# Patient Record
Sex: FEMALE | Race: WHITE | NOT HISPANIC OR LATINO | Employment: FULL TIME | URBAN - METROPOLITAN AREA
[De-identification: names, ages, dates, MRNs, and addresses within clinical notes are randomized per-mention and may not be internally consistent; named-entity substitution may affect disease eponyms.]

---

## 2017-06-29 ENCOUNTER — OFFICE VISIT (OUTPATIENT)
Dept: OBSTETRICS AND GYNECOLOGY | Facility: CLINIC | Age: 52
End: 2017-06-29

## 2017-06-29 VITALS
BODY MASS INDEX: 24.79 KG/M2 | WEIGHT: 134.7 LBS | DIASTOLIC BLOOD PRESSURE: 66 MMHG | SYSTOLIC BLOOD PRESSURE: 98 MMHG | HEIGHT: 62 IN

## 2017-06-29 DIAGNOSIS — D25.9 UTERINE LEIOMYOMA, UNSPECIFIED LOCATION: ICD-10-CM

## 2017-06-29 DIAGNOSIS — Z13.9 SCREENING: Primary | ICD-10-CM

## 2017-06-29 DIAGNOSIS — B00.9 HSV-2 (HERPES SIMPLEX VIRUS 2) INFECTION: ICD-10-CM

## 2017-06-29 DIAGNOSIS — F17.200 SMOKER: ICD-10-CM

## 2017-06-29 LAB
BILIRUB BLD-MCNC: NEGATIVE MG/DL
CLARITY, POC: CLEAR
COLOR UR: YELLOW
GLUCOSE UR STRIP-MCNC: NEGATIVE MG/DL
KETONES UR QL: NEGATIVE
LEUKOCYTE EST, POC: NEGATIVE
NITRITE UR-MCNC: NEGATIVE MG/ML
PH UR: 5 [PH] (ref 5–8)
PROT UR STRIP-MCNC: NEGATIVE MG/DL
RBC # UR STRIP: NEGATIVE /UL
SP GR UR: 1 (ref 1–1.03)
UROBILINOGEN UR QL: NORMAL

## 2017-06-29 PROCEDURE — 99396 PREV VISIT EST AGE 40-64: CPT | Performed by: OBSTETRICS & GYNECOLOGY

## 2017-06-29 PROCEDURE — 99406 BEHAV CHNG SMOKING 3-10 MIN: CPT | Performed by: OBSTETRICS & GYNECOLOGY

## 2017-06-29 PROCEDURE — 81002 URINALYSIS NONAUTO W/O SCOPE: CPT | Performed by: OBSTETRICS & GYNECOLOGY

## 2017-06-29 RX ORDER — SITAGLIPTIN 50 MG/1
TABLET, FILM COATED ORAL
COMMUNITY
Start: 2017-06-22 | End: 2020-01-04 | Stop reason: HOSPADM

## 2017-07-02 PROBLEM — R51.9 HEADACHE: Status: ACTIVE | Noted: 2017-07-02

## 2017-07-02 PROBLEM — D21.9 FIBROIDS: Status: ACTIVE | Noted: 2017-07-02

## 2017-07-02 PROBLEM — B00.9 HSV-2 (HERPES SIMPLEX VIRUS 2) INFECTION: Status: ACTIVE | Noted: 2017-07-02

## 2017-07-02 PROBLEM — F17.200 SMOKER: Status: ACTIVE | Noted: 2017-07-02

## 2017-07-02 PROBLEM — E11.9 DIABETES MELLITUS (HCC): Status: ACTIVE | Noted: 2017-07-02

## 2017-07-02 RX ORDER — VALACYCLOVIR HYDROCHLORIDE 500 MG/1
500 TABLET, FILM COATED ORAL DAILY
Qty: 90 TABLET | Refills: 3 | Status: SHIPPED | OUTPATIENT
Start: 2017-07-02 | End: 2018-09-02 | Stop reason: SDUPTHER

## 2017-08-21 ENCOUNTER — HOSPITAL ENCOUNTER (OUTPATIENT)
Dept: MAMMOGRAPHY | Facility: HOSPITAL | Age: 52
Discharge: HOME | End: 2017-08-21
Attending: OBSTETRICS & GYNECOLOGY | Admitting: OBSTETRICS & GYNECOLOGY

## 2017-08-21 DIAGNOSIS — Z13.9 SCREENING: ICD-10-CM

## 2017-08-21 PROCEDURE — G0202 SCR MAMMO BI INCL CAD: HCPCS

## 2017-09-16 ENCOUNTER — APPOINTMENT (OUTPATIENT)
Dept: GENERAL RADIOLOGY | Facility: HOSPITAL | Age: 52
End: 2017-09-16

## 2017-09-16 ENCOUNTER — HOSPITAL ENCOUNTER (EMERGENCY)
Facility: HOSPITAL | Age: 52
Discharge: HOME | End: 2017-09-16
Attending: EMERGENCY MEDICINE | Admitting: EMERGENCY MEDICINE

## 2017-09-16 VITALS
TEMPERATURE: 98.4 F | HEART RATE: 96 BPM | DIASTOLIC BLOOD PRESSURE: 77 MMHG | SYSTOLIC BLOOD PRESSURE: 117 MMHG | RESPIRATION RATE: 16 BRPM | HEIGHT: 62 IN | BODY MASS INDEX: 22.45 KG/M2 | WEIGHT: 122 LBS | OXYGEN SATURATION: 98 %

## 2017-09-16 DIAGNOSIS — S62.324A CLOSED DISPLACED FRACTURE OF SHAFT OF FOURTH METACARPAL BONE OF RIGHT HAND, INITIAL ENCOUNTER: Primary | ICD-10-CM

## 2017-09-16 PROCEDURE — 26600 TREAT METACARPAL FRACTURE: CPT | Performed by: EMERGENCY MEDICINE

## 2017-09-16 PROCEDURE — 29125 APPL SHORT ARM SPLINT STATIC: CPT | Performed by: EMERGENCY MEDICINE

## 2017-09-16 PROCEDURE — 73130 X-RAY EXAM OF HAND: CPT

## 2017-09-16 PROCEDURE — 99283 EMERGENCY DEPT VISIT LOW MDM: CPT

## 2017-09-16 RX ORDER — HYDROCODONE BITARTRATE AND ACETAMINOPHEN 5; 325 MG/1; MG/1
1 TABLET ORAL EVERY 8 HOURS PRN
Qty: 15 TABLET | Refills: 0 | Status: SHIPPED | OUTPATIENT
Start: 2017-09-16 | End: 2017-09-18 | Stop reason: SDUPTHER

## 2017-09-16 RX ORDER — HYDROCODONE BITARTRATE AND ACETAMINOPHEN 5; 325 MG/1; MG/1
1 TABLET ORAL ONCE
Status: COMPLETED | OUTPATIENT
Start: 2017-09-16 | End: 2017-09-16

## 2017-09-16 RX ADMIN — HYDROCODONE BITARTRATE AND ACETAMINOPHEN 1 TABLET: 5; 325 TABLET ORAL at 19:07

## 2017-09-16 NOTE — DISCHARGE INSTRUCTIONS
Keep your hand in the splint that we provided until you are seen by the orthopedics doctor this week.  Please return to the emergency room for any worsening pain, swelling, weakness, numbness or any other concerns.

## 2017-09-16 NOTE — ED PROVIDER NOTES
Subjective   History of Present Illness  History of Present Illness    Chief complaint: Hand injury    Location: Right hand    Quality/Severity:  Moderate pain    Timing/Duration: Occurred earlier this morning    Modifying Factors: Worse with movement of the hand    Narrative: This patient presents for evaluation of a right hand injury that she sustained this morning.  She went outside to take a trash bag out the trash can.  When she turned around to go upstairs, she lost her footing and fell forward.  She smacked her right hand against the railing along the back side of the hand bones.  This caused some immediate pain and subsequently some swelling.  She tried to take some ibuprofen at home but that did not help the pain at all.  The tenderness seemed to increase at the afternoon and it was hurting more and more if she tried to move her hand.  She is right-hand dominant.  She denies any other areas of injury or concern.    Associated Symptoms: none      Review of Systems   Constitutional: Negative for activity change and diaphoresis.   HENT: Negative.    Respiratory: Negative for cough and shortness of breath.    Cardiovascular: Negative for chest pain.   Gastrointestinal: Negative for abdominal pain.   Musculoskeletal: Positive for arthralgias and joint swelling. Negative for gait problem.   Skin: Negative for color change.   Neurological: Negative for syncope and headaches.   All other systems reviewed and are negative.      Past Medical History:   Diagnosis Date   • Abnormal Pap smear of cervix     1 abnormal pap with normal f/u   • Diabetes mellitus    • Fibroid    • HSV-2 (herpes simplex virus 2) infection 2017   • Menstrual disorder    • Migraines    • Seasonal allergies        No Known Allergies    Past Surgical History:   Procedure Laterality Date   • CARPAL TUNNEL RELEASE WITH CUBITAL TUNNEL RELEASE Right    •  SECTION      X2   • COLONOSCOPY N/A 2016    Procedure: COLONOSCOPY with  polypectomy;  Surgeon: Maryan Kent MD;  Location: [facility];  Service:    • ENDOMETRIAL ABLATION      thermachoice   • HYSTEROSCOPY      AND ENDOMETRIAL  ABLATION   • NASAL SEPTUM SURGERY     • TUBAL ABDOMINAL LIGATION         Family History   Problem Relation Age of Onset   • Hypertension Mother    • Hyperlipidemia Mother    • Atrial fibrillation Mother    • Stroke Mother    • Heart attack Father    • Hypertension Father    • Diabetes Father    • Alcohol abuse Father    • Dementia Father    • Stroke Sister    • Crohn's disease Sister    • Heart attack Brother    • Hypertension Brother    • Stroke Brother    • Alzheimer's disease Paternal Grandmother    • Diabetes Paternal Aunt    • Rheum arthritis Other    • Colon cancer Maternal Uncle    • Breast cancer Neg Hx    • Ovarian cancer Neg Hx        Social History     Social History   • Marital status:      Spouse name: N/A   • Number of children: N/A   • Years of education: N/A     Social History Main Topics   • Smoking status: Former Smoker   • Smokeless tobacco: Never Used   • Alcohol use Yes      Comment: OCCASIONALLY   • Drug use: No   • Sexual activity: Yes     Partners: Male     Birth control/ protection: Surgical     Other Topics Concern   • None     Social History Narrative   • None     ED Triage Vitals   Temp Heart Rate Resp BP SpO2   09/16/17 1747 09/16/17 1747 09/16/17 1747 09/16/17 1747 09/16/17 1747   98.4 °F (36.9 °C) 96 16 117/77 98 %      Temp src Heart Rate Source Patient Position BP Location FiO2 (%)   09/16/17 1747 -- -- -- --   Oral               Objective   Physical Exam   Constitutional: She is oriented to person, place, and time. She appears well-developed and well-nourished. No distress.   HENT:   Head: Normocephalic and atraumatic.   Eyes: EOM are normal. Pupils are equal, round, and reactive to light. Right eye exhibits no discharge. Left eye exhibits no discharge.   Neck: Normal range of motion. Neck supple.   Cardiovascular:  Normal rate and intact distal pulses.    Pulmonary/Chest: Effort normal. No respiratory distress.   Musculoskeletal: She exhibits tenderness. She exhibits no edema or deformity.   Moderate tenderness noted throughout the dorsal aspect of the right third, fourth, fifth metacarpal bones.  There is some moderate swelling and ecchymosis noted in these areas also.  Patient has decreased range of motion with flexion and extension of the fingers due to pain.  Distal sensation is intact to the fingers appropriately.  The wrist appears to be nontender and nonswollen.  She is able to flex and extend the wrist appropriately.  Radial pulses easily palpated.   Neurological: She is alert and oriented to person, place, and time.   Skin: Skin is warm and dry. No rash noted. She is not diaphoretic. No erythema.   Psychiatric: She has a normal mood and affect. Her behavior is normal. Judgment and thought content normal.   Nursing note and vitals reviewed.    RADIOLOGY        Study: X-ray right hand    Findings: Fourth metacarpal fracture    Interpreted contemporaneously with treatment by myself, independently viewed by me        Orthopedic Injury Treatment  Date/Time: 9/16/2017 7:40 PM  Performed by: MARIBETH STAFFORD  Authorized by: MARIBETH STAFFORD   Consent: Verbal consent obtained.  Risks and benefits: risks, benefits and alternatives were discussed  Consent given by: patient  Patient understanding: patient states understanding of the procedure being performed  Patient consent: the patient's understanding of the procedure matches consent given  Procedure consent: procedure consent matches procedure scheduled  Imaging studies: imaging studies available  Patient identity confirmed: verbally with patient  Injury location: hand  Location details: right hand  Injury type: fracture  Fracture type: fourth metacarpal  Pre-procedure neurovascular assessment: neurovascularly intact  Pre-procedure distal perfusion: normal  Pre-procedure  neurological function: normal  Pre-procedure range of motion: reduced    Anesthesia:  Local anesthesia used: no    Sedation:  Patient sedated: no  Manipulation performed: no  Immobilization: splint  Splint type: ulnar gutter  Supplies used: Ortho-Glass  Post-procedure neurovascular assessment: post-procedure neurovascularly intact  Post-procedure distal perfusion: normal  Post-procedure neurological function: normal  Post-procedure range of motion: unchanged  Patient tolerance: Patient tolerated the procedure well with no immediate complications               ED Course  ED Course   Comment By Time   I reviewed the x-ray which shows fourth metacarpal fracture as suspected clinically.  I placed her in an ulnar gutter splint using Ortho-Glass material.  We'll give her a prescription for Norco for the next few days.  Advised follow-up with orthopedics this week for repeat evaluation. Asael Barbour MD 09/16 1942                  MDM  Number of Diagnoses or Management Options  Closed displaced fracture of shaft of fourth metacarpal bone of right hand, initial encounter:      Amount and/or Complexity of Data Reviewed  Tests in the radiology section of CPT®: reviewed and ordered  Independent visualization of images, tracings, or specimens: yes    Risk of Complications, Morbidity, and/or Mortality  Presenting problems: moderate  Diagnostic procedures: moderate  Management options: moderate        Final diagnoses:   Closed displaced fracture of shaft of fourth metacarpal bone of right hand, initial encounter            Asael Barbour MD  09/16/17 1944

## 2017-09-18 ENCOUNTER — OFFICE VISIT (OUTPATIENT)
Dept: ORTHOPEDIC SURGERY | Facility: CLINIC | Age: 52
End: 2017-09-18

## 2017-09-18 VITALS
BODY MASS INDEX: 22.45 KG/M2 | DIASTOLIC BLOOD PRESSURE: 69 MMHG | HEIGHT: 62 IN | HEART RATE: 93 BPM | SYSTOLIC BLOOD PRESSURE: 101 MMHG | WEIGHT: 122 LBS

## 2017-09-18 DIAGNOSIS — S62.334A CLOSED DISPLACED FRACTURE OF NECK OF FOURTH METACARPAL BONE OF RIGHT HAND, INITIAL ENCOUNTER: Primary | ICD-10-CM

## 2017-09-18 PROCEDURE — 26600 TREAT METACARPAL FRACTURE: CPT | Performed by: ORTHOPAEDIC SURGERY

## 2017-09-18 PROCEDURE — 99203 OFFICE O/P NEW LOW 30 MIN: CPT | Performed by: ORTHOPAEDIC SURGERY

## 2017-09-18 RX ORDER — HYDROCODONE BITARTRATE AND ACETAMINOPHEN 5; 325 MG/1; MG/1
1 TABLET ORAL EVERY 8 HOURS PRN
Qty: 24 TABLET | Refills: 0 | Status: SHIPPED | OUTPATIENT
Start: 2017-09-18 | End: 2017-09-23

## 2017-09-18 NOTE — PROGRESS NOTES
Subjective: Right hand pain     Patient ID: Lona Dupree is a 52 y.o. female.    Chief Complaint:    History of Present Illness 52-year-old female is seen for her right dominant hand she injured over the weekend when she fell resulting in a right fourth metacarpal shaft fracture.  Was seen in the emergency room where she was x-rayed and splinted now presents here.  He is complaining of moderate pain.       Social History     Occupational History   • Not on file.     Social History Main Topics   • Smoking status: Former Smoker   • Smokeless tobacco: Never Used   • Alcohol use Yes      Comment: OCCASIONALLY   • Drug use: No   • Sexual activity: Yes     Partners: Male     Birth control/ protection: Surgical      Review of Systems   Constitutional: Negative for chills, diaphoresis, fever and unexpected weight change.   HENT: Negative for hearing loss, nosebleeds, sore throat and tinnitus.    Eyes: Negative for pain and visual disturbance.   Respiratory: Negative for cough, shortness of breath and wheezing.    Cardiovascular: Negative for chest pain and palpitations.   Gastrointestinal: Negative for abdominal pain, diarrhea, nausea and vomiting.   Endocrine: Negative for cold intolerance, heat intolerance and polydipsia.   Genitourinary: Negative for difficulty urinating, dysuria and hematuria.   Musculoskeletal: Positive for arthralgias, joint swelling and myalgias.   Skin: Negative for rash and wound.   Allergic/Immunologic: Negative for environmental allergies.   Neurological: Negative for dizziness, syncope and numbness.   Hematological: Does not bruise/bleed easily.   Psychiatric/Behavioral: Negative for dysphoric mood and sleep disturbance. The patient is not nervous/anxious.          Past Medical History:   Diagnosis Date   • Abnormal Pap smear of cervix     1 abnormal pap with normal f/u   • Diabetes mellitus    • Fibroid    • HSV-2 (herpes simplex virus 2) infection 7/2/2017   • Menstrual disorder    •  Migraines    • Seasonal allergies      Past Surgical History:   Procedure Laterality Date   • CARPAL TUNNEL RELEASE WITH CUBITAL TUNNEL RELEASE Right    •  SECTION      X2   • COLONOSCOPY N/A 2016    Procedure: COLONOSCOPY with polypectomy;  Surgeon: Maryan Kent MD;  Location: [facility];  Service:    • ENDOMETRIAL ABLATION      thermachoice   • HYSTEROSCOPY      AND ENDOMETRIAL  ABLATION   • NASAL SEPTUM SURGERY     • TUBAL ABDOMINAL LIGATION       Family History   Problem Relation Age of Onset   • Hypertension Mother    • Hyperlipidemia Mother    • Atrial fibrillation Mother    • Stroke Mother    • Heart attack Father    • Hypertension Father    • Diabetes Father    • Alcohol abuse Father    • Dementia Father    • Stroke Sister    • Crohn's disease Sister    • Heart attack Brother    • Hypertension Brother    • Stroke Brother    • Alzheimer's disease Paternal Grandmother    • Diabetes Paternal Aunt    • Rheum arthritis Other    • Colon cancer Maternal Uncle    • Breast cancer Neg Hx    • Ovarian cancer Neg Hx          Objective:  Vitals:    17 1355   BP: 101/69   Pulse: 93     Last 3 weights    17  1355   Weight: 122 lb (55.3 kg)     Body mass index is 22.31 kg/(m^2).       Ortho Exam  reviewed the x-rays AP lateral oblique done at the Hospital shows fracture of the fourth metacarpal shaft.  The mid metacarpal fracture oblique in nature with some slight shortening.  Does not appear to be any significant malrotation.  She is alert and oriented ×3.  She is normocephalic.  Pupils equal round reactive to light sclerae clear.  The right hand shows moderate swelling to the metacarpal area with minimal ecchymosis.  His no motor deficit good distal pulses.  Moderate to severe pain over the fourth metacarpal as expected.  Distal motor function is intact as far as radial ulnar median nerve.  No sensory loss.  No swelling to the forearm.  Skin is cool to touch.    Assessment:       1. Closed  displaced fracture of neck of fourth metacarpal bone of right hand, initial encounter          Plan:      reviewed the x-rays first with the patient.  Also reviewed her physical findings with her.  She does not have any apparent malrotation of the digit is when she goes to clench a fist there is no malrotation of that digit.  Discussed with her the nature the fracture that is slight shortening may cause an extensor lag but is no more than his right nonfunctional any functional disability.  After lengthy discussion we'll put her in a new ulnar gutter splint return next week for repeat x-rays to evaluate any further shortening of that fracture.  If there is any significant shortening she may require reduction internal fixation otherwise stays as it is been to treat this nonoperatively.  Discussed this treatment plan with her and she was in agreement.  Return next week with an x-ray AP lateral oblique of the hand in the splint.      Work Status:    ABE query complete.    Orders:  No orders of the defined types were placed in this encounter.      Medications:  New Medications Ordered This Visit   Medications   • HYDROcodone-acetaminophen (NORCO) 5-325 MG per tablet     Sig: Take 1 tablet by mouth Every 8 (Eight) Hours As Needed for Severe Pain  for up to 5 days.     Dispense:  24 tablet     Refill:  0       Followup:  Return in about 1 week (around 9/25/2017).          Dragon transcription disclaimer     Much of this encounter note is an electronic transcription/translation of spoken language to printed text. The electronic translation of spoken language may permit erroneous, or at times, nonsensical words or phrases to be inadvertently transcribed. Although I have reviewed the note for such errors, some may still exist.

## 2017-09-25 ENCOUNTER — OFFICE VISIT (OUTPATIENT)
Dept: ORTHOPEDIC SURGERY | Facility: CLINIC | Age: 52
End: 2017-09-25

## 2017-09-25 VITALS — WEIGHT: 125 LBS | BODY MASS INDEX: 23 KG/M2 | HEIGHT: 62 IN

## 2017-09-25 DIAGNOSIS — R52 PAIN: Primary | ICD-10-CM

## 2017-09-25 DIAGNOSIS — S62.334A CLOSED DISPLACED FRACTURE OF NECK OF FOURTH METACARPAL BONE OF RIGHT HAND, INITIAL ENCOUNTER: ICD-10-CM

## 2017-09-25 PROCEDURE — 99024 POSTOP FOLLOW-UP VISIT: CPT | Performed by: ORTHOPAEDIC SURGERY

## 2017-09-25 PROCEDURE — 73130 X-RAY EXAM OF HAND: CPT | Performed by: ORTHOPAEDIC SURGERY

## 2017-09-25 NOTE — PROGRESS NOTES
Subjective: Right fourth metacarpal fracture     Patient ID: Lona Dupree is a 52 y.o. female.    Chief Complaint:    History of Present Illness patient is a week out from the injury and is doing well with minimal pain and discomfort.  She has been in an ulnar gutter splint since her last visit       Social History     Occupational History   • Not on file.     Social History Main Topics   • Smoking status: Former Smoker   • Smokeless tobacco: Never Used   • Alcohol use Yes      Comment: OCCASIONALLY   • Drug use: No   • Sexual activity: Yes     Partners: Male     Birth control/ protection: Surgical      Review of Systems      Past Medical History:   Diagnosis Date   • Abnormal Pap smear of cervix     1 abnormal pap with normal f/u   • Diabetes mellitus    • Fibroid    • HSV-2 (herpes simplex virus 2) infection 2017   • Menstrual disorder    • Migraines    • Seasonal allergies      Past Surgical History:   Procedure Laterality Date   • CARPAL TUNNEL RELEASE WITH CUBITAL TUNNEL RELEASE Right    •  SECTION      X2   • COLONOSCOPY N/A 2016    Procedure: COLONOSCOPY with polypectomy;  Surgeon: Maryan Kent MD;  Location: [facility];  Service:    • ENDOMETRIAL ABLATION      thermachoice   • HYSTEROSCOPY      AND ENDOMETRIAL  ABLATION   • NASAL SEPTUM SURGERY     • TUBAL ABDOMINAL LIGATION       Family History   Problem Relation Age of Onset   • Hypertension Mother    • Hyperlipidemia Mother    • Atrial fibrillation Mother    • Stroke Mother    • Heart attack Father    • Hypertension Father    • Diabetes Father    • Alcohol abuse Father    • Dementia Father    • Stroke Sister    • Crohn's disease Sister    • Heart attack Brother    • Hypertension Brother    • Stroke Brother    • Alzheimer's disease Paternal Grandmother    • Diabetes Paternal Aunt    • Rheum arthritis Other    • Colon cancer Maternal Uncle    • Breast cancer Neg Hx    • Ovarian cancer Neg Hx          Objective:  There were no vitals  filed for this visit.  Last 3 weights    09/25/17  1411   Weight: 125 lb (56.7 kg)     Body mass index is 22.86 kg/(m^2).       Ortho Exam  AP lateral oblique view of that hand shows the oblique fracture with no significant change in the position compared to the x-rays done previously.  Alignment is satisfactory.  She is doing well with minimal pain    Assessment:       1. Pain    2. Closed displaced fracture of neck of fourth metacarpal bone of right hand, initial encounter          Plan:  Fracture remains unchanged think we can continue to treat this nonoperatively.  Return to office in 10 days with a repeat x-ray of the hand.  Off work until seen.          Work Status:    Squla query complete.    Orders:  Orders Placed This Encounter   Procedures   • XR Hand 3+ View Right       Medications:  No orders of the defined types were placed in this encounter.      Followup:  Return in about 10 days (around 10/5/2017).          Dragon transcription disclaimer     Much of this encounter note is an electronic transcription/translation of spoken language to printed text. The electronic translation of spoken language may permit erroneous, or at times, nonsensical words or phrases to be inadvertently transcribed. Although I have reviewed the note for such errors, some may still exist.

## 2017-10-05 ENCOUNTER — OFFICE VISIT (OUTPATIENT)
Dept: ORTHOPEDIC SURGERY | Facility: CLINIC | Age: 52
End: 2017-10-05

## 2017-10-05 DIAGNOSIS — R52 PAIN: Primary | ICD-10-CM

## 2017-10-05 DIAGNOSIS — S62.334A CLOSED DISPLACED FRACTURE OF NECK OF FOURTH METACARPAL BONE OF RIGHT HAND, INITIAL ENCOUNTER: ICD-10-CM

## 2017-10-05 PROCEDURE — 73130 X-RAY EXAM OF HAND: CPT | Performed by: ORTHOPAEDIC SURGERY

## 2017-10-05 PROCEDURE — 99024 POSTOP FOLLOW-UP VISIT: CPT | Performed by: ORTHOPAEDIC SURGERY

## 2017-10-05 RX ORDER — HYDROCODONE BITARTRATE AND ACETAMINOPHEN 5; 325 MG/1; MG/1
1 TABLET ORAL EVERY 6 HOURS PRN
Qty: 40 TABLET | Refills: 0 | Status: SHIPPED | OUTPATIENT
Start: 2017-10-05 | End: 2017-10-31 | Stop reason: SDUPTHER

## 2017-10-05 NOTE — PROGRESS NOTES
Subjective: Right hand fracture     Patient ID: Lona Dupree is a 52 y.o. female.    Chief Complaint:    History of Present Illness patient is approximately 3 weeks out from the right fourth metacarpal shaft fracture.  She is doing well with minimal pain.  Has been in the ulnar gutter splint.       Social History     Occupational History   • Not on file.     Social History Main Topics   • Smoking status: Former Smoker   • Smokeless tobacco: Never Used   • Alcohol use Yes      Comment: OCCASIONALLY   • Drug use: No   • Sexual activity: Yes     Partners: Male     Birth control/ protection: Surgical      Review of Systems   Constitutional: Negative for chills, diaphoresis, fever and unexpected weight change.   HENT: Negative for hearing loss, nosebleeds, sore throat and tinnitus.    Eyes: Negative for pain and visual disturbance.   Respiratory: Negative for cough, shortness of breath and wheezing.    Cardiovascular: Negative for chest pain and palpitations.   Gastrointestinal: Negative for abdominal pain, diarrhea, nausea and vomiting.   Endocrine: Negative for cold intolerance, heat intolerance and polydipsia.   Genitourinary: Negative for difficulty urinating, dysuria and hematuria.   Musculoskeletal: Positive for arthralgias. Negative for joint swelling and myalgias.   Skin: Negative for rash and wound.   Allergic/Immunologic: Negative for environmental allergies.   Neurological: Negative for dizziness, syncope and numbness.   Hematological: Does not bruise/bleed easily.   Psychiatric/Behavioral: Negative for dysphoric mood and sleep disturbance. The patient is not nervous/anxious.    All other systems reviewed and are negative.        Past Medical History:   Diagnosis Date   • Abnormal Pap smear of cervix     1 abnormal pap with normal f/u   • Diabetes mellitus    • Fibroid    • HSV-2 (herpes simplex virus 2) infection 7/2/2017   • Menstrual disorder    • Migraines    • Seasonal allergies      Past Surgical  History:   Procedure Laterality Date   • CARPAL TUNNEL RELEASE WITH CUBITAL TUNNEL RELEASE Right    •  SECTION      X2   • COLONOSCOPY N/A 2016    Procedure: COLONOSCOPY with polypectomy;  Surgeon: Maryan Kent MD;  Location: [facility];  Service:    • ENDOMETRIAL ABLATION      thermachoice   • HYSTEROSCOPY      AND ENDOMETRIAL  ABLATION   • NASAL SEPTUM SURGERY     • TUBAL ABDOMINAL LIGATION       Family History   Problem Relation Age of Onset   • Hypertension Mother    • Hyperlipidemia Mother    • Atrial fibrillation Mother    • Stroke Mother    • Heart attack Father    • Hypertension Father    • Diabetes Father    • Alcohol abuse Father    • Dementia Father    • Stroke Sister    • Crohn's disease Sister    • Heart attack Brother    • Hypertension Brother    • Stroke Brother    • Alzheimer's disease Paternal Grandmother    • Diabetes Paternal Aunt    • Rheum arthritis Other    • Colon cancer Maternal Uncle    • Breast cancer Neg Hx    • Ovarian cancer Neg Hx          Objective:  There were no vitals filed for this visit.  There were no vitals filed for this visit.  There is no height or weight on file to calculate BMI.       Ortho Exam  AP lateral oblique view of her wrist to evaluate fracture healing shows callus formation at the fractures I with no further displacement compared to previous x-rays.  She is alert and oriented ×3.  No motor deficit good distal pulses good capillary refill    Assessment:       1. Pain    2. Closed displaced fracture of neck of fourth metacarpal bone of right hand, initial encounter          Plan:        Ulnar gutter splint applied.  Return in 3 weeks with x-rays out of the splint.      Work Status:    ABE query complete.    Orders:  Orders Placed This Encounter   Procedures   • XR Hand 3+ View Right       Medications:  No orders of the defined types were placed in this encounter.      Followup:  Return in about 3 weeks (around 10/26/2017).          Dragon  transcription disclaimer     Much of this encounter note is an electronic transcription/translation of spoken language to printed text. The electronic translation of spoken language may permit erroneous, or at times, nonsensical words or phrases to be inadvertently transcribed. Although I have reviewed the note for such errors, some may still exist.

## 2017-10-26 ENCOUNTER — OFFICE VISIT (OUTPATIENT)
Dept: ORTHOPEDIC SURGERY | Facility: CLINIC | Age: 52
End: 2017-10-26

## 2017-10-26 DIAGNOSIS — S62.334A CLOSED DISPLACED FRACTURE OF NECK OF FOURTH METACARPAL BONE OF RIGHT HAND, INITIAL ENCOUNTER: ICD-10-CM

## 2017-10-26 DIAGNOSIS — R52 PAIN: Primary | ICD-10-CM

## 2017-10-26 PROCEDURE — 99024 POSTOP FOLLOW-UP VISIT: CPT | Performed by: ORTHOPAEDIC SURGERY

## 2017-10-26 PROCEDURE — 73130 X-RAY EXAM OF HAND: CPT | Performed by: ORTHOPAEDIC SURGERY

## 2017-10-26 NOTE — PROGRESS NOTES
Subjective: Right fourth metacarpal fracture     Patient ID: Lona Dupree is a 52 y.o. female.    Chief Complaint:    History of Present Illness 52-year-old female right-hand-dominant is 6 weeks out from her injury.  She is doing well pain well-controlled oral medication       Social History     Occupational History   • Not on file.     Social History Main Topics   • Smoking status: Former Smoker   • Smokeless tobacco: Never Used   • Alcohol use Yes      Comment: OCCASIONALLY   • Drug use: No   • Sexual activity: Yes     Partners: Male     Birth control/ protection: Surgical      Review of Systems   Constitutional: Negative for chills, diaphoresis, fever and unexpected weight change.   HENT: Negative for hearing loss, nosebleeds, sore throat and tinnitus.    Eyes: Negative for pain and visual disturbance.   Respiratory: Negative for cough, shortness of breath and wheezing.    Cardiovascular: Negative for chest pain and palpitations.   Gastrointestinal: Negative for abdominal pain, diarrhea, nausea and vomiting.   Endocrine: Negative for cold intolerance, heat intolerance and polydipsia.   Genitourinary: Negative for difficulty urinating, dysuria and hematuria.   Musculoskeletal: Positive for arthralgias. Negative for joint swelling and myalgias.   Skin: Negative for rash and wound.   Allergic/Immunologic: Negative for environmental allergies.   Neurological: Negative for dizziness, syncope and numbness.   Hematological: Does not bruise/bleed easily.   Psychiatric/Behavioral: Negative for dysphoric mood and sleep disturbance. The patient is not nervous/anxious.    All other systems reviewed and are negative.        Past Medical History:   Diagnosis Date   • Abnormal Pap smear of cervix     1 abnormal pap with normal f/u   • Diabetes mellitus    • Fibroid    • HSV-2 (herpes simplex virus 2) infection 7/2/2017   • Menstrual disorder    • Migraines    • Seasonal allergies      Past Surgical History:   Procedure  Laterality Date   • CARPAL TUNNEL RELEASE WITH CUBITAL TUNNEL RELEASE Right    •  SECTION      X2   • COLONOSCOPY N/A 2016    Procedure: COLONOSCOPY with polypectomy;  Surgeon: Maryan Kent MD;  Location: [facility] OR;  Service:    • ENDOMETRIAL ABLATION      thermachoice   • HYSTEROSCOPY      AND ENDOMETRIAL  ABLATION   • NASAL SEPTUM SURGERY     • TUBAL ABDOMINAL LIGATION       Family History   Problem Relation Age of Onset   • Hypertension Mother    • Hyperlipidemia Mother    • Atrial fibrillation Mother    • Stroke Mother    • Heart attack Father    • Hypertension Father    • Diabetes Father    • Alcohol abuse Father    • Dementia Father    • Stroke Sister    • Crohn's disease Sister    • Heart attack Brother    • Hypertension Brother    • Stroke Brother    • Alzheimer's disease Paternal Grandmother    • Diabetes Paternal Aunt    • Rheum arthritis Other    • Colon cancer Maternal Uncle    • Breast cancer Neg Hx    • Ovarian cancer Neg Hx          Objective:  There were no vitals filed for this visit.  There were no vitals filed for this visit.  There is no height or weight on file to calculate BMI.       Ortho Exam  AP lateral oblique view of the hand out of the splint shows no change in position of the fracture.  Compared to previous x-rays there is some increasing callus but is not completely healed.  She is alert and oriented ×3.  His no swelling.  No motor loss no erythema and no sensory loss.  There is still some tenderness over the fracture site itself.    Assessment:       1. Pain    2. Closed displaced fracture of neck of fourth metacarpal bone of right hand, initial encounter          Plan:      reviewed the x-rays and physical findings with the patient.  We'll place her in a wrist immobilizer to protect the fourth metacarpal fracture she can begin using the hand as pain permits.  Off work until seen back in 3 weeks with a repeat x-ray.      Work Status:    ABE query  complete.    Orders:  Orders Placed This Encounter   Procedures   • XR Hand 3+ View Right       Medications:  No orders of the defined types were placed in this encounter.      Followup:  Return in about 3 weeks (around 11/16/2017).          Dragon transcription disclaimer     Much of this encounter note is an electronic transcription/translation of spoken language to printed text. The electronic translation of spoken language may permit erroneous, or at times, nonsensical words or phrases to be inadvertently transcribed. Although I have reviewed the note for such errors, some may still exist.

## 2017-10-31 ENCOUNTER — TELEPHONE (OUTPATIENT)
Dept: ORTHOPEDIC SURGERY | Facility: CLINIC | Age: 52
End: 2017-10-31

## 2017-10-31 RX ORDER — HYDROCODONE BITARTRATE AND ACETAMINOPHEN 5; 325 MG/1; MG/1
1 TABLET ORAL EVERY 6 HOURS PRN
Qty: 40 TABLET | Refills: 0 | Status: SHIPPED | OUTPATIENT
Start: 2017-10-31 | End: 2020-01-02 | Stop reason: SDUPTHER

## 2017-11-20 ENCOUNTER — OFFICE VISIT (OUTPATIENT)
Dept: ORTHOPEDIC SURGERY | Facility: CLINIC | Age: 52
End: 2017-11-20

## 2017-11-20 VITALS — WEIGHT: 120 LBS | HEIGHT: 62 IN | BODY MASS INDEX: 22.08 KG/M2

## 2017-11-20 DIAGNOSIS — R52 PAIN: Primary | ICD-10-CM

## 2017-11-20 DIAGNOSIS — S62.334A CLOSED DISPLACED FRACTURE OF NECK OF FOURTH METACARPAL BONE OF RIGHT HAND, INITIAL ENCOUNTER: ICD-10-CM

## 2017-11-20 PROCEDURE — 73130 X-RAY EXAM OF HAND: CPT | Performed by: ORTHOPAEDIC SURGERY

## 2017-11-20 PROCEDURE — 99024 POSTOP FOLLOW-UP VISIT: CPT | Performed by: ORTHOPAEDIC SURGERY

## 2017-11-20 RX ORDER — MELOXICAM 7.5 MG/1
7.5 TABLET ORAL DAILY
Qty: 30 TABLET | Refills: 5 | Status: SHIPPED | OUTPATIENT
Start: 2017-11-20 | End: 2017-12-18

## 2017-11-20 NOTE — PROGRESS NOTES
Subjective: Right fourth metacarpal fracture     Patient ID: Lona Dupree is a 52 y.o. female.    Chief Complaint:    History of Present Illness patient is 2 months out from her injury for a nondisplaced fracture treated with splinting almost doing well still having some soreness in the hand.  Pain involves not only the fourth metacarpal fracture that she's having pain in the PIPJ of the long finger and some stiffness in the fifth digit.       Social History     Occupational History   • Not on file.     Social History Main Topics   • Smoking status: Former Smoker   • Smokeless tobacco: Never Used   • Alcohol use Yes      Comment: OCCASIONALLY   • Drug use: No   • Sexual activity: Yes     Partners: Male     Birth control/ protection: Surgical      Review of Systems   Constitutional: Negative for chills, diaphoresis, fever and unexpected weight change.   HENT: Negative for hearing loss, nosebleeds, sore throat and tinnitus.    Eyes: Negative for pain and visual disturbance.   Respiratory: Negative for cough, shortness of breath and wheezing.    Cardiovascular: Negative for chest pain and palpitations.   Gastrointestinal: Negative for abdominal pain, diarrhea, nausea and vomiting.   Endocrine: Negative for cold intolerance, heat intolerance and polydipsia.   Genitourinary: Negative for difficulty urinating, dysuria and hematuria.   Musculoskeletal: Positive for arthralgias and myalgias. Negative for joint swelling.   Skin: Negative for rash and wound.   Allergic/Immunologic: Negative for environmental allergies.   Neurological: Negative for dizziness, syncope and numbness.   Hematological: Does not bruise/bleed easily.   Psychiatric/Behavioral: Negative for dysphoric mood and sleep disturbance. The patient is not nervous/anxious.          Past Medical History:   Diagnosis Date   • Abnormal Pap smear of cervix     1 abnormal pap with normal f/u   • Diabetes mellitus    • Fibroid    • HSV-2 (herpes simplex virus 2)  infection 2017   • Menstrual disorder    • Migraines    • Seasonal allergies      Past Surgical History:   Procedure Laterality Date   • CARPAL TUNNEL RELEASE WITH CUBITAL TUNNEL RELEASE Right    •  SECTION      X2   • COLONOSCOPY N/A 2016    Procedure: COLONOSCOPY with polypectomy;  Surgeon: Maryan Kent MD;  Location: [facility];  Service:    • ENDOMETRIAL ABLATION      thermachoice   • HYSTEROSCOPY      AND ENDOMETRIAL  ABLATION   • NASAL SEPTUM SURGERY     • TUBAL ABDOMINAL LIGATION       Family History   Problem Relation Age of Onset   • Hypertension Mother    • Hyperlipidemia Mother    • Atrial fibrillation Mother    • Stroke Mother    • Heart attack Father    • Hypertension Father    • Diabetes Father    • Alcohol abuse Father    • Dementia Father    • Stroke Sister    • Crohn's disease Sister    • Heart attack Brother    • Hypertension Brother    • Stroke Brother    • Alzheimer's disease Paternal Grandmother    • Diabetes Paternal Aunt    • Rheum arthritis Other    • Colon cancer Maternal Uncle    • Breast cancer Neg Hx    • Ovarian cancer Neg Hx          Objective:  There were no vitals filed for this visit.  Last 3 weights    17  0854   Weight: 120 lb (54.4 kg)     Body mass index is 21.95 kg/(m^2).       Ortho Exam  AP lateral oblique view of her hand to evaluate fracture healing does show increased callus formation compared to previous x-rays no change in the alignment with the fracture is not completely healed.  She is alert and oriented ×3.  She has full range of motion of the hand and the digits or soreness in the long and little finger with full flexion.  Still some point tenderness over the fracture site.  Swelling to the PIPJ of the long finger but no swelling over the fracture site.  There is no motor deficit good distal pulses no sensory loss.    Assessment:       1. Pain    2. Closed displaced fracture of neck of fourth metacarpal bone of right hand, initial encounter           Plan:      we'll begin meloxicam to try to get rid of some of the soreness in the digits.  Return in 4 to the repeat x-ray to does not complete consolidation we'll get a CT scan.  Discussed treatment plan with the patient and she is in agreement.      Work Status:    ABE query complete.    Orders:  Orders Placed This Encounter   Procedures   • XR hand 3+ vw right       Medications:  New Medications Ordered This Visit   Medications   • meloxicam (MOBIC) 7.5 MG tablet     Sig: Take 1 tablet by mouth Daily.     Dispense:  30 tablet     Refill:  5       Followup:  Return in about 4 weeks (around 12/18/2017).          Dragon transcription disclaimer     Much of this encounter note is an electronic transcription/translation of spoken language to printed text. The electronic translation of spoken language may permit erroneous, or at times, nonsensical words or phrases to be inadvertently transcribed. Although I have reviewed the note for such errors, some may still exist.

## 2017-12-18 ENCOUNTER — OFFICE VISIT (OUTPATIENT)
Dept: ORTHOPEDIC SURGERY | Facility: CLINIC | Age: 52
End: 2017-12-18

## 2017-12-18 DIAGNOSIS — R52 PAIN: Primary | ICD-10-CM

## 2017-12-18 DIAGNOSIS — S62.334A CLOSED DISPLACED FRACTURE OF NECK OF FOURTH METACARPAL BONE OF RIGHT HAND, INITIAL ENCOUNTER: ICD-10-CM

## 2017-12-18 PROCEDURE — 73130 X-RAY EXAM OF HAND: CPT | Performed by: ORTHOPAEDIC SURGERY

## 2017-12-18 PROCEDURE — 99213 OFFICE O/P EST LOW 20 MIN: CPT | Performed by: ORTHOPAEDIC SURGERY

## 2017-12-18 NOTE — PROGRESS NOTES
Subjective: Fracture right fourth metacarpal shaft     Patient ID: Lona Dupree is a 52 y.o. female.    Chief Complaint:    History of Present Illness patient is a little more than 3 months out is doing better with less pain and discomfort in the hand.  Is able to perform all activities of daily living with minimal to no limitations       Social History     Occupational History   • Not on file.     Social History Main Topics   • Smoking status: Former Smoker   • Smokeless tobacco: Never Used   • Alcohol use Yes      Comment: OCCASIONALLY   • Drug use: No   • Sexual activity: Yes     Partners: Male     Birth control/ protection: Surgical      Review of Systems   Constitutional: Negative for chills, diaphoresis, fever and unexpected weight change.   HENT: Negative for hearing loss, nosebleeds, sore throat and tinnitus.    Eyes: Negative for pain and visual disturbance.   Respiratory: Negative for cough, shortness of breath and wheezing.    Cardiovascular: Negative for chest pain and palpitations.   Gastrointestinal: Negative for abdominal pain, diarrhea, nausea and vomiting.   Endocrine: Negative for cold intolerance, heat intolerance and polydipsia.   Genitourinary: Negative for difficulty urinating, dysuria and hematuria.   Musculoskeletal: Positive for arthralgias. Negative for joint swelling and myalgias.   Skin: Negative for rash and wound.   Allergic/Immunologic: Negative for environmental allergies.   Neurological: Negative for dizziness, syncope and numbness.   Hematological: Does not bruise/bleed easily.   Psychiatric/Behavioral: Negative for dysphoric mood and sleep disturbance. The patient is not nervous/anxious.    All other systems reviewed and are negative.        Past Medical History:   Diagnosis Date   • Abnormal Pap smear of cervix     1 abnormal pap with normal f/u   • Diabetes mellitus    • Fibroid    • HSV-2 (herpes simplex virus 2) infection 7/2/2017   • Menstrual disorder    • Migraines    •  Seasonal allergies      Past Surgical History:   Procedure Laterality Date   • CARPAL TUNNEL RELEASE WITH CUBITAL TUNNEL RELEASE Right    •  SECTION      X2   • COLONOSCOPY N/A 2016    Procedure: COLONOSCOPY with polypectomy;  Surgeon: Maryan Kent MD;  Location: [facility];  Service:    • ENDOMETRIAL ABLATION      thermachoice   • HYSTEROSCOPY      AND ENDOMETRIAL  ABLATION   • NASAL SEPTUM SURGERY     • TUBAL ABDOMINAL LIGATION       Family History   Problem Relation Age of Onset   • Hypertension Mother    • Hyperlipidemia Mother    • Atrial fibrillation Mother    • Stroke Mother    • Heart attack Father    • Hypertension Father    • Diabetes Father    • Alcohol abuse Father    • Dementia Father    • Stroke Sister    • Crohn's disease Sister    • Heart attack Brother    • Hypertension Brother    • Stroke Brother    • Alzheimer's disease Paternal Grandmother    • Diabetes Paternal Aunt    • Rheum arthritis Other    • Colon cancer Maternal Uncle    • Breast cancer Neg Hx    • Ovarian cancer Neg Hx          Objective:  There were no vitals filed for this visit.  There were no vitals filed for this visit.  There is no height or weight on file to calculate BMI.       Ortho Exam   AP lateral oblique view of the hand shows increasing consolidation at the fracture site compared to the previous x-rays.  On exam there is no swelling or erythema.  She has full range of motion of the hand to make a tight clench fist.  No motor deficit no sensory loss.  The skin is cool to touch.  Minimal tenderness over the fracture site but no crepitus or instability is noted.  Skin is cool to touch.  Unable take meloxicam on a daily basis and she states it causes blurring of her vision but can't take ibuprofen with no side effects.  Has not been taking any anti-inflammatory to regular basis.      Assessment:       1. Pain    2. Closed displaced fracture of neck of fourth metacarpal bone of right hand, initial encounter           Plan:       I recommend she take the ibuprofen on a daily basis at least 800 twice a day.  Return to see me in 6 weeks only symptomatic otherwise when necessary.  She is doing better supportive a delay any further testing specifically CT scan that she has not met her deductible      Work Status:    ABE query complete.    Orders:  Orders Placed This Encounter   Procedures   • XR Hand 3+ View Right       Medications:  No orders of the defined types were placed in this encounter.      Followup:  Return in about 6 weeks (around 1/29/2018).          Dragon transcription disclaimer     Much of this encounter note is an electronic transcription/translation of spoken language to printed text. The electronic translation of spoken language may permit erroneous, or at times, nonsensical words or phrases to be inadvertently transcribed. Although I have reviewed the note for such errors, some may still exist.

## 2018-01-26 ENCOUNTER — TELEPHONE (OUTPATIENT)
Dept: ORTHOPEDIC SURGERY | Facility: CLINIC | Age: 53
End: 2018-01-26

## 2018-02-01 ENCOUNTER — OFFICE VISIT (OUTPATIENT)
Dept: ORTHOPEDIC SURGERY | Facility: CLINIC | Age: 53
End: 2018-02-01

## 2018-02-01 DIAGNOSIS — R52 PAIN: Primary | ICD-10-CM

## 2018-02-01 DIAGNOSIS — S62.334A CLOSED DISPLACED FRACTURE OF NECK OF FOURTH METACARPAL BONE OF RIGHT HAND, INITIAL ENCOUNTER: ICD-10-CM

## 2018-02-01 PROCEDURE — 73130 X-RAY EXAM OF HAND: CPT | Performed by: ORTHOPAEDIC SURGERY

## 2018-02-01 PROCEDURE — 99213 OFFICE O/P EST LOW 20 MIN: CPT | Performed by: ORTHOPAEDIC SURGERY

## 2018-02-01 NOTE — PROGRESS NOTES
Subjective: Right fourth metacarpal fracture     Patient ID: Lona Dupree is a 52 y.o. female.    Chief Complaint:    History of Present Illness 52-year-old female right-hand-dominant is now 4-1/2 months out from injury.  She returns today BECAUSE she is having still some soreness in the pain is not any real pain.  She did taken an anti-inflammatory but only intermittently.       Social History     Occupational History   • Not on file.     Social History Main Topics   • Smoking status: Former Smoker   • Smokeless tobacco: Never Used   • Alcohol use Yes      Comment: OCCASIONALLY   • Drug use: No   • Sexual activity: Yes     Partners: Male     Birth control/ protection: Surgical      Review of Systems   Constitutional: Negative for chills, diaphoresis, fever and unexpected weight change.   HENT: Negative for hearing loss, nosebleeds, sore throat and tinnitus.    Eyes: Negative for pain and visual disturbance.   Respiratory: Negative for cough, shortness of breath and wheezing.    Cardiovascular: Negative for chest pain and palpitations.   Gastrointestinal: Negative for abdominal pain, diarrhea, nausea and vomiting.   Endocrine: Negative for cold intolerance, heat intolerance and polydipsia.   Genitourinary: Negative for difficulty urinating, dysuria and hematuria.   Musculoskeletal: Positive for arthralgias. Negative for joint swelling and myalgias.   Skin: Negative for rash and wound.   Allergic/Immunologic: Negative for environmental allergies.   Neurological: Negative for dizziness, syncope and numbness.   Hematological: Does not bruise/bleed easily.   Psychiatric/Behavioral: Negative for dysphoric mood and sleep disturbance. The patient is not nervous/anxious.    All other systems reviewed and are negative.          Objective:     Ortho Exam  in AP lateral oblique view of the hand to evaluate healing of the fourth metacarpal fracture shows complete consolidation at the fracture site with near anatomical  alignment.  She is alert and oriented ×3.  She has no motor deficit as far as radial ulnar median nerve function is no sensory loss either.  His no appreciable swelling.  There is no erythema.  She has full range of motion of the hand and all the digits of the hand.  She complains of more discomfort along the ulnar border of the hand along the fifth metacarpal and over the fracture site which shows no fluctuance and minimal discomfort.  She has full dorsi and volar flexion of the wrist.  She has good clench fist strength minimal tenderness.  Tolerating the anti-inflammatory taking it with minimal discomfort.  She has full range of motion of the elbow.  There is no appreciable swelling to the upper or lower arm indicating any signs of vascular compromise.  She has good capillary refill.    Assessment:       1. Pain    2. Closed displaced fracture of neck of fourth metacarpal bone of right hand, initial encounter          Plan:      over 10 minutes was spent reviewing the x-rays and physical findings with the patient.  I assured her the fracture is healed and the discomfort she is feeling now is soft tissue related.  I advised continue taking the anti-inflammatory and a therapeutic level on a regular basis for the next 2 months.  She still symptomatic in 2 months she is to call we'll make a referral to hand surgeon but as far as her activity can be mentioned that he has pain permits with no limitations.

## 2018-07-24 ENCOUNTER — TRANSCRIBE ORDERS (OUTPATIENT)
Dept: ADMINISTRATIVE | Facility: HOSPITAL | Age: 53
End: 2018-07-24

## 2018-07-24 DIAGNOSIS — Z12.39 SCREENING BREAST EXAMINATION: Primary | ICD-10-CM

## 2018-07-30 ENCOUNTER — OFFICE VISIT (OUTPATIENT)
Dept: OBSTETRICS AND GYNECOLOGY | Facility: CLINIC | Age: 53
End: 2018-07-30

## 2018-07-30 VITALS — DIASTOLIC BLOOD PRESSURE: 76 MMHG | SYSTOLIC BLOOD PRESSURE: 120 MMHG | WEIGHT: 116 LBS | BODY MASS INDEX: 21.22 KG/M2

## 2018-07-30 DIAGNOSIS — Z11.51 SPECIAL SCREENING EXAMINATION FOR HUMAN PAPILLOMAVIRUS (HPV): ICD-10-CM

## 2018-07-30 DIAGNOSIS — Z01.419 WELL FEMALE EXAM WITH ROUTINE GYNECOLOGICAL EXAM: ICD-10-CM

## 2018-07-30 DIAGNOSIS — Z01.419 WELL WOMAN EXAM WITH ROUTINE GYNECOLOGICAL EXAM: Primary | ICD-10-CM

## 2018-07-30 PROCEDURE — 99396 PREV VISIT EST AGE 40-64: CPT | Performed by: NURSE PRACTITIONER

## 2018-08-01 LAB
CYTOLOGIST CVX/VAG CYTO: NORMAL
CYTOLOGY CVX/VAG DOC THIN PREP: NORMAL
DX ICD CODE: NORMAL
HIV 1 & 2 AB SER-IMP: NORMAL
HPV I/H RISK 1 DNA CVX QL PROBE+SIG AMP: NEGATIVE
OTHER STN SPEC: NORMAL
PATH REPORT.FINAL DX SPEC: NORMAL
STAT OF ADQ CVX/VAG CYTO-IMP: NORMAL

## 2018-08-04 PROBLEM — Z01.419 WELL WOMAN EXAM WITH ROUTINE GYNECOLOGICAL EXAM: Status: ACTIVE | Noted: 2018-08-04

## 2018-08-27 ENCOUNTER — HOSPITAL ENCOUNTER (OUTPATIENT)
Dept: MAMMOGRAPHY | Facility: HOSPITAL | Age: 53
Discharge: HOME | End: 2018-08-27
Attending: OBSTETRICS & GYNECOLOGY | Admitting: OBSTETRICS & GYNECOLOGY

## 2018-08-27 DIAGNOSIS — Z12.39 SCREENING BREAST EXAMINATION: ICD-10-CM

## 2018-08-27 PROCEDURE — 77067 SCR MAMMO BI INCL CAD: CPT

## 2018-09-04 RX ORDER — VALACYCLOVIR HYDROCHLORIDE 500 MG/1
TABLET, FILM COATED ORAL
Qty: 90 TABLET | Refills: 2 | Status: SHIPPED | OUTPATIENT
Start: 2018-09-04 | End: 2019-05-25 | Stop reason: SDUPTHER

## 2019-03-18 ENCOUNTER — HOSPITAL ENCOUNTER (EMERGENCY)
Facility: HOSPITAL | Age: 54
Discharge: HOME | End: 2019-03-18
Attending: EMERGENCY MEDICINE | Admitting: EMERGENCY MEDICINE

## 2019-03-18 VITALS
WEIGHT: 103 LBS | HEIGHT: 62 IN | HEART RATE: 83 BPM | DIASTOLIC BLOOD PRESSURE: 56 MMHG | TEMPERATURE: 97.5 F | RESPIRATION RATE: 18 BRPM | BODY MASS INDEX: 18.95 KG/M2 | OXYGEN SATURATION: 99 % | SYSTOLIC BLOOD PRESSURE: 108 MMHG

## 2019-03-18 DIAGNOSIS — E11.65 TYPE 2 DIABETES MELLITUS WITH HYPERGLYCEMIA, WITHOUT LONG-TERM CURRENT USE OF INSULIN (HCC): Primary | ICD-10-CM

## 2019-03-18 LAB
ANION GAP SERPL CALCULATED.3IONS-SCNC: 11.6 MMOL/L
B-HCG UR QL: NEGATIVE
BILIRUB UR QL STRIP: NEGATIVE
BUN BLD-MCNC: 15 MG/DL (ref 6–20)
BUN/CREAT SERPL: 19 (ref 7–25)
CALCIUM SPEC-SCNC: 9 MG/DL (ref 8.6–10.5)
CHLORIDE SERPL-SCNC: 97 MMOL/L (ref 98–107)
CLARITY UR: CLEAR
CO2 SERPL-SCNC: 26.4 MMOL/L (ref 22–29)
COLOR UR: YELLOW
CREAT BLD-MCNC: 0.79 MG/DL (ref 0.57–1)
GFR SERPL CREATININE-BSD FRML MDRD: 76 ML/MIN/1.73
GLUCOSE BLD-MCNC: 406 MG/DL (ref 65–99)
GLUCOSE BLDC GLUCOMTR-MCNC: 314 MG/DL (ref 70–130)
GLUCOSE BLDC GLUCOMTR-MCNC: 422 MG/DL (ref 70–130)
GLUCOSE UR STRIP-MCNC: ABNORMAL MG/DL
HGB UR QL STRIP.AUTO: NEGATIVE
KETONES UR QL STRIP: ABNORMAL
LEUKOCYTE ESTERASE UR QL STRIP.AUTO: NEGATIVE
NITRITE UR QL STRIP: NEGATIVE
PH UR STRIP.AUTO: 7 [PH] (ref 4.5–8)
POTASSIUM BLD-SCNC: 4.1 MMOL/L (ref 3.5–5.2)
PROT UR QL STRIP: NEGATIVE
SODIUM BLD-SCNC: 135 MMOL/L (ref 136–145)
SP GR UR STRIP: 1.01 (ref 1–1.03)
UROBILINOGEN UR QL STRIP: ABNORMAL

## 2019-03-18 PROCEDURE — 99284 EMERGENCY DEPT VISIT MOD MDM: CPT | Performed by: EMERGENCY MEDICINE

## 2019-03-18 PROCEDURE — 99283 EMERGENCY DEPT VISIT LOW MDM: CPT

## 2019-03-18 PROCEDURE — 82962 GLUCOSE BLOOD TEST: CPT

## 2019-03-18 PROCEDURE — 63710000001 INSULIN REGULAR HUMAN PER 5 UNITS: Performed by: EMERGENCY MEDICINE

## 2019-03-18 PROCEDURE — 80048 BASIC METABOLIC PNL TOTAL CA: CPT | Performed by: EMERGENCY MEDICINE

## 2019-03-18 PROCEDURE — 96360 HYDRATION IV INFUSION INIT: CPT

## 2019-03-18 PROCEDURE — 81025 URINE PREGNANCY TEST: CPT | Performed by: EMERGENCY MEDICINE

## 2019-03-18 PROCEDURE — 81003 URINALYSIS AUTO W/O SCOPE: CPT | Performed by: EMERGENCY MEDICINE

## 2019-03-18 RX ORDER — ASPIRIN 81 MG/1
81 TABLET, CHEWABLE ORAL DAILY
COMMUNITY
End: 2021-04-22 | Stop reason: HOSPADM

## 2019-03-18 RX ORDER — SODIUM CHLORIDE 0.9 % (FLUSH) 0.9 %
10 SYRINGE (ML) INJECTION AS NEEDED
Status: DISCONTINUED | OUTPATIENT
Start: 2019-03-18 | End: 2019-03-18 | Stop reason: HOSPADM

## 2019-03-18 RX ORDER — FLUOXETINE 10 MG/1
10 CAPSULE ORAL DAILY
COMMUNITY
End: 2021-02-22

## 2019-03-18 RX ADMIN — HUMAN INSULIN 10 UNITS: 100 INJECTION, SOLUTION SUBCUTANEOUS at 17:53

## 2019-03-18 RX ADMIN — SODIUM CHLORIDE 1000 ML: 9 INJECTION, SOLUTION INTRAVENOUS at 16:14

## 2019-03-18 NOTE — ED PROVIDER NOTES
" EMERGENCY DEPARTMENT ENCOUNTER      Room Number: 3/03      HPI:    Chief complaint: Hyperglycemia    Location: No focal pain complaint    Quality/Severity: Her meter would not read other than \"high\"    Timing/Duration: Today    Modifying Factors: Has not yet taken her Janumet this morning    Associated Symptoms: Intermittent blurry vision, fatigue, xerostomia.  No chest pain or shortness of breath.  No vomiting or diarrhea.  No dysuria, hematuria or urinary frequency reported    Narrative: Pt is a 54 y.o. female who presents complaining of uncontrolled diabetes.  Patient has been off of all diabetes medication for nearly 1 year until she was seen by her primary care provider last week who found her hemoglobin A1c had returned to 12.1.  She was started on Janumet last week.  She had not taken her morning dose.      PMD: Jaiden Villagomez MD    REVIEW OF SYSTEMS  Review of Systems   Intermittent blurry vision, fatigue, xerostomia.  No chest pain or shortness of breath.  No vomiting or diarrhea.  No dysuria, hematuria or urinary frequency reported    PAST MEDICAL HISTORY  Active Ambulatory Problems     Diagnosis Date Noted   • History of bloody stools 2016   • HSV-2 (herpes simplex virus 2) infection 2017   • Fibroids 2017   • Smoker 2017   • Headache 2017   • Diabetes mellitus (CMS/HCC) 2017   • Well woman exam with routine gynecological exam 2018     Resolved Ambulatory Problems     Diagnosis Date Noted   • No Resolved Ambulatory Problems     Past Medical History:   Diagnosis Date   • Abnormal Pap smear of cervix    • Diabetes mellitus (CMS/[facility])    • Fibroid    • HSV-2 (herpes simplex virus 2) infection 2017   • Menstrual disorder    • Migraines    • Seasonal allergies        PAST SURGICAL HISTORY  Past Surgical History:   Procedure Laterality Date   • CARPAL TUNNEL RELEASE WITH CUBITAL TUNNEL RELEASE Right    •  SECTION      X2   • COLONOSCOPY N/A 2016    " "Procedure: COLONOSCOPY with polypectomy;  Surgeon: Maryan Kent MD;  Location: [facility];  Service:    • ENDOMETRIAL ABLATION      thermachoice   • HYSTEROSCOPY      AND ENDOMETRIAL  ABLATION   • NASAL SEPTUM SURGERY     • TUBAL ABDOMINAL LIGATION         FAMILY HISTORY  Family History   Problem Relation Age of Onset   • Hypertension Mother    • Hyperlipidemia Mother    • Atrial fibrillation Mother    • Stroke Mother    • Heart attack Father    • Hypertension Father    • Diabetes Father    • Alcohol abuse Father    • Dementia Father    • Stroke Sister    • Crohn's disease Sister    • Heart attack Brother    • Hypertension Brother    • Stroke Brother    • Alzheimer's disease Paternal Grandmother    • Diabetes Paternal Aunt    • Rheum arthritis Other    • Colon cancer Maternal Uncle    • Breast cancer Neg Hx    • Ovarian cancer Neg Hx        SOCIAL HISTORY  Social History     Socioeconomic History   • Marital status:      Spouse name: Not on file   • Number of children: Not on file   • Years of education: Not on file   • Highest education level: Not on file   Social Needs   • Financial resource strain: Not on file   • Food insecurity - worry: Not on file   • Food insecurity - inability: Not on file   • Transportation needs - medical: Not on file   • Transportation needs - non-medical: Not on file   Occupational History   • Not on file   Tobacco Use   • Smoking status: Former Smoker   • Smokeless tobacco: Never Used   • Tobacco comment: Quit \" a long time ago\"    Substance and Sexual Activity   • Alcohol use: Yes     Comment: OCCASIONALLY   • Drug use: No   • Sexual activity: Yes     Partners: Male     Birth control/protection: Surgical   Other Topics Concern   • Not on file   Social History Narrative   • Not on file       ALLERGIES  Mobic [meloxicam]    PHYSICAL EXAM  ED Triage Vitals [03/18/19 1501]   Temp Heart Rate Resp BP SpO2   97.5 °F (36.4 °C) 80 16 111/64 99 %      Temp src Heart Rate Source " Patient Position BP Location FiO2 (%)   Oral Monitor Sitting Right arm --       Physical Exam   Constitutional: She is oriented to person, place, and time and well-developed, well-nourished, and in no distress. No distress.   HENT:   Head: Normocephalic.   Mucous membranes moist   Eyes: No scleral icterus.   Neck:   Painless range of motion   Cardiovascular: Normal rate and regular rhythm.   Pulmonary/Chest: Effort normal and breath sounds normal. No respiratory distress.   Abdominal: Soft. There is no tenderness.   Musculoskeletal:   Moves all extremities equally   Neurological: She is alert and oriented to person, place, and time. Gait normal. GCS score is 15.   Skin: Skin is warm and dry.   Psychiatric: Mood and affect normal.   Nursing note and vitals reviewed.      LAB RESULTS  Results for orders placed or performed during the hospital encounter of 03/18/19   Basic Metabolic Panel   Result Value Ref Range    Glucose 406 (C) 65 - 99 mg/dL    BUN 15 6 - 20 mg/dL    Creatinine 0.79 0.57 - 1.00 mg/dL    Sodium 135 (L) 136 - 145 mmol/L    Potassium 4.1 3.5 - 5.2 mmol/L    Chloride 97 (L) 98 - 107 mmol/L    CO2 26.4 22.0 - 29.0 mmol/L    Calcium 9.0 8.6 - 10.5 mg/dL    eGFR Non African Amer 76 >60 mL/min/1.73    BUN/Creatinine Ratio 19.0 7.0 - 25.0    Anion Gap 11.6 mmol/L   Urinalysis With Microscopic If Indicated (No Culture) - Urine, Clean Catch   Result Value Ref Range    Color, UA Yellow Yellow, Straw    Appearance, UA Clear Clear    pH, UA 7.0 4.5 - 8.0    Specific Gravity, UA 1.015 1.003 - 1.030    Glucose,  mg/dL (2+) (A) Negative    Ketones, UA 40 mg/dL (2+) (A) Negative    Bilirubin, UA Negative Negative    Blood, UA Negative Negative    Protein, UA Negative Negative    Leuk Esterase, UA Negative Negative    Nitrite, UA Negative Negative    Urobilinogen, UA 0.2 E.U./dL 0.2 - 1.0 E.U./dL   Pregnancy, Urine - Urine, Clean Catch   Result Value Ref Range    HCG, Urine QL Negative Negative   POC Glucose  Once   Result Value Ref Range    Glucose 422 (H) 70 - 130 mg/dL         I ordered the above labs and reviewed the results    RADIOLOGY  No results found.    I ordered the above radiologic testing and reviewed the results    PROCEDURES  Procedures      PROGRESS AND CONSULTS  ED Course as of Mar 18 1739   Mon Mar 18, 2019   1737 No serious electrolyte abnormalities related to the hyperglycemia.  Normal renal function noted.  10 units insulin given IV at this time with plan for patient to check her glucose at home in the next hour.  Outpatient follow-up with primary care in the next 24 hours is recommended. CO2: 26.4 [RS]      ED Course User Index  [RS] Johnie Menendez MD           MEDICAL DECISION MAKING  Results were reviewed/discussed with the patient and they were also made aware of online access. Pt also made aware that some labs, such as cultures, will not be resulted during ER visit and follow up with PMD is necessary.     MDM       DIAGNOSIS  Final diagnoses:   Type 2 diabetes mellitus with hyperglycemia, without long-term current use of insulin (CMS/[facility])       Latest Documented Vital Signs:  As of 5:39 PM  BP- 111/64 HR- 80 Temp- 97.5 °F (36.4 °C) (Oral) O2 sat- 99%    DISPOSITION  Discharge-stable       Medication List      No changes were made to your prescriptions during this visit.       Follow-up Information     Jaiden Villagomez MD. Go in 1 day.    Specialty:  Family Medicine  Contact information:  [address] [place] [address] [phone]                        Johnie Menendez MD  03/18/19 1739

## 2019-05-28 RX ORDER — VALACYCLOVIR HYDROCHLORIDE 500 MG/1
TABLET, FILM COATED ORAL
Qty: 90 TABLET | Refills: 1 | Status: SHIPPED | OUTPATIENT
Start: 2019-05-28 | End: 2019-12-07 | Stop reason: SDUPTHER

## 2019-12-09 RX ORDER — VALACYCLOVIR HYDROCHLORIDE 500 MG/1
TABLET, FILM COATED ORAL
Qty: 90 TABLET | Refills: 0 | Status: SHIPPED | OUTPATIENT
Start: 2019-12-09 | End: 2020-06-08 | Stop reason: SDUPTHER

## 2020-01-02 ENCOUNTER — APPOINTMENT (OUTPATIENT)
Dept: GENERAL RADIOLOGY | Facility: HOSPITAL | Age: 55
End: 2020-01-02

## 2020-01-02 ENCOUNTER — HOSPITAL ENCOUNTER (OUTPATIENT)
Facility: HOSPITAL | Age: 55
Setting detail: OBSERVATION
Discharge: HOME | End: 2020-01-04
Attending: EMERGENCY MEDICINE | Admitting: INTERNAL MEDICINE

## 2020-01-02 DIAGNOSIS — R11.2 NON-INTRACTABLE VOMITING WITH NAUSEA, UNSPECIFIED VOMITING TYPE: ICD-10-CM

## 2020-01-02 DIAGNOSIS — E11.10 DIABETIC KETOACIDOSIS WITHOUT COMA ASSOCIATED WITH TYPE 2 DIABETES MELLITUS (HCC): Primary | ICD-10-CM

## 2020-01-02 DIAGNOSIS — E86.0 DEHYDRATION: ICD-10-CM

## 2020-01-02 PROBLEM — E13.10 DIABETES MELLITUS WITH KETOSIS: Status: ACTIVE | Noted: 2020-01-02

## 2020-01-02 PROBLEM — E87.29 HIGH ANION GAP METABOLIC ACIDOSIS: Status: ACTIVE | Noted: 2020-01-02

## 2020-01-02 PROBLEM — F15.10 METHAMPHETAMINE USE: Status: ACTIVE | Noted: 2020-01-02

## 2020-01-02 PROBLEM — F14.90 COCAINE USE: Status: ACTIVE | Noted: 2020-01-02

## 2020-01-02 LAB
ACETONE BLD QL: ABNORMAL
ALBUMIN SERPL-MCNC: 4.6 G/DL (ref 3.5–5.2)
ALBUMIN/GLOB SERPL: 1.6 G/DL
ALP SERPL-CCNC: 77 U/L (ref 39–117)
ALT SERPL W P-5'-P-CCNC: 13 U/L (ref 1–33)
AMPHET+METHAMPHET UR QL: NEGATIVE
AMPHETAMINES UR QL: POSITIVE
ANION GAP SERPL CALCULATED.3IONS-SCNC: 16.4 MMOL/L (ref 5–15)
ANION GAP SERPL CALCULATED.3IONS-SCNC: 19.1 MMOL/L (ref 5–15)
ANION GAP SERPL CALCULATED.3IONS-SCNC: 22 MMOL/L (ref 5–15)
ARTERIAL PATENCY WRIST A: ABNORMAL
AST SERPL-CCNC: 12 U/L (ref 1–32)
ATMOSPHERIC PRESS: 732 MMHG
B-HCG UR QL: NEGATIVE
BARBITURATES UR QL SCN: NEGATIVE
BASE EXCESS BLDA CALC-SCNC: -9 MMOL/L (ref 0–2)
BASOPHILS # BLD AUTO: 0.03 10*3/MM3 (ref 0–0.2)
BASOPHILS NFR BLD AUTO: 0.6 % (ref 0–1.5)
BDY SITE: ABNORMAL
BENZODIAZ UR QL SCN: NEGATIVE
BILIRUB SERPL-MCNC: 0.4 MG/DL (ref 0.2–1.2)
BILIRUB UR QL STRIP: NEGATIVE
BODY TEMPERATURE: 37 C
BUN BLD-MCNC: 15 MG/DL (ref 6–20)
BUN BLD-MCNC: 16 MG/DL (ref 6–20)
BUN BLD-MCNC: 18 MG/DL (ref 6–20)
BUN/CREAT SERPL: 26.1 (ref 7–25)
BUN/CREAT SERPL: 26.7 (ref 7–25)
BUN/CREAT SERPL: 26.8 (ref 7–25)
BUPRENORPHINE SERPL-MCNC: NEGATIVE NG/ML
CALCIUM SPEC-SCNC: 8.1 MG/DL (ref 8.6–10.5)
CALCIUM SPEC-SCNC: 8.7 MG/DL (ref 8.6–10.5)
CALCIUM SPEC-SCNC: 9.3 MG/DL (ref 8.6–10.5)
CANNABINOIDS SERPL QL: POSITIVE
CHLORIDE SERPL-SCNC: 105 MMOL/L (ref 98–107)
CHLORIDE SERPL-SCNC: 108 MMOL/L (ref 98–107)
CHLORIDE SERPL-SCNC: 97 MMOL/L (ref 98–107)
CK SERPL-CCNC: 40 U/L (ref 20–180)
CLARITY UR: CLEAR
CO2 SERPL-SCNC: 14.6 MMOL/L (ref 22–29)
CO2 SERPL-SCNC: 14.9 MMOL/L (ref 22–29)
CO2 SERPL-SCNC: 16 MMOL/L (ref 22–29)
COCAINE UR QL: POSITIVE
COLOR UR: ABNORMAL
CREAT BLD-MCNC: 0.56 MG/DL (ref 0.57–1)
CREAT BLD-MCNC: 0.6 MG/DL (ref 0.57–1)
CREAT BLD-MCNC: 0.69 MG/DL (ref 0.57–1)
D-LACTATE SERPL-SCNC: 0.8 MMOL/L (ref 0.5–2)
DEPRECATED RDW RBC AUTO: 48.7 FL (ref 37–54)
EOSINOPHIL # BLD AUTO: 0.06 10*3/MM3 (ref 0–0.4)
EOSINOPHIL NFR BLD AUTO: 1.2 % (ref 0.3–6.2)
ERYTHROCYTE [DISTWIDTH] IN BLOOD BY AUTOMATED COUNT: 13.5 % (ref 12.3–15.4)
ETHANOL BLD-MCNC: <10 MG/DL (ref 0–10)
ETHANOL UR QL: <0.01 %
GFR SERPL CREATININE-BSD FRML MDRD: 105 ML/MIN/1.73
GFR SERPL CREATININE-BSD FRML MDRD: 113 ML/MIN/1.73
GFR SERPL CREATININE-BSD FRML MDRD: 89 ML/MIN/1.73
GLOBULIN UR ELPH-MCNC: 2.9 GM/DL
GLUCOSE BLD-MCNC: 109 MG/DL (ref 65–99)
GLUCOSE BLD-MCNC: 137 MG/DL (ref 65–99)
GLUCOSE BLD-MCNC: 250 MG/DL (ref 65–99)
GLUCOSE BLDC GLUCOMTR-MCNC: 114 MG/DL (ref 70–130)
GLUCOSE BLDC GLUCOMTR-MCNC: 128 MG/DL (ref 70–130)
GLUCOSE BLDC GLUCOMTR-MCNC: 227 MG/DL (ref 70–130)
GLUCOSE UR STRIP-MCNC: ABNORMAL MG/DL
HBA1C MFR BLD: 11.2 % (ref 4.8–5.6)
HCO3 BLDA-SCNC: 16.5 MMOL/L (ref 20–26)
HCT VFR BLD AUTO: 39.5 % (ref 34–46.6)
HGB BLD-MCNC: 13.7 G/DL (ref 12–15.9)
HGB BLDA-MCNC: 12.4 G/DL (ref 13.5–17.5)
HGB UR QL STRIP.AUTO: NEGATIVE
IMM GRANULOCYTES # BLD AUTO: 0.02 10*3/MM3 (ref 0–0.05)
IMM GRANULOCYTES NFR BLD AUTO: 0.4 % (ref 0–0.5)
KETONES UR QL STRIP: ABNORMAL
LEUKOCYTE ESTERASE UR QL STRIP.AUTO: NEGATIVE
LIPASE SERPL-CCNC: 35 U/L (ref 13–60)
LYMPHOCYTES # BLD AUTO: 1.39 10*3/MM3 (ref 0.7–3.1)
LYMPHOCYTES NFR BLD AUTO: 27.9 % (ref 19.6–45.3)
MAGNESIUM SERPL-MCNC: 1.8 MG/DL (ref 1.6–2.6)
MAGNESIUM SERPL-MCNC: 1.9 MG/DL (ref 1.6–2.6)
MCH RBC QN AUTO: 34.2 PG (ref 26.6–33)
MCHC RBC AUTO-ENTMCNC: 34.7 G/DL (ref 31.5–35.7)
MCV RBC AUTO: 98.5 FL (ref 79–97)
METHADONE UR QL SCN: NEGATIVE
MODALITY: ABNORMAL
MONOCYTES # BLD AUTO: 0.44 10*3/MM3 (ref 0.1–0.9)
MONOCYTES NFR BLD AUTO: 8.8 % (ref 5–12)
NEUTROPHILS # BLD AUTO: 3.04 10*3/MM3 (ref 1.7–7)
NEUTROPHILS NFR BLD AUTO: 61.1 % (ref 42.7–76)
NITRITE UR QL STRIP: NEGATIVE
NRBC BLD AUTO-RTO: 0 /100 WBC (ref 0–0.2)
OPIATES UR QL: NEGATIVE
OXYCODONE UR QL SCN: NEGATIVE
PCO2 BLDA: 33.7 MM HG (ref 35–45)
PCO2 TEMP ADJ BLD: 33.7 MM HG (ref 35–45)
PCP UR QL SCN: NEGATIVE
PH BLDA: 7.3 PH UNITS (ref 7.35–7.45)
PH UR STRIP.AUTO: 5.5 [PH] (ref 4.5–8)
PH, TEMP CORRECTED: 7.3 PH UNITS (ref 7.35–7.45)
PHOSPHATE SERPL-MCNC: 3.5 MG/DL (ref 2.5–4.5)
PHOSPHATE SERPL-MCNC: 4.4 MG/DL (ref 2.5–4.5)
PLATELET # BLD AUTO: 201 10*3/MM3 (ref 140–450)
PMV BLD AUTO: 11 FL (ref 6–12)
PO2 BLDA: 88.8 MM HG (ref 83–108)
PO2 TEMP ADJ BLD: 88.8 MM HG (ref 83–108)
POTASSIUM BLD-SCNC: 3.7 MMOL/L (ref 3.5–5.2)
POTASSIUM BLD-SCNC: 3.9 MMOL/L (ref 3.5–5.2)
POTASSIUM BLD-SCNC: 3.9 MMOL/L (ref 3.5–5.2)
PROCALCITONIN SERPL-MCNC: 0.03 NG/ML (ref 0.1–0.25)
PROPOXYPH UR QL: NEGATIVE
PROT SERPL-MCNC: 7.5 G/DL (ref 6–8.5)
PROT UR QL STRIP: NEGATIVE
RBC # BLD AUTO: 4.01 10*6/MM3 (ref 3.77–5.28)
SAO2 % BLDCOA: 95 % (ref 94–99)
SODIUM BLD-SCNC: 135 MMOL/L (ref 136–145)
SODIUM BLD-SCNC: 139 MMOL/L (ref 136–145)
SODIUM BLD-SCNC: 139 MMOL/L (ref 136–145)
SP GR UR STRIP: 1.02 (ref 1–1.03)
T4 FREE SERPL-MCNC: 1.13 NG/DL (ref 0.93–1.7)
TRICYCLICS UR QL SCN: NEGATIVE
TROPONIN T SERPL-MCNC: <0.01 NG/ML (ref 0–0.03)
TROPONIN T SERPL-MCNC: <0.01 NG/ML (ref 0–0.03)
TSH SERPL DL<=0.05 MIU/L-ACNC: 1.59 UIU/ML (ref 0.27–4.2)
UROBILINOGEN UR QL STRIP: ABNORMAL
VENTILATOR MODE: ABNORMAL
WBC NRBC COR # BLD: 4.98 10*3/MM3 (ref 3.4–10.8)

## 2020-01-02 PROCEDURE — 84484 ASSAY OF TROPONIN QUANT: CPT | Performed by: INTERNAL MEDICINE

## 2020-01-02 PROCEDURE — 96361 HYDRATE IV INFUSION ADD-ON: CPT

## 2020-01-02 PROCEDURE — 83690 ASSAY OF LIPASE: CPT | Performed by: EMERGENCY MEDICINE

## 2020-01-02 PROCEDURE — G0378 HOSPITAL OBSERVATION PER HR: HCPCS

## 2020-01-02 PROCEDURE — 80048 BASIC METABOLIC PNL TOTAL CA: CPT | Performed by: INTERNAL MEDICINE

## 2020-01-02 PROCEDURE — 80184 ASSAY OF PHENOBARBITAL: CPT | Performed by: INTERNAL MEDICINE

## 2020-01-02 PROCEDURE — 83930 ASSAY OF BLOOD OSMOLALITY: CPT | Performed by: INTERNAL MEDICINE

## 2020-01-02 PROCEDURE — 36600 WITHDRAWAL OF ARTERIAL BLOOD: CPT

## 2020-01-02 PROCEDURE — G0480 DRUG TEST DEF 1-7 CLASSES: HCPCS | Performed by: INTERNAL MEDICINE

## 2020-01-02 PROCEDURE — 82962 GLUCOSE BLOOD TEST: CPT

## 2020-01-02 PROCEDURE — 84443 ASSAY THYROID STIM HORMONE: CPT | Performed by: INTERNAL MEDICINE

## 2020-01-02 PROCEDURE — 84145 PROCALCITONIN (PCT): CPT | Performed by: INTERNAL MEDICINE

## 2020-01-02 PROCEDURE — 87040 BLOOD CULTURE FOR BACTERIA: CPT | Performed by: INTERNAL MEDICINE

## 2020-01-02 PROCEDURE — 99283 EMERGENCY DEPT VISIT LOW MDM: CPT

## 2020-01-02 PROCEDURE — 25010000002 ONDANSETRON PER 1 MG: Performed by: EMERGENCY MEDICINE

## 2020-01-02 PROCEDURE — 82803 BLOOD GASES ANY COMBINATION: CPT

## 2020-01-02 PROCEDURE — 99220 PR INITIAL OBSERVATION CARE/DAY 70 MINUTES: CPT | Performed by: INTERNAL MEDICINE

## 2020-01-02 PROCEDURE — 80053 COMPREHEN METABOLIC PANEL: CPT | Performed by: EMERGENCY MEDICINE

## 2020-01-02 PROCEDURE — 96374 THER/PROPH/DIAG INJ IV PUSH: CPT

## 2020-01-02 PROCEDURE — 99284 EMERGENCY DEPT VISIT MOD MDM: CPT | Performed by: EMERGENCY MEDICINE

## 2020-01-02 PROCEDURE — 82009 KETONE BODYS QUAL: CPT | Performed by: EMERGENCY MEDICINE

## 2020-01-02 PROCEDURE — 85025 COMPLETE CBC W/AUTO DIFF WBC: CPT | Performed by: EMERGENCY MEDICINE

## 2020-01-02 PROCEDURE — 81003 URINALYSIS AUTO W/O SCOPE: CPT | Performed by: EMERGENCY MEDICINE

## 2020-01-02 PROCEDURE — 83735 ASSAY OF MAGNESIUM: CPT | Performed by: INTERNAL MEDICINE

## 2020-01-02 PROCEDURE — 83605 ASSAY OF LACTIC ACID: CPT | Performed by: INTERNAL MEDICINE

## 2020-01-02 PROCEDURE — 83036 HEMOGLOBIN GLYCOSYLATED A1C: CPT | Performed by: INTERNAL MEDICINE

## 2020-01-02 PROCEDURE — 82550 ASSAY OF CK (CPK): CPT | Performed by: INTERNAL MEDICINE

## 2020-01-02 PROCEDURE — 81025 URINE PREGNANCY TEST: CPT | Performed by: INTERNAL MEDICINE

## 2020-01-02 PROCEDURE — 84439 ASSAY OF FREE THYROXINE: CPT | Performed by: INTERNAL MEDICINE

## 2020-01-02 PROCEDURE — 80299 QUANTITATIVE ASSAY DRUG: CPT | Performed by: INTERNAL MEDICINE

## 2020-01-02 PROCEDURE — 84100 ASSAY OF PHOSPHORUS: CPT | Performed by: INTERNAL MEDICINE

## 2020-01-02 PROCEDURE — 0100U HC BIOFIRE FILMARRAY RESP PANEL 2: CPT | Performed by: INTERNAL MEDICINE

## 2020-01-02 PROCEDURE — 71046 X-RAY EXAM CHEST 2 VIEWS: CPT

## 2020-01-02 PROCEDURE — 80307 DRUG TEST PRSMV CHEM ANLYZR: CPT | Performed by: INTERNAL MEDICINE

## 2020-01-02 RX ORDER — ASPIRIN 81 MG/1
81 TABLET, CHEWABLE ORAL DAILY
Status: DISCONTINUED | OUTPATIENT
Start: 2020-01-03 | End: 2020-01-04 | Stop reason: HOSPADM

## 2020-01-02 RX ORDER — DEXTROSE MONOHYDRATE 25 G/50ML
12.5 INJECTION, SOLUTION INTRAVENOUS AS NEEDED
Status: DISCONTINUED | OUTPATIENT
Start: 2020-01-02 | End: 2020-01-02

## 2020-01-02 RX ORDER — SODIUM CHLORIDE 9 MG/ML
40 INJECTION, SOLUTION INTRAVENOUS AS NEEDED
Status: DISCONTINUED | OUTPATIENT
Start: 2020-01-02 | End: 2020-01-04 | Stop reason: HOSPADM

## 2020-01-02 RX ORDER — ONDANSETRON 2 MG/ML
4 INJECTION INTRAMUSCULAR; INTRAVENOUS EVERY 6 HOURS PRN
Status: DISCONTINUED | OUTPATIENT
Start: 2020-01-02 | End: 2020-01-04 | Stop reason: HOSPADM

## 2020-01-02 RX ORDER — DEXTROSE, SODIUM CHLORIDE, AND POTASSIUM CHLORIDE 5; .45; .15 G/100ML; G/100ML; G/100ML
250 INJECTION INTRAVENOUS CONTINUOUS PRN
Status: DISCONTINUED | OUTPATIENT
Start: 2020-01-02 | End: 2020-01-02

## 2020-01-02 RX ORDER — SODIUM CHLORIDE 9 MG/ML
250 INJECTION, SOLUTION INTRAVENOUS CONTINUOUS PRN
Status: DISCONTINUED | OUTPATIENT
Start: 2020-01-02 | End: 2020-01-02

## 2020-01-02 RX ORDER — SODIUM CHLORIDE 0.9 % (FLUSH) 0.9 %
10 SYRINGE (ML) INJECTION EVERY 12 HOURS SCHEDULED
Status: DISCONTINUED | OUTPATIENT
Start: 2020-01-02 | End: 2020-01-04 | Stop reason: HOSPADM

## 2020-01-02 RX ORDER — SODIUM CHLORIDE 9 MG/ML
10 INJECTION, SOLUTION INTRAVENOUS CONTINUOUS PRN
Status: DISCONTINUED | OUTPATIENT
Start: 2020-01-02 | End: 2020-01-02

## 2020-01-02 RX ORDER — POTASSIUM CHLORIDE 20 MEQ/1
40 TABLET, EXTENDED RELEASE ORAL ONCE
Status: COMPLETED | OUTPATIENT
Start: 2020-01-02 | End: 2020-01-02

## 2020-01-02 RX ORDER — SODIUM CHLORIDE AND POTASSIUM CHLORIDE 150; 450 MG/100ML; MG/100ML
250 INJECTION, SOLUTION INTRAVENOUS CONTINUOUS PRN
Status: DISCONTINUED | OUTPATIENT
Start: 2020-01-02 | End: 2020-01-02

## 2020-01-02 RX ORDER — ONDANSETRON 4 MG/1
4 TABLET, FILM COATED ORAL EVERY 6 HOURS PRN
Status: DISCONTINUED | OUTPATIENT
Start: 2020-01-02 | End: 2020-01-04 | Stop reason: HOSPADM

## 2020-01-02 RX ORDER — SODIUM CHLORIDE 0.9 % (FLUSH) 0.9 %
10 SYRINGE (ML) INJECTION ONCE AS NEEDED
Status: DISCONTINUED | OUTPATIENT
Start: 2020-01-02 | End: 2020-01-02

## 2020-01-02 RX ORDER — SODIUM CHLORIDE, SODIUM LACTATE, POTASSIUM CHLORIDE, CALCIUM CHLORIDE 600; 310; 30; 20 MG/100ML; MG/100ML; MG/100ML; MG/100ML
150 INJECTION, SOLUTION INTRAVENOUS CONTINUOUS
Status: DISCONTINUED | OUTPATIENT
Start: 2020-01-02 | End: 2020-01-03

## 2020-01-02 RX ORDER — SENNA AND DOCUSATE SODIUM 50; 8.6 MG/1; MG/1
2 TABLET, FILM COATED ORAL 2 TIMES DAILY
Status: DISCONTINUED | OUTPATIENT
Start: 2020-01-02 | End: 2020-01-04 | Stop reason: HOSPADM

## 2020-01-02 RX ORDER — FLUOXETINE 10 MG/1
10 CAPSULE ORAL DAILY
Status: DISCONTINUED | OUTPATIENT
Start: 2020-01-03 | End: 2020-01-04 | Stop reason: HOSPADM

## 2020-01-02 RX ORDER — VALACYCLOVIR HYDROCHLORIDE 500 MG/1
500 TABLET, FILM COATED ORAL DAILY
Status: DISCONTINUED | OUTPATIENT
Start: 2020-01-03 | End: 2020-01-04 | Stop reason: HOSPADM

## 2020-01-02 RX ORDER — DEXTROSE MONOHYDRATE 25 G/50ML
25 INJECTION, SOLUTION INTRAVENOUS
Status: DISCONTINUED | OUTPATIENT
Start: 2020-01-02 | End: 2020-01-04 | Stop reason: HOSPADM

## 2020-01-02 RX ORDER — DEXTROSE, SODIUM CHLORIDE, AND POTASSIUM CHLORIDE 5; .45; .3 G/100ML; G/100ML; G/100ML
250 INJECTION INTRAVENOUS CONTINUOUS PRN
Status: DISCONTINUED | OUTPATIENT
Start: 2020-01-02 | End: 2020-01-02

## 2020-01-02 RX ORDER — CALCIUM CARBONATE 200(500)MG
1 TABLET,CHEWABLE ORAL 2 TIMES DAILY PRN
Status: DISCONTINUED | OUTPATIENT
Start: 2020-01-02 | End: 2020-01-04 | Stop reason: HOSPADM

## 2020-01-02 RX ORDER — PROMETHAZINE HYDROCHLORIDE 12.5 MG/1
25 TABLET ORAL EVERY 6 HOURS PRN
Status: DISCONTINUED | OUTPATIENT
Start: 2020-01-02 | End: 2020-01-04 | Stop reason: HOSPADM

## 2020-01-02 RX ORDER — SODIUM CHLORIDE 450 MG/100ML
250 INJECTION, SOLUTION INTRAVENOUS CONTINUOUS PRN
Status: DISCONTINUED | OUTPATIENT
Start: 2020-01-02 | End: 2020-01-02

## 2020-01-02 RX ORDER — ONDANSETRON 2 MG/ML
4 INJECTION INTRAMUSCULAR; INTRAVENOUS ONCE
Status: COMPLETED | OUTPATIENT
Start: 2020-01-02 | End: 2020-01-02

## 2020-01-02 RX ORDER — SODIUM CHLORIDE 0.9 % (FLUSH) 0.9 %
10 SYRINGE (ML) INJECTION AS NEEDED
Status: DISCONTINUED | OUTPATIENT
Start: 2020-01-02 | End: 2020-01-04 | Stop reason: HOSPADM

## 2020-01-02 RX ORDER — SODIUM CHLORIDE AND POTASSIUM CHLORIDE 300; 900 MG/100ML; MG/100ML
250 INJECTION, SOLUTION INTRAVENOUS CONTINUOUS PRN
Status: DISCONTINUED | OUTPATIENT
Start: 2020-01-02 | End: 2020-01-02

## 2020-01-02 RX ORDER — SODIUM CHLORIDE AND POTASSIUM CHLORIDE 150; 900 MG/100ML; MG/100ML
250 INJECTION, SOLUTION INTRAVENOUS CONTINUOUS PRN
Status: DISCONTINUED | OUTPATIENT
Start: 2020-01-02 | End: 2020-01-02

## 2020-01-02 RX ORDER — DEXTROSE AND SODIUM CHLORIDE 5; .45 G/100ML; G/100ML
250 INJECTION, SOLUTION INTRAVENOUS CONTINUOUS PRN
Status: DISCONTINUED | OUTPATIENT
Start: 2020-01-02 | End: 2020-01-02

## 2020-01-02 RX ORDER — TRAZODONE HYDROCHLORIDE 50 MG/1
50 TABLET ORAL NIGHTLY
Status: DISCONTINUED | OUTPATIENT
Start: 2020-01-02 | End: 2020-01-04 | Stop reason: HOSPADM

## 2020-01-02 RX ORDER — ACETAMINOPHEN 650 MG/1
650 SUPPOSITORY RECTAL EVERY 4 HOURS PRN
Status: DISCONTINUED | OUTPATIENT
Start: 2020-01-02 | End: 2020-01-04 | Stop reason: HOSPADM

## 2020-01-02 RX ORDER — ACETAMINOPHEN 325 MG/1
650 TABLET ORAL EVERY 4 HOURS PRN
Status: DISCONTINUED | OUTPATIENT
Start: 2020-01-02 | End: 2020-01-04 | Stop reason: HOSPADM

## 2020-01-02 RX ORDER — ACETAMINOPHEN 160 MG/5ML
650 SOLUTION ORAL EVERY 4 HOURS PRN
Status: DISCONTINUED | OUTPATIENT
Start: 2020-01-02 | End: 2020-01-04 | Stop reason: HOSPADM

## 2020-01-02 RX ORDER — CHOLECALCIFEROL (VITAMIN D3) 125 MCG
5 CAPSULE ORAL NIGHTLY PRN
Status: DISCONTINUED | OUTPATIENT
Start: 2020-01-02 | End: 2020-01-04 | Stop reason: HOSPADM

## 2020-01-02 RX ORDER — NICOTINE POLACRILEX 4 MG
15 LOZENGE BUCCAL
Status: DISCONTINUED | OUTPATIENT
Start: 2020-01-02 | End: 2020-01-04 | Stop reason: HOSPADM

## 2020-01-02 RX ADMIN — SODIUM CHLORIDE 1000 ML: 9 INJECTION, SOLUTION INTRAVENOUS at 20:31

## 2020-01-02 RX ADMIN — POTASSIUM CHLORIDE 40 MEQ: 1500 TABLET, EXTENDED RELEASE ORAL at 22:34

## 2020-01-02 RX ADMIN — TRAZODONE HYDROCHLORIDE 50 MG: 50 TABLET ORAL at 22:34

## 2020-01-02 RX ADMIN — SODIUM CHLORIDE 1000 ML: 9 INJECTION, SOLUTION INTRAVENOUS at 18:01

## 2020-01-02 RX ADMIN — ONDANSETRON 4 MG: 2 INJECTION, SOLUTION INTRAMUSCULAR; INTRAVENOUS at 18:01

## 2020-01-02 RX ADMIN — SODIUM CHLORIDE, PRESERVATIVE FREE 10 ML: 5 INJECTION INTRAVENOUS at 22:41

## 2020-01-02 RX ADMIN — SODIUM CHLORIDE, POTASSIUM CHLORIDE, SODIUM LACTATE AND CALCIUM CHLORIDE 150 ML/HR: 600; 310; 30; 20 INJECTION, SOLUTION INTRAVENOUS at 21:41

## 2020-01-02 NOTE — ED PROVIDER NOTES
Subjective   History of Present Illness  History of Present Illness    Chief complaint: Vomiting and weakness    Location: Gastrointestinal    Quality/Severity: Moderate nausea and vomiting    Timing/Duration: Persistent for the past 5 days    Modifying Factors: None    Narrative: This patient presents for evaluation of persistent nausea with vomiting and weakness.  She has been feeling ill for the past 5 days approximately.  She started off with nausea and vomiting earlier this week and now has some increasing weakness and lightheadedness with activities.  She does have a history of diabetes.  Ordinarily she takes oral medications for her diabetes but has been unable to do so lately because of her vomiting.  She has not had much diarrhea.  In fact she has had some constipation.  There have been no known sick contacts.  She denies any dysuria.  She has had 2 C-sections in the past but no other past abdominal surgeries.  She was seen earlier today at urgent care center but they referred her here for further evaluation and IV fluids because of concerns for dehydration.    Associated Symptoms: Crampy abdominal pains    Review of Systems   Constitutional: Positive for activity change, appetite change and fatigue. Negative for chills and fever.   HENT: Negative.    Respiratory: Negative for shortness of breath.    Cardiovascular: Negative for chest pain.   Gastrointestinal: Positive for abdominal pain, constipation, nausea and vomiting. Negative for diarrhea.   Genitourinary: Negative for dysuria, flank pain and frequency.   Skin: Negative for color change and rash.   Neurological: Positive for dizziness, weakness and light-headedness. Negative for syncope and headaches.   All other systems reviewed and are negative.      Past Medical History:   Diagnosis Date   • Abnormal Pap smear of cervix     1 abnormal pap with normal f/u   • Diabetes mellitus (CMS/HCC)    • Fibroid    • HSV-2 (herpes simplex virus 2) infection  "2017   • Menstrual disorder    • Migraines    • Seasonal allergies        Allergies   Allergen Reactions   • Mobic [Meloxicam]      Blurred vision       Past Surgical History:   Procedure Laterality Date   • CARPAL TUNNEL RELEASE WITH CUBITAL TUNNEL RELEASE Right    •  SECTION      X2   • COLONOSCOPY N/A 2016    Procedure: COLONOSCOPY with polypectomy;  Surgeon: Maryan Kent MD;  Location: [facility];  Service:    • ENDOMETRIAL ABLATION      thermachoice   • HYSTEROSCOPY      AND ENDOMETRIAL  ABLATION   • NASAL SEPTUM SURGERY     • TUBAL ABDOMINAL LIGATION         Family History   Problem Relation Age of Onset   • Hypertension Mother    • Hyperlipidemia Mother    • Atrial fibrillation Mother    • Stroke Mother    • Heart attack Father    • Hypertension Father    • Diabetes Father    • Alcohol abuse Father    • Dementia Father    • Stroke Sister    • Crohn's disease Sister    • Heart attack Brother    • Hypertension Brother    • Stroke Brother    • Alzheimer's disease Paternal Grandmother    • Diabetes Paternal Aunt    • Rheum arthritis Other    • Colon cancer Maternal Uncle    • Breast cancer Neg Hx    • Ovarian cancer Neg Hx        Social History     Socioeconomic History   • Marital status:      Spouse name: Not on file   • Number of children: Not on file   • Years of education: Not on file   • Highest education level: Not on file   Tobacco Use   • Smoking status: Former Smoker   • Smokeless tobacco: Never Used   • Tobacco comment: Quit \" a long time ago\"    Substance and Sexual Activity   • Alcohol use: Yes     Comment: OCCASIONALLY   • Drug use: No   • Sexual activity: Yes     Partners: Male     Birth control/protection: Surgical     ED Triage Vitals [20 1713]   Temp Heart Rate Resp BP SpO2   97.5 °F (36.4 °C) 90 18 101/63 98 %      Temp src Heart Rate Source Patient Position BP Location FiO2 (%)   Oral Monitor Lying Right arm --           Objective   Physical Exam "   Constitutional: She is oriented to person, place, and time. She appears well-developed and well-nourished. No distress.   HENT:   Head: Normocephalic and atraumatic.   Eyes: Pupils are equal, round, and reactive to light. EOM are normal. Right eye exhibits no discharge. Left eye exhibits no discharge.   Neck: Normal range of motion. Neck supple. No tracheal deviation present.   Cardiovascular: Normal rate, regular rhythm, normal heart sounds and intact distal pulses. Exam reveals no friction rub.   No murmur heard.  Pulmonary/Chest: Effort normal and breath sounds normal. No stridor. No respiratory distress. She has no wheezes.   Abdominal: Soft. She exhibits no distension and no mass. There is no tenderness. There is no rebound and no guarding.   No focal areas of tenderness to deep palpation.  No peritoneal signs anywhere.   Musculoskeletal: Normal range of motion. She exhibits no edema, tenderness or deformity.   Neurological: She is alert and oriented to person, place, and time.   Skin: Skin is warm and dry. No rash noted. She is not diaphoretic. No erythema.   Psychiatric: She has a normal mood and affect. Her behavior is normal. Judgment and thought content normal.   Nursing note and vitals reviewed.    Results for orders placed or performed during the hospital encounter of 01/02/20   Comprehensive Metabolic Panel   Result Value Ref Range    Glucose 250 (H) 65 - 99 mg/dL    BUN 18 6 - 20 mg/dL    Creatinine 0.69 0.57 - 1.00 mg/dL    Sodium 135 (L) 136 - 145 mmol/L    Potassium 3.9 3.5 - 5.2 mmol/L    Chloride 97 (L) 98 - 107 mmol/L    CO2 16.0 (L) 22.0 - 29.0 mmol/L    Calcium 9.3 8.6 - 10.5 mg/dL    Total Protein 7.5 6.0 - 8.5 g/dL    Albumin 4.60 3.50 - 5.20 g/dL    ALT (SGPT) 13 1 - 33 U/L    AST (SGOT) 12 1 - 32 U/L    Alkaline Phosphatase 77 39 - 117 U/L    Total Bilirubin 0.4 0.2 - 1.2 mg/dL    eGFR Non African Amer 89 >60 mL/min/1.73    Globulin 2.9 gm/dL    A/G Ratio 1.6 g/dL    BUN/Creatinine Ratio  26.1 (H) 7.0 - 25.0    Anion Gap 22.0 (H) 5.0 - 15.0 mmol/L   Lipase   Result Value Ref Range    Lipase 35 13 - 60 U/L   Urinalysis With Microscopic If Indicated (No Culture) - Urine, Clean Catch   Result Value Ref Range    Color, UA Straw Yellow, Straw    Appearance, UA Clear Clear    pH, UA 5.5 4.5 - 8.0    Specific Gravity, UA 1.025 1.003 - 1.030    Glucose,  mg/dL (2+) (A) Negative    Ketones, UA >=160 mg/dL (4+) (A) Negative    Bilirubin, UA Negative Negative    Blood, UA Negative Negative    Protein, UA Negative Negative    Leuk Esterase, UA Negative Negative    Nitrite, UA Negative Negative    Urobilinogen, UA 0.2 E.U./dL 0.2 - 1.0 E.U./dL   CBC Auto Differential   Result Value Ref Range    WBC 4.98 3.40 - 10.80 10*3/mm3    RBC 4.01 3.77 - 5.28 10*6/mm3    Hemoglobin 13.7 12.0 - 15.9 g/dL    Hematocrit 39.5 34.0 - 46.6 %    MCV 98.5 (H) 79.0 - 97.0 fL    MCH 34.2 (H) 26.6 - 33.0 pg    MCHC 34.7 31.5 - 35.7 g/dL    RDW 13.5 12.3 - 15.4 %    RDW-SD 48.7 37.0 - 54.0 fl    MPV 11.0 6.0 - 12.0 fL    Platelets 201 140 - 450 10*3/mm3    Neutrophil % 61.1 42.7 - 76.0 %    Lymphocyte % 27.9 19.6 - 45.3 %    Monocyte % 8.8 5.0 - 12.0 %    Eosinophil % 1.2 0.3 - 6.2 %    Basophil % 0.6 0.0 - 1.5 %    Immature Grans % 0.4 0.0 - 0.5 %    Neutrophils, Absolute 3.04 1.70 - 7.00 10*3/mm3    Lymphocytes, Absolute 1.39 0.70 - 3.10 10*3/mm3    Monocytes, Absolute 0.44 0.10 - 0.90 10*3/mm3    Eosinophils, Absolute 0.06 0.00 - 0.40 10*3/mm3    Basophils, Absolute 0.03 0.00 - 0.20 10*3/mm3    Immature Grans, Absolute 0.02 0.00 - 0.05 10*3/mm3    nRBC 0.0 0.0 - 0.2 /100 WBC   Acetone   Result Value Ref Range    Acetone Moderate (A) Negative   Blood Gas, Arterial   Result Value Ref Range    Site Right Brachial     Alfred's Test N/A     pH, Arterial 7.299 (L) 7.350 - 7.450 pH units    pCO2, Arterial 33.7 (L) 35.0 - 45.0 mm Hg    pO2, Arterial 88.8 83.0 - 108.0 mm Hg    HCO3, Arterial 16.5 (L) 20.0 - 26.0 mmol/L    Base Excess,  Arterial -9.0 (L) 0.0 - 2.0 mmol/L    O2 Saturation, Arterial 95.0 94.0 - 99.0 %    Hemoglobin, Blood Gas 12.4 (L) 13.5 - 17.5 g/dL    Temperature 37.0 C    Barometric Pressure for Blood Gas 732 mmHg    Modality Room Air     Ventilator Mode NA     pCO2, Temperature Corrected 33.7 (L) 35 - 45 mm Hg    pH, Temp Corrected 7.299 (L) 7.350 - 7.450 pH Units    pO2, Temperature Corrected 88.8 83 - 108 mm Hg   POC Glucose Once   Result Value Ref Range    Glucose 227 (H) 70 - 130 mg/dL         Procedures           ED Course  ED Course as of Jan 02 1902   Thu Jan 02, 2020 1900 I reviewed the labs from today's visit.  Patient does technically have DKA with positive ketones in the blood and an anion gap.  She does not seem to be very far into the DKA spectrum, however.  Her heart rate is within normal limits.  She is not tachypneic.  I spoke with Dr. Hernandez from the hospitalist service.  She is agreeable to accept this patient tonight for further therapies in the ICU.  Patient is agreeable to stay as recommended.    [RODNEY]      ED Course User Index  [RODNEY] Asael Barbour MD                                               MDM  Number of Diagnoses or Management Options     Amount and/or Complexity of Data Reviewed  Clinical lab tests: reviewed and ordered  Decide to obtain previous medical records or to obtain history from someone other than the patient: yes  Review and summarize past medical records: yes    Risk of Complications, Morbidity, and/or Mortality  Presenting problems: moderate  Diagnostic procedures: moderate  Management options: moderate        Final diagnoses:   Diabetic ketoacidosis without coma associated with type 2 diabetes mellitus (CMS/HCC)   Non-intractable vomiting with nausea, unspecified vomiting type   Dehydration            Asael Barbour MD  01/02/20 1902

## 2020-01-03 ENCOUNTER — APPOINTMENT (OUTPATIENT)
Dept: CT IMAGING | Facility: HOSPITAL | Age: 55
End: 2020-01-03

## 2020-01-03 LAB
ADV 40+41 DNA STL QL NAA+NON-PROBE: NOT DETECTED
ANION GAP SERPL CALCULATED.3IONS-SCNC: 17.6 MMOL/L (ref 5–15)
ANION GAP SERPL CALCULATED.3IONS-SCNC: 19 MMOL/L (ref 5–15)
ANION GAP SERPL CALCULATED.3IONS-SCNC: 19.1 MMOL/L (ref 5–15)
ASTRO TYP 1-8 RNA STL QL NAA+NON-PROBE: NOT DETECTED
B PARAPERT DNA SPEC QL NAA+PROBE: NOT DETECTED
B PERT DNA SPEC QL NAA+PROBE: NOT DETECTED
BASOPHILS # BLD AUTO: 0.04 10*3/MM3 (ref 0–0.2)
BASOPHILS NFR BLD AUTO: 0.9 % (ref 0–1.5)
BUN BLD-MCNC: 11 MG/DL (ref 6–20)
BUN BLD-MCNC: 14 MG/DL (ref 6–20)
BUN BLD-MCNC: 9 MG/DL (ref 6–20)
BUN/CREAT SERPL: 13.2 (ref 7–25)
BUN/CREAT SERPL: 16.7 (ref 7–25)
BUN/CREAT SERPL: 23.3 (ref 7–25)
C CAYETANENSIS DNA STL QL NAA+NON-PROBE: NOT DETECTED
C PNEUM DNA NPH QL NAA+NON-PROBE: NOT DETECTED
CALCIUM SPEC-SCNC: 8.4 MG/DL (ref 8.6–10.5)
CALCIUM SPEC-SCNC: 8.8 MG/DL (ref 8.6–10.5)
CALCIUM SPEC-SCNC: 8.8 MG/DL (ref 8.6–10.5)
CAMPY SP DNA.DIARRHEA STL QL NAA+PROBE: NOT DETECTED
CHLORIDE SERPL-SCNC: 101 MMOL/L (ref 98–107)
CHLORIDE SERPL-SCNC: 104 MMOL/L (ref 98–107)
CHLORIDE SERPL-SCNC: 108 MMOL/L (ref 98–107)
CK SERPL-CCNC: 36 U/L (ref 20–180)
CO2 SERPL-SCNC: 11.9 MMOL/L (ref 22–29)
CO2 SERPL-SCNC: 14 MMOL/L (ref 22–29)
CO2 SERPL-SCNC: 15.4 MMOL/L (ref 22–29)
CREAT BLD-MCNC: 0.6 MG/DL (ref 0.57–1)
CREAT BLD-MCNC: 0.66 MG/DL (ref 0.57–1)
CREAT BLD-MCNC: 0.68 MG/DL (ref 0.57–1)
CRYPTOSP STL CULT: NOT DETECTED
D-LACTATE SERPL-SCNC: 0.7 MMOL/L (ref 0.5–2)
DEPRECATED RDW RBC AUTO: 50.6 FL (ref 37–54)
E COLI DNA SPEC QL NAA+PROBE: NOT DETECTED
E HISTOLYT AG STL-ACNC: NOT DETECTED
EAEC PAA PLAS AGGR+AATA ST NAA+NON-PRB: NOT DETECTED
EC STX1 + STX2 GENES STL NAA+PROBE: NOT DETECTED
EOSINOPHIL # BLD AUTO: 0.15 10*3/MM3 (ref 0–0.4)
EOSINOPHIL NFR BLD AUTO: 3.3 % (ref 0.3–6.2)
EPEC EAE GENE STL QL NAA+NON-PROBE: NOT DETECTED
ERYTHROCYTE [DISTWIDTH] IN BLOOD BY AUTOMATED COUNT: 13.7 % (ref 12.3–15.4)
ETEC LTA+ST1A+ST1B TOX ST NAA+NON-PROBE: NOT DETECTED
FLUAV H1 2009 PAND RNA NPH QL NAA+PROBE: NOT DETECTED
FLUAV H1 HA GENE NPH QL NAA+PROBE: NOT DETECTED
FLUAV H3 RNA NPH QL NAA+PROBE: NOT DETECTED
FLUAV SUBTYP SPEC NAA+PROBE: NOT DETECTED
FLUBV RNA ISLT QL NAA+PROBE: NOT DETECTED
G LAMBLIA DNA SPEC QL NAA+PROBE: NOT DETECTED
GFR SERPL CREATININE-BSD FRML MDRD: 105 ML/MIN/1.73
GFR SERPL CREATININE-BSD FRML MDRD: 91 ML/MIN/1.73
GFR SERPL CREATININE-BSD FRML MDRD: 94 ML/MIN/1.73
GLUCOSE BLD-MCNC: 103 MG/DL (ref 65–99)
GLUCOSE BLD-MCNC: 123 MG/DL (ref 65–99)
GLUCOSE BLD-MCNC: 165 MG/DL (ref 65–99)
GLUCOSE BLDC GLUCOMTR-MCNC: 117 MG/DL (ref 70–130)
GLUCOSE BLDC GLUCOMTR-MCNC: 205 MG/DL (ref 70–130)
GLUCOSE BLDC GLUCOMTR-MCNC: 341 MG/DL (ref 70–130)
HADV DNA SPEC NAA+PROBE: NOT DETECTED
HCOV 229E RNA SPEC QL NAA+PROBE: NOT DETECTED
HCOV HKU1 RNA SPEC QL NAA+PROBE: NOT DETECTED
HCOV NL63 RNA SPEC QL NAA+PROBE: NOT DETECTED
HCOV OC43 RNA SPEC QL NAA+PROBE: NOT DETECTED
HCT VFR BLD AUTO: 36.3 % (ref 34–46.6)
HGB BLD-MCNC: 12.4 G/DL (ref 12–15.9)
HMPV RNA NPH QL NAA+NON-PROBE: NOT DETECTED
HPIV1 RNA SPEC QL NAA+PROBE: NOT DETECTED
HPIV2 RNA SPEC QL NAA+PROBE: NOT DETECTED
HPIV3 RNA NPH QL NAA+PROBE: NOT DETECTED
HPIV4 P GENE NPH QL NAA+PROBE: NOT DETECTED
IMM GRANULOCYTES # BLD AUTO: 0.01 10*3/MM3 (ref 0–0.05)
IMM GRANULOCYTES NFR BLD AUTO: 0.2 % (ref 0–0.5)
LYMPHOCYTES # BLD AUTO: 1.62 10*3/MM3 (ref 0.7–3.1)
LYMPHOCYTES NFR BLD AUTO: 35.8 % (ref 19.6–45.3)
M PNEUMO IGG SER IA-ACNC: NOT DETECTED
MAGNESIUM SERPL-MCNC: 1.7 MG/DL (ref 1.6–2.6)
MCH RBC QN AUTO: 34.3 PG (ref 26.6–33)
MCHC RBC AUTO-ENTMCNC: 34.2 G/DL (ref 31.5–35.7)
MCV RBC AUTO: 100.3 FL (ref 79–97)
MONOCYTES # BLD AUTO: 0.51 10*3/MM3 (ref 0.1–0.9)
MONOCYTES NFR BLD AUTO: 11.3 % (ref 5–12)
NEUTROPHILS # BLD AUTO: 2.19 10*3/MM3 (ref 1.7–7)
NEUTROPHILS NFR BLD AUTO: 48.5 % (ref 42.7–76)
NOROVIRUS GI+II RNA STL QL NAA+NON-PROBE: NOT DETECTED
NRBC BLD AUTO-RTO: 0 /100 WBC (ref 0–0.2)
OSMOLALITY SERPL: 569 MOSM/KG (ref 275–300)
P SHIGELLOIDES DNA STL QL NAA+PROBE: NOT DETECTED
PHOSPHATE SERPL-MCNC: 2.9 MG/DL (ref 2.5–4.5)
PLATELET # BLD AUTO: 180 10*3/MM3 (ref 140–450)
PMV BLD AUTO: 10.9 FL (ref 6–12)
POTASSIUM BLD-SCNC: 3.9 MMOL/L (ref 3.5–5.2)
POTASSIUM BLD-SCNC: 4 MMOL/L (ref 3.5–5.2)
POTASSIUM BLD-SCNC: 4.4 MMOL/L (ref 3.5–5.2)
RBC # BLD AUTO: 3.62 10*6/MM3 (ref 3.77–5.28)
RHINOVIRUS RNA SPEC NAA+PROBE: NOT DETECTED
RSV RNA NPH QL NAA+NON-PROBE: NOT DETECTED
RV RNA STL NAA+PROBE: NOT DETECTED
SALMONELLA DNA SPEC QL NAA+PROBE: NOT DETECTED
SAPO I+II+IV+V RNA STL QL NAA+NON-PROBE: NOT DETECTED
SHIGELLA SP+EIEC IPAH STL QL NAA+PROBE: NOT DETECTED
SODIUM BLD-SCNC: 134 MMOL/L (ref 136–145)
SODIUM BLD-SCNC: 137 MMOL/L (ref 136–145)
SODIUM BLD-SCNC: 139 MMOL/L (ref 136–145)
TROPONIN T SERPL-MCNC: <0.01 NG/ML (ref 0–0.03)
V CHOLERAE DNA SPEC QL NAA+PROBE: NOT DETECTED
VIBRIO DNA SPEC NAA+PROBE: NOT DETECTED
WBC NRBC COR # BLD: 4.52 10*3/MM3 (ref 3.4–10.8)
YERSINIA STL CULT: NOT DETECTED

## 2020-01-03 PROCEDURE — 83605 ASSAY OF LACTIC ACID: CPT | Performed by: INTERNAL MEDICINE

## 2020-01-03 PROCEDURE — 63710000001 INSULIN ASPART PER 5 UNITS: Performed by: INTERNAL MEDICINE

## 2020-01-03 PROCEDURE — 83735 ASSAY OF MAGNESIUM: CPT | Performed by: INTERNAL MEDICINE

## 2020-01-03 PROCEDURE — 96361 HYDRATE IV INFUSION ADD-ON: CPT

## 2020-01-03 PROCEDURE — 99225 PR SBSQ OBSERVATION CARE/DAY 25 MINUTES: CPT | Performed by: HOSPITALIST

## 2020-01-03 PROCEDURE — 84100 ASSAY OF PHOSPHORUS: CPT | Performed by: INTERNAL MEDICINE

## 2020-01-03 PROCEDURE — 85025 COMPLETE CBC W/AUTO DIFF WBC: CPT | Performed by: INTERNAL MEDICINE

## 2020-01-03 PROCEDURE — 0097U HC BIOFIRE FILMARRAY GI PANEL: CPT | Performed by: INTERNAL MEDICINE

## 2020-01-03 PROCEDURE — 25010000002 IOPAMIDOL 61 % SOLUTION: Performed by: INTERNAL MEDICINE

## 2020-01-03 PROCEDURE — 63710000001 PROMETHAZINE PER 12.5 MG: Performed by: INTERNAL MEDICINE

## 2020-01-03 PROCEDURE — 82550 ASSAY OF CK (CPK): CPT | Performed by: INTERNAL MEDICINE

## 2020-01-03 PROCEDURE — G0378 HOSPITAL OBSERVATION PER HR: HCPCS

## 2020-01-03 PROCEDURE — 84484 ASSAY OF TROPONIN QUANT: CPT | Performed by: INTERNAL MEDICINE

## 2020-01-03 PROCEDURE — 74177 CT ABD & PELVIS W/CONTRAST: CPT

## 2020-01-03 PROCEDURE — 0 DIATRIZOATE MEGLUMINE & SODIUM PER 1 ML: Performed by: INTERNAL MEDICINE

## 2020-01-03 PROCEDURE — 80048 BASIC METABOLIC PNL TOTAL CA: CPT | Performed by: HOSPITALIST

## 2020-01-03 PROCEDURE — 80048 BASIC METABOLIC PNL TOTAL CA: CPT | Performed by: INTERNAL MEDICINE

## 2020-01-03 PROCEDURE — 82962 GLUCOSE BLOOD TEST: CPT

## 2020-01-03 RX ORDER — DEXTROSE AND SODIUM CHLORIDE 5; .45 G/100ML; G/100ML
INJECTION, SOLUTION INTRAVENOUS
Status: COMPLETED
Start: 2020-01-03 | End: 2020-01-03

## 2020-01-03 RX ORDER — SUMATRIPTAN 25 MG/1
100 TABLET, FILM COATED ORAL ONCE
Status: COMPLETED | OUTPATIENT
Start: 2020-01-03 | End: 2020-01-03

## 2020-01-03 RX ORDER — DEXTROSE AND SODIUM CHLORIDE 5; .9 G/100ML; G/100ML
INJECTION, SOLUTION INTRAVENOUS
Status: COMPLETED
Start: 2020-01-03 | End: 2020-01-03

## 2020-01-03 RX ORDER — DEXTROSE AND SODIUM CHLORIDE 5; .9 G/100ML; G/100ML
100 INJECTION, SOLUTION INTRAVENOUS CONTINUOUS
Status: DISCONTINUED | OUTPATIENT
Start: 2020-01-03 | End: 2020-01-04

## 2020-01-03 RX ORDER — DEXTROSE AND SODIUM CHLORIDE 5; .45 G/100ML; G/100ML
100 INJECTION, SOLUTION INTRAVENOUS CONTINUOUS
Status: DISCONTINUED | OUTPATIENT
Start: 2020-01-03 | End: 2020-01-03

## 2020-01-03 RX ADMIN — SENNOSIDES AND DOCUSATE SODIUM 2 TABLET: 8.6; 5 TABLET ORAL at 20:32

## 2020-01-03 RX ADMIN — PROMETHAZINE HYDROCHLORIDE 25 MG: 12.5 TABLET ORAL at 11:33

## 2020-01-03 RX ADMIN — INSULIN ASPART 3 UNITS: 100 INJECTION, SOLUTION INTRAVENOUS; SUBCUTANEOUS at 11:55

## 2020-01-03 RX ADMIN — SUMATRIPTAN SUCCINATE 100 MG: 25 TABLET, FILM COATED ORAL at 14:10

## 2020-01-03 RX ADMIN — SODIUM CHLORIDE, PRESERVATIVE FREE 10 ML: 5 INJECTION INTRAVENOUS at 20:32

## 2020-01-03 RX ADMIN — DEXTROSE AND SODIUM CHLORIDE 100 ML/HR: 5; 450 INJECTION, SOLUTION INTRAVENOUS at 08:29

## 2020-01-03 RX ADMIN — DIATRIZOATE MEGLUMINE AND DIATRIZOATE SODIUM 30 ML: 600; 100 SOLUTION ORAL; RECTAL at 11:30

## 2020-01-03 RX ADMIN — ACETAMINOPHEN 650 MG: 325 TABLET, FILM COATED ORAL at 11:33

## 2020-01-03 RX ADMIN — VALACYCLOVIR HYDROCHLORIDE 500 MG: 500 TABLET, FILM COATED ORAL at 09:51

## 2020-01-03 RX ADMIN — TRAZODONE HYDROCHLORIDE 50 MG: 50 TABLET ORAL at 20:32

## 2020-01-03 RX ADMIN — FLUOXETINE 10 MG: 10 CAPSULE ORAL at 09:51

## 2020-01-03 RX ADMIN — SODIUM CHLORIDE, PRESERVATIVE FREE 10 ML: 5 INJECTION INTRAVENOUS at 09:51

## 2020-01-03 RX ADMIN — ASPIRIN 81 MG 81 MG: 81 TABLET ORAL at 09:51

## 2020-01-03 RX ADMIN — SENNOSIDES AND DOCUSATE SODIUM 2 TABLET: 8.6; 5 TABLET ORAL at 09:51

## 2020-01-03 RX ADMIN — IOPAMIDOL 100 ML: 612 INJECTION, SOLUTION INTRAVENOUS at 14:00

## 2020-01-03 RX ADMIN — ONDANSETRON HYDROCHLORIDE 4 MG: 4 TABLET, FILM COATED ORAL at 08:45

## 2020-01-03 RX ADMIN — INSULIN ASPART 5 UNITS: 100 INJECTION, SOLUTION INTRAVENOUS; SUBCUTANEOUS at 20:31

## 2020-01-03 RX ADMIN — DEXTROSE AND SODIUM CHLORIDE 100 ML: 5; 900 INJECTION, SOLUTION INTRAVENOUS at 17:02

## 2020-01-03 RX ADMIN — DEXTROSE AND SODIUM CHLORIDE 100 ML/HR: 5; .45 INJECTION, SOLUTION INTRAVENOUS at 08:29

## 2020-01-03 RX ADMIN — SODIUM CHLORIDE, POTASSIUM CHLORIDE, SODIUM LACTATE AND CALCIUM CHLORIDE 150 ML/HR: 600; 310; 30; 20 INJECTION, SOLUTION INTRAVENOUS at 04:48

## 2020-01-03 NOTE — PROGRESS NOTES
Malnutrition Severity Assessment    Patient Name:  Lona Dupree  YOB: 1965  MRN: [id number]  Admit Date:  1/2/2020    Patient meets criteria for : Severe Malnutrition    Comments:  Recommend when begin po to advance as tolerated to Consistent Carbohydrate as tolerated.  May also benefit from GI Soft.    Malnutrition Severity Assessment  Malnutrition Type: Chronic Disease - Related Malnutrition     Malnutrition Type (last 8 hours)      Malnutrition Severity Assessment     Row Name 01/03/20 1541       Malnutrition Severity Assessment    Malnutrition Type  Chronic Disease - Related Malnutrition    Row Name 01/03/20 1541       Insufficient Energy Intake     Insufficient Energy Intake   <75% of est. energy requirement for >7d)    Row Name 01/03/20 1541       Muscle Loss    [place]stone Region  Severe - deep hollowing/scooping, lack of muscle to touch, facial bones well defined    Acromion Bone Region  Severe - squared shoulders, bones, and acromion process protrusion prominent    Patellar Region  Moderate - patella more prominent, less muscle definition around patella    Row Name 01/03/20 1541       Fat Loss    Upper Arm Region  Moderate - some fat tissue, not ample    Row Name 01/03/20 1541       Criteria Met (Must meet criteria for severity in at least 2 of these categories: M Wasting, Fat Loss, Fluid, Secondary Signs, Wt. Status, Intake)    Patient meets criteria for   Severe Malnutrition          Electronically signed by:  Analy Overton RD  01/03/20 4:21 PM

## 2020-01-03 NOTE — H&P
[facility] MEDICAL GROUP HOSPITALIST     Jaiden Villagomez MD    CHIEF COMPLAINT: Vomiting and Weakness    HISTORY OF PRESENT ILLNESS:    53 yo WF w/ PMH DM (on oral medications including Metformin), and Migraines who p/w feeling ill including n/v, lethargy, increased issues from her hemorrhoids, increased constipation, orthostasis  for ~5 days.     Was around young children at [place], one of which has come down with an unspecified viral illness.    She has continued to take her medications, and was concerned because her BG's were elevated. States that when she was diagnosed w/ type 2 DM about 3 years ago, she stopped eating sugars and carbs. Because she did not want to be on Insulin.     States that since he was seen in the ER here last march, she went back on her janumet and Dr. Villagomez added Jardiance. In November she was switched to a different SGLT2 inihibitor since jardiance was no longer covered by her insurance. For the last few days her blood sugars have been running 200's-300's, and that this is unusual for her.     She has also been losing weight. She has been trying to put weight on, and feels she has lost another 20 pounds in the last few months. Documented weights in the system show she was 158 in 2016, and then had a stated weight of 103 in March of this year. The only measured weights in the system are from today, and from 2016.      Pt denies substance use, but UDS positive for Amphetamine and cocaine.     ROS Positive for mild epigastric pain, BRBPR (hemorriods), mild cough, seasonal allergies, anorexia  Negative for fever, chills, night sweats      Past Medical History:   Diagnosis Date   • Abnormal Pap smear of cervix     1 abnormal pap with normal f/u   • Diabetes mellitus (CMS/HCC)    • Fibroid    • HSV-2 (herpes simplex virus 2) infection 7/2/2017   • Menstrual disorder    • Migraines    • Seasonal allergies      Past Surgical History:   Procedure Laterality Date   • CARPAL TUNNEL  "RELEASE WITH CUBITAL TUNNEL RELEASE Right    •  SECTION      X2   • COLONOSCOPY N/A 2016    Procedure: COLONOSCOPY with polypectomy;  Surgeon: Maryan Kent MD;  Location: [facility] OR;  Service:    • ENDOMETRIAL ABLATION      thermachoice   • HYSTEROSCOPY      AND ENDOMETRIAL  ABLATION   • NASAL SEPTUM SURGERY     • TUBAL ABDOMINAL LIGATION       Family History   Problem Relation Age of Onset   • Hypertension Mother    • Hyperlipidemia Mother    • Atrial fibrillation Mother    • Stroke Mother    • Heart attack Father    • Hypertension Father    • Diabetes Father    • Alcohol abuse Father    • Dementia Father    • Stroke Sister    • Crohn's disease Sister    • Heart attack Brother    • Hypertension Brother    • Stroke Brother    • Alzheimer's disease Paternal Grandmother    • Diabetes Paternal Aunt    • Rheum arthritis Other    • Colon cancer Maternal Uncle    • Breast cancer Neg Hx    • Ovarian cancer Neg Hx      Social History     Tobacco Use   • Smoking status: Former Smoker   • Smokeless tobacco: Never Used   • Tobacco comment: Quit \" a long time ago\"    Substance Use Topics   • Alcohol use: Yes     Comment: OCCASIONALLY   • Drug use: No     Medications Prior to Admission   Medication Sig Dispense Refill Last Dose   • aspirin 81 MG chewable tablet Chew 81 mg Daily.   2020 at Unknown time   • cetirizine (ZyrTEC) 10 MG tablet Take 10 mg by mouth daily   2020 at Unknown time   • Empagliflozin (JARDIANCE) 25 MG tablet Jardiance 25 mg tablet   Take 1 tablet every day by oral route for 30 days.   2020 at Unknown time   • FLUoxetine (PROzac) 10 MG capsule Take 10 mg by mouth Daily.   2020 at Unknown time   • ibuprofen (IBU) 800 MG tablet 800 mg Every 8 (Eight) Hours As Needed for Mild Pain  or Moderate Pain .   Past Month at Unknown time   • promethazine (PHENERGAN) 25 MG tablet Take 25 mg by mouth every 6 (six) hours as needed for nausea or vomiting.   2020 at Unknown time   • " "pseudoephedrine (SUDAFED) 120 MG 12 hr tablet Take 120 mg by mouth Every 12 (Twelve) Hours As Needed for Congestion.   Past Week at Unknown time   • SITagliptin-metFORMIN HCl ER (JANUMET XR)  MG tablet Take 2 tablets by mouth Daily.   1/2/2020 at Unknown time   • SITagliptin-metFORMIN HCl ER (JANUMET XR)  MG tablet Take 1 tablet by mouth Daily.      • SUMAtriptan (IMITREX) 100 MG tablet sumatriptan 100 mg tablet   take 1 po at onset of symptoms. can repeat x 1 in 2 hours if headache persists   Past Week at Unknown time   • traZODone (DESYREL) 50 MG tablet Take 50 mg by mouth every night.   1/1/2020 at Unknown time   • valACYclovir (VALTREX) 500 MG tablet TAKE ONE TABLET BY MOUTH DAILY 90 tablet 0 1/2/2020 at Unknown time   • JANUVIA 50 MG tablet    Taking   • metFORMIN (GLUCOPHAGE) 500 MG tablet    Taking     Allergies:  Mobic [meloxicam]  Debilities: As per HPI and Physical Exam.     REVIEW OF SYSTEMS:  Please see the above history of present illness for pertinent positives and negatives.  The remainder of the patient's systems have been reviewed and are negative.    Vital Signs  Temp:  [97.3 °F (36.3 °C)-98.2 °F (36.8 °C)] 98.2 °F (36.8 °C)  Heart Rate:  [85-95] 85  Resp:  [18-20] 20  BP: ()/(63-70) 109/70    Flowsheet Rows      First Filed Value   Admission Height  157.5 cm (62\") Documented at 01/02/2020 1713   Admission Weight  42.2 kg (93 lb) Documented at 01/02/2020 1713           Physical Exam:  Physical Exam   Constitutional: She appears well-developed. No distress.   Thin, appears younger than biologic age, tired appearing lady   HENT:   Head: Normocephalic and atraumatic.   Right Ear: External ear normal.   Left Ear: External ear normal.   Nose: Nose normal.   Eyes: Pupils are equal, round, and reactive to light. Conjunctivae and EOM are normal.   Neck: No JVD present. No tracheal deviation present.   Cardiovascular: Normal rate, regular rhythm, normal heart sounds and intact distal " pulses. Exam reveals no friction rub.   No murmur heard.  Pulmonary/Chest: Effort normal and breath sounds normal. No stridor. No respiratory distress. She has no wheezes. She has no rales.   Abdominal: Soft. Bowel sounds are normal. She exhibits no distension. There is no tenderness. There is no guarding.   Musculoskeletal: Normal range of motion. She exhibits no edema.   Lymphadenopathy:     She has no cervical adenopathy.   Neurological: No cranial nerve deficit. She exhibits normal muscle tone. Coordination normal.   Skin: Skin is warm and dry. She is not diaphoretic. No erythema. No pallor.   Tanned skin   Psychiatric:   Deferred mood, full affect   Nursing note and vitals reviewed.       Results Review:    I reviewed the patient's new clinical results.  Lab Results (most recent)     Procedure Component Value Units Date/Time    POC Glucose Once [[id number]]  (Normal) Collected:  01/02/20 2103    Specimen:  Blood Updated:  01/02/20 2112     Glucose 114 mg/dL     Basic Metabolic Panel [[id number]]  (Abnormal) Collected:  01/02/20 2021    Specimen:  Blood Updated:  01/02/20 2050     Glucose 137 mg/dL      BUN 16 mg/dL      Creatinine 0.60 mg/dL      Sodium 139 mmol/L      Potassium 3.9 mmol/L      Chloride 105 mmol/L      CO2 14.9 mmol/L      Calcium 8.7 mg/dL      eGFR Non African Amer 105 mL/min/1.73      BUN/Creatinine Ratio 26.7     Anion Gap 19.1 mmol/L     Narrative:       GFR Normal >60  Chronic Kidney Disease <60  Kidney Failure <15      Magnesium [[id number]]  (Normal) Collected:  01/02/20 2021    Specimen:  Blood Updated:  01/02/20 2050     Magnesium 1.8 mg/dL     Phosphorus [[id number]]  (Normal) Collected:  01/02/20 2021    Specimen:  Blood Updated:  01/02/20 2050     Phosphorus 3.5 mg/dL     Lactic Acid, Plasma [[id number]]  (Normal) Collected:  01/02/20 2022    Specimen:  Blood Updated:  01/02/20 2047     Lactate 0.8 mmol/L     Urine Drug Screen - Urine, Clean Catch [[id number]]  (Abnormal) Collected:   01/02/20 1727    Specimen:  Urine, Clean Catch Updated:  01/02/20 2044     THC, Screen, Urine Positive     Phencyclidine (PCP), Urine Negative     Cocaine Screen, Urine Positive     Methamphetamine, Ur Positive     Opiate Screen Negative     Amphetamine Screen, Urine Negative     Benzodiazepine Screen, Urine Negative     Tricyclic Antidepressants Screen Negative     Methadone Screen, Urine Negative     Barbiturates Screen, Urine Negative     Oxycodone Screen, Urine Negative     Propoxyphene Screen Negative     Buprenorphine, Screen, Urine Negative    Narrative:       Urine drug screen results are to be used for medical purposes only.  They are not to be used for legal purposes such as employment testing.  Negative results do not necessarily mean the complete absence of a subtance, but rather that the result is less than the cutoff for that substance.  Positive results are unconfirmed and considered Preliminary Positive.  [facility] does not automatically confirm Postitive Unconfirmed results.  The physician may request (order) an Unconfirmed Positive result to be sent out for confirmation.      Negative Thresholds for Drugs Screened:    THC screen, urine                          50 ng/ml  Phenycyclidine (PCP), urine                25 ng/ml  Cocaine screen, urine                     150 ng/ml  Methamphetamine, urine                    500 ng/ml  Opiate screen, urine                      100 ng/ml  Amphetamine screen, urine                 500 ng/ml  Benzodiazepine screen, urine              150 ng/ml  Tricyclic Antidepressants screen, urine   300 ng/ml  Methadone screen, urine                   200 ng/ml  Barbiturates screen, urine                200 ng/ml  Oxycodone screen, urine                   100 ng/ml  Propoxyphene screen, urine                300 ng/ml  Buprenorphine screen, urine                10 ng/ml    Blood Culture - Blood, Arm, Right [[id number]] Collected:  01/02/20 2035    Specimen:   Blood from Arm, Right Updated:  01/02/20 2035    Blood Culture - Blood, Arm, Right [[id number]] Collected:  01/02/20 2021    Specimen:  Blood from Arm, Right Updated:  01/02/20 2025    Osmolality, Serum [[id number]] Collected:  01/02/20 2021    Specimen:  Blood Updated:  01/02/20 2025    Toxicology Screen, Serum [[id number]] Collected:  01/02/20 2021    Specimen:  Blood Updated:  01/02/20 2025    POC Glucose Once [[id number]]  (Normal) Collected:  01/02/20 2007    Specimen:  Blood Updated:  01/02/20 2020     Glucose 128 mg/dL     Procalcitonin [[id number]]  (Abnormal) Collected:  01/02/20 1725    Specimen:  Blood Updated:  01/02/20 1936     Procalcitonin 0.03 ng/mL     Troponin [[id number]]  (Normal) Collected:  01/02/20 1725    Specimen:  Blood Updated:  01/02/20 1936     Troponin T <0.010 ng/mL     Narrative:       Troponin T Reference Range:  <= 0.03 ng/mL-   Negative for AMI  >0.03 ng/mL-     Abnormal for myocardial necrosis.  Clinicians would have to utilize clinical acumen, EKG, Troponin and serial changes to determine if it is an Acute Myocardial Infarction or myocardial injury due to an underlying chronic condition.     Hemoglobin A1c [[id number]]  (Abnormal) Collected:  01/02/20 1725    Specimen:  Blood Updated:  01/02/20 1931     Hemoglobin A1C 11.20 %     Narrative:       Hemoglobin A1C Ranges:    Increased Risk for Diabetes  5.7% to 6.4%  Diabetes                     >= 6.5%  Diabetic Goal                < 7.0%    Phosphorus [[id number]]  (Normal) Collected:  01/02/20 1725    Specimen:  Blood Updated:  01/02/20 1930     Phosphorus 4.4 mg/dL     Ethanol [[id number]] Collected:  01/02/20 1725    Specimen:  Blood Updated:  01/02/20 1930     Ethanol <10 mg/dL      Ethanol % <0.010 %     Magnesium [[id number]]  (Normal) Collected:  01/02/20 1725    Specimen:  Blood Updated:  01/02/20 1930     Magnesium 1.9 mg/dL     Pregnancy, Urine - Urine, Clean Catch [[id number]]  (Normal) Collected:  01/02/20 1727    Specimen:   Urine, Clean Catch Updated:  01/02/20 1920     HCG, Urine QL Negative    Blood Gas, Arterial [[id number]]  (Abnormal) Collected:  01/02/20 1819    Specimen:  Arterial Blood Updated:  01/02/20 1825     Site Right Brachial     Alfred's Test N/A     pH, Arterial 7.299 pH units      Comment: 84 Value below reference range        pCO2, Arterial 33.7 mm Hg      Comment: 84 Value below reference range        pO2, Arterial 88.8 mm Hg      HCO3, Arterial 16.5 mmol/L      Comment: 84 Value below reference range        Base Excess, Arterial -9.0 mmol/L      Comment: 84 Value below reference range        O2 Saturation, Arterial 95.0 %      Hemoglobin, Blood Gas 12.4 g/dL      Temperature 37.0 C      Barometric Pressure for Blood Gas 732 mmHg      Modality Room Air     Ventilator Mode NA     Comment: Meter: [id number]     :  [id number]        pCO2, Temperature Corrected 33.7 mm Hg      pH, Temp Corrected 7.299 pH Units      pO2, Temperature Corrected 88.8 mm Hg     Comprehensive Metabolic Panel [[id number]]  (Abnormal) Collected:  01/02/20 1725    Specimen:  Blood Updated:  01/02/20 1757     Glucose 250 mg/dL      BUN 18 mg/dL      Creatinine 0.69 mg/dL      Sodium 135 mmol/L      Potassium 3.9 mmol/L      Chloride 97 mmol/L      CO2 16.0 mmol/L      Calcium 9.3 mg/dL      Total Protein 7.5 g/dL      Albumin 4.60 g/dL      ALT (SGPT) 13 U/L      AST (SGOT) 12 U/L      Alkaline Phosphatase 77 U/L      Total Bilirubin 0.4 mg/dL      eGFR Non African Amer 89 mL/min/1.73      Globulin 2.9 gm/dL      A/G Ratio 1.6 g/dL      BUN/Creatinine Ratio 26.1     Anion Gap 22.0 mmol/L     Narrative:       GFR Normal >60  Chronic Kidney Disease <60  Kidney Failure <15      Lipase [[id number]]  (Normal) Collected:  01/02/20 1725    Specimen:  Blood Updated:  01/02/20 1757     Lipase 35 U/L     Ketone Bodies, Serum (Not performed at [place]) [[id number]] Collected:  01/02/20 1725    Specimen:  Blood Updated:  01/02/20 1749    Narrative:        The following orders were created for panel order Ketone Bodies, Serum (Not performed at [place]).  Procedure                               Abnormality         Status                     ---------                               -----------         ------                     Acetone[[id number]]                      Abnormal            Final result                 Please view results for these tests on the individual orders.    Acetone [[id number]]  (Abnormal) Collected:  01/02/20 1725    Specimen:  Blood Updated:  01/02/20 1749     Acetone Moderate    Urinalysis With Microscopic If Indicated (No Culture) - Urine, Clean Catch [[id number]]  (Abnormal) Collected:  01/02/20 1727    Specimen:  Urine, Clean Catch Updated:  01/02/20 1739     Color, UA Straw     Appearance, UA Clear     pH, UA 5.5     Specific Gravity, UA 1.025     Glucose,  mg/dL (2+)     Ketones, UA >=160 mg/dL (4+)     Bilirubin, UA Negative     Blood, UA Negative     Protein, UA Negative     Leuk Esterase, UA Negative     Nitrite, UA Negative     Urobilinogen, UA 0.2 E.U./dL    Narrative:       Urine microscopic not indicated.    CBC & Differential [[id number]] Collected:  01/02/20 1725    Specimen:  Blood Updated:  01/02/20 1738    Narrative:       The following orders were created for panel order CBC & Differential.  Procedure                               Abnormality         Status                     ---------                               -----------         ------                     CBC Auto Differential[[id number]]        Abnormal            Final result                 Please view results for these tests on the individual orders.    CBC Auto Differential [[id number]]  (Abnormal) Collected:  01/02/20 1725    Specimen:  Blood Updated:  01/02/20 1738     WBC 4.98 10*3/mm3      RBC 4.01 10*6/mm3      Hemoglobin 13.7 g/dL      Hematocrit 39.5 %      MCV 98.5 fL      MCH 34.2 pg      MCHC 34.7 g/dL      RDW 13.5 %      RDW-SD 48.7 fl      MPV 11.0  fL      Platelets 201 10*3/mm3      Neutrophil % 61.1 %      Lymphocyte % 27.9 %      Monocyte % 8.8 %      Eosinophil % 1.2 %      Basophil % 0.6 %      Immature Grans % 0.4 %      Neutrophils, Absolute 3.04 10*3/mm3      Lymphocytes, Absolute 1.39 10*3/mm3      Monocytes, Absolute 0.44 10*3/mm3      Eosinophils, Absolute 0.06 10*3/mm3      Basophils, Absolute 0.03 10*3/mm3      Immature Grans, Absolute 0.02 10*3/mm3      nRBC 0.0 /100 WBC           Imaging Results (Most Recent)     Procedure Component Value Units Date/Time    XR Chest 2 View [[id number]] Collected:  01/02/20 1950     Updated:  01/02/20 1952    Narrative:       CR Chest 2 Vws    INDICATION:    Diabetic ketoacidosis. Nausea and vomiting.    COMPARISON:    None.    FINDINGS:   PA and lateral views of the chest.  Heart and mediastinal contours are normal. The lungs are clear. No pneumothorax or pleural effusion.        Impression:       No acute cardiopulmonary findings.    Signer Name: Prashanth Elizabeth MD   Signed: 1/2/2020 7:50 PM   Workstation Name: [id number]    [facility]        ECG/EMG Results (most recent)     None          Assessment/Plan     HAGMA  - DDX prior to her UDS resulting was GARIMA w/ DKA, Metformin lactic acidosis, Severe dehydration / sepsis from gastroenteritis  - Now favor dehydration, with possible viral component (lactate neg, improving with just fluids)  - Insulin drip ordered in ER for BG > 250, pH 3.2, bicarb 16, ketones in urine/acetone in serum  -  did not start insulin drip, because BG < 200 with fluid bolus, drip dc'd just after getting to unit  - Will continue to give fluids  - Will trend and replace electrolytes  - Given Cocaine use, will check trop and CK    N/V  - Will give her clear liquid diet  - Will get Resp panel (does have mild cough, sick contact) and GI panel to help r/o viral enteritis     DM2  - Pt states complaint with meds, but A1c 11, and was 12 back in March  - BG currently < 180,  will start on SSI and accuchecks ACHS    Polysubstance abuse  - UDS positive for Methamphetamine, cocaine, and THC  - [place] eval prior to discharge  - Have not provided counseling, results available after interview w/ pt, felt at risk of leaving ama if confronted overnight, and due to degree of acidosis, best for pt to be worked up and given fluids       I discussed the patients findings and my recommendations with patient    Amaury Hernnadez MD  01/02/20  9:15 PM

## 2020-01-03 NOTE — ED NOTES
Patient transported via RN on monitor, CXR obtained in radiology in route.  PT arrived in ICU, placed on monitor and left with two ICU RN in room.         Anibal Umanzor, RN  01/02/20 1954

## 2020-01-03 NOTE — PLAN OF CARE
Problem: Patient Care Overview  Goal: Plan of Care Review  Outcome: Ongoing (interventions implemented as appropriate)  Flowsheets (Taken 1/3/2020 8560)  Progress: improving  Plan of Care Reviewed With: patient  Outcome Summary: CO2 remains low and anion gap remains elevated. fluids changed to D5 NS. VSS. afebrile. ongoing nausea no episodes of vomiting. c/o of headache resolved with medication; see MAR. CT with contrast of abd and pelvis; tolerated well. Will continue to monitor in the ICU setting.

## 2020-01-03 NOTE — PROGRESS NOTES
Adult Nutrition  Assessment/PES    Patient Name:  Lona Dupree  YOB: 1965  MRN: [id number]  Admit Date:  1/2/2020    Assessment Date:  1/3/2020        Reason for Assessment     Row Name 01/03/20 1819        Reason for Assessment    Reason For Assessment  other (see comments)     Diagnosis  -- DKA      Identified At Risk by Screening Criteria  need for education             Problem      Row Name 01/03/20 1820        Nutrition Diagnoses Problem 2      Knowledge Deficit     Etiology (related to)  MNT for Treatment/Condition     Signs/Symptoms (evidenced by)  Potential Information Deficit             Intervention Goal     Row Name 01/03/20 1820        Intervention Goal    General  Provide information regarding MNT for treatment/condition        Education/Evaluation     Row Name 01/03/20 1821        Education    Education  Provided education regarding     Provided education regarding  -- Consistent Carbohydrate diet with no more than 180 grams carbohydrate/day and no more than 60 grams per meal.        Monitor/Evaluation    Monitor  -- Pt. demonstrated good understanding by ability to read a food label for carbohydrate and state examples of carbohydrate containing foods, expected compliance is good based upon response to education     Education Follow-up  -- Provided with handouts:  Quick Starts to Diabetes, Consistent Carbohydrate Diet, Food Labeling and RD contact info.           Electronically signed by:  Analy Overton RD  01/03/20 6:25 PM

## 2020-01-03 NOTE — NURSING NOTE
Discharge Planning Assessment   Aditi Do     Patient Name: Lona Dupree  MRN: [id number]  Today's Date: 1/3/2020    Admit Date: 1/2/2020    Discharge Needs Assessment     Row Name 01/03/20 1516       Living Environment    Lives With  child(yfn), adult    Name(s) of Who Lives With Patient  Norberto Dupree son    Current Living Arrangements  home/apartment/condo    Primary Care Provided by  self    Provides Primary Care For  no one    Family Caregiver if Needed  child(yfn), adult    Family Caregiver Names  Kerrie and Norberto - adult children    Quality of Family Relationships  supportive    Able to Return to Prior Arrangements  yes       Resource/Environmental Concerns    Resource/Environmental Concerns  none    Transportation Concerns  car, none       Transition Planning    Patient/Family Anticipates Transition to  home with family    Patient/Family Anticipated Services at Transition  none    Transportation Anticipated  family or friend will provide       Discharge Needs Assessment    Readmission Within the Last 30 Days  no previous admission in last 30 days    Concerns to be Addressed  no discharge needs identified    Equipment Currently Used at Home  glucometer    Anticipated Changes Related to Illness  none    Equipment Needed After Discharge  none    Current Discharge Risk  substance use/abuse        Discharge Plan     Row Name 01/03/20 1519       Plan    Plan  d/c home with family    Patient/Family in Agreement with Plan  yes    Plan Comments  Into room to visit with pt.  Facesheet verified.  Pt lives in a home with her adult son Norberto.  She is typically independent with ADL;s and uses no DME except for a glucometer which is in good working order.  Pt states she has no problems obtaining or paying for medications at pharmacy.  Pt has no history with HH/rehab services.  Pt plans on d/c home with family upon d/c.  Denies any needs or concerns r/t d/c.  CM name and number placed on whiteboard.  CM will  continue to follow.          Destination      Coordination has not been started for this encounter.      Durable Medical Equipment      Coordination has not been started for this encounter.      Dialysis/Infusion      Coordination has not been started for this encounter.      Home Medical Care      Coordination has not been started for this encounter.      Therapy      Coordination has not been started for this encounter.      Community Resources      Coordination has not been started for this encounter.          Demographic Summary     Row Name 01/03/20 1514       General Information    Admission Type  observation    Arrived From  home    Referral Source  admission list    Reason for Consult  discharge planning    Preferred Language  English     Used During This Interaction  no        Functional Status     Row Name 01/03/20 1516       Functional Status    Usual Activity Tolerance  excellent    Current Activity Tolerance  excellent       Functional Status, IADL    Medications  independent    Meal Preparation  independent    Housekeeping  independent    Laundry  independent    Shopping  independent       Mental Status    General Appearance WDL  WDL       Mental Status Summary    Recent Changes in Mental Status/Cognitive Functioning  no changes        Psychosocial    No documentation.       Abuse/Neglect    No documentation.       Legal    No documentation.       Substance Abuse    No documentation.       Patient Forms    No documentation.           Pedro Lujan RN

## 2020-01-03 NOTE — PLAN OF CARE
Discharge Planning Assessment   [facility] Do     Patient Name: Lona Dupree  MRN: [id number]  Today's Date: 1/3/2020    Admit Date: 1/2/2020    Discharge Needs Assessment       Row Name 01/03/20 1516       Living Environment    Lives With  child(yfn), adult    Name(s) of Who Lives With Patient  Norberto mario    Current Living Arrangements  home/apartment/condo    Primary Care Provided by  self    Provides Primary Care For  no one    Family Caregiver if Needed  child(yfn), adult    Family Caregiver Names  Kerrie and Norberto - adult children    Quality of Family Relationships  supportive    Able to Return to Prior Arrangements  yes       Resource/Environmental Concerns    Resource/Environmental Concerns  none    Transportation Concerns  car, none       Transition Planning    Patient/Family Anticipates Transition to  home with family    Patient/Family Anticipated Services at Transition  none    Transportation Anticipated  family or friend will provide       Discharge Needs Assessment    Readmission Within the Last 30 Days  no previous admission in last 30 days    Concerns to be Addressed  no discharge needs identified    Equipment Currently Used at Home  glucometer    Anticipated Changes Related to Illness  none    Equipment Needed After Discharge  none    Current Discharge Risk  substance use/abuse            Discharge Plan       Row Name 01/03/20 1519       Plan    Plan  d/c home with family    Patient/Family in Agreement with Plan  yes    Plan Comments  Into room to visit with pt.  Facesheet verified.  Pt lives in a home with her adult son Norberto.  She is typically independent with ADL;s and uses no DME except for a glucometer which is in good working order.  Pt states she has no problems obtaining or paying for medications at pharmacy.  Pt has no history with HH/rehab services.  Pt plans on d/c home with family upon d/c.  Denies any needs or concerns r/t d/c.  CM name and number placed on whiteboard.  CM  will continue to follow.              Destination        Coordination has not been started for this encounter.          Durable Medical Equipment        Coordination has not been started for this encounter.          Dialysis/Infusion        Coordination has not been started for this encounter.          Home Medical Care        Coordination has not been started for this encounter.          Therapy        Coordination has not been started for this encounter.          Community Resources        Coordination has not been started for this encounter.              Demographic Summary       Row Name 01/03/20 1514       General Information    Admission Type  observation    Arrived From  home    Referral Source  admission list    Reason for Consult  discharge planning    Preferred Language  English     Used During This Interaction  no            Functional Status       Row Name 01/03/20 1516       Functional Status    Usual Activity Tolerance  excellent    Current Activity Tolerance  excellent       Functional Status, IADL    Medications  independent    Meal Preparation  independent    Housekeeping  independent    Laundry  independent    Shopping  independent       Mental Status    General Appearance WDL  WDL       Mental Status Summary    Recent Changes in Mental Status/Cognitive Functioning  no changes            Psychosocial    No documentation.         Abuse/Neglect    No documentation.         Legal    No documentation.         Substance Abuse    No documentation.         Patient Forms    No documentation.             Pedro Lujan RN

## 2020-01-03 NOTE — PROGRESS NOTES
"Adult Nutrition  Assessment/PES    Patient Name:  Lona Dupree  YOB: 1965  MRN: [id number]  Admit Date:  1/2/2020    Assessment Date:  1/3/2020    Comments:  Patient meets criteria for severe malnutrition due to physical signs/symptoms of muscle/subcutaneous fat loss.  Recommend when begin po to advance as tolerated to Consistent Carbohydrate as tolerated.  May also benefit from GI Soft.      Reason for Assessment     Row Name 01/03/20 1530          Reason for Assessment    Reason For Assessment  nurse/nurse practitioner consult     Diagnosis  -- DKA, severe dehydration/sepsis from gastroenteritis, polysubstance abuse     Identified At Risk by Screening Criteria  MST SCORE 2+         Nutrition/Diet History     Row Name 01/03/20 1531          Nutrition/Diet History    Typical Food/Fluid Intake  avoids mostly added sugars in diet, diagnosis of diabetes 2016, wt was 153, changed diet/exercised, doing well until 3/19 and glucose increased     Factors Affecting Nutritional Intake  -- reports good appetite/intake until about a week ago when started having nausea/vomiting/intake drastically reduced         Anthropometrics     Row Name 01/03/20 1534          Anthropometrics    Height  157.5 cm (62.01\")        Admit Weight    Admit Weight  43.1 kg (95 lb)        Ideal Body Weight (IBW)    Ideal Body Weight (IBW) (kg)  50.45        Usual Body Weight (UBW)    Weight Loss  -- wt 158# 8-8-16 (39.8% decrease in weight)        Body Mass Index (BMI)    BMI Assessment  BMI 17-18.4: protein-energy malnutrition grade I 17         Labs/Tests/Procedures/Meds     Row Name 01/03/20 1536          Labs/Procedures/Meds    Lab Results Reviewed  reviewed     Lab Results Comments  [id number]=84.2        Medications    Pertinent Medications Reviewed  reviewed     Pertinent Medications Comments  IV fluids at 100 ml/hour           Estimated/Assessed Needs     Row Name 01/03/20 1538 01/03/20 1534       Calculation Measurements    " "Height  --  157.5 cm (62.01\")       Estimated/Assessed Needs    Additional Documentation  Fluid Requirements (Group);Calorie Requirements (Group);Protein Requirements (Group);Grafton-St. Jeor Equation (Group)  --       Calorie Requirements    Estimated Calorie Need Method  [facility] activity factor  --    Estimated Calorie Requirement Comment  1,485 estimated carbohydrate needs=167 grams per day  --       Protein Requirements    Est Protein Requirement Amount (gms/kg)  1.5 gm protein 65 grams per day  --       Fluid Requirements    Estimated Fluid Requirement Method  RDA Method 1,485  --        Nutrition Prescription Ordered     Row Name 01/03/20 1540          Nutrition Prescription PO    Current PO Diet  NPO         Evaluation of Received Nutrient/Fluid Intake     Row Name 01/03/20 1540          Fluid Intake Evaluation    IV Fluid (mL)  -- IV fluids meets estimated fluid needs        PO Evaluation    % PO Intake  -- pt. NPO           Malnutrition Severity Assessment     Row Name 01/03/20 1541          Malnutrition Severity Assessment    Malnutrition Type  Chronic Disease - Related Malnutrition        Insufficient Energy Intake     Insufficient Energy Intake   <75% of est. energy requirement for >7d)        Muscle Loss    [place]  Severe - deep hollowing/scooping, lack of muscle to touch, facial bones well defined     Clavicle Bone Region  Moderate - some protrusion in females, visible in males     Acromion Bone Region  Severe - squared shoulders, bones, and acromion process protrusion prominent     Patellar Region  Moderate - patella more prominent, less muscle definition around patella        Fat Loss    Upper Arm Region  Moderate - some fat tissue, not ample        Criteria Met (Must meet criteria for severity in at least 2 of these categories: M Wasting, Fat Loss, Fluid, Secondary Signs, Wt. Status, Intake)    Patient meets criteria for   Severe Malnutrition     "       Problem/Interventions:  Problem 1     Row Name 01/03/20 1544          Nutrition Diagnoses Problem 1    Problem 1  Malnutrition     Etiology (related to)  Factors Affecting Nutrition     Signs/Symptoms (evidenced by)  Report/Observation malnutrition severity assessment         Problem 2     Row Name 01/03/20 1545          Nutrition Diagnoses Problem 2    Problem 2  Knowledge Deficit     Etiology (related to)  MNT for Treatment/Condition     Signs/Symptoms (evidenced by)  Potential Information Deficit             Intervention Goal     Row Name 01/03/20 1545          Intervention Goal    General  Provide information regarding MNT for treatment/condition     PO  Initiate feeding     Weight  No significant weight loss         Nutrition Intervention     Row Name 01/03/20 1545          Nutrition Intervention    RD/Tech Action  Await begin PO;Encourage intake;Follow Tx progress Secure chat message sent to MD regarding recommendations above.          Education/Evaluation     Row Name 01/03/20 1619          Education    Education  -- pt. interested in education for diabetes.  Will follow up with this at a later time        Monitor/Evaluation    Monitor  I&O;Pertinent labs;Weight;Skin status           Electronically signed by:  Analy Overton RD  01/03/20 4:28 PM

## 2020-01-03 NOTE — PROGRESS NOTES
"Hospitalist Team      Patient Care Team:  Jaiden Villagomez MD as PCP - General  Jaiden Villagomez MD as PCP - Family Medicine        Chief Complaint: Follow-up high anion gap metabolic acidosis.    Subjective    Interval History and ROS:     The patient states she feels sick at her stomach still today.  She notes pain diffusely in her abdomen.  She admits to using marijuana for nausea and headaches at home.  She also admits to doing cocaine on New Year's Marya.  She states this is the only time she has ever used cocaine.  She denies ever using methamphetamine, she does states she uses Sudafed at home on a regular basis for her allergies.  Denies any chest pain or shortness of breath.  She denies any heart palpitations.  Her heart rate and blood pressure are within normal limits and she is afebrile.      Objective    Vital Signs  Temp:  [97.5 °F (36.4 °C)-98.2 °F (36.8 °C)] 97.5 °F (36.4 °C)  Heart Rate:  [70-96] 80  Resp:  [14-20] 14  BP: ()/(55-77) 99/59  Oxygen Therapy  SpO2: 98 %  Pulse Oximetry Type: Continuous  Device (Oxygen Therapy): room air     Flowsheet Rows      First Filed Value   Admission Height  157.5 cm (62\") Documented at 01/02/2020 1713   Admission Weight  42.2 kg (93 lb) Documented at 01/02/2020 1713            Physical Exam:    Physical Exam   Constitutional: She is oriented to person, place, and time. She appears well-developed. No distress.   Thin female who appears older than her stated age.   HENT:   Head: Normocephalic and atraumatic.   Mouth/Throat: Oropharynx is clear and moist.   Eyes: Conjunctivae and EOM are normal. No scleral icterus.   Neck: Neck supple. No JVD present.   Cardiovascular: Normal rate and regular rhythm. Exam reveals no gallop and no friction rub.   No murmur heard.  Pulmonary/Chest: Effort normal and breath sounds normal. No respiratory distress. She has no wheezes. She has no rales.   Abdominal: Soft. Bowel sounds are normal. She exhibits no distension. There " is tenderness.   Tenderness to palpation most notable in the right lower quadrant.   Musculoskeletal: She exhibits no edema.   Lymphadenopathy:     She has no cervical adenopathy.   Neurological: She is alert and oriented to person, place, and time.   Skin: Skin is warm and dry. No rash noted.   Psychiatric: She has a normal mood and affect. Her behavior is normal.   Vitals reviewed.      Results Review:     I reviewed the patient's new clinical results.    Lab Results (last 24 hours)     Procedure Component Value Units Date/Time    Basic Metabolic Panel [[id number]] Collected:  01/03/20 1712    Specimen:  Blood Updated:  01/03/20 1714    Basic Metabolic Panel [[id number]]  (Abnormal) Collected:  01/03/20 1227    Specimen:  Blood Updated:  01/03/20 1305     Glucose 165 mg/dL      BUN 11 mg/dL      Creatinine 0.66 mg/dL      Sodium 134 mmol/L      Potassium 4.0 mmol/L      Chloride 101 mmol/L      CO2 14.0 mmol/L      Calcium 8.4 mg/dL      eGFR Non African Amer 94 mL/min/1.73      BUN/Creatinine Ratio 16.7     Anion Gap 19.0 mmol/L     Narrative:       GFR Normal >60  Chronic Kidney Disease <60  Kidney Failure <15      POC Glucose Once [[id number]]  (Abnormal) Collected:  01/03/20 1142    Specimen:  Blood Updated:  01/03/20 1159     Glucose 205 mg/dL     Respiratory Panel, PCR - Swab, Nasopharynx [[id number]]  (Normal) Collected:  01/02/20 2200    Specimen:  Swab from Nasopharynx Updated:  01/03/20 1118     ADENOVIRUS, PCR Not Detected     Coronavirus 229E Not Detected     Coronavirus HKU1 Not Detected     Coronavirus NL63 Not Detected     Coronavirus OC43 Not Detected     Human Metapneumovirus Not Detected     Human Rhinovirus/Enterovirus Not Detected     Influenza B PCR Not Detected     Parainfluenza Virus 1 Not Detected     Parainfluenza Virus 2 Not Detected     Parainfluenza Virus 3 Not Detected     Parainfluenza Virus 4 Not Detected     Bordetella pertussis pcr Not Detected     Influenza A H1 2009 PCR Not Detected       Chlamydophila pneumoniae PCR Not Detected     Mycoplasma pneumo by PCR Not Detected     Influenza A PCR Not Detected     Influenza A H3 Not Detected     Influenza A H1 Not Detected     RSV, PCR Not Detected     Bordetella parapertussis PCR Not Detected    Osmolality, Serum [[id number]]  (Abnormal) Collected:  01/02/20 2021    Specimen:  Blood Updated:  01/03/20 1043     Osmolality 569 mOsm/kg     Gastrointestinal Panel, PCR - Stool, Per Rectum [[id number]] Collected:  01/03/20 0949    Specimen:  Stool from Per Rectum Updated:  01/03/20 0955    POC Glucose Once [[id number]]  (Normal) Collected:  01/03/20 0758    Specimen:  Blood Updated:  01/03/20 0812     Glucose 117 mg/dL     Troponin [[id number]]  (Normal) Collected:  01/03/20 0500    Specimen:  Blood Updated:  01/03/20 0542     Troponin T <0.010 ng/mL     Narrative:       Troponin T Reference Range:  <= 0.03 ng/mL-   Negative for AMI  >0.03 ng/mL-     Abnormal for myocardial necrosis.  Clinicians would have to utilize clinical acumen, EKG, Troponin and serial changes to determine if it is an Acute Myocardial Infarction or myocardial injury due to an underlying chronic condition.     Basic Metabolic Panel [[id number]]  (Abnormal) Collected:  01/03/20 0500    Specimen:  Blood Updated:  01/03/20 0537     Glucose 123 mg/dL      BUN 14 mg/dL      Creatinine 0.60 mg/dL      Sodium 139 mmol/L      Potassium 4.4 mmol/L      Chloride 108 mmol/L      CO2 11.9 mmol/L      Calcium 8.8 mg/dL      eGFR Non African Amer 105 mL/min/1.73      BUN/Creatinine Ratio 23.3     Anion Gap 19.1 mmol/L     Narrative:       GFR Normal >60  Chronic Kidney Disease <60  Kidney Failure <15      Magnesium [[id number]]  (Normal) Collected:  01/03/20 0500    Specimen:  Blood Updated:  01/03/20 0537     Magnesium 1.7 mg/dL     Phosphorus [[id number]]  (Normal) Collected:  01/03/20 0500    Specimen:  Blood Updated:  01/03/20 0537     Phosphorus 2.9 mg/dL     CK [[id number]]  (Normal) Collected:   01/03/20 0500    Specimen:  Blood Updated:  01/03/20 0537     Creatine Kinase 36 U/L     Lactic Acid, Plasma [[id number]]  (Normal) Collected:  01/03/20 0500    Specimen:  Blood Updated:  01/03/20 0530     Lactate 0.7 mmol/L     CBC Auto Differential [[id number]]  (Abnormal) Collected:  01/03/20 0500    Specimen:  Blood Updated:  01/03/20 0525     WBC 4.52 10*3/mm3      RBC 3.62 10*6/mm3      Hemoglobin 12.4 g/dL      Hematocrit 36.3 %      .3 fL      MCH 34.3 pg      MCHC 34.2 g/dL      RDW 13.7 %      RDW-SD 50.6 fl      MPV 10.9 fL      Platelets 180 10*3/mm3      Neutrophil % 48.5 %      Lymphocyte % 35.8 %      Monocyte % 11.3 %      Eosinophil % 3.3 %      Basophil % 0.9 %      Immature Grans % 0.2 %      Neutrophils, Absolute 2.19 10*3/mm3      Lymphocytes, Absolute 1.62 10*3/mm3      Monocytes, Absolute 0.51 10*3/mm3      Eosinophils, Absolute 0.15 10*3/mm3      Basophils, Absolute 0.04 10*3/mm3      Immature Grans, Absolute 0.01 10*3/mm3      nRBC 0.0 /100 WBC     T4, Free [[id number]]  (Normal) Collected:  01/02/20 2114    Specimen:  Blood from Arm, Left Updated:  01/02/20 2348     Free T4 1.13 ng/dL     Troponin [[id number]]  (Normal) Collected:  01/02/20 2114    Specimen:  Blood from Arm, Left Updated:  01/02/20 2348     Troponin T <0.010 ng/mL     Narrative:       Troponin T Reference Range:  <= 0.03 ng/mL-   Negative for AMI  >0.03 ng/mL-     Abnormal for myocardial necrosis.  Clinicians would have to utilize clinical acumen, EKG, Troponin and serial changes to determine if it is an Acute Myocardial Infarction or myocardial injury due to an underlying chronic condition.     TSH [[id number]]  (Normal) Collected:  01/02/20 2114    Specimen:  Blood from Arm, Left Updated:  01/02/20 2348     TSH 1.590 uIU/mL     CK [[id number]]  (Normal) Collected:  01/02/20 2114    Specimen:  Blood from Arm, Left Updated:  01/02/20 2337     Creatine Kinase 40 U/L     Vitamin B12 & Folate [[id number]] Collected:  01/02/20  2021    Specimen:  Blood Updated:  01/02/20 2312    Basic Metabolic Panel [[id number]]  (Abnormal) Collected:  01/02/20 2114    Specimen:  Blood from Arm, Left Updated:  01/02/20 2149     Glucose 109 mg/dL      BUN 15 mg/dL      Creatinine 0.56 mg/dL      Sodium 139 mmol/L      Potassium 3.7 mmol/L      Chloride 108 mmol/L      CO2 14.6 mmol/L      Calcium 8.1 mg/dL      eGFR Non African Amer 113 mL/min/1.73      BUN/Creatinine Ratio 26.8     Anion Gap 16.4 mmol/L     Narrative:       GFR Normal >60  Chronic Kidney Disease <60  Kidney Failure <15      POC Glucose Once [[id number]]  (Normal) Collected:  01/02/20 2103    Specimen:  Blood Updated:  01/02/20 2112     Glucose 114 mg/dL     Basic Metabolic Panel [[id number]]  (Abnormal) Collected:  01/02/20 2021    Specimen:  Blood Updated:  01/02/20 2050     Glucose 137 mg/dL      BUN 16 mg/dL      Creatinine 0.60 mg/dL      Sodium 139 mmol/L      Potassium 3.9 mmol/L      Chloride 105 mmol/L      CO2 14.9 mmol/L      Calcium 8.7 mg/dL      eGFR Non African Amer 105 mL/min/1.73      BUN/Creatinine Ratio 26.7     Anion Gap 19.1 mmol/L     Narrative:       GFR Normal >60  Chronic Kidney Disease <60  Kidney Failure <15      Magnesium [[id number]]  (Normal) Collected:  01/02/20 2021    Specimen:  Blood Updated:  01/02/20 2050     Magnesium 1.8 mg/dL     Phosphorus [[id number]]  (Normal) Collected:  01/02/20 2021    Specimen:  Blood Updated:  01/02/20 2050     Phosphorus 3.5 mg/dL     Lactic Acid, Plasma [[id number]]  (Normal) Collected:  01/02/20 2022    Specimen:  Blood Updated:  01/02/20 2047     Lactate 0.8 mmol/L     Urine Drug Screen - Urine, Clean Catch [[id number]]  (Abnormal) Collected:  01/02/20 1727    Specimen:  Urine, Clean Catch Updated:  01/02/20 2044     THC, Screen, Urine Positive     Phencyclidine (PCP), Urine Negative     Cocaine Screen, Urine Positive     Methamphetamine, Ur Positive     Opiate Screen Negative     Amphetamine Screen, Urine Negative      Benzodiazepine Screen, Urine Negative     Tricyclic Antidepressants Screen Negative     Methadone Screen, Urine Negative     Barbiturates Screen, Urine Negative     Oxycodone Screen, Urine Negative     Propoxyphene Screen Negative     Buprenorphine, Screen, Urine Negative    Narrative:       Urine drug screen results are to be used for medical purposes only.  They are not to be used for legal purposes such as employment testing.  Negative results do not necessarily mean the complete absence of a subtance, but rather that the result is less than the cutoff for that substance.  Positive results are unconfirmed and considered Preliminary Positive.  [facility] does not automatically confirm Postitive Unconfirmed results.  The physician may request (order) an Unconfirmed Positive result to be sent out for confirmation.      Negative Thresholds for Drugs Screened:    THC screen, urine                          50 ng/ml  Phenycyclidine (PCP), urine                25 ng/ml  Cocaine screen, urine                     150 ng/ml  Methamphetamine, urine                    500 ng/ml  Opiate screen, urine                      100 ng/ml  Amphetamine screen, urine                 500 ng/ml  Benzodiazepine screen, urine              150 ng/ml  Tricyclic Antidepressants screen, urine   300 ng/ml  Methadone screen, urine                   200 ng/ml  Barbiturates screen, urine                200 ng/ml  Oxycodone screen, urine                   100 ng/ml  Propoxyphene screen, urine                300 ng/ml  Buprenorphine screen, urine                10 ng/ml    Blood Culture - Blood, Arm, Right [[id number]] Collected:  01/02/20 2035    Specimen:  Blood from Arm, Right Updated:  01/02/20 2035    Blood Culture - Blood, Arm, Right [[id number]] Collected:  01/02/20 2021    Specimen:  Blood from Arm, Right Updated:  01/02/20 2025    Toxicology Screen, Serum [[id number]] Collected:  01/02/20 2021    Specimen:  Blood Updated:   01/02/20 2025    POC Glucose Once [[id number]]  (Normal) Collected:  01/02/20 2007    Specimen:  Blood Updated:  01/02/20 2020     Glucose 128 mg/dL     Procalcitonin [[id number]]  (Abnormal) Collected:  01/02/20 1725    Specimen:  Blood Updated:  01/02/20 1936     Procalcitonin 0.03 ng/mL     Troponin [[id number]]  (Normal) Collected:  01/02/20 1725    Specimen:  Blood Updated:  01/02/20 1936     Troponin T <0.010 ng/mL     Narrative:       Troponin T Reference Range:  <= 0.03 ng/mL-   Negative for AMI  >0.03 ng/mL-     Abnormal for myocardial necrosis.  Clinicians would have to utilize clinical acumen, EKG, Troponin and serial changes to determine if it is an Acute Myocardial Infarction or myocardial injury due to an underlying chronic condition.     Hemoglobin A1c [[id number]]  (Abnormal) Collected:  01/02/20 1725    Specimen:  Blood Updated:  01/02/20 1931     Hemoglobin A1C 11.20 %     Narrative:       Hemoglobin A1C Ranges:    Increased Risk for Diabetes  5.7% to 6.4%  Diabetes                     >= 6.5%  Diabetic Goal                < 7.0%    Phosphorus [[id number]]  (Normal) Collected:  01/02/20 1725    Specimen:  Blood Updated:  01/02/20 1930     Phosphorus 4.4 mg/dL     Ethanol [[id number]] Collected:  01/02/20 1725    Specimen:  Blood Updated:  01/02/20 1930     Ethanol <10 mg/dL      Ethanol % <0.010 %     Magnesium [[id number]]  (Normal) Collected:  01/02/20 1725    Specimen:  Blood Updated:  01/02/20 1930     Magnesium 1.9 mg/dL     Pregnancy, Urine - Urine, Clean Catch [[id number]]  (Normal) Collected:  01/02/20 1727    Specimen:  Urine, Clean Catch Updated:  01/02/20 1920     HCG, Urine QL Negative    Blood Gas, Arterial [[id number]]  (Abnormal) Collected:  01/02/20 1819    Specimen:  Arterial Blood Updated:  01/02/20 1825     Site Right Brachial     Alfred's Test N/A     pH, Arterial 7.299 pH units      Comment: 84 Value below reference range        pCO2, Arterial 33.7 mm Hg      Comment: 84 Value below  reference range        pO2, Arterial 88.8 mm Hg      HCO3, Arterial 16.5 mmol/L      Comment: 84 Value below reference range        Base Excess, Arterial -9.0 mmol/L      Comment: 84 Value below reference range        O2 Saturation, Arterial 95.0 %      Hemoglobin, Blood Gas 12.4 g/dL      Temperature 37.0 C      Barometric Pressure for Blood Gas 732 mmHg      Modality Room Air     Ventilator Mode NA     Comment: Meter: [id number]     :  [id number]        pCO2, Temperature Corrected 33.7 mm Hg      pH, Temp Corrected 7.299 pH Units      pO2, Temperature Corrected 88.8 mm Hg     Comprehensive Metabolic Panel [[id number]]  (Abnormal) Collected:  01/02/20 1725    Specimen:  Blood Updated:  01/02/20 1757     Glucose 250 mg/dL      BUN 18 mg/dL      Creatinine 0.69 mg/dL      Sodium 135 mmol/L      Potassium 3.9 mmol/L      Chloride 97 mmol/L      CO2 16.0 mmol/L      Calcium 9.3 mg/dL      Total Protein 7.5 g/dL      Albumin 4.60 g/dL      ALT (SGPT) 13 U/L      AST (SGOT) 12 U/L      Alkaline Phosphatase 77 U/L      Total Bilirubin 0.4 mg/dL      eGFR Non African Amer 89 mL/min/1.73      Globulin 2.9 gm/dL      A/G Ratio 1.6 g/dL      BUN/Creatinine Ratio 26.1     Anion Gap 22.0 mmol/L     Narrative:       GFR Normal >60  Chronic Kidney Disease <60  Kidney Failure <15      Lipase [[id number]]  (Normal) Collected:  01/02/20 1725    Specimen:  Blood Updated:  01/02/20 1757     Lipase 35 U/L     Ketone Bodies, Serum (Not performed at [place]) [[id number]] Collected:  01/02/20 1725    Specimen:  Blood Updated:  01/02/20 1749    Narrative:       The following orders were created for panel order Ketone Bodies, Serum (Not performed at [place]).  Procedure                               Abnormality         Status                     ---------                               -----------         ------                     Acetone[[id number]]                      Abnormal            Final result                 Please view  results for these tests on the individual orders.    Acetone [[id number]]  (Abnormal) Collected:  01/02/20 1725    Specimen:  Blood Updated:  01/02/20 1749     Acetone Moderate    Urinalysis With Microscopic If Indicated (No Culture) - Urine, Clean Catch [[id number]]  (Abnormal) Collected:  01/02/20 1727    Specimen:  Urine, Clean Catch Updated:  01/02/20 1739     Color, UA Straw     Appearance, UA Clear     pH, UA 5.5     Specific Gravity, UA 1.025     Glucose,  mg/dL (2+)     Ketones, UA >=160 mg/dL (4+)     Bilirubin, UA Negative     Blood, UA Negative     Protein, UA Negative     Leuk Esterase, UA Negative     Nitrite, UA Negative     Urobilinogen, UA 0.2 E.U./dL    Narrative:       Urine microscopic not indicated.    CBC & Differential [[id number]] Collected:  01/02/20 1725    Specimen:  Blood Updated:  01/02/20 1738    Narrative:       The following orders were created for panel order CBC & Differential.  Procedure                               Abnormality         Status                     ---------                               -----------         ------                     CBC Auto Differential[[id number]]        Abnormal            Final result                 Please view results for these tests on the individual orders.    CBC Auto Differential [[id number]]  (Abnormal) Collected:  01/02/20 1725    Specimen:  Blood Updated:  01/02/20 1738     WBC 4.98 10*3/mm3      RBC 4.01 10*6/mm3      Hemoglobin 13.7 g/dL      Hematocrit 39.5 %      MCV 98.5 fL      MCH 34.2 pg      MCHC 34.7 g/dL      RDW 13.5 %      RDW-SD 48.7 fl      MPV 11.0 fL      Platelets 201 10*3/mm3      Neutrophil % 61.1 %      Lymphocyte % 27.9 %      Monocyte % 8.8 %      Eosinophil % 1.2 %      Basophil % 0.6 %      Immature Grans % 0.4 %      Neutrophils, Absolute 3.04 10*3/mm3      Lymphocytes, Absolute 1.39 10*3/mm3      Monocytes, Absolute 0.44 10*3/mm3      Eosinophils, Absolute 0.06 10*3/mm3      Basophils, Absolute 0.03 10*3/mm3        Immature Grans, Absolute 0.02 10*3/mm3      nRBC 0.0 /100 WBC           Imaging Results (Last 24 Hours)     Procedure Component Value Units Date/Time    CT Abdomen Pelvis With Contrast [[id number]] Collected:  01/03/20 1500     Updated:  01/03/20 1509    Narrative:       CT ABDOMEN PELVIS WITH CONTRAST 01/03/2020     HISTORY:  54-year-old female with right lower quadrant abdominal pain and nausea  and vomiting for a few days.     TECHNIQUE:   Routine contrast-enhanced CT abdomen and pelvis. Radiation dose  reduction techniques included automated exposure control or exposure  modulation based on body size. Radiation audit for CT and nuclear  cardiology exams in the last 12 months: 0.     ABDOMEN FINDINGS:   Visualized lung bases are unremarkable.     The liver, spleen, pancreas, gallbladder, both adrenal glands, and both  kidneys appear within normal limits. Abdominal aorta normal in course  and caliber without dissection. Small bowel is unremarkable without  obstruction. Normal appendix. Moderate stool burden throughout the  colon. The colon is otherwise unremarkable. No free fluid or free air.     PELVIS FINDINGS:   The urinary bladder is unremarkable. The uterus is retroverted.  Questionable uterine fibroids. Consider follow-up with pelvic  ultrasound. Adnexa are unremarkable. No significant free pelvic fluid.     No acute osseous abnormality.       Impression:       1. No acute abdominal or pelvic findings.  2. Normal appendix.  3. Moderate stool burden.  4. Questionable uterine fibroids. This can be further evaluated with a  nonemergent pelvic ultrasound if clinically indicated.     This report was finalized on 1/3/2020 3:07 PM by Dr. Cayden Valdez MD.       XR Chest 2 View [[id number]] Collected:  01/02/20 1950     Updated:  01/02/20 1952    Narrative:       CR Chest 2 Vws    INDICATION:    Diabetic ketoacidosis. Nausea and vomiting.    COMPARISON:    None.    FINDINGS:   PA and lateral views of the chest.   Heart and mediastinal contours are normal. The lungs are clear. No pneumothorax or pleural effusion.        Impression:       No acute cardiopulmonary findings.    Signer Name: Prashanth Elizabeth MD   Signed: 1/2/2020 7:50 PM   Workstation Name: [id number]    [facility]          ECG/EMG Results (most recent)     None          Medication Review:   I have reviewed the patient's current medication list    Current Facility-Administered Medications:   •  0.9% sodium chloride for irrigation-mineral oil-glycerin (SMOG) enema 300 mL, 300 mL, Rectal, Once, Loreto Morrow MD  •  acetaminophen (TYLENOL) tablet 650 mg, 650 mg, Oral, Q4H PRN, 650 mg at 01/03/20 1133 **OR** acetaminophen (TYLENOL) 160 MG/5ML solution 650 mg, 650 mg, Oral, Q4H PRN **OR** acetaminophen (TYLENOL) suppository 650 mg, 650 mg, Rectal, Q4H PRN, Amaury Hernandez MD  •  aspirin chewable tablet 81 mg, 81 mg, Oral, Daily, Amaury Hernandez MD, 81 mg at 01/03/20 0951  •  calcium carbonate (TUMS) chewable tablet 500 mg (200 mg elemental), 1 tablet, Oral, BID PRN, Amaury Hernandez MD  •  dextrose (D50W) 25 g/ 50mL Intravenous Solution 25 g, 25 g, Intravenous, Q15 Min PRN, Amaury Hernandez MD  •  dextrose (GLUTOSE) oral gel 15 g, 15 g, Oral, Q15 Min PRN, Amaury Hernandez MD  •  dextrose 5 % and sodium chloride 0.9 % infusion, 100 mL/hr, Intravenous, Continuous, Loreto Morrow MD, Last Rate: 100 mL/hr at 01/03/20 1702, 100 mL at 01/03/20 1702  •  FLUoxetine (PROzac) capsule 10 mg, 10 mg, Oral, Daily, Amaury Hernandez MD, 10 mg at 01/03/20 0951  •  glucagon (GLUCAGEN) injection 1 mg, 1 mg, Subcutaneous, Q15 Min PRN, Amaury Hernandez MD  •  insulin aspart (novoLOG) injection 0-7 Units, 0-7 Units, Subcutaneous, 4x Daily AC & at Bedtime, Amaury Hernandez MD, 3 Units at 01/03/20 1155  •  melatonin tablet 5 mg, 5 mg, Oral, Nightly PRN, Amaury Hernandez MD  •  ondansetron  (ZOFRAN) tablet 4 mg, 4 mg, Oral, Q6H PRN, 4 mg at 01/03/20 0845 **OR** ondansetron (ZOFRAN) injection 4 mg, 4 mg, Intravenous, Q6H PRN, Amaury Hernandez MD  •  polyethylene glycol 3350 powder (packet), 17 g, Oral, Daily PRN, Amaury Hernandez MD  •  promethazine (PHENERGAN) tablet 25 mg, 25 mg, Oral, Q6H PRN, Amaury Hernandez MD, 25 mg at 01/03/20 1133  •  senna-docusate sodium (SENOKOT-S) 8.6-50 MG tablet 2 tablet, 2 tablet, Oral, BID, Amaury Hernandez MD, 2 tablet at 01/03/20 0951  •  [COMPLETED] Insert peripheral IV, , , Once **AND** sodium chloride 0.9 % flush 10 mL, 10 mL, Intravenous, PRN, Asael Barbour MD  •  sodium chloride 0.9 % flush 10 mL, 10 mL, Intravenous, PRN, Amaury Hernandez MD  •  sodium chloride 0.9 % flush 10 mL, 10 mL, Intravenous, Q12H, Amaury Hernandez MD, 10 mL at 01/03/20 0951  •  sodium chloride 0.9 % infusion 40 mL, 40 mL, Intravenous, PRN, Amaury Hernandez MD  •  traZODone (DESYREL) tablet 50 mg, 50 mg, Oral, Nightly, Amaury Hernandez MD, 50 mg at 01/02/20 2234  •  valACYclovir (VALTREX) tablet 500 mg, 500 mg, Oral, Daily, Amaury Hernandez MD, 500 mg at 01/03/20 0951      Assessment/Plan     -High anion gap metabolic acidosis:  Unclear etiology, possible dehydration from viral gastroenteritis, questionable borderline DKA but bicarbonate was not quite low enough initially, then blood glucose level dropped well below 250.  Lactic acid level was normal.  CK also normal.  In ER for BG > 250, pH 3.2, bicarb 16, ketones in urine/acetone in serum  Patient was placed in the intensive care unit but DKA protocol was not initiated as by that point her blood sugar was down to 128.  Despite improvement in her blood glucose levels and IV fluids her acidosis has persisted.  This morning I added dextrose to her IV fluids to help metabolize some of the keto acids and her blood as her acidosis had worsened slightly.  Her bicarbonate level is now trending back  up.  I initially had her on D5 half-normal saline at 100, I will change this to D5 normal saline at 100 as her sodium has dropped slightly.  I will recheck labs again later this evening and if her bicarbonate level continues to trend up we will continue current treatment.  If her bicarbonate level again falls I feel that there will be no choice but to the patient on DKA protocol and see if this helps resolve her acidosis.  I will continue to monitor closely in the intensive care unit.     -Nausea and vomiting:  Viral panel here is negative and GI panel is pending.  The patient has some tenderness to palpation in her right lower quadrant on examination today so I will check a CT scan with oral and IV contrast to further evaluate.  The patient notes she has a known history of chronic constipation but her stools now are very soft.  She continues to have some persistent nausea.  Will continue Zofran and Phenergan as needed.  Continue IV fluids.  I will continue to monitor.     -Uncontrolled DM type 2  Hemoglobin A1c is 11.2.  Right now the patient's home oral medications are on hold as her oral intake is decreased due to her nausea and vomiting.  I will continue Accu-Cheks q. before meals and at bedtime with low-dose sliding scale insulin.  Continue dextrose and fluids for now as noted above.  I would like her blood sugar levels tonight to be in the mid 100s.     -Polysubstance abuse:  The patient states she uses marijuana intermittently for nausea and migraine headaches.  She admits to using cocaine 1 time on New Year's Marya.  She denies ever using methamphetamine but she does take Sudafed at home which can cross-react and make this portion of her drug screen falsely positive.    -Severe malnutrition:  Nutrition is following here.  Patient has had significant weight loss, some of this may be due to her uncontrolled diabetes.  I also wonder if she is being truthful about her drug use history.  Continue to  monitor.      Plan for disposition: Home when able.    Loreto Morrow MD  01/03/20  5:28 PM

## 2020-01-04 VITALS
TEMPERATURE: 98.5 F | RESPIRATION RATE: 16 BRPM | BODY MASS INDEX: 17.48 KG/M2 | WEIGHT: 95 LBS | DIASTOLIC BLOOD PRESSURE: 65 MMHG | HEART RATE: 85 BPM | SYSTOLIC BLOOD PRESSURE: 104 MMHG | HEIGHT: 62 IN | OXYGEN SATURATION: 98 %

## 2020-01-04 LAB
ACETONE BLD QL: NEGATIVE
ANION GAP SERPL CALCULATED.3IONS-SCNC: 11.9 MMOL/L (ref 5–15)
ANION GAP SERPL CALCULATED.3IONS-SCNC: 12.4 MMOL/L (ref 5–15)
ARTERIAL PATENCY WRIST A: POSITIVE
ATMOSPHERIC PRESS: 739 MMHG
BASE EXCESS BLDA CALC-SCNC: 0.8 MMOL/L (ref 0–2)
BDY SITE: ABNORMAL
BODY TEMPERATURE: 37 C
BUN BLD-MCNC: 10 MG/DL (ref 6–20)
BUN BLD-MCNC: 12 MG/DL (ref 6–20)
BUN/CREAT SERPL: 17.1 (ref 7–25)
BUN/CREAT SERPL: 22.2 (ref 7–25)
CALCIUM SPEC-SCNC: 7 MG/DL (ref 8.6–10.5)
CALCIUM SPEC-SCNC: 8.3 MG/DL (ref 8.6–10.5)
CHLORIDE SERPL-SCNC: 108 MMOL/L (ref 98–107)
CHLORIDE SERPL-SCNC: 109 MMOL/L (ref 98–107)
CO2 SERPL-SCNC: 17.6 MMOL/L (ref 22–29)
CO2 SERPL-SCNC: 21.1 MMOL/L (ref 22–29)
CREAT BLD-MCNC: 0.45 MG/DL (ref 0.57–1)
CREAT BLD-MCNC: 0.7 MG/DL (ref 0.57–1)
FOLATE SERPL-MCNC: 17.8 NG/ML (ref 4.78–24.2)
GFR SERPL CREATININE-BSD FRML MDRD: 146 ML/MIN/1.73
GFR SERPL CREATININE-BSD FRML MDRD: 88 ML/MIN/1.73
GLUCOSE BLD-MCNC: 202 MG/DL (ref 65–99)
GLUCOSE BLD-MCNC: 353 MG/DL (ref 65–99)
GLUCOSE BLDC GLUCOMTR-MCNC: 208 MG/DL (ref 70–130)
GLUCOSE BLDC GLUCOMTR-MCNC: 240 MG/DL (ref 70–130)
GLUCOSE BLDC GLUCOMTR-MCNC: 463 MG/DL (ref 70–130)
GLUCOSE BLDC GLUCOMTR-MCNC: 510 MG/DL (ref 70–130)
HCO3 BLDA-SCNC: 24.4 MMOL/L (ref 20–26)
HGB BLDA-MCNC: 13.3 G/DL (ref 13.5–17.5)
MODALITY: ABNORMAL
PCO2 BLDA: 35.2 MM HG (ref 35–45)
PCO2 TEMP ADJ BLD: 35.2 MM HG (ref 35–45)
PH BLDA: 7.45 PH UNITS (ref 7.35–7.45)
PH, TEMP CORRECTED: 7.45 PH UNITS (ref 7.35–7.45)
PO2 BLDA: 94 MM HG (ref 83–108)
PO2 TEMP ADJ BLD: 94 MM HG (ref 83–108)
POTASSIUM BLD-SCNC: 3.6 MMOL/L (ref 3.5–5.2)
POTASSIUM BLD-SCNC: 4.6 MMOL/L (ref 3.5–5.2)
SAO2 % BLDCOA: 96.4 % (ref 94–99)
SODIUM BLD-SCNC: 138 MMOL/L (ref 136–145)
SODIUM BLD-SCNC: 142 MMOL/L (ref 136–145)
VENTILATOR MODE: ABNORMAL
VIT B12 BLD-MCNC: 1162 PG/ML (ref 211–946)

## 2020-01-04 PROCEDURE — 82962 GLUCOSE BLOOD TEST: CPT

## 2020-01-04 PROCEDURE — 99217 PR OBSERVATION CARE DISCHARGE MANAGEMENT: CPT | Performed by: HOSPITALIST

## 2020-01-04 PROCEDURE — 63710000001 INSULIN ASPART PER 5 UNITS: Performed by: HOSPITALIST

## 2020-01-04 PROCEDURE — 82803 BLOOD GASES ANY COMBINATION: CPT

## 2020-01-04 PROCEDURE — 82746 ASSAY OF FOLIC ACID SERUM: CPT | Performed by: HOSPITALIST

## 2020-01-04 PROCEDURE — 63710000001 INSULIN ASPART PER 5 UNITS: Performed by: INTERNAL MEDICINE

## 2020-01-04 PROCEDURE — 36600 WITHDRAWAL OF ARTERIAL BLOOD: CPT

## 2020-01-04 PROCEDURE — 82009 KETONE BODYS QUAL: CPT | Performed by: HOSPITALIST

## 2020-01-04 PROCEDURE — 80048 BASIC METABOLIC PNL TOTAL CA: CPT | Performed by: HOSPITALIST

## 2020-01-04 PROCEDURE — 96361 HYDRATE IV INFUSION ADD-ON: CPT

## 2020-01-04 PROCEDURE — 80048 BASIC METABOLIC PNL TOTAL CA: CPT | Performed by: INTERNAL MEDICINE

## 2020-01-04 PROCEDURE — G0378 HOSPITAL OBSERVATION PER HR: HCPCS

## 2020-01-04 PROCEDURE — 82607 VITAMIN B-12: CPT | Performed by: HOSPITALIST

## 2020-01-04 RX ORDER — ACETAMINOPHEN 325 MG/1
650 TABLET ORAL EVERY 4 HOURS PRN
Start: 2020-01-04

## 2020-01-04 RX ADMIN — INSULIN ASPART 6 UNITS: 100 INJECTION, SOLUTION INTRAVENOUS; SUBCUTANEOUS at 16:34

## 2020-01-04 RX ADMIN — DEXTROSE AND SODIUM CHLORIDE 100 ML/HR: 5; 900 INJECTION, SOLUTION INTRAVENOUS at 02:42

## 2020-01-04 RX ADMIN — INSULIN ASPART 4 UNITS: 100 INJECTION, SOLUTION INTRAVENOUS; SUBCUTANEOUS at 16:47

## 2020-01-04 RX ADMIN — SENNOSIDES AND DOCUSATE SODIUM 2 TABLET: 8.6; 5 TABLET ORAL at 08:00

## 2020-01-04 RX ADMIN — FLUOXETINE 10 MG: 10 CAPSULE ORAL at 08:00

## 2020-01-04 RX ADMIN — ASPIRIN 81 MG 81 MG: 81 TABLET ORAL at 08:00

## 2020-01-04 RX ADMIN — VALACYCLOVIR HYDROCHLORIDE 500 MG: 500 TABLET, FILM COATED ORAL at 08:00

## 2020-01-04 RX ADMIN — LINAGLIPTIN 5 MG: 5 TABLET, FILM COATED ORAL at 13:53

## 2020-01-04 RX ADMIN — INSULIN ASPART 7 UNITS: 100 INJECTION, SOLUTION INTRAVENOUS; SUBCUTANEOUS at 11:28

## 2020-01-04 RX ADMIN — INSULIN ASPART 3 UNITS: 100 INJECTION, SOLUTION INTRAVENOUS; SUBCUTANEOUS at 07:55

## 2020-01-04 RX ADMIN — SODIUM CHLORIDE, PRESERVATIVE FREE 10 ML: 5 INJECTION INTRAVENOUS at 08:00

## 2020-01-04 NOTE — NURSING NOTE
Written perscription for outpatient labs given to patient and discharge education completed. Patient questions answered and patient sent home with daughter with private transportation.

## 2020-01-04 NOTE — DISCHARGE SUMMARY
Lona Dupree  1965  [id number]    Hospitalists Discharge Summary    Date of Admission: 1/2/2020  Date of Discharge:  1/4/2020    History of Present Illness: (from H&P)  53 yo WF w/ PMH DM (on oral medications including Metformin), and Migraines who p/w feeling ill including n/v, lethargy, increased issues from her hemorrhoids, increased constipation, orthostasis  for ~5 days.      Was around young children at [place], one of which has come down with an unspecified viral illness.     She has continued to take her medications, and was concerned because her BG's were elevated. States that when she was diagnosed w/ type 2 DM about 3 years ago, she stopped eating sugars and carbs. Because she did not want to be on Insulin.      States that since he was seen in the ER here last march, she went back on her janumet and Dr. Villagomez added Jardiance. In November she was switched to a different SGLT2 inihibitor since jardiance was no longer covered by her insurance. For the last few days her blood sugars have been running 200's-300's, and that this is unusual for her.      She has also been losing weight. She has been trying to put weight on, and feels she has lost another 20 pounds in the last few months. Documented weights in the system show she was 158 in 2016, and then had a stated weight of 103 in March of this year. The only measured weights in the system are from today, and from 2016.        Pt denies substance use, but UDS positive for Amphetamine and cocaine.      ROS Positive for mild epigastric pain, BRBPR (hemorriods), mild cough, seasonal allergies, anorexia  Negative for fever, chills, night sweats       Hospital Course Summary:  Primary Discharge Diagnoses:     -High anion gap metabolic acidosis:  Unclear etiology, possible dehydration from viral gastroenteritis, questionable borderline DKA but bicarbonate was not quite low enough initially, then blood glucose level dropped well below 250. Lactic acid  level was normal.  CK also normal.  In ER for BG > 250, pH 3.2, bicarb 16, ketones in urine/acetone in serum  Patient was placed in the intensive care unit but DKA protocol was not initiated as by that point her blood sugar was down to 128. Despite improvement in her blood glucose levels and IV fluids her acidosis persisted.  Dextrose was added to her IV fluids to help metabolize some of the keto acids and her bicarbonate level improved.  On the day of discharge the patient was feeling well although her bicarbonate level was not completely NL. Her AG was NL, pH was NL and there was no further acetone detected in her blood. Patient agreeable to F/U with PCP next week for recheck of labs to assure further normalization.     -Nausea and vomiting:  -Constipation:  Resolved Viral panel here was negative and GI panel negative.  The patient had some tenderness to palpation in her right lower quadrant which resolved and a CT scan with oral and IV contrast of the abdomen was negative except for moderate stool burden and possible uterine fibroids.  The patient notes she has a known history of chronic constipation, she did have a large BM after her CT scan and dose of Miralax here.  She is tolerating a regular diabetic diet at discharge.     Secondary Discharge Diagnoses:     -Uncontrolled DM type 2  Hemoglobin A1c is 11.2.  Patient's home oral medications were initially on hold as her oral intake is decreased due to her nausea and vomiting.   Accu-Cheks q. before meals and at bedtime were monitored with low-dose sliding scale insulin.  She was initially on IVFs with dextrose as noted above but once she started eating well her BG levels melissa and IVFs were stopped. Metformin remained on hold secondary to IV contrast here, patient can resume day after D/C. Tradjenta was substituted for home Januvia. Jardiance was held because it was not on formulary here. Patient can resume prior home medications at discharge. Instructed to keep  BG log to take to PCP F/U.      -Polysubstance abuse:  The patient states she uses marijuana intermittently for nausea and migraine headaches.  She admits to using cocaine 1 time on New Year's Marya.  She denies ever using methamphetamine but she does take Sudafed at home which can cross-react and make this portion of her drug screen falsely positive. Patient counseled on risks of illicit drug use and encouraged to abstain.     -Severe malnutrition:  Nutrition followed here.  Patient has had significant weight loss, some of this may be due to her uncontrolled diabetes.  I also wonder if she is being truthful about her drug use history. Recommended supplement like glucerna at discharge.     PCP  Patient Care Team:  Jaiden Villagomez MD as PCP - General  Jaiden Villagomez MD as PCP - Family Medicine    Consults:   Consults     No orders found from 12/4/2019 to 1/3/2020.          Operations and Procedures Performed:       Xr Chest 2 View    Result Date: 1/2/2020  Narrative: CR Chest 2 Vws INDICATION:  Diabetic ketoacidosis. Nausea and vomiting. COMPARISON:  None. FINDINGS: PA and lateral views of the chest.  Heart and mediastinal contours are normal. The lungs are clear. No pneumothorax or pleural effusion.      Impression: No acute cardiopulmonary findings. Signer Name: Prashanth Elizabeth MD  Signed: 1/2/2020 7:50 PM  Workstation Name: [id number]  [facility]    Ct Abdomen Pelvis With Contrast    Result Date: 1/3/2020  Narrative: CT ABDOMEN PELVIS WITH CONTRAST 01/03/2020  HISTORY: 54-year-old female with right lower quadrant abdominal pain and nausea and vomiting for a few days.  TECHNIQUE: Routine contrast-enhanced CT abdomen and pelvis. Radiation dose reduction techniques included automated exposure control or exposure modulation based on body size. Radiation audit for CT and nuclear cardiology exams in the last 12 months: 0.  ABDOMEN FINDINGS: Visualized lung bases are unremarkable.  The liver,  spleen, pancreas, gallbladder, both adrenal glands, and both kidneys appear within normal limits. Abdominal aorta normal in course and caliber without dissection. Small bowel is unremarkable without obstruction. Normal appendix. Moderate stool burden throughout the colon. The colon is otherwise unremarkable. No free fluid or free air.  PELVIS FINDINGS: The urinary bladder is unremarkable. The uterus is retroverted. Questionable uterine fibroids. Consider follow-up with pelvic ultrasound. Adnexa are unremarkable. No significant free pelvic fluid.  No acute osseous abnormality.      Impression: 1. No acute abdominal or pelvic findings. 2. Normal appendix. 3. Moderate stool burden. 4. Questionable uterine fibroids. This can be further evaluated with a nonemergent pelvic ultrasound if clinically indicated.  This report was finalized on 1/3/2020 3:07 PM by Dr. Cayden Valdez MD.        Allergies:  is allergic to mobic [meloxicam].    James  Reviewed    Discharge Medications:     Discharge Medications      New Medications      Instructions Start Date   acetaminophen 325 MG tablet  Commonly known as:  TYLENOL   650 mg, Oral, Every 4 Hours PRN, Over the counter      polyethylene glycol pack packet  Commonly known as:  MIRALAX   17 g, Oral, Daily PRN, Over the counter         Changes to Medications      Instructions Start Date   JANUMET XR  MG tablet  Generic drug:  SITagliptin-metFORMIN HCl ER  What changed:  Another medication with the same name was removed. Continue taking this medication, and follow the directions you see here.   2 tablets, Oral, Daily         Continue These Medications      Instructions Start Date   aspirin 81 MG chewable tablet   81 mg, Oral, Daily      cetirizine 10 MG tablet  Commonly known as:  zyrTEC   10 mg, Oral, Daily      FLUoxetine 10 MG capsule  Commonly known as:  PROzac   10 mg, Oral, Daily      JARDIANCE 25 MG tablet  Generic drug:  Empagliflozin   Jardiance 25 mg tablet   Take 1  tablet every day by oral route for 30 days.      promethazine 25 MG tablet  Commonly known as:  PHENERGAN   25 mg, Oral, Every 6 Hours PRN      pseudoephedrine 120 MG 12 hr tablet  Commonly known as:  SUDAFED   120 mg, Oral, Every 12 Hours PRN      SUMAtriptan 100 MG tablet  Commonly known as:  IMITREX   sumatriptan 100 mg tablet   take 1 po at onset of symptoms. can repeat x 1 in 2 hours if headache persists      traZODone 50 MG tablet  Commonly known as:  DESYREL   50 mg, Oral, Nightly      valACYclovir 500 MG tablet  Commonly known as:  VALTREX   TAKE ONE TABLET BY MOUTH DAILY         Stop These Medications     MG tablet  Generic drug:  ibuprofen     JANUVIA 50 MG tablet  Generic drug:  SITagliptin     metFORMIN 500 MG tablet  Commonly known as:  GLUCOPHAGE            Last Lab Results:   Lab Results (most recent)     Procedure Component Value Units Date/Time    Acetone [[id number]]  (Normal) Collected:  01/04/20 1715    Specimen:  Blood Updated:  01/04/20 1728     Acetone Negative    Blood Gas, Arterial [[id number]]  (Abnormal) Collected:  01/04/20 1715    Specimen:  Arterial Blood Updated:  01/04/20 1720     Site Left Radial     Alfred's Test Positive     pH, Arterial 7.450 pH units      pCO2, Arterial 35.2 mm Hg      pO2, Arterial 94.0 mm Hg      HCO3, Arterial 24.4 mmol/L      Base Excess, Arterial 0.8 mmol/L      O2 Saturation, Arterial 96.4 %      Hemoglobin, Blood Gas 13.3 g/dL      Temperature 37.0 C      Barometric Pressure for Blood Gas 739 mmHg      Modality Room Air     Ventilator Mode NA     Comment: Meter: [id number]     :  [id number]        pCO2, Temperature Corrected 35.2 mm Hg      pH, Temp Corrected 7.450 pH Units      pO2, Temperature Corrected 94.0 mm Hg     Basic Metabolic Panel [[id number]]  (Abnormal) Collected:  01/04/20 1546    Specimen:  Blood Updated:  01/04/20 1620     Glucose 353 mg/dL      BUN 10 mg/dL      Creatinine 0.45 mg/dL      Sodium 138 mmol/L      Potassium 3.6  mmol/L      Chloride 108 mmol/L      CO2 17.6 mmol/L      Calcium 7.0 mg/dL      eGFR Non African Amer 146 mL/min/1.73      BUN/Creatinine Ratio 22.2     Anion Gap 12.4 mmol/L     Narrative:       GFR Normal >60  Chronic Kidney Disease <60  Kidney Failure <15      POC Glucose Once [[id number]]  (Abnormal) Collected:  01/04/20 1304    Specimen:  Blood Updated:  01/04/20 1311     Glucose 240 mg/dL     Vitamin B12 [[id number]]  (Abnormal) Collected:  01/04/20 0449    Specimen:  Blood Updated:  01/04/20 1154     Vitamin B-12 1,162 pg/mL     Folate [[id number]]  (Normal) Collected:  01/04/20 0449    Specimen:  Blood Updated:  01/04/20 1154     Folate 17.80 ng/mL     POC Glucose Once [[id number]]  (Abnormal) Collected:  01/04/20 1122    Specimen:  Blood Updated:  01/04/20 1134     Glucose 463 mg/dL     Basic Metabolic Panel [[id number]]  (Abnormal) Collected:  01/04/20 0449    Specimen:  Blood Updated:  01/04/20 0518     Glucose 202 mg/dL      BUN 12 mg/dL      Creatinine 0.70 mg/dL      Sodium 142 mmol/L      Potassium 4.6 mmol/L      Chloride 109 mmol/L      CO2 21.1 mmol/L      Calcium 8.3 mg/dL      eGFR Non African Amer 88 mL/min/1.73      BUN/Creatinine Ratio 17.1     Anion Gap 11.9 mmol/L     Narrative:       GFR Normal >60  Chronic Kidney Disease <60  Kidney Failure <15      Blood Culture - Blood, Arm, Right [[id number]] Collected:  01/02/20 2035    Specimen:  Blood from Arm, Right Updated:  01/03/20 2045     Blood Culture No growth at 24 hours    Blood Culture - Blood, Arm, Right [[id number]] Collected:  01/02/20 2021    Specimen:  Blood from Arm, Right Updated:  01/03/20 2030     Blood Culture No growth at 24 hours    Gastrointestinal Panel, PCR - Stool, Per Rectum [[id number]]  (Normal) Collected:  01/03/20 0949    Specimen:  Stool from Per Rectum Updated:  01/03/20 1753     Campylobacter Not Detected     Plesiomonas shigelloides Not Detected     Salmonella Not Detected     Vibrio Not Detected     Vibrio cholerae Not  Detected     Yersinia enterocolitica Not Detected     Enteroaggregative E. coli (EAEC) Not Detected     Enteropathogenic E. coli (EPEC) Not Detected     Enterotoxigenic E. coli (ETEC) lt/st Not Detected     Shiga-like toxin-producing E. coli (STEC) stx1/stx2 Not Detected     E. coli O157 Not Detected     Shigella/Enteroinvasive E. coli (EIEC) Not Detected     Cryptosporidium Not Detected     Cyclospora cayetanensis Not Detected     Entamoeba histolytica Not Detected     Giardia lamblia Not Detected     Adenovirus F40/41 Not Detected     Astrovirus Not Detected     Norovirus GI/GII Not Detected     Rotavirus A Not Detected     Sapovirus (I, II, IV or V) Not Detected    Narrative:       If Aeromonas, Staphylococcus aureus or Bacillus cereus are suspected, please order [id number]: Stool Culture, Aeromonas, S aureus, B Cereus.    Respiratory Panel, PCR - Swab, Nasopharynx [[id number]]  (Normal) Collected:  01/02/20 2200    Specimen:  Swab from Nasopharynx Updated:  01/03/20 1118     ADENOVIRUS, PCR Not Detected     Coronavirus 229E Not Detected     Coronavirus HKU1 Not Detected     Coronavirus NL63 Not Detected     Coronavirus OC43 Not Detected     Human Metapneumovirus Not Detected     Human Rhinovirus/Enterovirus Not Detected     Influenza B PCR Not Detected     Parainfluenza Virus 1 Not Detected     Parainfluenza Virus 2 Not Detected     Parainfluenza Virus 3 Not Detected     Parainfluenza Virus 4 Not Detected     Bordetella pertussis pcr Not Detected     Influenza A H1 2009 PCR Not Detected     Chlamydophila pneumoniae PCR Not Detected     Mycoplasma pneumo by PCR Not Detected     Influenza A PCR Not Detected     Influenza A H3 Not Detected     Influenza A H1 Not Detected     RSV, PCR Not Detected     Bordetella parapertussis PCR Not Detected    Osmolality, Serum [[id number]]  (Abnormal) Collected:  01/02/20 2021    Specimen:  Blood Updated:  01/03/20 1043     Osmolality 569 mOsm/kg     Troponin [[id number]]  (Normal)  Collected:  01/03/20 0500    Specimen:  Blood Updated:  01/03/20 0542     Troponin T <0.010 ng/mL     Narrative:       Troponin T Reference Range:  <= 0.03 ng/mL-   Negative for AMI  >0.03 ng/mL-     Abnormal for myocardial necrosis.  Clinicians would have to utilize clinical acumen, EKG, Troponin and serial changes to determine if it is an Acute Myocardial Infarction or myocardial injury due to an underlying chronic condition.     Magnesium [[id number]]  (Normal) Collected:  01/03/20 0500    Specimen:  Blood Updated:  01/03/20 0537     Magnesium 1.7 mg/dL     Phosphorus [[id number]]  (Normal) Collected:  01/03/20 0500    Specimen:  Blood Updated:  01/03/20 0537     Phosphorus 2.9 mg/dL     CK [[id number]]  (Normal) Collected:  01/03/20 0500    Specimen:  Blood Updated:  01/03/20 0537     Creatine Kinase 36 U/L     Lactic Acid, Plasma [[id number]]  (Normal) Collected:  01/03/20 0500    Specimen:  Blood Updated:  01/03/20 0530     Lactate 0.7 mmol/L     CBC Auto Differential [[id number]]  (Abnormal) Collected:  01/03/20 0500    Specimen:  Blood Updated:  01/03/20 0525     WBC 4.52 10*3/mm3      RBC 3.62 10*6/mm3      Hemoglobin 12.4 g/dL      Hematocrit 36.3 %      .3 fL      MCH 34.3 pg      MCHC 34.2 g/dL      RDW 13.7 %      RDW-SD 50.6 fl      MPV 10.9 fL      Platelets 180 10*3/mm3      Neutrophil % 48.5 %      Lymphocyte % 35.8 %      Monocyte % 11.3 %      Eosinophil % 3.3 %      Basophil % 0.9 %      Immature Grans % 0.2 %      Neutrophils, Absolute 2.19 10*3/mm3      Lymphocytes, Absolute 1.62 10*3/mm3      Monocytes, Absolute 0.51 10*3/mm3      Eosinophils, Absolute 0.15 10*3/mm3      Basophils, Absolute 0.04 10*3/mm3      Immature Grans, Absolute 0.01 10*3/mm3      nRBC 0.0 /100 WBC     T4, Free [[id number]]  (Normal) Collected:  01/02/20 2114    Specimen:  Blood from Arm, Left Updated:  01/02/20 2348     Free T4 1.13 ng/dL     Troponin [[id number]]  (Normal) Collected:  01/02/20 2114    Specimen:  Blood  from Arm, Left Updated:  01/02/20 2348     Troponin T <0.010 ng/mL     Narrative:       Troponin T Reference Range:  <= 0.03 ng/mL-   Negative for AMI  >0.03 ng/mL-     Abnormal for myocardial necrosis.  Clinicians would have to utilize clinical acumen, EKG, Troponin and serial changes to determine if it is an Acute Myocardial Infarction or myocardial injury due to an underlying chronic condition.     TSH [[id number]]  (Normal) Collected:  01/02/20 2114    Specimen:  Blood from Arm, Left Updated:  01/02/20 2348     TSH 1.590 uIU/mL     CK [[id number]]  (Normal) Collected:  01/02/20 2114    Specimen:  Blood from Arm, Left Updated:  01/02/20 2337     Creatine Kinase 40 U/L     Vitamin B12 & Folate [[id number]] Collected:  01/02/20 2021    Specimen:  Blood Updated:  01/02/20 2312    Magnesium [[id number]]  (Normal) Collected:  01/02/20 2021    Specimen:  Blood Updated:  01/02/20 2050     Magnesium 1.8 mg/dL     Phosphorus [[id number]]  (Normal) Collected:  01/02/20 2021    Specimen:  Blood Updated:  01/02/20 2050     Phosphorus 3.5 mg/dL     Lactic Acid, Plasma [[id number]]  (Normal) Collected:  01/02/20 2022    Specimen:  Blood Updated:  01/02/20 2047     Lactate 0.8 mmol/L     Urine Drug Screen - Urine, Clean Catch [[id number]]  (Abnormal) Collected:  01/02/20 1727    Specimen:  Urine, Clean Catch Updated:  01/02/20 2044     THC, Screen, Urine Positive     Phencyclidine (PCP), Urine Negative     Cocaine Screen, Urine Positive     Methamphetamine, Ur Positive     Opiate Screen Negative     Amphetamine Screen, Urine Negative     Benzodiazepine Screen, Urine Negative     Tricyclic Antidepressants Screen Negative     Methadone Screen, Urine Negative     Barbiturates Screen, Urine Negative     Oxycodone Screen, Urine Negative     Propoxyphene Screen Negative     Buprenorphine, Screen, Urine Negative    Narrative:       Urine drug screen results are to be used for medical purposes only.  They are not to be used for legal purposes  such as employment testing.  Negative results do not necessarily mean the complete absence of a subtance, but rather that the result is less than the cutoff for that substance.  Positive results are unconfirmed and considered Preliminary Positive.  [facility] does not automatically confirm Postitive Unconfirmed results.  The physician may request (order) an Unconfirmed Positive result to be sent out for confirmation.      Negative Thresholds for Drugs Screened:    THC screen, urine                          50 ng/ml  Phenycyclidine (PCP), urine                25 ng/ml  Cocaine screen, urine                     150 ng/ml  Methamphetamine, urine                    500 ng/ml  Opiate screen, urine                      100 ng/ml  Amphetamine screen, urine                 500 ng/ml  Benzodiazepine screen, urine              150 ng/ml  Tricyclic Antidepressants screen, urine   300 ng/ml  Methadone screen, urine                   200 ng/ml  Barbiturates screen, urine                200 ng/ml  Oxycodone screen, urine                   100 ng/ml  Propoxyphene screen, urine                300 ng/ml  Buprenorphine screen, urine                10 ng/ml    Toxicology Screen, Serum [[id number]] Collected:  01/02/20 2021    Specimen:  Blood Updated:  01/02/20 2025    Procalcitonin [[id number]]  (Abnormal) Collected:  01/02/20 1725    Specimen:  Blood Updated:  01/02/20 1936     Procalcitonin 0.03 ng/mL     Hemoglobin A1c [[id number]]  (Abnormal) Collected:  01/02/20 1725    Specimen:  Blood Updated:  01/02/20 1931     Hemoglobin A1C 11.20 %     Narrative:       Hemoglobin A1C Ranges:    Increased Risk for Diabetes  5.7% to 6.4%  Diabetes                     >= 6.5%  Diabetic Goal                < 7.0%    Ethanol [[id number]] Collected:  01/02/20 1725    Specimen:  Blood Updated:  01/02/20 1930     Ethanol <10 mg/dL      Ethanol % <0.010 %     Pregnancy, Urine - Urine, Clean Catch [[id number]]  (Normal) Collected:  01/02/20  1727    Specimen:  Urine, Clean Catch Updated:  01/02/20 1920     HCG, Urine QL Negative    Blood Gas, Arterial [[id number]]  (Abnormal) Collected:  01/02/20 1819    Specimen:  Arterial Blood Updated:  01/02/20 1825     Site Right Brachial     Alfred's Test N/A     pH, Arterial 7.299 pH units      Comment: 84 Value below reference range        pCO2, Arterial 33.7 mm Hg      Comment: 84 Value below reference range        pO2, Arterial 88.8 mm Hg      HCO3, Arterial 16.5 mmol/L      Comment: 84 Value below reference range        Base Excess, Arterial -9.0 mmol/L      Comment: 84 Value below reference range        O2 Saturation, Arterial 95.0 %      Hemoglobin, Blood Gas 12.4 g/dL      Temperature 37.0 C      Barometric Pressure for Blood Gas 732 mmHg      Modality Room Air     Ventilator Mode NA     Comment: Meter: [id number]     :  [id number]        pCO2, Temperature Corrected 33.7 mm Hg      pH, Temp Corrected 7.299 pH Units      pO2, Temperature Corrected 88.8 mm Hg     Comprehensive Metabolic Panel [[id number]]  (Abnormal) Collected:  01/02/20 1725    Specimen:  Blood Updated:  01/02/20 1757     Glucose 250 mg/dL      BUN 18 mg/dL      Creatinine 0.69 mg/dL      Sodium 135 mmol/L      Potassium 3.9 mmol/L      Chloride 97 mmol/L      CO2 16.0 mmol/L      Calcium 9.3 mg/dL      Total Protein 7.5 g/dL      Albumin 4.60 g/dL      ALT (SGPT) 13 U/L      AST (SGOT) 12 U/L      Alkaline Phosphatase 77 U/L      Total Bilirubin 0.4 mg/dL      eGFR Non African Amer 89 mL/min/1.73      Globulin 2.9 gm/dL      A/G Ratio 1.6 g/dL      BUN/Creatinine Ratio 26.1     Anion Gap 22.0 mmol/L     Narrative:       GFR Normal >60  Chronic Kidney Disease <60  Kidney Failure <15      Lipase [[id number]]  (Normal) Collected:  01/02/20 1725    Specimen:  Blood Updated:  01/02/20 1757     Lipase 35 U/L     Ketone Bodies, Serum (Not performed at [place]) [[id number]] Collected:  01/02/20 1725    Specimen:  Blood Updated:  01/02/20  1749    Narrative:       The following orders were created for panel order Ketone Bodies, Serum (Not performed at [place]).  Procedure                               Abnormality         Status                     ---------                               -----------         ------                     Acetone[[id number]]                      Abnormal            Final result                 Please view results for these tests on the individual orders.    Acetone [[id number]]  (Abnormal) Collected:  01/02/20 1725    Specimen:  Blood Updated:  01/02/20 1749     Acetone Moderate    Urinalysis With Microscopic If Indicated (No Culture) - Urine, Clean Catch [[id number]]  (Abnormal) Collected:  01/02/20 1727    Specimen:  Urine, Clean Catch Updated:  01/02/20 1739     Color, UA Straw     Appearance, UA Clear     pH, UA 5.5     Specific Gravity, UA 1.025     Glucose,  mg/dL (2+)     Ketones, UA >=160 mg/dL (4+)     Bilirubin, UA Negative     Blood, UA Negative     Protein, UA Negative     Leuk Esterase, UA Negative     Nitrite, UA Negative     Urobilinogen, UA 0.2 E.U./dL    Narrative:       Urine microscopic not indicated.    CBC & Differential [[id number]] Collected:  01/02/20 1725    Specimen:  Blood Updated:  01/02/20 1738    Narrative:       The following orders were created for panel order CBC & Differential.  Procedure                               Abnormality         Status                     ---------                               -----------         ------                     CBC Auto Differential[[id number]]        Abnormal            Final result                 Please view results for these tests on the individual orders.    CBC Auto Differential [[id number]]  (Abnormal) Collected:  01/02/20 1725    Specimen:  Blood Updated:  01/02/20 1738     WBC 4.98 10*3/mm3      RBC 4.01 10*6/mm3      Hemoglobin 13.7 g/dL      Hematocrit 39.5 %      MCV 98.5 fL      MCH 34.2 pg      MCHC 34.7 g/dL      RDW 13.5 %      RDW-SD  48.7 fl      MPV 11.0 fL      Platelets 201 10*3/mm3      Neutrophil % 61.1 %      Lymphocyte % 27.9 %      Monocyte % 8.8 %      Eosinophil % 1.2 %      Basophil % 0.6 %      Immature Grans % 0.4 %      Neutrophils, Absolute 3.04 10*3/mm3      Lymphocytes, Absolute 1.39 10*3/mm3      Monocytes, Absolute 0.44 10*3/mm3      Eosinophils, Absolute 0.06 10*3/mm3      Basophils, Absolute 0.03 10*3/mm3      Immature Grans, Absolute 0.02 10*3/mm3      nRBC 0.0 /100 WBC         Imaging Results (Most Recent)     Procedure Component Value Units Date/Time    CT Abdomen Pelvis With Contrast [[id number]] Collected:  01/03/20 1500     Updated:  01/03/20 1509    Narrative:       CT ABDOMEN PELVIS WITH CONTRAST 01/03/2020     HISTORY:  54-year-old female with right lower quadrant abdominal pain and nausea  and vomiting for a few days.     TECHNIQUE:   Routine contrast-enhanced CT abdomen and pelvis. Radiation dose  reduction techniques included automated exposure control or exposure  modulation based on body size. Radiation audit for CT and nuclear  cardiology exams in the last 12 months: 0.     ABDOMEN FINDINGS:   Visualized lung bases are unremarkable.     The liver, spleen, pancreas, gallbladder, both adrenal glands, and both  kidneys appear within normal limits. Abdominal aorta normal in course  and caliber without dissection. Small bowel is unremarkable without  obstruction. Normal appendix. Moderate stool burden throughout the  colon. The colon is otherwise unremarkable. No free fluid or free air.     PELVIS FINDINGS:   The urinary bladder is unremarkable. The uterus is retroverted.  Questionable uterine fibroids. Consider follow-up with pelvic  ultrasound. Adnexa are unremarkable. No significant free pelvic fluid.     No acute osseous abnormality.       Impression:       1. No acute abdominal or pelvic findings.  2. Normal appendix.  3. Moderate stool burden.  4. Questionable uterine fibroids. This can be further evaluated  with a  nonemergent pelvic ultrasound if clinically indicated.     This report was finalized on 1/3/2020 3:07 PM by Dr. Cayden Valdez MD.       XR Chest 2 View [[id number]] Collected:  01/02/20 1950     Updated:  01/02/20 1952    Narrative:       CR Chest 2 Vws    INDICATION:    Diabetic ketoacidosis. Nausea and vomiting.    COMPARISON:    None.    FINDINGS:   PA and lateral views of the chest.  Heart and mediastinal contours are normal. The lungs are clear. No pneumothorax or pleural effusion.        Impression:       No acute cardiopulmonary findings.    Signer Name: Prashanth Elizabeth MD   Signed: 1/2/2020 7:50 PM   Workstation Name: [id number]    [facility]          PROCEDURES      Condition on Discharge:  Stable    Physical Exam at Discharge  Vital Signs  Temp:  [98.1 °F (36.7 °C)-98.9 °F (37.2 °C)] 98.5 °F (36.9 °C)  Heart Rate:  [61-91] 85  Resp:  [14-18] 16  BP: ()/(55-68) 104/65    Physical Exam   Constitutional:  She appears well-developed. No distress.   Thin female who appears older than her stated age.   HENT:   Head: Normocephalic and atraumatic.   Mouth/Throat: Oropharynx is clear and moist.   Eyes: Conjunctivae and EOM are normal. No scleral icterus.   Cardiovascular: Normal rate and regular rhythm. Exam reveals no gallop and no friction rub.   No murmur heard.  Pulmonary/Chest: Effort normal and breath sounds normal. No respiratory distress. She has no wheezes. She has no rales.   Abdominal: Soft. Bowel sounds are normal. She exhibits no distension. There is no tenderness.   Musculoskeletal: She exhibits no edema.   Neurological: She is alert and oriented to person, place, and time.   Psychiatric: She has a normal mood and affect. Her behavior is normal.     Discharge Disposition  Home    Visiting Nurse:    No    Home PT/OT:  No    Home Safety Evaluation:  No    DME  None    Discharge Diet:      Dietary Orders (From admission, onward)     Start     Ordered    01/03/20 9160   Diet Soft Texture; Whole Foods; Consistent Carbohydrate  Diet Effective Now     Question Answer Comment   Diet Texture / Consistency Soft Texture    Select Texture: Whole Foods    Common Modifiers Consistent Carbohydrate        01/03/20 9531                Activity at Discharge:  As tolerated    Pre-discharge education  Medications and F/U, patient to have BMP conducted by PCP next week.      Follow-up Appointments  Future Appointments   Date Time Provider Department Center   2/7/2020  9:15 AM Amaya Tran MD [facility] None     Additional Instructions for the Follow-ups that You Need to Schedule     Discharge Follow-up with PCP   As directed       Currently Documented PCP:    Jaiden Villagomez MD    PCP Phone Number:    [phone]     Follow Up Details:  within 1 week               Test Results Pending at Discharge   Order Current Status    Toxicology Screen, Serum In process    Vitamin B12 & Folate In process    Blood Culture - Blood, Arm, Right Preliminary result    Blood Culture - Blood, Arm, Right Preliminary result           Loreto Morrow MD  01/04/20  5:52 PM    Time: <30 minutes

## 2020-01-04 NOTE — PLAN OF CARE
Problem: Patient Care Overview  Goal: Plan of Care Review  Outcome: Ongoing (interventions implemented as appropriate)  Flowsheets (Taken 1/4/2020 2918)  Progress: improving  Plan of Care Reviewed With: patient  Outcome Summary: VSS although SBP is in 80's-90's which patient states isn't out of the ordinary for her. Afebrile through the night. Denies pain/discomfort except a mild headache early on in the shift with history of migraines. She did refuse her enema last night stating that she had a moderate and soft BM yesterday afternoon and did not feel she needed it. We did do prune juice and nightly Senna tabs as ordered instead. No nausea/vomiting this shift. AM labs pending. Will continue to monitor.     Problem: Pain, Chronic (Adult)  Goal: Acceptable Pain/Comfort Level and Functional Ability  Outcome: Ongoing (interventions implemented as appropriate)

## 2020-01-04 NOTE — NURSING NOTE
, Page Dr. Rios. New orders to DC IV fluids, treat with sliding scale and repeat blood sugar shortly

## 2020-01-05 ENCOUNTER — READMISSION MANAGEMENT (OUTPATIENT)
Dept: CALL CENTER | Facility: HOSPITAL | Age: 55
End: 2020-01-05

## 2020-01-05 NOTE — OUTREACH NOTE
Prep Survey      Responses   Facility patient discharged from?  [facility]   Is LACE score < 7 ?  Yes   Is patient eligible?  Yes   Discharge diagnosis  DKA   Does the patient have one of the following disease processes/diagnoses(primary or secondary)?  Other   Does the patient have Home health ordered?  No   Is there a DME ordered?  No   Prep survey completed?  Yes          Kasey Coronado RN

## 2020-01-05 NOTE — NURSING NOTE
Case Management Discharge Note      Final Note: dc home         Destination      No service has been selected for the patient.      Durable Medical Equipment      No service has been selected for the patient.      Dialysis/Infusion      No service has been selected for the patient.      Home Medical Care      No service has been selected for the patient.      Therapy      No service has been selected for the patient.      Community Resources      No service has been selected for the patient.             Final Discharge Disposition Code: 01 - home or self-care

## 2020-01-06 ENCOUNTER — READMISSION MANAGEMENT (OUTPATIENT)
Dept: CALL CENTER | Facility: HOSPITAL | Age: 55
End: 2020-01-06

## 2020-01-06 NOTE — OUTREACH NOTE
LAG < 7 Survey      Responses   Facility patient discharged from?  [facility]   Does the patient have one of the following disease processes/diagnoses(primary or secondary)?  Other   Is there a successful TCM telephone encounter documented?  No   BHLAG <7 Attempt successful?  No   Unsuccessful attempts  Attempt 1          Opal Cannon, RN

## 2020-01-07 LAB
ACETONE SERPL-MCNC: 0.02 % (ref 0–0.01)
BACTERIA SPEC AEROBE CULT: NORMAL
BACTERIA SPEC AEROBE CULT: NORMAL
BUTALBITAL SERPL-MCNC: <1 UG/ML (ref 1–10)
CHLORDIAZEP SERPL-MCNC: <0.1 UG/ML (ref 0.1–0.9)
DIAZEPAM SERPL-MCNC: <0.1 UG/ML (ref 0.1–0.9)
ETHANOL SPEC-SCNC: NEGATIVE % (ref 0–0.01)
ISOPROPANOL SERPL-MCNC: NEGATIVE % (ref 0–0.01)
Lab: ABNORMAL
METHANOL SERPL-MCNC: NEGATIVE % (ref 0–0.01)
NORCHLORDIAZEP SERPL-MCNC: <0.1 UG/ML (ref 0.1–0.6)
NORDIAZEPAM SERPL-MCNC: <0.1 UG/ML (ref 0.1–1.4)
PENTOBARB SERPL-MCNC: <1 UG/ML (ref 1–5)
PHENOBARB SERPL-MCNC: <1 UG/ML (ref 15–40)
SALICYLATES SERPL-MCNC: ABNORMAL UG/ML (ref 30–250)

## 2020-01-08 ENCOUNTER — READMISSION MANAGEMENT (OUTPATIENT)
Dept: CALL CENTER | Facility: HOSPITAL | Age: 55
End: 2020-01-08

## 2020-01-08 NOTE — OUTREACH NOTE
LAG < 7 Survey      Responses   Facility patient discharged from?  [facility]   Does the patient have one of the following disease processes/diagnoses(primary or secondary)?  Other   Is there a successful TCM telephone encounter documented?  No   BHLAG <7 Attempt successful?  No   Unsuccessful attempts  Attempt 2          Marie Espitia, RN

## 2020-02-07 ENCOUNTER — OFFICE VISIT (OUTPATIENT)
Dept: OBSTETRICS AND GYNECOLOGY | Facility: CLINIC | Age: 55
End: 2020-02-07

## 2020-02-07 VITALS
WEIGHT: 104.5 LBS | SYSTOLIC BLOOD PRESSURE: 98 MMHG | BODY MASS INDEX: 19.23 KG/M2 | HEIGHT: 62 IN | DIASTOLIC BLOOD PRESSURE: 60 MMHG

## 2020-02-07 DIAGNOSIS — Z01.419 ROUTINE GYNECOLOGICAL EXAMINATION: ICD-10-CM

## 2020-02-07 DIAGNOSIS — K62.5 RECTAL BLEEDING: ICD-10-CM

## 2020-02-07 DIAGNOSIS — Z01.419 PAP SMEAR, LOW-RISK: Primary | ICD-10-CM

## 2020-02-07 DIAGNOSIS — Z13.9 SCREENING FOR CONDITION: ICD-10-CM

## 2020-02-07 DIAGNOSIS — R63.4 WEIGHT LOSS: ICD-10-CM

## 2020-02-07 LAB
BILIRUB BLD-MCNC: NEGATIVE MG/DL
CLARITY, POC: CLEAR
COLOR UR: ABNORMAL
GLUCOSE UR STRIP-MCNC: ABNORMAL MG/DL
KETONES UR QL: ABNORMAL
LEUKOCYTE EST, POC: NEGATIVE
NITRITE UR-MCNC: NEGATIVE MG/ML
PH UR: 8 [PH] (ref 5–8)
PROT UR STRIP-MCNC: ABNORMAL MG/DL
RBC # UR STRIP: NEGATIVE /UL
SP GR UR: 1.01 (ref 1–1.03)
UROBILINOGEN UR QL: NORMAL

## 2020-02-07 PROCEDURE — 99396 PREV VISIT EST AGE 40-64: CPT | Performed by: OBSTETRICS & GYNECOLOGY

## 2020-02-07 PROCEDURE — 81002 URINALYSIS NONAUTO W/O SCOPE: CPT | Performed by: OBSTETRICS & GYNECOLOGY

## 2020-02-07 RX ORDER — INSULIN GLARGINE 100 [IU]/ML
35 INJECTION, SOLUTION SUBCUTANEOUS EVERY EVENING
COMMUNITY
Start: 2020-02-06 | End: 2021-04-22 | Stop reason: HOSPADM

## 2020-02-07 NOTE — PROGRESS NOTES
GYN Annual Exam     CC- Here for annual exam.     Lona Dupree is a 54 y.o. female who presents for annual well woman exam. She was last seen in 2018.  She has not had a cycle in > 1 year. No  VB Her brother was just dx with esophageal cancer and . She has had unexplained weight loss and rectal bleeding. She has had very poor control of her DM and it is causing her to have some vaginal irritation off and on. She has no symptoms at present. We discussed that this could be due to atrophy as well as recurrent yeast. I have advised her to call for an appt when she has symptoms.      OB History        2    Para   2    Term   2            AB        Living   2       SAB        TAB        Ectopic        Molar        Multiple        Live Births              Obstetric Comments   2 C/S             Menarche:14  Menopause: 52  HRT:none  Current contraception: tubal ligation,   History of abnormal Pap smear: yes - 1 abnormal with normal f/u  History of abnormal mammogram: no  Family history of uterine, colon or ovarian cancer: yes - uncle had colon or pancreatic, brother with esphopahgeal cancer  Family history of breast cancer: no  STD: HSV 2+  Last pap: - normal pap/HPV  HOMERO: none    Health Maintenance   Topic Date Due   • URINE MICROALBUMIN  1966   • ANNUAL PHYSICAL  1969   • TDAP/TD VACCINES (1 - Tdap) 1977   • PNEUMOCOCCAL VACCINE (19-64 HIGHEST RISK) (1 of 3 - PCV13) 1985   • ZOSTER VACCINE (1 of 2) 2016   • DIABETIC FOOT EXAM  2017   • DIABETIC EYE EXAM  2017   • COLONOSCOPY  2017   • INFLUENZA VACCINE  2019   • HEMOGLOBIN A1C  2020   • MAMMOGRAM  2020   • Annual Gynecologic Pelvic and Breast Exam  2021   • PAP SMEAR  2021       Past Medical History:   Diagnosis Date   • Abnormal Pap smear of cervix     1 abnormal pap with normal f/u   • Diabetes mellitus (CMS/HCC)    • Fibroid    • HSV-2 (herpes simplex virus 2)  infection 2017   • Menstrual disorder    • Migraines    • Seasonal allergies        Past Surgical History:   Procedure Laterality Date   • CARPAL TUNNEL RELEASE WITH CUBITAL TUNNEL RELEASE Right    •  SECTION      X2   • COLONOSCOPY N/A 2016    Procedure: COLONOSCOPY with polypectomy;  Surgeon: Maryan Kent MD;  Location: [facility];  Service:    • ENDOMETRIAL ABLATION      thermachoice   • HYSTEROSCOPY      AND ENDOMETRIAL  ABLATION   • NASAL SEPTUM SURGERY     • TUBAL ABDOMINAL LIGATION           Current Outpatient Medications:   •  acetaminophen (TYLENOL) 325 MG tablet, Take 2 tablets by mouth Every 4 (Four) Hours As Needed for Mild Pain . Over the counter, Disp: , Rfl:   •  aspirin 81 MG chewable tablet, Chew 81 mg Daily., Disp: , Rfl:   •  cetirizine (ZyrTEC) 10 MG tablet, Take 10 mg by mouth daily, Disp: , Rfl:   •  Empagliflozin (JARDIANCE) 25 MG tablet, Jardiance 25 mg tablet  Take 1 tablet every day by oral route for 30 days., Disp: , Rfl:   •  FLUoxetine (PROzac) 10 MG capsule, Take 10 mg by mouth Daily., Disp: , Rfl:   •  LANTUS SOLOSTAR 100 UNIT/ML injection pen, , Disp: , Rfl:   •  polyethylene glycol (MIRALAX) pack packet, Take 17 g by mouth Daily As Needed (Constipation). Over the counter, Disp: , Rfl:   •  promethazine (PHENERGAN) 25 MG tablet, Take 25 mg by mouth every 6 (six) hours as needed for nausea or vomiting., Disp: , Rfl:   •  pseudoephedrine (SUDAFED) 120 MG 12 hr tablet, Take 120 mg by mouth Every 12 (Twelve) Hours As Needed for Congestion., Disp: , Rfl:   •  SITagliptin-metFORMIN HCl ER (JANUMET XR)  MG tablet, Take 2 tablets by mouth Daily., Disp: , Rfl:   •  SUMAtriptan (IMITREX) 100 MG tablet, sumatriptan 100 mg tablet  take 1 po at onset of symptoms. can repeat x 1 in 2 hours if headache persists, Disp: , Rfl:   •  traZODone (DESYREL) 50 MG tablet, Take 50 mg by mouth every night., Disp: , Rfl:   •  valACYclovir (VALTREX) 500 MG tablet, TAKE ONE TABLET  "BY MOUTH DAILY, Disp: 90 tablet, Rfl: 0    Allergies   Allergen Reactions   • Mobic [Meloxicam]      Blurred vision       Social History     Tobacco Use   • Smoking status: Former Smoker   • Smokeless tobacco: Never Used   • Tobacco comment: Quit \" a long time ago\"    Substance Use Topics   • Alcohol use: Yes     Comment: OCCASIONALLY   • Drug use: No       Family History   Problem Relation Age of Onset   • Hypertension Mother    • Hyperlipidemia Mother    • Atrial fibrillation Mother    • Stroke Mother    • Heart attack Father    • Hypertension Father    • Diabetes Father    • Alcohol abuse Father    • Dementia Father    • Stroke Sister    • Crohn's disease Sister    • Heart attack Brother    • Hypertension Brother    • Stroke Brother    • Esophageal cancer Brother    • Alzheimer's disease Paternal Grandmother    • Diabetes Paternal Aunt    • Rheum arthritis Other    • Colon cancer Maternal Uncle    • Breast cancer Neg Hx    • Ovarian cancer Neg Hx    • Deep vein thrombosis Neg Hx    • Pulmonary embolism Neg Hx        Review of Systems   Constitutional: Positive for fatigue and unexpected weight change (weight loss). Negative for appetite change and fever.   Respiratory: Negative for cough and shortness of breath.    Cardiovascular: Negative for chest pain and palpitations.   Gastrointestinal: Positive for anal bleeding. Negative for abdominal distention, abdominal pain, constipation, diarrhea and nausea.   Endocrine: Negative for heat intolerance, polydipsia and polyuria.   Genitourinary: Positive for vaginal pain. Negative for dyspareunia, dysuria, genital sores, menstrual problem, pelvic pain and vaginal discharge.   Skin: Negative for color change and rash.   Neurological: Negative for headaches.   Psychiatric/Behavioral: Positive for dysphoric mood (grief). The patient is not nervous/anxious.        BP 98/60   Ht 157.5 cm (62.01\")   Wt 47.4 kg (104 lb 8 oz)   Breastfeeding No   BMI 19.11 kg/m²     Physical " Exam   Constitutional: She is oriented to person, place, and time. She appears well-developed.   cachetic   HENT:   Head: Normocephalic and atraumatic.   Eyes: Conjunctivae are normal. No scleral icterus.   Neck: Neck supple. No thyromegaly present.   Cardiovascular: Normal rate and regular rhythm.   Pulmonary/Chest: Effort normal and breath sounds normal. Right breast exhibits no inverted nipple, no mass, no nipple discharge, no skin change and no tenderness. Left breast exhibits no inverted nipple, no mass, no nipple discharge, no skin change and no tenderness.   Abdominal: Soft. Bowel sounds are normal. She exhibits no distension and no mass. There is no tenderness. There is no rebound and no guarding. No hernia.   Genitourinary: Pelvic exam was performed with patient supine. There is no rash, tenderness or lesion on the right labia. There is no rash, tenderness or lesion on the left labia. Uterus is enlarged. Uterus is not deviated, not fixed and not tender. Cervix exhibits no motion tenderness, no discharge and no friability. Right adnexum displays no mass, no tenderness and no fullness. Left adnexum displays no mass, no tenderness and no fullness. No erythema, tenderness or bleeding in the vagina. No foreign body in the vagina. No signs of injury around the vagina. No vaginal discharge found.   Genitourinary Comments: 12 cm uterus, irregular contour   Neurological: She is alert and oriented to person, place, and time.   Skin: Skin is warm and dry.   Psychiatric: She has a normal mood and affect. Her behavior is normal. Judgment and thought content normal.   Vitals reviewed.         Assessment/Plan    1) GYN HM: check pap/HPV   SBE demonstrated and encouraged.  2) STD screening: accepts Condoms encouraged.  3) Bone health - Weight bearing exercise, dietary calcium recommendations and vitamin D reviewed.   4) Diet and Exercise discussed  5) Smoking Status: Quit! Congratulations !  6) Social: grieving death of  her brother and a close friend.   7)MMG:  schedule  8) DEXA-plan age 56  9)C scope-8/2016, pt now with rectal bleeding and unexplained weight loss-  Refer ASAP. 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  11) Vaginal irritation- enc pt to call when she is having issues.   12) Parts of this document have been copied or forwarded from her previous visits and have been reviewed, updated and edited as indicated.    13)Follow up prn and 1 year       Lona was seen today for gynecologic exam.    Diagnoses and all orders for this visit:    Pap smear, low-risk  -     Cancel: Pap IG, HPV-hr  -     PapIG, CtNgTv, HPV, Rfx 16 / 18  -     Hepatitis B Surface Antigen  -     Hepatitis C Antibody  -     HSV 1 & 2 - Specific Antibody, IgG  -     HIV-1 / O / 2 Ag / Antibody 4th Generation  -     RPR, Rfx Qn RPR / Confirm TP    Screening for condition  -     POC Urinalysis Dipstick  -     Cancel: POC Pregnancy, Urine    Routine gynecological examination  -     Cancel: Pap IG, HPV-hr  -     PapIG, CtNgTv, HPV, Rfx 16 / 18  -     Hepatitis B Surface Antigen  -     Hepatitis C Antibody  -     HSV 1 & 2 - Specific Antibody, IgG  -     HIV-1 / O / 2 Ag / Antibody 4th Generation  -     RPR, Rfx Qn RPR / Confirm TP        Amaya Tran MD  2/7/2020  12:33 AM

## 2020-02-08 LAB
HBV SURFACE AG SERPL QL IA: NEGATIVE
HCV AB S/CO SERPL IA: <0.1 S/CO RATIO (ref 0–0.9)
HIV 1+2 AB+HIV1 P24 AG SERPL QL IA: NON REACTIVE
HSV1 IGG SER IA-ACNC: 20.5 INDEX (ref 0–0.9)
HSV2 IGG SER IA-ACNC: 9.81 INDEX (ref 0–0.9)
RPR SER QL: NON REACTIVE

## 2020-02-08 NOTE — PROGRESS NOTES
PIP= blood portion of STD panel shows previous exposure to the cold sore and the genital herpes viruses ( known)

## 2020-02-11 LAB
C TRACH RRNA CVX QL NAA+PROBE: NEGATIVE
CYTOLOGIST CVX/VAG CYTO: NORMAL
CYTOLOGY CVX/VAG DOC CYTO: NORMAL
CYTOLOGY CVX/VAG DOC THIN PREP: NORMAL
DX ICD CODE: NORMAL
HIV 1 & 2 AB SER-IMP: NORMAL
HPV I/H RISK 1 DNA CVX QL PROBE+SIG AMP: NEGATIVE
N GONORRHOEA RRNA CVX QL NAA+PROBE: NEGATIVE
OTHER STN SPEC: NORMAL
STAT OF ADQ CVX/VAG CYTO-IMP: NORMAL
T VAGINALIS RRNA SPEC QL NAA+PROBE: NEGATIVE

## 2020-02-20 ENCOUNTER — HOSPITAL ENCOUNTER (OUTPATIENT)
Dept: MAMMOGRAPHY | Facility: HOSPITAL | Age: 55
Discharge: HOME | End: 2020-02-20
Admitting: OBSTETRICS & GYNECOLOGY

## 2020-02-20 DIAGNOSIS — Z13.9 SCREENING FOR CONDITION: ICD-10-CM

## 2020-02-20 PROCEDURE — 77067 SCR MAMMO BI INCL CAD: CPT

## 2020-02-20 PROCEDURE — 77063 BREAST TOMOSYNTHESIS BI: CPT

## 2020-02-24 ENCOUNTER — PREP FOR SURGERY (OUTPATIENT)
Dept: OTHER | Facility: HOSPITAL | Age: 55
End: 2020-02-24

## 2020-02-24 ENCOUNTER — TELEPHONE (OUTPATIENT)
Dept: GASTROENTEROLOGY | Facility: CLINIC | Age: 55
End: 2020-02-24

## 2020-02-24 DIAGNOSIS — R13.19 ESOPHAGEAL DYSPHAGIA: Primary | ICD-10-CM

## 2020-02-24 DIAGNOSIS — Z86.010 PERSONAL HISTORY OF COLONIC POLYPS: ICD-10-CM

## 2020-02-24 NOTE — TELEPHONE ENCOUNTER
FAST TRACK (IN MEDIA) - LAST COLONOSCOPY 07/14/2016 BY DR. DANIEL - PERSONAL HISTORY POLYPS & FAMILY HISTORY CRC IN UNCLE - MARKED TROUBLE SWALLOWING, NAUSUA, CONSTIPATION, RECTAL BLEEDING & ABD PAIN -SCHEDULE AT [place].

## 2020-02-25 PROBLEM — R13.19 ESOPHAGEAL DYSPHAGIA: Status: ACTIVE | Noted: 2020-02-25

## 2020-02-25 PROBLEM — Z86.010 PERSONAL HISTORY OF COLONIC POLYPS: Status: ACTIVE | Noted: 2020-02-25

## 2020-02-25 PROBLEM — Z86.0100 PERSONAL HISTORY OF COLONIC POLYPS: Status: ACTIVE | Noted: 2020-02-25

## 2020-02-25 NOTE — TELEPHONE ENCOUNTER
RETURN CALL FROM PATIENT.  SCHEDULED AT [facility] 03/24/2020 AT 2PM - ARRIVE 1PM.  WILL MAIL INSTRUCTIONS.

## 2020-03-17 DIAGNOSIS — R13.19 ESOPHAGEAL DYSPHAGIA: Primary | ICD-10-CM

## 2020-03-18 ENCOUNTER — TELEPHONE (OUTPATIENT)
Dept: GASTROENTEROLOGY | Facility: CLINIC | Age: 55
End: 2020-03-18

## 2020-03-18 NOTE — TELEPHONE ENCOUNTER
RETURN CALL FROM PATIENT.  EXPLAINING NEED TO CANCEL PROCEDURES FOR NOW DUE TO THE CORONAVIRUS.  ALSO DR. DEE HAS ORDER UGI.  EXPLAINED SCHEDULE ONE WILL BE CALL HER.  SHE UNDERSTANDS.

## 2020-03-18 NOTE — TELEPHONE ENCOUNTER
----- Message from Dmitri Fung MD sent at 3/17/2020  5:14 PM EDT -----  Regarding: Cancel List  Cant rationalize a 4 year screening colon but due to family history and dysphagia- I put in an order for UGI w Barium Pill  Post pone and will call with UGI results

## 2020-03-20 ENCOUNTER — TELEPHONE (OUTPATIENT)
Dept: GASTROENTEROLOGY | Facility: CLINIC | Age: 55
End: 2020-03-20

## 2020-03-25 ENCOUNTER — APPOINTMENT (OUTPATIENT)
Dept: GENERAL RADIOLOGY | Facility: HOSPITAL | Age: 55
End: 2020-03-25

## 2020-03-30 ENCOUNTER — TELEPHONE (OUTPATIENT)
Dept: GASTROENTEROLOGY | Facility: CLINIC | Age: 55
End: 2020-03-30

## 2020-03-31 ENCOUNTER — PREP FOR SURGERY (OUTPATIENT)
Dept: OTHER | Facility: HOSPITAL | Age: 55
End: 2020-03-31

## 2020-03-31 ENCOUNTER — TELEPHONE (OUTPATIENT)
Dept: GASTROENTEROLOGY | Facility: CLINIC | Age: 55
End: 2020-03-31

## 2020-03-31 DIAGNOSIS — R13.19 ESOPHAGEAL DYSPHAGIA: Primary | ICD-10-CM

## 2020-03-31 DIAGNOSIS — Z86.010 PERSONAL HISTORY OF COLONIC POLYPS: ICD-10-CM

## 2020-03-31 NOTE — TELEPHONE ENCOUNTER
RETURN CALL FROM PATIENT.  SCHEDULED AT [facility] 06/03/2020 AT 1:15PM - ARRIVE 12:15PM.  MAILED INSTRUCTIONS.

## 2020-04-29 ENCOUNTER — HOSPITAL ENCOUNTER (OUTPATIENT)
Dept: GENERAL RADIOLOGY | Facility: HOSPITAL | Age: 55
Discharge: HOME | End: 2020-04-29
Admitting: INTERNAL MEDICINE

## 2020-04-29 DIAGNOSIS — R13.19 ESOPHAGEAL DYSPHAGIA: ICD-10-CM

## 2020-04-29 PROCEDURE — 74246 X-RAY XM UPR GI TRC 2CNTRST: CPT

## 2020-05-06 ENCOUNTER — TRANSCRIBE ORDERS (OUTPATIENT)
Dept: ADMINISTRATIVE | Facility: HOSPITAL | Age: 55
End: 2020-05-06

## 2020-05-06 ENCOUNTER — LAB (OUTPATIENT)
Dept: LAB | Facility: HOSPITAL | Age: 55
End: 2020-05-06

## 2020-05-06 DIAGNOSIS — R22.2 SUBCUTANEOUS NODULE OF CHEST WALL: ICD-10-CM

## 2020-05-06 DIAGNOSIS — E11.9 DIABETES MELLITUS WITHOUT COMPLICATION (HCC): ICD-10-CM

## 2020-05-06 DIAGNOSIS — E78.2 MIXED HYPERLIPIDEMIA: ICD-10-CM

## 2020-05-06 DIAGNOSIS — R22.2 LOCALIZED SWELLING, MASS AND LUMP, TRUNK: Primary | ICD-10-CM

## 2020-05-06 LAB
ALBUMIN SERPL-MCNC: 4.5 G/DL (ref 3.5–5.2)
ALBUMIN/GLOB SERPL: 1.5 G/DL
ALP SERPL-CCNC: 81 U/L (ref 39–117)
ALT SERPL W P-5'-P-CCNC: 28 U/L (ref 1–33)
ANION GAP SERPL CALCULATED.3IONS-SCNC: 13.3 MMOL/L (ref 5–15)
AST SERPL-CCNC: 19 U/L (ref 1–32)
BILIRUB SERPL-MCNC: 0.2 MG/DL (ref 0.2–1.2)
BUN BLD-MCNC: 16 MG/DL (ref 6–20)
BUN/CREAT SERPL: 19.3 (ref 7–25)
CALCIUM SPEC-SCNC: 9.8 MG/DL (ref 8.6–10.5)
CHLORIDE SERPL-SCNC: 96 MMOL/L (ref 98–107)
CHOLEST SERPL-MCNC: 169 MG/DL (ref 0–200)
CO2 SERPL-SCNC: 25.7 MMOL/L (ref 22–29)
CREAT BLD-MCNC: 0.83 MG/DL (ref 0.57–1)
GFR SERPL CREATININE-BSD FRML MDRD: 72 ML/MIN/1.73
GLOBULIN UR ELPH-MCNC: 3 GM/DL
GLUCOSE BLD-MCNC: 514 MG/DL (ref 65–99)
HBA1C MFR BLD: 12.05 % (ref 4.8–5.6)
HDLC SERPL-MCNC: 53 MG/DL (ref 40–60)
LDLC SERPL CALC-MCNC: 95 MG/DL (ref 0–100)
LDLC/HDLC SERPL: 1.79 {RATIO}
POTASSIUM BLD-SCNC: 4.5 MMOL/L (ref 3.5–5.2)
PROT SERPL-MCNC: 7.5 G/DL (ref 6–8.5)
SODIUM BLD-SCNC: 135 MMOL/L (ref 136–145)
TRIGL SERPL-MCNC: 106 MG/DL (ref 0–150)
VLDLC SERPL-MCNC: 21.2 MG/DL (ref 5–40)

## 2020-05-06 PROCEDURE — 83036 HEMOGLOBIN GLYCOSYLATED A1C: CPT

## 2020-05-06 PROCEDURE — 80053 COMPREHEN METABOLIC PANEL: CPT

## 2020-05-06 PROCEDURE — 36415 COLL VENOUS BLD VENIPUNCTURE: CPT

## 2020-05-06 PROCEDURE — 80061 LIPID PANEL: CPT

## 2020-05-20 ENCOUNTER — HOSPITAL ENCOUNTER (OUTPATIENT)
Dept: ULTRASOUND IMAGING | Facility: HOSPITAL | Age: 55
Discharge: HOME | End: 2020-05-20
Admitting: INTERNAL MEDICINE

## 2020-05-20 DIAGNOSIS — R22.2 SUBCUTANEOUS NODULE OF CHEST WALL: ICD-10-CM

## 2020-05-20 DIAGNOSIS — R22.2 LOCALIZED SWELLING, MASS AND LUMP, TRUNK: ICD-10-CM

## 2020-05-20 PROCEDURE — 76604 US EXAM CHEST: CPT

## 2020-05-27 ENCOUNTER — TRANSCRIBE ORDERS (OUTPATIENT)
Dept: ADMINISTRATIVE | Facility: HOSPITAL | Age: 55
End: 2020-05-27

## 2020-05-27 DIAGNOSIS — Z01.818 OTHER SPECIFIED PRE-OPERATIVE EXAMINATION: Primary | ICD-10-CM

## 2020-06-01 ENCOUNTER — APPOINTMENT (OUTPATIENT)
Dept: LAB | Facility: HOSPITAL | Age: 55
End: 2020-06-01

## 2020-06-01 ENCOUNTER — LAB (OUTPATIENT)
Dept: LAB | Facility: HOSPITAL | Age: 55
End: 2020-06-01

## 2020-06-01 DIAGNOSIS — Z01.818 OTHER SPECIFIED PRE-OPERATIVE EXAMINATION: ICD-10-CM

## 2020-06-01 PROCEDURE — U0002 COVID-19 LAB TEST NON-CDC: HCPCS

## 2020-06-01 PROCEDURE — C9803 HOPD COVID-19 SPEC COLLECT: HCPCS

## 2020-06-01 PROCEDURE — U0004 COV-19 TEST NON-CDC HGH THRU: HCPCS

## 2020-06-02 ENCOUNTER — ANESTHESIA EVENT (OUTPATIENT)
Dept: PERIOP | Facility: HOSPITAL | Age: 55
End: 2020-06-02

## 2020-06-02 RX ORDER — POTASSIUM CHLORIDE 20 MEQ/1
99 TABLET, EXTENDED RELEASE ORAL DAILY
COMMUNITY
End: 2021-04-15 | Stop reason: HOSPADM

## 2020-06-03 ENCOUNTER — ANESTHESIA (OUTPATIENT)
Dept: PERIOP | Facility: HOSPITAL | Age: 55
End: 2020-06-03

## 2020-06-03 ENCOUNTER — HOSPITAL ENCOUNTER (OUTPATIENT)
Facility: HOSPITAL | Age: 55
Setting detail: HOSPITAL OUTPATIENT SURGERY
Discharge: HOME | End: 2020-06-03
Attending: INTERNAL MEDICINE | Admitting: INTERNAL MEDICINE

## 2020-06-03 VITALS
OXYGEN SATURATION: 98 % | HEIGHT: 62 IN | HEART RATE: 82 BPM | BODY MASS INDEX: 20.39 KG/M2 | WEIGHT: 110.8 LBS | TEMPERATURE: 98.4 F | RESPIRATION RATE: 16 BRPM | DIASTOLIC BLOOD PRESSURE: 61 MMHG | SYSTOLIC BLOOD PRESSURE: 98 MMHG

## 2020-06-03 DIAGNOSIS — Z86.010 PERSONAL HISTORY OF COLONIC POLYPS: ICD-10-CM

## 2020-06-03 DIAGNOSIS — R13.19 ESOPHAGEAL DYSPHAGIA: ICD-10-CM

## 2020-06-03 LAB
GLUCOSE BLDC GLUCOMTR-MCNC: 161 MG/DL (ref 70–130)
GLUCOSE BLDC GLUCOMTR-MCNC: 57 MG/DL (ref 70–130)
GLUCOSE BLDC GLUCOMTR-MCNC: 80 MG/DL (ref 70–130)
REF LAB TEST METHOD: NORMAL
SARS-COV-2 RNA RESP QL NAA+PROBE: NOT DETECTED

## 2020-06-03 PROCEDURE — 82962 GLUCOSE BLOOD TEST: CPT

## 2020-06-03 PROCEDURE — 45380 COLONOSCOPY AND BIOPSY: CPT | Performed by: INTERNAL MEDICINE

## 2020-06-03 PROCEDURE — 25010000002 PROPOFOL 10 MG/ML EMULSION: Performed by: ANESTHESIOLOGY

## 2020-06-03 PROCEDURE — 88313 SPECIAL STAINS GROUP 2: CPT | Performed by: INTERNAL MEDICINE

## 2020-06-03 PROCEDURE — 43239 EGD BIOPSY SINGLE/MULTIPLE: CPT | Performed by: INTERNAL MEDICINE

## 2020-06-03 PROCEDURE — 88305 TISSUE EXAM BY PATHOLOGIST: CPT | Performed by: INTERNAL MEDICINE

## 2020-06-03 RX ORDER — DEXTROSE MONOHYDRATE 25 G/50ML
25 INJECTION, SOLUTION INTRAVENOUS ONCE
Status: COMPLETED | OUTPATIENT
Start: 2020-06-03 | End: 2020-06-03

## 2020-06-03 RX ORDER — SODIUM CHLORIDE 9 MG/ML
40 INJECTION, SOLUTION INTRAVENOUS AS NEEDED
Status: DISCONTINUED | OUTPATIENT
Start: 2020-06-03 | End: 2020-06-03 | Stop reason: HOSPADM

## 2020-06-03 RX ORDER — GLYCOPYRROLATE 0.2 MG/ML
INJECTION INTRAMUSCULAR; INTRAVENOUS AS NEEDED
Status: DISCONTINUED | OUTPATIENT
Start: 2020-06-03 | End: 2020-06-03 | Stop reason: SURG

## 2020-06-03 RX ORDER — PANTOPRAZOLE SODIUM 40 MG/1
40 TABLET, DELAYED RELEASE ORAL DAILY
Qty: 90 TABLET | Refills: 3 | Status: SHIPPED | OUTPATIENT
Start: 2020-06-03 | End: 2020-08-27 | Stop reason: SDUPTHER

## 2020-06-03 RX ORDER — SODIUM CHLORIDE 0.9 % (FLUSH) 0.9 %
10 SYRINGE (ML) INJECTION EVERY 12 HOURS SCHEDULED
Status: DISCONTINUED | OUTPATIENT
Start: 2020-06-03 | End: 2020-06-03 | Stop reason: HOSPADM

## 2020-06-03 RX ORDER — SODIUM CHLORIDE 0.9 % (FLUSH) 0.9 %
10 SYRINGE (ML) INJECTION AS NEEDED
Status: DISCONTINUED | OUTPATIENT
Start: 2020-06-03 | End: 2020-06-03 | Stop reason: HOSPADM

## 2020-06-03 RX ORDER — SODIUM CHLORIDE, SODIUM LACTATE, POTASSIUM CHLORIDE, CALCIUM CHLORIDE 600; 310; 30; 20 MG/100ML; MG/100ML; MG/100ML; MG/100ML
9 INJECTION, SOLUTION INTRAVENOUS CONTINUOUS PRN
Status: DISCONTINUED | OUTPATIENT
Start: 2020-06-03 | End: 2020-06-03 | Stop reason: HOSPADM

## 2020-06-03 RX ORDER — PROPOFOL 10 MG/ML
VIAL (ML) INTRAVENOUS AS NEEDED
Status: DISCONTINUED | OUTPATIENT
Start: 2020-06-03 | End: 2020-06-03 | Stop reason: SURG

## 2020-06-03 RX ORDER — LIDOCAINE HYDROCHLORIDE 20 MG/ML
INJECTION, SOLUTION INFILTRATION; PERINEURAL AS NEEDED
Status: DISCONTINUED | OUTPATIENT
Start: 2020-06-03 | End: 2020-06-03 | Stop reason: SURG

## 2020-06-03 RX ORDER — LIDOCAINE HYDROCHLORIDE 10 MG/ML
0.5 INJECTION, SOLUTION EPIDURAL; INFILTRATION; INTRACAUDAL; PERINEURAL ONCE AS NEEDED
Status: COMPLETED | OUTPATIENT
Start: 2020-06-03 | End: 2020-06-03

## 2020-06-03 RX ORDER — MAGNESIUM HYDROXIDE 1200 MG/15ML
LIQUID ORAL AS NEEDED
Status: DISCONTINUED | OUTPATIENT
Start: 2020-06-03 | End: 2020-06-03 | Stop reason: HOSPADM

## 2020-06-03 RX ADMIN — LIDOCAINE HYDROCHLORIDE 80 MG: 20 INJECTION, SOLUTION INFILTRATION; PERINEURAL at 13:49

## 2020-06-03 RX ADMIN — SODIUM CHLORIDE, POTASSIUM CHLORIDE, SODIUM LACTATE AND CALCIUM CHLORIDE 9 ML/HR: 600; 310; 30; 20 INJECTION, SOLUTION INTRAVENOUS at 12:50

## 2020-06-03 RX ADMIN — GLYCOPYRROLATE 0.1 MG: 0.2 INJECTION INTRAMUSCULAR; INTRAVENOUS at 13:47

## 2020-06-03 RX ADMIN — PROPOFOL 300 MG: 10 INJECTION, EMULSION INTRAVENOUS at 13:49

## 2020-06-03 RX ADMIN — DEXTROSE MONOHYDRATE 25 ML: 25 INJECTION, SOLUTION INTRAVENOUS at 13:28

## 2020-06-03 RX ADMIN — LIDOCAINE HYDROCHLORIDE 0.2 ML: 10 INJECTION, SOLUTION EPIDURAL; INFILTRATION; INTRACAUDAL; PERINEURAL at 12:50

## 2020-06-03 NOTE — H&P
"Patient Care Team:  Jaiden Villagomez MD as PCP - General  Jaiden Villagomez MD as PCP - Family Medicine    CHIEF COMPLAINT: Personal hx colon polyps and dysphagia    HISTORY OF PRESENT ILLNESS:  Last colon was 2016, No weight loss    Past Medical History:   Diagnosis Date   • Abnormal Pap smear of cervix     1 abnormal pap with normal f/u   • Diabetes mellitus (CMS/HCC)    • Difficulty swallowing     at times   • Fibroid    • HSV-2 (herpes simplex virus 2) infection 2017   • Menstrual disorder    • Migraines    • Rectal bleeding     bleeds with bowel movement   • Seasonal allergies      Past Surgical History:   Procedure Laterality Date   • CARPAL TUNNEL RELEASE WITH CUBITAL TUNNEL RELEASE Right    •  SECTION      X2   • COLONOSCOPY N/A 2016    Procedure: COLONOSCOPY with polypectomy;  Surgeon: Maryan Kent MD;  Location: [facility];  Service:    • ENDOMETRIAL ABLATION      thermachoice   • HYSTEROSCOPY      AND ENDOMETRIAL  ABLATION   • NASAL SEPTUM SURGERY         • TUBAL ABDOMINAL LIGATION       Family History   Problem Relation Age of Onset   • Hypertension Mother    • Hyperlipidemia Mother    • Atrial fibrillation Mother    • Stroke Mother    • Heart attack Father    • Hypertension Father    • Diabetes Father    • Alcohol abuse Father    • Dementia Father    • Stroke Sister    • Crohn's disease Sister    • Heart attack Brother    • Hypertension Brother    • Stroke Brother    • Esophageal cancer Brother    • Alzheimer's disease Paternal Grandmother    • Diabetes Paternal Aunt    • Rheum arthritis Other    • Colon cancer Maternal Uncle    • Breast cancer Neg Hx    • Ovarian cancer Neg Hx    • Deep vein thrombosis Neg Hx    • Pulmonary embolism Neg Hx      Social History     Tobacco Use   • Smoking status: Former Smoker     Last attempt to quit: 1995     Years since quittin.0   • Smokeless tobacco: Never Used   • Tobacco comment: Quit \" a long time ago\"    Substance Use " Topics   • Alcohol use: Yes     Comment: OCCASIONALLY   • Drug use: No     Medications Prior to Admission   Medication Sig Dispense Refill Last Dose   • Multiple Vitamins-Minerals (MULTI-BETIC PO) Take 2 capsules by mouth Daily.   6/1/2020 at 0900   • polyethylene glycol (MIRALAX) pack packet Take 17 g by mouth Daily As Needed (Constipation). Over the counter   6/2/2020 at 2200   • potassium chloride (K-DUR,KLOR-CON) 20 MEQ CR tablet Take 99 mEq by mouth Daily.   6/1/2020 at 0900   • promethazine (PHENERGAN) 25 MG tablet Take 25 mg by mouth every 6 (six) hours as needed for nausea or vomiting.   Past Week at Unknown time   • SUMAtriptan (IMITREX) 100 MG tablet sumatriptan 100 mg tablet   take 1 po at onset of symptoms. can repeat x 1 in 2 hours if headache persists   Past Week at Unknown time   • traZODone (DESYREL) 50 MG tablet Take 50 mg by mouth every night.   6/2/2020 at 2100   • acetaminophen (TYLENOL) 325 MG tablet Take 2 tablets by mouth Every 4 (Four) Hours As Needed for Mild Pain . Over the counter   5/2/2020 at 0900   • aspirin 81 MG chewable tablet Chew 81 mg Daily.   5/28/2020 at 0900   • cetirizine (ZyrTEC) 10 MG tablet Take 10 mg by mouth daily   5/31/2020 at 0900   • FLUoxetine (PROzac) 10 MG capsule Take 10 mg by mouth Daily.   5/31/2020 at 0900   • LANTUS SOLOSTAR 100 UNIT/ML injection pen 35 Units Every Night.   6/1/2020 at 2100   • pseudoephedrine (SUDAFED) 120 MG 12 hr tablet Take 120 mg by mouth Every 12 (Twelve) Hours As Needed for Congestion.   6/1/2020 at 0900   • SITagliptin-metFORMIN HCl ER (JANUMET XR)  MG tablet Take 2 tablets by mouth Daily.   6/1/2020 at 0900   • valACYclovir (VALTREX) 500 MG tablet TAKE ONE TABLET BY MOUTH DAILY 90 tablet 0 6/1/2020 at 0900     Allergies:  Mobic [meloxicam]    REVIEW OF SYSTEMS:  Please see the above history of present illness for pertinent positives and negatives.  The remainder of the patient's systems have been reviewed and are negative.  "    Vital Signs  Temp:  [98.3 °F (36.8 °C)] 98.3 °F (36.8 °C)  Heart Rate:  [84] 84  Resp:  [14] 14  BP: (102)/(65) 102/65    Flowsheet Rows      First Filed Value   Admission Height  157.5 cm (62\") Documented at 06/02/2020 1122   Admission Weight  51.7 kg (114 lb) Documented at 06/02/2020 1122           Physical Exam:  Physical Exam   Constitutional: Patient appears well-developed and well-nourished and in no acute distress   HEENT:   Head: Normocephalic and atraumatic.   Eyes:  Pupils are equal, round, and reactive to light. EOM are intact. Sclerae are anicteric and non-injected.  Mouth and Throat: Patient has moist mucous membranes. Oropharynx is clear of any erythema or exudate.     Neck: Neck supple. No JVD present. No thyromegaly present. No lymphadenopathy present.  Cardiovascular: Regular rate, regular rhythm, S1 normal and S2 normal.  Exam reveals no gallop and no friction rub.  No murmur heard.  Pulmonary/Chest: Lungs are clear to auscultation bilaterally. No respiratory distress. No wheezes. No rhonchi. No rales.   Abdominal: Soft. Bowel sounds are normal. No distension and no mass. There is no hepatosplenomegaly. There is no tenderness.   Musculoskeletal: Normal Muscle tone  Extremities: No edema. Pulses are palpable in all 4 extremities.  Neurological: Patient is alert and oriented to person, place, and time. Cranial nerves II-XII are grossly intact with no focal deficits.  Skin: Skin is warm. No rash noted. Nails show no clubbing.  No cyanosis or erythema.    Debilities/Disabilities Identified: None  Emotional Behavior: Appropriate     Results Review:    I reviewed the patient's new clinical results.  Lab Results (most recent)     None          Imaging Results (Most Recent)     None        reviewed    ECG/EMG Results (most recent)     None        reviewed    Assessment/Plan   Personal hx colon polyps and dysphagia/  EGD and colonoscopy      I discussed the patients findings and my recommendations with " patient.     Dmitri Fung MD  06/03/20  12:51    Time: 10 min prior to procedure.

## 2020-06-03 NOTE — ANESTHESIA POSTPROCEDURE EVALUATION
Patient: Lona Dupree    Procedure Summary     Date:  06/03/20 Room / Location:  [facility] ENDOSCOPY 1 /  [facility] OR    Anesthesia Start:  1345 Anesthesia Stop:  1422    Procedures:       ESOPHAGOGASTRODUODENOSCOPY (N/A Esophagus)      COLONOSCOPY (N/A ) Diagnosis:       Esophageal dysphagia      Personal history of colonic polyps      Reflux esophagitis      Erosive gastritis      Duodenitis      Colon polyp      (Esophageal dysphagia [R13.10])      (Personal history of colonic polyps [Z86.010])    Surgeon:  Dmitri Fung MD Provider:  Rupali Ruff MD    Anesthesia Type:  MAC ASA Status:  3          Anesthesia Type: MAC    Vitals  Vitals Value Taken Time   BP 88/60 6/3/2020  2:26 PM   Temp 98.4 °F (36.9 °C) 6/3/2020  2:26 PM   Pulse 81 6/3/2020  2:26 PM   Resp 14 6/3/2020  2:26 PM   SpO2 98 % 6/3/2020  2:26 PM           Post Anesthesia Care and Evaluation    Patient location during evaluation: bedside  Patient participation: complete - patient participated  Level of consciousness: awake  Pain score: 0  Pain management: adequate  Airway patency: patent  Anesthetic complications: No anesthetic complications  PONV Status: none  Cardiovascular status: acceptable  Respiratory status: acceptable  Hydration status: acceptable

## 2020-06-03 NOTE — BRIEF OP NOTE
ESOPHAGOGASTRODUODENOSCOPY, COLONOSCOPY  Progress Note    Lona Dupree  6/3/2020    Pre-op Diagnosis:   Esophageal dysphagia [R13.10]  Personal history of colonic polyps [Z86.010]       Post-Op Diagnosis Codes:     * Esophageal dysphagia [R13.10]     * Personal history of colonic polyps [Z86.010]     * Reflux esophagitis [K21.0]     * Erosive gastritis [K29.60]     * Duodenitis [535.6]     * Colon polyp [K63.5]    Procedure/CPT® Codes:      Procedure(s):  ESOPHAGOGASTRODUODENOSCOPY  COLONOSCOPY    Surgeon(s):  Dmitri Fung MD    Anesthesia: Monitored Anesthesia Care    Staff:   Circulator: Chelsea Akbar RN  Scrub Person: Shweta Nunez; Marilin Kirkland  Assistant: Patricia Garcia APRN    Estimated Blood Loss: none    Urine Voided: * No values recorded between 6/3/2020  1:46 PM and 6/3/2020  2:20 PM *    Specimens:                Specimens     ID Source Type Tests Collected By Collected At Frozen?      A Small Intestine, Duodenum Tissue · TISSUE PATHOLOGY EXAM   Dmitri Fung MD 6/3/20 1356 No     This specimen was not marked as sent.    B Gastric, Body Tissue · TISSUE PATHOLOGY EXAM   Dmitri Fung MD 6/3/20 1358 No     This specimen was not marked as sent.    C Esophagus, Distal Tissue · TISSUE PATHOLOGY EXAM   Dmitri Fung MD 6/3/20 1400 No     This specimen was not marked as sent.    D Large Intestine, Rectum Polyp · TISSUE PATHOLOGY EXAM   Dmitri Fung MD 6/3/20 1419 No     This specimen was not marked as sent.                Drains: * No LDAs found *    Findings: Mild Duodenitis-Biopsy  Erosive Gastritis-Biopsy  Reflux Esophagitis-Biopsy    Colon to Cecum Poor Prep  Polyp-Biopsy    Complications: None      Dmitri Fung MD     Date: 6/3/2020  Time: 14:23

## 2020-06-03 NOTE — ANESTHESIA PREPROCEDURE EVALUATION
Anesthesia Evaluation     Patient summary reviewed and Nursing notes reviewed   no history of anesthetic complications:  NPO Solid Status: > 8 hours  NPO Liquid Status: > 8 hours           Airway   Mallampati: II  TM distance: >3 FB  Neck ROM: full  Possible difficult intubation and Small opening  Dental - normal exam     Pulmonary     breath sounds clear to auscultation  (+) a smoker (quit over 20 years ago) Former,   Sleep apnea: snores.  Cardiovascular   Exercise tolerance: good (4-7 METS)    Rhythm: regular  Rate: normal        Neuro/Psych  (+) headaches (migraines),     Numbness: carpal tunnel right wrist.  GI/Hepatic/Renal/Endo    (+)   diabetes mellitus (getting under control, 69 last pm) type 2 poorly controlled using insulin,   GERD: occ dysphagia.    Musculoskeletal     (+) back pain (occ),   Abdominal  - normal exam   Substance History   Alcohol use: occ. Drug use: patient denies.     OB/GYN negative ob/gyn ROS         Other   arthritis (hands),                      Anesthesia Plan    ASA 3     MAC     intravenous induction     Anesthetic plan, all risks, benefits, and alternatives have been provided, discussed and informed consent has been obtained with: patient.  Use of blood products discussed with patient  Consented to blood products.

## 2020-06-03 NOTE — OP NOTE
ESOPHAGOGASTRODUODENOSCOPY, COLONOSCOPY  Procedure Report    Patient Name:  Lona Dupree  YOB: 1965    Date of Surgery:  6/3/2020     Indications:  Esophageal dysphagia [R13.10]  Personal history of colonic polyps [Z86.010]      Pre-op Diagnosis:   Esophageal dysphagia [R13.10]  Personal history of colonic polyps [Z86.010]    Post-Op Diagnosis Codes:     * Esophageal dysphagia [R13.10]     * Personal history of colonic polyps [Z86.010]     * Reflux esophagitis [K21.0]     * Erosive gastritis [K29.60]     * Duodenitis [535.6]     * Colon polyp [K63.5]         Procedure/CPT® Codes:      Procedure(s):  ESOPHAGOGASTRODUODENOSCOPY  COLONOSCOPY    Staff:  Surgeon(s):  Dmitri Fung MD    Assistant: Patricia Garcia APRN    Anesthesia: Monitored Anesthesia Care    Estimated Blood Loss: none    Specimens:   ID Type Source Tests Collected by Time   A (Not marked as sent) :  Tissue Small Intestine, Duodenum TISSUE PATHOLOGY EXAM Dmitri Fung MD 6/3/2020 1356   B (Not marked as sent) :  Tissue Gastric, Body TISSUE PATHOLOGY EXAM Dmitri Fung MD 6/3/2020 1358   C (Not marked as sent) :  Tissue Esophagus, Distal TISSUE PATHOLOGY EXAM Dmitri Fung MD 6/3/2020 1400   D (Not marked as sent) :  Polyp Large Intestine, Rectum TISSUE PATHOLOGY EXAM Dmitri Fung MD 6/3/2020 1419       Implants:    Nothing was implanted during the procedure      Description of Procedure: After having signed informed consent, she was brought to the endoscopy suite, placed in left lateral decubitus position and given her IV sedation.  A bite block was placed between her incisors.  Scope was introduced into the oropharynx and advanced under direct visualization with ease in the esophagus.  Through the distal esophagus, there was no evidence of stricture.  There was irregular Z line consistent with reflux esophagitis.  Scope was advanced to the stomach and  retroflexed revealing normal cardia and fundus.  Scope was deretroflexed revealing gastric erosions in the body and antrum.  These were scattered.  No parenchymal ulcerations.  Scope was advanced through the pylorus through duodenal bulb, which was examined and normal and around the angle of the second and third portion of the duodenum, which showed some mild nonspecific scalloping, but no erosions, ulcers or vascular lesions.  Biopsies were taken to rule out celiac disease.  Scope was withdrawn in the antrum.  Biopsies were taken to rule out H. pylori.  Scope was withdrawn in the distal esophagus and biopsies were taken to rule out short segment Howell's esophagus.  As the scope was withdrawn, the esophagus was easily distended and otherwise normal appearing.  The scope was taken from the patient.  She tolerated the procedure well.    The patient was kept in the left lateral decubitus position and repositioned.  Rectal exam revealed no external lesions, normal anal tone, no rectal mass.  Scope was introduced into the rectum, advanced under direct visualization with ease, rectum, sigmoid colon, descending colon, to and around the splenic flexure, reduced and advanced through the transverse colon with some looping around the hepatic flexure, reduced in the ascending colon and then advanced again using rotational technique into the cecum.  Overall, the colon was poorly prepped.  There is significant amount of opaque liquid stool present and some particulate matter that prohibited clearing of all the residual bowel contents, although extensive flushing was used throughout to get as best exam as possible.  The ileocecal valve was not intubated.  Scope was withdrawn from an otherwise normal appearing cecum into the ascending colon, withdrawn slowly examining the colon in circumferential fashion.  There were no mucosal lesions noted throughout the ascending colon, hepatic flexure, transverse, descending or sigmoid colon.   In the rectum, there was a single 3 x 5 mm hyperplastic appearing polyp that was biopsied, felt to be completely removed, sent separately as rectal polyp.  Scope was retroflexed revealing intact dentate line, no mucosal lesions.  Scope was deretroflexed and withdrawn.  The patient tolerated the procedure very well.          Findings: Mild Duodenitis-Biopsy  Erosive Gastritis-Biopsy  Reflux Esophagitis-Biopsy    Colon to Cecum Poor Prep  Polyp-Biopsy         Complications: None    Recommendations: Results to be called.      Dmitri Fung MD     Date: 6/3/2020  Time: 14:24

## 2020-06-05 LAB
CYTO UR: NORMAL
LAB AP CASE REPORT: NORMAL
LAB AP DIAGNOSIS COMMENT: NORMAL
PATH REPORT.FINAL DX SPEC: NORMAL
PATH REPORT.GROSS SPEC: NORMAL

## 2020-06-08 RX ORDER — VALACYCLOVIR HYDROCHLORIDE 500 MG/1
500 TABLET, FILM COATED ORAL DAILY
Qty: 90 TABLET | Refills: 2 | Status: SHIPPED | OUTPATIENT
Start: 2020-06-08 | End: 2021-03-11

## 2020-08-27 ENCOUNTER — OFFICE VISIT (OUTPATIENT)
Dept: GASTROENTEROLOGY | Facility: CLINIC | Age: 55
End: 2020-08-27

## 2020-08-27 VITALS — WEIGHT: 123 LBS | TEMPERATURE: 97.9 F | HEIGHT: 62 IN | BODY MASS INDEX: 22.63 KG/M2

## 2020-08-27 DIAGNOSIS — E08.22 DIABETES MELLITUS DUE TO UNDERLYING CONDITION WITH STAGE 1 CHRONIC KIDNEY DISEASE, UNSPECIFIED WHETHER LONG TERM INSULIN USE (HCC): ICD-10-CM

## 2020-08-27 DIAGNOSIS — R13.19 ESOPHAGEAL DYSPHAGIA: Primary | ICD-10-CM

## 2020-08-27 DIAGNOSIS — N18.1 DIABETES MELLITUS DUE TO UNDERLYING CONDITION WITH STAGE 1 CHRONIC KIDNEY DISEASE, UNSPECIFIED WHETHER LONG TERM INSULIN USE (HCC): ICD-10-CM

## 2020-08-27 DIAGNOSIS — K22.719 BARRETT'S ESOPHAGUS WITH DYSPLASIA: ICD-10-CM

## 2020-08-27 PROCEDURE — 99213 OFFICE O/P EST LOW 20 MIN: CPT | Performed by: NURSE PRACTITIONER

## 2020-08-27 RX ORDER — PANTOPRAZOLE SODIUM 40 MG/1
40 TABLET, DELAYED RELEASE ORAL 2 TIMES DAILY
Qty: 180 TABLET | Refills: 3 | Status: SHIPPED | OUTPATIENT
Start: 2020-08-27 | End: 2021-07-01 | Stop reason: SDUPTHER

## 2020-08-27 NOTE — PROGRESS NOTES
PATIENT INFORMATION  Lona Dupree       - 1965    CHIEF COMPLAINT  Chief Complaint   Patient presents with   • Follow-up     follow up on EGD - Howell's       HISTORY OF PRESENT ILLNESS  55-year-old female with dysphagia. Patient reports for  the past year she feels like food gets stuck in the back of her throat  and mid chest. Exacerbating foods include meats and breads. Occasionally  feels mid chest pressure when the food feels stuck. Currently  asymptomatic. Symptoms have been present for approximately 1 year. Her  brother recently  in January of esophageal cancer.    She came to Dr. Fung in March with above symptoms and had 3/17/20 Fl upper GI no stricture or mass  6/3/20 EGD colon: for Dysphagia,Polyps biopsy was Positive for SSBarrett's  Negative for Celiac.HP, Dysplasia   0/1 TA 5yr recall.                 REVIEW OF SYSTEMS  Review of Systems   HENT: Positive for trouble swallowing.    All other systems reviewed and are negative.        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]   • Well woman exam with routine gynecological exam [Z01.419]   • HSV-2 (herpes simplex virus 2) infection [B00.9]   • Fibroids [D21.9]   • Smoker [F17.200]   • Headache [R51]   • Diabetes mellitus (CMS/HCC) [E11.9]   • History of bloody stools [Z87.19]         PAST MEDICAL HISTORY  Past Medical History:   Diagnosis Date   • Abnormal Pap smear of cervix     1 abnormal pap with normal f/u   • Howell esophagus    • Colon polyp    • Diabetes mellitus (CMS/HCC)    • Difficulty swallowing     at times   • Fibroid    • GERD (gastroesophageal reflux disease)    • HSV-2 (herpes simplex virus 2) infection 2017   •  "Menstrual disorder    • Migraines    • Rectal bleeding     bleeds with bowel movement   • Seasonal allergies          SURGICAL HISTORY  Past Surgical History:   Procedure Laterality Date   • CARPAL TUNNEL RELEASE WITH CUBITAL TUNNEL RELEASE Right    •  SECTION      X2   • COLONOSCOPY N/A 2016    Procedure: COLONOSCOPY with polypectomy;  Surgeon: Maryan Kent MD;  Location: [facility] OR;  Service:    • COLONOSCOPY N/A 6/3/2020    Procedure: COLONOSCOPY;  Surgeon: Dmitri Fung MD;  Location: [facility] OR;  Service: Gastroenterology;  Laterality: N/A;  Poor prep, polyp   • ENDOMETRIAL ABLATION      thermachoice   • ENDOSCOPY N/A 6/3/2020    Procedure: ESOPHAGOGASTRODUODENOSCOPY;  Surgeon: Dmitri Fung MD;  Location: [facility] OR;  Service: Gastroenterology;  Laterality: N/A;  Reflux esophagitis, erosive gastritis, duodenitis   • HYSTEROSCOPY      AND ENDOMETRIAL  ABLATION   • NASAL SEPTUM SURGERY         • TUBAL ABDOMINAL LIGATION           FAMILY HISTORY  Family History   Problem Relation Age of Onset   • Hypertension Mother    • Hyperlipidemia Mother    • Atrial fibrillation Mother    • Stroke Mother    • Heart attack Father    • Hypertension Father    • Diabetes Father    • Alcohol abuse Father    • Dementia Father    • Stroke Sister    • Crohn's disease Sister    • Heart attack Brother    • Hypertension Brother    • Stroke Brother    • Esophageal cancer Brother    • Alzheimer's disease Paternal Grandmother    • Diabetes Paternal Aunt    • Rheum arthritis Other    • Colon cancer Maternal Uncle    • Breast cancer Neg Hx    • Ovarian cancer Neg Hx    • Deep vein thrombosis Neg Hx    • Pulmonary embolism Neg Hx          SOCIAL HISTORY  Social History     Occupational History   • Not on file   Tobacco Use   • Smoking status: Former Smoker     Last attempt to quit: 1995     Years since quittin.2   • Smokeless tobacco: Never Used   • Tobacco comment: Quit \" a long " "time ago\"    Substance and Sexual Activity   • Alcohol use: Yes     Comment: OCCASIONALLY   • Drug use: No   • Sexual activity: Yes     Partners: Male     Birth control/protection: Surgical         CURRENT MEDICATIONS    Current Outpatient Medications:   •  acetaminophen (TYLENOL) 325 MG tablet, Take 2 tablets by mouth Every 4 (Four) Hours As Needed for Mild Pain . Over the counter, Disp: , Rfl:   •  aspirin 81 MG chewable tablet, Chew 81 mg Daily., Disp: , Rfl:   •  cetirizine (ZyrTEC) 10 MG tablet, Take 10 mg by mouth daily, Disp: , Rfl:   •  FLUoxetine (PROzac) 10 MG capsule, Take 10 mg by mouth Daily., Disp: , Rfl:   •  LANTUS SOLOSTAR 100 UNIT/ML injection pen, 35 Units Every Night., Disp: , Rfl:   •  Multiple Vitamins-Minerals (MULTI-BETIC PO), Take 2 capsules by mouth Daily., Disp: , Rfl:   •  pantoprazole (PROTONIX) 40 MG EC tablet, Take 1 tablet by mouth 2 (two) times a day., Disp: 180 tablet, Rfl: 3  •  polyethylene glycol (MIRALAX) pack packet, Take 17 g by mouth Daily As Needed (Constipation). Over the counter, Disp: , Rfl:   •  potassium chloride (K-DUR,KLOR-CON) 20 MEQ CR tablet, Take 99 mEq by mouth Daily., Disp: , Rfl:   •  promethazine (PHENERGAN) 25 MG tablet, Take 25 mg by mouth every 6 (six) hours as needed for nausea or vomiting., Disp: , Rfl:   •  pseudoephedrine (SUDAFED) 120 MG 12 hr tablet, Take 120 mg by mouth Every 12 (Twelve) Hours As Needed for Congestion., Disp: , Rfl:   •  SITagliptin-metFORMIN HCl ER (JANUMET XR)  MG tablet, Take 2 tablets by mouth Daily., Disp: , Rfl:   •  SUMAtriptan (IMITREX) 100 MG tablet, sumatriptan 100 mg tablet  take 1 po at onset of symptoms. can repeat x 1 in 2 hours if headache persists, Disp: , Rfl:   •  traZODone (DESYREL) 50 MG tablet, Take 50 mg by mouth every night., Disp: , Rfl:   •  valACYclovir (VALTREX) 500 MG tablet, Take 1 tablet by mouth Daily., Disp: 90 tablet, Rfl: 2    ALLERGIES  Mobic [meloxicam]    VITALS  Vitals:    08/27/20 0830 " "  Temp: 97.9 °F (36.6 °C)   TempSrc: Temporal   Weight: 55.8 kg (123 lb)   Height: 157.5 cm (62.01\")       LAST RESULTS   Admission on 06/03/2020, Discharged on 06/03/2020   Component Date Value Ref Range Status   • Glucose 06/03/2020 57* 70 - 130 mg/dL Final   • Glucose 06/03/2020 161* 70 - 130 mg/dL Final   • Case Report 06/03/2020    Final                    Value:Surgical Pathology Report                         Case: [id number]                                  Authorizing Provider:  Dmitri Fung        Collected:           06/03/2020 01:56 PM                                 MD Goran                                                                   Ordering Location:     [facility]   Received:            06/03/2020 05:23 PM                                 OR                                                                           Pathologist:           Norberto Mcgregor MD                                                         Specimens:   1) - Small Intestine, Duodenum                                                                      2) - Gastric, Body                                                                                  3) - Esophagus, Distal                                                                              4) - Large Intestine, Rectum                                                              • Final Diagnosis 06/03/2020    Final                    Value:This result contains rich text formatting which cannot be displayed here.   • Comment 06/03/2020    Final                    Value:This result contains rich text formatting which cannot be displayed here.   • Gross Description 06/03/2020    Final                    Value:This result contains rich text formatting which cannot be displayed here.   • Microscopic Description 06/03/2020    Final                    Value:This result contains rich text formatting which cannot be displayed here.   • Glucose " "06/03/2020 80  70 - 130 mg/dL Final     No results found.    PHYSICAL EXAM  Debilities/Disabilities Identified: None  Emotional Behavior: Appropriate  Wt Readings from Last 3 Encounters:   08/27/20 55.8 kg (123 lb)   06/03/20 50.3 kg (110 lb 12.8 oz)   02/07/20 47.4 kg (104 lb 8 oz)     Ht Readings from Last 1 Encounters:   08/27/20 157.5 cm (62.01\")     Body mass index is 22.49 kg/m².  Physical Exam   Constitutional: She is oriented to person, place, and time. She appears well-developed and well-nourished.   HENT:   Head: Normocephalic and atraumatic.   Eyes: Pupils are equal, round, and reactive to light. Conjunctivae are normal.   Neck: Normal range of motion. Neck supple.   Cardiovascular: Normal rate and regular rhythm.   Pulmonary/Chest: Effort normal and breath sounds normal.   Abdominal: Soft. Bowel sounds are normal. She exhibits no distension. There is tenderness in the epigastric area.   Musculoskeletal: Normal range of motion.   Lymphadenopathy:     She has no cervical adenopathy.   Neurological: She is alert and oriented to person, place, and time.   Skin: Skin is warm and dry.   Psychiatric: She has a normal mood and affect. Her behavior is normal.   Nursing note and vitals reviewed.      CLINICAL DATA REVIEWED   reviewed previous lab results and integrated with today's visit, reviewed notes from other physicians and/or last GI encounter, reviewed previous endoscopy results and available photos    ASSESSMENT  Diagnoses and all orders for this visit:    Esophageal dysphagia  -     pantoprazole (PROTONIX) 40 MG EC tablet; Take 1 tablet by mouth 2 (two) times a day.    Howell's esophagus with dysplasia  -     pantoprazole (PROTONIX) 40 MG EC tablet; Take 1 tablet by mouth 2 (two) times a day.    Diabetes mellitus due to underlying condition with stage 1 chronic kidney disease, unspecified whether long term insulin use (CMS/[facility])          PLAN  Return in about 3 months (around 11/27/2020) for Recheck. "     low acid diet instructions needed, last A1c 12.05!!!! discussed risk for GI issues achalasia/gastroparesis with this. She just had a recheck with Dr Villagomez and is down to A1c9.     Don't eat late, don't eat fried and don't eat too much at one time. Avoid tomato based products like pizza, lasagna, spagetti.  If you want to have these take an over the counter Pepcid immediately before you eat them.  Do not eat within 3-4 hrs of bedtime. If reflux is severe elevate the head of the bed. You may also use TUMS, maalox, or mylanta for breakthrough heartburn.    Take a daily probiotic for your gut as well.    Drink lots of water, eat a high fiber diet with 25-30gm a day(check the fiber content in the foods you eat), and get regular exercise.       I have discussed the above plan with the patient.  They verbalize understanding and are in agreement with the plan.  They have been advised to contact the office for any questions, concerns, or changes related to their health.

## 2020-08-27 NOTE — PATIENT INSTRUCTIONS
Protonix increased to twice a day!    Don't eat late, don't eat fried and don't eat too much at one time. Avoid tomato based products like pizza, lasagna, spagetti.  If you want to have these take an over the counter Pepcid immediately before you eat them.  Do not eat within 3-4 hrs of bedtime. If reflux is severe elevate the head of the bed. You may also use TUMS, maalox, or mylanta for breakthrough heartburn.    Take a daily probiotic for your gut as well.    Drink lots of water, eat a high fiber diet with 25-30gm a day(check the fiber content in the foods you eat), and get regular exercise.

## 2020-10-26 ENCOUNTER — OFFICE VISIT (OUTPATIENT)
Dept: GASTROENTEROLOGY | Facility: CLINIC | Age: 55
End: 2020-10-26

## 2020-10-26 VITALS — WEIGHT: 127.2 LBS | TEMPERATURE: 97.8 F | HEIGHT: 62 IN | BODY MASS INDEX: 23.41 KG/M2

## 2020-10-26 DIAGNOSIS — K21.00 GASTROESOPHAGEAL REFLUX DISEASE WITH ESOPHAGITIS WITHOUT HEMORRHAGE: ICD-10-CM

## 2020-10-26 DIAGNOSIS — K22.719 BARRETT'S ESOPHAGUS WITH DYSPLASIA: Primary | ICD-10-CM

## 2020-10-26 DIAGNOSIS — R13.19 ESOPHAGEAL DYSPHAGIA: ICD-10-CM

## 2020-10-26 PROCEDURE — 99213 OFFICE O/P EST LOW 20 MIN: CPT | Performed by: NURSE PRACTITIONER

## 2020-10-26 RX ORDER — FAMOTIDINE 40 MG/1
40 TABLET, FILM COATED ORAL 2 TIMES DAILY
Qty: 180 TABLET | Refills: 3 | Status: SHIPPED | OUTPATIENT
Start: 2020-10-26 | End: 2021-04-12

## 2020-10-26 RX ORDER — FLUOXETINE HYDROCHLORIDE 20 MG/1
20 CAPSULE ORAL DAILY
COMMUNITY
Start: 2020-10-15 | End: 2022-10-13

## 2020-10-26 RX ORDER — SUCRALFATE 1 G/1
1 TABLET ORAL 4 TIMES DAILY
Qty: 120 TABLET | Refills: 5 | Status: SHIPPED | OUTPATIENT
Start: 2020-10-26 | End: 2021-07-01 | Stop reason: SDUPTHER

## 2020-10-26 NOTE — PROGRESS NOTES
PATIENT INFORMATION  Lona Dupree       - 1965    CHIEF COMPLAINT  Chief Complaint   Patient presents with   • Follow-up     2 mo follow up on Howell's       HISTORY OF PRESENT ILLNESS  55-year-old female with dysphagia. Patient reports for  the past year she feels like food gets stuck in the back of her throat  and mid chest. Exacerbating foods include meats and breads. Occasionally  feels mid chest pressure when the food feels stuck. Currently  asymptomatic. Symptoms have been present for approximately 1 year. Her  brother recently  in January of esophageal cancer.    3/17/20 Fl upper GI no stricture or mass  6/3/20 EGD colon: for Dysphagia,Polyps biopsy was Positive for SSBarrett's  Negative for Celiac.HP, Dysplasia   0/1 TA 5yr recall.   low acid diet instructions needed, last A1c 9 from 12.05!!!! discussed risk for GI issues achalasia/gastroparesis with this.   Increased PROTONIX to 40 bid. 3 mo f/u    Is still having some spasms in her esophagus with regurg water but foods she is working on low acid diet.  Food is still getting stuck.             REVIEWED PERTINENT RESULTS/ LABS  Lab Results   Component Value Date    CASEREPORT  2020     Surgical Pathology Report                         Case: [id number]                                  Authorizing Provider:  Dmitri Fung        Collected:           2020 01:56 PM                                 MD Goran                                                                   Ordering Location:     [facility]   Received:            2020 05:23 PM                                 OR                                                                           Pathologist:           Norberto Mcgregor MD                                                         Specimens:   1) - Small Intestine, Duodenum                                                                      2) - Gastric, Body                                                                                   3) - Esophagus, Distal                                                                              4) - Large Intestine, Rectum                                                               FINALDX  06/03/2020     1.  Duodenum Biopsy: Benign duodenal and small intestinal mucosa with   A. Normal villous architecture.   B. No active cryptitis, crypt abscess formation, granulomatous inflammation nor         intestinal parasites identified.    2.  Gastric Biopsy:  Benign gastric mucosa with     A.  Mild chronic inflammation.    B.  No H. pylori-like organisms identified by routine staining.      3.  Distal Esophagus Biopsy:  Benign squamous and glandular mucosa with   A.  Focally active mild chronic inflammation with rare eosinophil noted suggesting chronic reflux.    B.  Focal, rare intestinal metaplasia by special staining consistent with developing Howell's esophagus                      (see comment).   C.  No dysplasia nor malignancy identified.      4.  Rectum Biopsy:    A.  Hyperplastic polyp.    B.  No glandular dyplasia nor malignancy identified.     jat/brb        Lab Results   Component Value Date    HGB 12.4 01/03/2020    .3 (H) 01/03/2020     01/03/2020    ALT 28 05/06/2020    AST 19 05/06/2020    HGBA1C 12.05 (H) 05/06/2020    TRIG 106 05/06/2020      No results found.    REVIEW OF SYSTEMS  Review of Systems   All other systems reviewed and are negative.        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]   • Well woman exam with routine gynecological exam [Z01.419]   • HSV-2 (herpes simplex virus 2) infection [B00.9]   • Fibroids [D21.9]   • Smoker  [F17.200]   • Headache [R51.9]   • Diabetes mellitus (CMS/HCC) [E11.9]   • History of bloody stools [Z87.19]         PAST MEDICAL HISTORY  Past Medical History:   Diagnosis Date   • Abnormal Pap smear of cervix     1 abnormal pap with normal f/u   • Howell esophagus    • Colon polyp    • Diabetes mellitus (CMS/HCC)    • Difficulty swallowing     at times   • Fibroid    • GERD (gastroesophageal reflux disease)    • HSV-2 (herpes simplex virus 2) infection 2017   • Menstrual disorder    • Migraines    • Rectal bleeding     bleeds with bowel movement   • Seasonal allergies          SURGICAL HISTORY  Past Surgical History:   Procedure Laterality Date   • CARPAL TUNNEL RELEASE WITH CUBITAL TUNNEL RELEASE Right    •  SECTION      X2   • COLONOSCOPY N/A 2016    Procedure: COLONOSCOPY with polypectomy;  Surgeon: Maryan Kent MD;  Location: [facility] OR;  Service:    • COLONOSCOPY N/A 6/3/2020    Procedure: COLONOSCOPY;  Surgeon: Dmitri Fung MD;  Location: [facility] OR;  Service: Gastroenterology;  Laterality: N/A;  Poor prep, polyp   • ENDOMETRIAL ABLATION      thermachoice   • ENDOSCOPY N/A 6/3/2020    Procedure: ESOPHAGOGASTRODUODENOSCOPY;  Surgeon: Dmitri Fung MD;  Location: [facility] OR;  Service: Gastroenterology;  Laterality: N/A;  Reflux esophagitis, erosive gastritis, duodenitis   • HYSTEROSCOPY      AND ENDOMETRIAL  ABLATION   • NASAL SEPTUM SURGERY         • TUBAL ABDOMINAL LIGATION           FAMILY HISTORY  Family History   Problem Relation Age of Onset   • Hypertension Mother    • Hyperlipidemia Mother    • Atrial fibrillation Mother    • Stroke Mother    • Heart attack Father    • Hypertension Father    • Diabetes Father    • Alcohol abuse Father    • Dementia Father    • Stroke Sister    • Crohn's disease Sister    • Heart attack Brother    • Hypertension Brother    • Stroke Brother    • Esophageal cancer Brother    • Alzheimer's disease Paternal  "Grandmother    • Diabetes Paternal Aunt    • Rheum arthritis Other    • Colon cancer Maternal Uncle    • Breast cancer Neg Hx    • Ovarian cancer Neg Hx    • Deep vein thrombosis Neg Hx    • Pulmonary embolism Neg Hx          SOCIAL HISTORY  Social History     Occupational History   • Not on file   Tobacco Use   • Smoking status: Former Smoker     Quit date: 1995     Years since quittin.4   • Smokeless tobacco: Never Used   • Tobacco comment: Quit \" a long time ago\"    Substance and Sexual Activity   • Alcohol use: Yes     Comment: OCCASIONALLY   • Drug use: No   • Sexual activity: Yes     Partners: Male     Birth control/protection: Surgical         CURRENT MEDICATIONS    Current Outpatient Medications:   •  acetaminophen (TYLENOL) 325 MG tablet, Take 2 tablets by mouth Every 4 (Four) Hours As Needed for Mild Pain . Over the counter, Disp: , Rfl:   •  aspirin 81 MG chewable tablet, Chew 81 mg Daily., Disp: , Rfl:   •  cetirizine (ZyrTEC) 10 MG tablet, Take 10 mg by mouth daily, Disp: , Rfl:   •  famotidine (PEPCID) 40 MG tablet, Take 1 tablet by mouth 2 (Two) Times a Day. With lunch and at bedtime, Disp: 180 tablet, Rfl: 3  •  FLUoxetine (PROzac) 10 MG capsule, Take 10 mg by mouth Daily., Disp: , Rfl:   •  FLUoxetine (PROzac) 20 MG capsule, , Disp: , Rfl:   •  LANTUS SOLOSTAR 100 UNIT/ML injection pen, 35 Units Every Night., Disp: , Rfl:   •  Multiple Vitamins-Minerals (MULTI-BETIC PO), Take 2 capsules by mouth Daily., Disp: , Rfl:   •  pantoprazole (PROTONIX) 40 MG EC tablet, Take 1 tablet by mouth 2 (two) times a day., Disp: 180 tablet, Rfl: 3  •  polyethylene glycol (MIRALAX) pack packet, Take 17 g by mouth Daily As Needed (Constipation). Over the counter, Disp: , Rfl:   •  potassium chloride (K-DUR,KLOR-CON) 20 MEQ CR tablet, Take 99 mEq by mouth Daily., Disp: , Rfl:   •  promethazine (PHENERGAN) 25 MG tablet, Take 25 mg by mouth every 6 (six) hours as needed for nausea or vomiting., Disp: , Rfl:   •  " "pseudoephedrine (SUDAFED) 120 MG 12 hr tablet, Take 120 mg by mouth Every 12 (Twelve) Hours As Needed for Congestion., Disp: , Rfl:   •  SITagliptin-metFORMIN HCl ER (JANUMET XR)  MG tablet, Take 2 tablets by mouth Daily., Disp: , Rfl:   •  sucralfate (Carafate) 1 g tablet, Take 1 tablet by mouth 4 (Four) Times a Day. Crush tablet in wax paper mix with 1-2 tsp of water to make slurry and drink., Disp: 120 tablet, Rfl: 5  •  SUMAtriptan (IMITREX) 100 MG tablet, sumatriptan 100 mg tablet  take 1 po at onset of symptoms. can repeat x 1 in 2 hours if headache persists, Disp: , Rfl:   •  traZODone (DESYREL) 50 MG tablet, Take 50 mg by mouth every night., Disp: , Rfl:   •  valACYclovir (VALTREX) 500 MG tablet, Take 1 tablet by mouth Daily., Disp: 90 tablet, Rfl: 2    ALLERGIES  Mobic [meloxicam]    VITALS  Vitals:    10/26/20 1340   Temp: 97.8 °F (36.6 °C)   TempSrc: Temporal   Weight: 57.7 kg (127 lb 3.2 oz)   Height: 157.5 cm (62.01\")       PHYSICAL EXAM  Debilities/Disabilities Identified: None  Emotional Behavior: Appropriate  Wt Readings from Last 3 Encounters:   10/26/20 57.7 kg (127 lb 3.2 oz)   08/27/20 55.8 kg (123 lb)   06/03/20 50.3 kg (110 lb 12.8 oz)     Ht Readings from Last 1 Encounters:   10/26/20 157.5 cm (62.01\")     Body mass index is 23.26 kg/m².  Physical Exam  Vitals signs and nursing note reviewed.   Constitutional:       Appearance: She is well-developed.   HENT:      Head: Normocephalic and atraumatic.   Eyes:      Conjunctiva/sclera: Conjunctivae normal.      Pupils: Pupils are equal, round, and reactive to light.   Neck:      Musculoskeletal: Normal range of motion and neck supple.   Cardiovascular:      Rate and Rhythm: Normal rate and regular rhythm.   Pulmonary:      Effort: Pulmonary effort is normal.      Breath sounds: Normal breath sounds.   Abdominal:      General: Bowel sounds are normal. There is no distension.      Palpations: Abdomen is soft.      Tenderness: There is " abdominal tenderness in the right upper quadrant, epigastric area and left upper quadrant.   Musculoskeletal: Normal range of motion.   Lymphadenopathy:      Cervical: No cervical adenopathy.   Skin:     General: Skin is warm and dry.   Neurological:      Mental Status: She is alert and oriented to person, place, and time.   Psychiatric:         Behavior: Behavior normal.         CLINICAL DATA REVIEWED   reviewed previous lab results and integrated with today's visit, reviewed notes from other physicians and/or last GI encounter, reviewed previous endoscopy results and available photos, reviewed surgical pathology results from previous biopsies    ASSESSMENT  Diagnoses and all orders for this visit:    Howell's esophagus with dysplasia  -     sucralfate (Carafate) 1 g tablet; Take 1 tablet by mouth 4 (Four) Times a Day. Crush tablet in wax paper mix with 1-2 tsp of water to make slurry and drink.  -     famotidine (PEPCID) 40 MG tablet; Take 1 tablet by mouth 2 (Two) Times a Day. With lunch and at bedtime    Esophageal dysphagia  -     sucralfate (Carafate) 1 g tablet; Take 1 tablet by mouth 4 (Four) Times a Day. Crush tablet in wax paper mix with 1-2 tsp of water to make slurry and drink.  -     famotidine (PEPCID) 40 MG tablet; Take 1 tablet by mouth 2 (Two) Times a Day. With lunch and at bedtime    Gastroesophageal reflux disease with esophagitis without hemorrhage  -     sucralfate (Carafate) 1 g tablet; Take 1 tablet by mouth 4 (Four) Times a Day. Crush tablet in wax paper mix with 1-2 tsp of water to make slurry and drink.  -     famotidine (PEPCID) 40 MG tablet; Take 1 tablet by mouth 2 (Two) Times a Day. With lunch and at bedtime    Other orders  -     FLUoxetine (PROzac) 20 MG capsule          PLAN  Return in about 4 months (around 2/26/2021).     Discussed the importance of taking Pantoprazole/Protonix, twice daily before breakfast and dinner, In addition, conitnue famotidine (Pepcid) 40mg twice a day  with lunch and at bedtime. and sucralfate (carafate) four times a day with meals and at bedtime.  Take your other medications 30-60 min before you take this.      Don't eat late, don't eat fried and don't eat too much at one time. Avoid tomato based products like pizza, lasagna, spagetti.  If you want to have these take an over the counter Pepcid immediately before you eat them.  Do not eat within 3-4 hrs of bedtime. Limit caffeine and carbonated beverages, if you must have them do not have later in the day.  If reflux is severe elevate the head of the bed. You may also use TUMS, maalox, or mylanta for breakthrough heartburn.    Take a daily probiotic for your gut as well.  AVOID taking NSAIDS (like ibuprofen, Aleve, Motrin, naproxen, meloxicam, etc) as much as possible and use acetaminophen (Tylenol) instead.    Drink lots of water, eat a high fiber diet with 25-30gm a day(check the fiber content in the foods you eat.  Protein bars with 15gm of fiber in each bar are a great supplement daily), and get regular exercise.     High Fiber Food suggestions:    Mendez Protein bars from [employer] = 15gm of fiber in each bar (also gluten free)  Quest Protein bars from grocery stores= 15gm of fiber each (also gluten free)  Fiber One bars from grocery stores = 5-6gm of fiber each  Kelloggs Bran Buds cereal 1/2 cup = 17gm of fiber      I have discussed the above plan with the patient.  They verbalize understanding and are in agreement with the plan.  They have been advised to contact the office for any questions, concerns, or changes related to their health.

## 2020-10-26 NOTE — PATIENT INSTRUCTIONS
Discussed the importance of taking Pantoprazole/Protonix, twice daily before breakfast and dinner, In addition, conitnue famotidine (Pepcid) 40mg twice a day with lunch and at bedtime. and sucralfate (carafate) four times a day with meals and at bedtime.  Take your other medications 30-60 min before you take this.      Don't eat late, don't eat fried and don't eat too much at one time. Avoid tomato based products like pizza, lasagna, spagetti.  If you want to have these take an over the counter Pepcid immediately before you eat them.  Do not eat within 3-4 hrs of bedtime. Limit caffeine and carbonated beverages, if you must have them do not have later in the day.  If reflux is severe elevate the head of the bed. You may also use TUMS, maalox, or mylanta for breakthrough heartburn.    Take a daily probiotic for your gut as well.  AVOID taking NSAIDS (like ibuprofen, Aleve, Motrin, naproxen, meloxicam, etc) as much as possible and use acetaminophen (Tylenol) instead.    Drink lots of water, eat a high fiber diet with 25-30gm a day(check the fiber content in the foods you eat.  Protein bars with 15gm of fiber in each bar are a great supplement daily), and get regular exercise.     High Fiber Food suggestions:    Andrea Protein bars from [employer] = 15gm of fiber in each bar (also gluten free)  Quest Protein bars from grocery stores= 15gm of fiber each (also gluten free)  Fiber One bars from grocery stores = 5-6gm of fiber each  Travis Bran Buds cereal 1/2 cup = 17gm of fiber

## 2021-02-01 ENCOUNTER — TRANSCRIBE ORDERS (OUTPATIENT)
Dept: ADMINISTRATIVE | Facility: HOSPITAL | Age: 56
End: 2021-02-01

## 2021-02-01 DIAGNOSIS — Z12.39 SCREENING BREAST EXAMINATION: Primary | ICD-10-CM

## 2021-02-22 ENCOUNTER — OFFICE VISIT (OUTPATIENT)
Dept: GASTROENTEROLOGY | Facility: CLINIC | Age: 56
End: 2021-02-22

## 2021-02-22 VITALS — WEIGHT: 132.4 LBS | HEIGHT: 62 IN | TEMPERATURE: 98.1 F | BODY MASS INDEX: 24.37 KG/M2

## 2021-02-22 DIAGNOSIS — N18.1 DIABETES MELLITUS DUE TO UNDERLYING CONDITION WITH STAGE 1 CHRONIC KIDNEY DISEASE, UNSPECIFIED WHETHER LONG TERM INSULIN USE (HCC): ICD-10-CM

## 2021-02-22 DIAGNOSIS — E08.22 DIABETES MELLITUS DUE TO UNDERLYING CONDITION WITH STAGE 1 CHRONIC KIDNEY DISEASE, UNSPECIFIED WHETHER LONG TERM INSULIN USE (HCC): ICD-10-CM

## 2021-02-22 DIAGNOSIS — R13.19 ESOPHAGEAL DYSPHAGIA: ICD-10-CM

## 2021-02-22 DIAGNOSIS — K22.719 BARRETT'S ESOPHAGUS WITH DYSPLASIA: Primary | ICD-10-CM

## 2021-02-22 DIAGNOSIS — K22.4 ESOPHAGEAL DYSMOTILITY: ICD-10-CM

## 2021-02-22 DIAGNOSIS — K21.00 GASTROESOPHAGEAL REFLUX DISEASE WITH ESOPHAGITIS WITHOUT HEMORRHAGE: ICD-10-CM

## 2021-02-22 PROCEDURE — 99214 OFFICE O/P EST MOD 30 MIN: CPT | Performed by: NURSE PRACTITIONER

## 2021-02-22 NOTE — PATIENT INSTRUCTIONS
"Discussed having her check her BPs at [facility] and write them down a few times to see what her baseline is.  I do not have those records from Dr. Suresh office.   Make sure to take small bites of bread and meat and chew thoroughly.      Discussed the importance of taking Pantoprazole/Protonix, 40mg twice daily before breakfast and dinner permanently due to known risk with long term acid reflux of Howell's esophagus/esophageal cancer., In addition, take famotidine (Pepcid) 40mg twice a day with lunch and at bedtime. Use the sucralfate as needed for flares.     Don't eat late, don't eat fried and don't eat too much at one time. Avoid tomato based products like pizza, lasagna, spagetti.  If you want to have these take an over the counter Pepcid immediately before you eat them.  Do not eat within 3-4 hrs of bedtime. Limit caffeine and carbonated beverages, if you must have them do not have later in the day.  If reflux is severe elevate the head of the bed. You may also use TUMS, maalox, or mylanta for breakthrough heartburn.    Discussed using medication for esophageal spasms but her BP normally runs low and she may not tolerate it well and this is improved on the acid reflux medication so will continue without this at present.     Take a daily probiotic (something with a billion cultures and more different strains is better like \"Probiotic 10\" or probiotic gummies) for your gut as well.  AVOID taking NSAIDS (like ibuprofen, Aleve, Motrin, naproxen, meloxicam, etc) as much as possible and use acetaminophen (Tylenol) instead.    Recheck EGD 6/2023 for Barretts recheck.  "

## 2021-02-22 NOTE — PROGRESS NOTES
PATIENT INFORMATION  Lona Dupree       - 1965    CHIEF COMPLAINT  Chief Complaint   Patient presents with   • Follow-up     4 mo follow up on Howell's       HISTORY OF PRESENT ILLNESS  55-year-old female with dysphagia. Patient reports for  the past year she feels like food gets stuck in the back of her throat  and mid chest. Exacerbating foods include meats and breads. Occasionally  feels mid chest pressure when the food feels stuck. Currently  asymptomatic. Symptoms have been present for approximately 1 year. Her  brother recently  in January of esophageal cancer.    3/17/20 Fl upper GI no stricture or mass  Impression:  1. Mild esophageal dysmotility demonstrated by intermittent tertiary  contractions.   2. Small sliding hiatal hernia with a small amount of spontaneous GE  reflux observed refluxing to the midthoracic esophagus.3. Atrophic changes with obscured small areae gastricae which is often  seen with gastritis.    6/3/20 EGD colon: for Dysphagia,Polyps biopsy was Positive for SSBarrett's  Negative for Celiac.HP, Dysplasia   0/1 TA 5yr recall.   low acid diet instructions needed, last A1c 9 from 12.05!!!! discussed risk for GI issues achalasia/gastroparesis with this.      PROTONIX to 40 bid. , famotidine 40bid added and carafate qid 4mof/u    Today:  She is doing ok except when she drinks carbonation and a few issues with swallowing and feeling painful on the way down and is just bothering he when she eats something fried or with tomatoes. Removes the breading off her fried food and is less than 3 times a week. Usually like the food is getting hung up right above her stomach. Some bloating but rare when she eats but not really early satiety.       I dont have her most recent labs today need to request from Dr. Villagomez.  Last A1c was down from 12 to 10 she thinks.            REVIEWED PERTINENT RESULTS/ LABS  Lab Results   Component Value Date    CASEREPORT  2020     Surgical  Pathology Report                         Case: [id number]                                  Authorizing Provider:  Dmitri Fung        Collected:           06/03/2020 01:56 PM                                 MD Goran                                                                   Ordering Location:     [facility]   Received:            06/03/2020 05:23 PM                                 OR                                                                           Pathologist:           Norberto Mcgregor MD                                                         Specimens:   1) - Small Intestine, Duodenum                                                                      2) - Gastric, Body                                                                                  3) - Esophagus, Distal                                                                              4) - Large Intestine, Rectum                                                               FINALDX  06/03/2020     1.  Duodenum Biopsy: Benign duodenal and small intestinal mucosa with   A. Normal villous architecture.   B. No active cryptitis, crypt abscess formation, granulomatous inflammation nor         intestinal parasites identified.    2.  Gastric Biopsy:  Benign gastric mucosa with     A.  Mild chronic inflammation.    B.  No H. pylori-like organisms identified by routine staining.      3.  Distal Esophagus Biopsy:  Benign squamous and glandular mucosa with   A.  Focally active mild chronic inflammation with rare eosinophil noted suggesting chronic reflux.    B.  Focal, rare intestinal metaplasia by special staining consistent with developing Howell's esophagus                      (see comment).   C.  No dysplasia nor malignancy identified.      4.  Rectum Biopsy:    A.  Hyperplastic polyp.    B.  No glandular dyplasia nor malignancy identified.     jat/brb        Lab Results   Component Value Date    HGB 12.4  2020    .3 (H) 2020     2020    ALT 28 2020    AST 19 2020    HGBA1C 12.05 (H) 2020    TRIG 106 2020      No results found.    REVIEW OF SYSTEMS  Review of Systems   All other systems reviewed and are negative.        ACTIVE PROBLEMS  Patient Active Problem List    Diagnosis   • Gastroesophageal reflux disease with esophagitis without hemorrhage [K21.00]   • Howell's esophagus with dysplasia [K22.719]   • Esophageal dysmotility [K22.4]   • Esophageal dysphagia [R13.10]   • Personal history of colonic polyps [Z86.010]   • High anion gap metabolic acidosis [E87.2]   • Methamphetamine use (CMS/[facility]) [F15.10]   • Cocaine use [F14.90]   • Nausea & vomiting [R11.2]   • Diabetes mellitus with ketosis (CMS/[facility]) [E13.10]   • Well woman exam with routine gynecological exam [Z01.419]   • HSV-2 (herpes simplex virus 2) infection [B00.9]   • Fibroids [D21.9]   • Smoker [F17.200]   • Headache [R51.9]   • Diabetes mellitus (CMS/HCC) [E11.9]   • History of bloody stools [Z87.19]         PAST MEDICAL HISTORY  Past Medical History:   Diagnosis Date   • Abnormal Pap smear of cervix     1 abnormal pap with normal f/u   • Howell esophagus    • Colon polyp    • Diabetes mellitus (CMS/HCC)    • Difficulty swallowing     at times   • Fibroid    • GERD (gastroesophageal reflux disease)    • HSV-2 (herpes simplex virus 2) infection 2017   • Menstrual disorder    • Migraines    • Rectal bleeding     bleeds with bowel movement   • Seasonal allergies          SURGICAL HISTORY  Past Surgical History:   Procedure Laterality Date   • CARPAL TUNNEL RELEASE WITH CUBITAL TUNNEL RELEASE Right    •  SECTION      X2   • COLONOSCOPY N/A 2016    Procedure: COLONOSCOPY with polypectomy;  Surgeon: Maryan Kent MD;  Location: [facility] OR;  Service:    • COLONOSCOPY N/A 6/3/2020    Procedure: COLONOSCOPY;  Surgeon: Dmitri Fung MD;  Location: [facility] OR;  Service:  "Gastroenterology;  Laterality: N/A;  Poor prep, polyp   • ENDOMETRIAL ABLATION      thermachoice   • ENDOSCOPY N/A 6/3/2020    Procedure: ESOPHAGOGASTRODUODENOSCOPY;  Surgeon: Dmitri Fung MD;  Location: [facility];  Service: Gastroenterology;  Laterality: N/A;  Reflux esophagitis, erosive gastritis, duodenitis   • HYSTEROSCOPY      AND ENDOMETRIAL  ABLATION   • NASAL SEPTUM SURGERY         • TUBAL ABDOMINAL LIGATION           FAMILY HISTORY  Family History   Problem Relation Age of Onset   • Hypertension Mother    • Hyperlipidemia Mother    • Atrial fibrillation Mother    • Stroke Mother    • Heart attack Father    • Hypertension Father    • Diabetes Father    • Alcohol abuse Father    • Dementia Father    • Stroke Sister    • Crohn's disease Sister    • Heart attack Brother    • Hypertension Brother    • Stroke Brother    • Esophageal cancer Brother    • Alzheimer's disease Paternal Grandmother    • Diabetes Paternal Aunt    • Rheum arthritis Other    • Colon cancer Maternal Uncle    • Breast cancer Neg Hx    • Ovarian cancer Neg Hx    • Deep vein thrombosis Neg Hx    • Pulmonary embolism Neg Hx          SOCIAL HISTORY  Social History     Occupational History   • Not on file   Tobacco Use   • Smoking status: Former Smoker     Quit date: 1995     Years since quittin.7   • Smokeless tobacco: Never Used   • Tobacco comment: Quit \" a long time ago\"    Substance and Sexual Activity   • Alcohol use: Yes     Comment: OCCASIONALLY   • Drug use: No   • Sexual activity: Yes     Partners: Male     Birth control/protection: Surgical         CURRENT MEDICATIONS    Current Outpatient Medications:   •  acetaminophen (TYLENOL) 325 MG tablet, Take 2 tablets by mouth Every 4 (Four) Hours As Needed for Mild Pain . Over the counter, Disp: , Rfl:   •  aspirin 81 MG chewable tablet, Chew 81 mg Daily., Disp: , Rfl:   •  cetirizine (ZyrTEC) 10 MG tablet, Take 10 mg by mouth daily, Disp: , Rfl:   •  " "famotidine (PEPCID) 40 MG tablet, Take 1 tablet by mouth 2 (Two) Times a Day. With lunch and at bedtime, Disp: 180 tablet, Rfl: 3  •  FLUoxetine (PROzac) 20 MG capsule, , Disp: , Rfl:   •  LANTUS SOLOSTAR 100 UNIT/ML injection pen, 35 Units Every Night., Disp: , Rfl:   •  Multiple Vitamins-Minerals (MULTI-BETIC PO), Take 2 capsules by mouth Daily., Disp: , Rfl:   •  pantoprazole (PROTONIX) 40 MG EC tablet, Take 1 tablet by mouth 2 (two) times a day., Disp: 180 tablet, Rfl: 3  •  potassium chloride (K-DUR,KLOR-CON) 20 MEQ CR tablet, Take 99 mEq by mouth Daily., Disp: , Rfl:   •  promethazine (PHENERGAN) 25 MG tablet, Take 25 mg by mouth every 6 (six) hours as needed for nausea or vomiting., Disp: , Rfl:   •  pseudoephedrine (SUDAFED) 120 MG 12 hr tablet, Take 120 mg by mouth Every 12 (Twelve) Hours As Needed for Congestion., Disp: , Rfl:   •  SITagliptin-metFORMIN HCl ER (JANUMET XR)  MG tablet, Take 2 tablets by mouth Daily., Disp: , Rfl:   •  sucralfate (Carafate) 1 g tablet, Take 1 tablet by mouth 4 (Four) Times a Day. Crush tablet in wax paper mix with 1-2 tsp of water to make slurry and drink., Disp: 120 tablet, Rfl: 5  •  SUMAtriptan (IMITREX) 100 MG tablet, sumatriptan 100 mg tablet  take 1 po at onset of symptoms. can repeat x 1 in 2 hours if headache persists, Disp: , Rfl:   •  traZODone (DESYREL) 50 MG tablet, Take 50 mg by mouth every night., Disp: , Rfl:   •  valACYclovir (VALTREX) 500 MG tablet, Take 1 tablet by mouth Daily., Disp: 90 tablet, Rfl: 2    ALLERGIES  Mobic [meloxicam]    VITALS  Vitals:    02/22/21 1310   Temp: 98.1 °F (36.7 °C)   TempSrc: Temporal   Weight: 60.1 kg (132 lb 6.4 oz)   Height: 157.5 cm (62.01\")       PHYSICAL EXAM  Debilities/Disabilities Identified: None  Emotional Behavior: Appropriate  Wt Readings from Last 3 Encounters:   02/22/21 60.1 kg (132 lb 6.4 oz)   10/26/20 57.7 kg (127 lb 3.2 oz)   08/27/20 55.8 kg (123 lb)     Ht Readings from Last 1 Encounters:   02/22/21 " "157.5 cm (62.01\")     Body mass index is 24.21 kg/m².  Physical Exam  Vitals signs and nursing note reviewed.   Constitutional:       Appearance: She is well-developed.   HENT:      Head: Normocephalic and atraumatic.   Eyes:      Conjunctiva/sclera: Conjunctivae normal.      Pupils: Pupils are equal, round, and reactive to light.   Neck:      Musculoskeletal: Normal range of motion and neck supple.   Cardiovascular:      Rate and Rhythm: Normal rate and regular rhythm.   Pulmonary:      Effort: Pulmonary effort is normal.      Breath sounds: Normal breath sounds.   Abdominal:      General: Bowel sounds are normal. There is no distension.      Palpations: Abdomen is soft.      Tenderness: There is abdominal tenderness in the epigastric area.      Comments: Much improved and less tender   Musculoskeletal: Normal range of motion.   Lymphadenopathy:      Cervical: No cervical adenopathy.   Skin:     General: Skin is warm and dry.   Neurological:      Mental Status: She is alert and oriented to person, place, and time.   Psychiatric:         Behavior: Behavior normal.         CLINICAL DATA REVIEWED   reviewed previous lab results and integrated with today's visit, reviewed notes from other physicians and/or last GI encounter, reviewed previous endoscopy results and available photos, reviewed surgical pathology results from previous biopsies    ASSESSMENT  Diagnoses and all orders for this visit:    Howell's esophagus with dysplasia    Esophageal dysphagia    Gastroesophageal reflux disease with esophagitis without hemorrhage    Diabetes mellitus due to underlying condition with stage 1 chronic kidney disease, unspecified whether long term insulin use (CMS/[facility])    Esophageal dysmotility          PLAN  Return in about 4 months (around 6/22/2021).   Discussed having her check her BPs at [facility] and write them down a few times to see what her baseline is.  I do not have those records from Dr. Suresh office.   Make sure " "to take small bites of bread and meat and chew thoroughly.      Discussed the importance of taking Pantoprazole/Protonix, 40mg twice daily before breakfast and dinner permanently due to known risk with long term acid reflux of Howell's esophagus/esophageal cancer., In addition, take famotidine (Pepcid) 40mg twice a day with lunch and at bedtime. Use the sucralfate as needed for flares.     Don't eat late, don't eat fried and don't eat too much at one time. Avoid tomato based products like pizza, lasagna, spagetti.  If you want to have these take an over the counter Pepcid immediately before you eat them.  Do not eat within 3-4 hrs of bedtime. Limit caffeine and carbonated beverages, if you must have them do not have later in the day.  If reflux is severe elevate the head of the bed. You may also use TUMS, maalox, or mylanta for breakthrough heartburn.    Discussed using medication for esophageal spasms but her BP normally runs low and she may not tolerate it well and this is improved on the acid reflux medication so will continue without this at present.     Take a daily probiotic (something with a billion cultures and more different strains is better like \"Probiotic 10\" or probiotic gummies) for your gut as well.  AVOID taking NSAIDS (like ibuprofen, Aleve, Motrin, naproxen, meloxicam, etc) as much as possible and use acetaminophen (Tylenol) instead.    Recheck EGD 6/2023 for Barretts recheck.     I have discussed the above plan with the patient.  They verbalize understanding and are in agreement with the plan.  They have been advised to contact the office for any questions, concerns, or changes related to their health.                      "

## 2021-02-23 ENCOUNTER — HOSPITAL ENCOUNTER (OUTPATIENT)
Dept: MAMMOGRAPHY | Facility: HOSPITAL | Age: 56
Discharge: HOME | End: 2021-02-23
Admitting: OBSTETRICS & GYNECOLOGY

## 2021-02-23 DIAGNOSIS — Z12.39 SCREENING BREAST EXAMINATION: ICD-10-CM

## 2021-02-23 PROCEDURE — 77067 SCR MAMMO BI INCL CAD: CPT

## 2021-02-23 PROCEDURE — 77063 BREAST TOMOSYNTHESIS BI: CPT

## 2021-03-11 RX ORDER — VALACYCLOVIR HYDROCHLORIDE 500 MG/1
TABLET, FILM COATED ORAL
Qty: 90 TABLET | Refills: 1 | Status: ON HOLD | OUTPATIENT
Start: 2021-03-11 | End: 2021-04-14 | Stop reason: SDUPTHER

## 2021-04-12 ENCOUNTER — OFFICE VISIT (OUTPATIENT)
Dept: OBSTETRICS AND GYNECOLOGY | Facility: CLINIC | Age: 56
End: 2021-04-12

## 2021-04-12 VITALS
DIASTOLIC BLOOD PRESSURE: 70 MMHG | SYSTOLIC BLOOD PRESSURE: 110 MMHG | BODY MASS INDEX: 23.19 KG/M2 | HEIGHT: 62 IN | WEIGHT: 126 LBS

## 2021-04-12 DIAGNOSIS — B00.9 HSV-2 (HERPES SIMPLEX VIRUS 2) INFECTION: ICD-10-CM

## 2021-04-12 DIAGNOSIS — Z13.9 SCREENING FOR CONDITION: Primary | ICD-10-CM

## 2021-04-12 DIAGNOSIS — Z01.419 ENCOUNTER FOR GYNECOLOGICAL EXAMINATION WITHOUT ABNORMAL FINDING: ICD-10-CM

## 2021-04-12 LAB
BILIRUB BLD-MCNC: NEGATIVE MG/DL
CLARITY, POC: CLEAR
COLOR UR: YELLOW
GLUCOSE UR STRIP-MCNC: ABNORMAL MG/DL
KETONES UR QL: ABNORMAL
LEUKOCYTE EST, POC: NEGATIVE
NITRITE UR-MCNC: NEGATIVE MG/ML
PH UR: 5 [PH] (ref 5–8)
PROT UR STRIP-MCNC: NEGATIVE MG/DL
RBC # UR STRIP: NEGATIVE /UL
SP GR UR: 1 (ref 1–1.03)
UROBILINOGEN UR QL: NORMAL

## 2021-04-12 PROCEDURE — 99396 PREV VISIT EST AGE 40-64: CPT | Performed by: OBSTETRICS & GYNECOLOGY

## 2021-04-12 PROCEDURE — 81002 URINALYSIS NONAUTO W/O SCOPE: CPT | Performed by: OBSTETRICS & GYNECOLOGY

## 2021-04-12 RX ORDER — FAMOTIDINE 40 MG/1
TABLET, FILM COATED ORAL EVERY 12 HOURS SCHEDULED
COMMUNITY
End: 2021-07-01 | Stop reason: SDUPTHER

## 2021-04-12 RX ORDER — PEN NEEDLE, DIABETIC 32GX 5/32"
NEEDLE, DISPOSABLE MISCELLANEOUS
COMMUNITY
End: 2022-12-21

## 2021-04-12 NOTE — PROGRESS NOTES
GYN Annual Exam     CC- Here for annual exam.     Lona Dupree is a 56 y.o. female who presents for annual well woman exam. No  VB . She is now on insulin and feels better. She had another brother die this year from COVID.       OB History        2    Para   2    Term   2            AB        Living   2       SAB        TAB        Ectopic        Molar        Multiple        Live Births              Obstetric Comments   2 C/S             Menarche:14  Menopause: 52  HRT:none  Current contraception: tubal ligation,   History of abnormal Pap smear: yes - 1 abnormal with normal f/u  History of abnormal mammogram: no  Family history of uterine, colon or ovarian cancer: yes - uncle had colon or pancreatic, brother with esphopahgeal cancer  Family history of breast cancer: no  STD: HSV 2+  Last pap: 2020 normal pap/HPV  HOMERO: none    Health Maintenance   Topic Date Due   • URINE MICROALBUMIN  Never done   • ANNUAL PHYSICAL  Never done   • Pneumococcal Vaccine 0-64 (1 of 1 - PPSV23) Never done   • Hepatitis B (1 of 3 - Risk 3-dose series) Never done   • TDAP/TD VACCINES (1 - Tdap) Never done   • ZOSTER VACCINE (1 of 2) Never done   • DIABETIC FOOT EXAM  Never done   • DIABETIC EYE EXAM  Never done   • Annual Gynecologic Pelvic and Breast Exam  2021   • COVID-19 Vaccine (2 - Moderna 2-dose series) 2021   • INFLUENZA VACCINE  2021   • HEMOGLOBIN A1C  10/14/2021   • PAP SMEAR  2023   • MAMMOGRAM  2023   • GASTROSCOPY (EGD)  2023   • COLONOSCOPY  2025   • HEPATITIS C SCREENING  Completed       Past Medical History:   Diagnosis Date   • Abnormal Pap smear of cervix     1 abnormal pap with normal f/u   • Howell esophagus    • Colon polyp    • Diabetes mellitus (CMS/HCC)    • Difficulty swallowing     at times   • Fibroid    • GERD (gastroesophageal reflux disease)    • HSV-2 (herpes simplex virus 2) infection 2017   • Menstrual disorder    • Migraines    •  Rectal bleeding     bleeds with bowel movement   • Seasonal allergies        Past Surgical History:   Procedure Laterality Date   • CARPAL TUNNEL RELEASE WITH CUBITAL TUNNEL RELEASE Right    •  SECTION      X2   • COLONOSCOPY N/A 2016    Procedure: COLONOSCOPY with polypectomy;  Surgeon: Maryan Kent MD;  Location: [facility] OR;  Service:    • COLONOSCOPY N/A 6/3/2020    Procedure: COLONOSCOPY;  Surgeon: Dmitri Fung MD;  Location: [facility] OR;  Service: Gastroenterology;  Laterality: N/A;  Poor prep, polyp   • ENDOMETRIAL ABLATION      thermachoice   • ENDOSCOPY N/A 6/3/2020    Procedure: ESOPHAGOGASTRODUODENOSCOPY;  Surgeon: Dmitri Fung MD;  Location: [facility] OR;  Service: Gastroenterology;  Laterality: N/A;  Reflux esophagitis, erosive gastritis, duodenitis   • HYSTEROSCOPY      AND ENDOMETRIAL  ABLATION   • NASAL SEPTUM SURGERY         • TUBAL ABDOMINAL LIGATION         No current facility-administered medications for this visit.  No current outpatient medications on file.    Facility-Administered Medications Ordered in Other Visits:   •  acetaminophen (TYLENOL) tablet 650 mg, 650 mg, Oral, Q4H PRN **OR** acetaminophen (TYLENOL) suppository 650 mg, 650 mg, Rectal, Q4H PRN, Romana Simpson R, APRN  •  albuterol (PROVENTIL) nebulizer solution 0.083% 2.5 mg/3mL, 2.5 mg, Nebulization, Q6H PRN, Romana Simpson, APRN  •  dextrose (D50W) 25 g/ 50mL Intravenous Solution 12.5 g, 12.5 g, Intravenous, PRN, Raffy Villela MD  •  dextrose 5 % and sodium chloride 0.45 % infusion, 150 mL/hr, Intravenous, Continuous PRN, Raffy Villela MD  •  dextrose 5 % and sodium chloride 0.45 % with KCl 20 mEq/L infusion, 150 mL/hr, Intravenous, Continuous PRN, Raffy Villela MD, Last Rate: 150 mL/hr at 21, 150 mL/hr at 21  •  dextrose 5 % and sodium chloride 0.45 % with KCl 40 mEq/L infusion, 150 mL/hr, Intravenous, Continuous PRN, Raffy Villela  MD  •  dextrose 5 % and sodium chloride 0.9 % infusion, 150 mL/hr, Intravenous, Continuous PRN, Raffy Villela MD  •  dextrose 5 % and sodium chloride 0.9 % with KCl 20 mEq/L infusion, 150 mL/hr, Intravenous, Continuous PRN, Raffy Villela MD  •  dextrose 5 % and sodium chloride 0.9 % with KCl 40 mEq/L infusion, 150 mL/hr, Intravenous, Continuous PRN, Raffy Villela MD  •  enoxaparin (LOVENOX) syringe 30 mg, 30 mg, Subcutaneous, Q24H, Romana Simpson APRN, 30 mg at 04/14/21 0958  •  insulin regular 1 unit/mL in 0.9% sodium chloride, 5 Units/hr, Intravenous, Titrated, Raffy Villela MD, Last Rate: 2 mL/hr at 04/14/21 1917, 2 Units/hr at 04/14/21 1917  •  ondansetron (ZOFRAN) tablet 4 mg, 4 mg, Oral, Q6H PRN **OR** ondansetron (ZOFRAN) injection 4 mg, 4 mg, Intravenous, Q6H PRN, Romana Simpson, APRN, 4 mg at 04/14/21 1115  •  Pharmacy to Dose enoxaparin (LOVENOX), , Does not apply, Continuous PRN, Romana Simpson R, APRN  •  potassium phosphate 45 mmol in sodium chloride 0.9 % 500 mL infusion, 45 mmol, Intravenous, Once, Aditya Carlos MD  •  sodium chloride 0.45 % 1,000 mL with potassium chloride 40 mEq infusion, 250 mL/hr, Intravenous, Continuous PRN, Raffy Villela MD  •  sodium chloride 0.45 % infusion, 250 mL/hr, Intravenous, Continuous PRN, Raffy Villela MD  •  sodium chloride 0.45 % with KCl 20 mEq/L infusion, 250 mL/hr, Intravenous, Continuous PRN, Raffy Villela MD, Stopped at 04/14/21 1110  •  sodium chloride 0.9 % bolus 2,000 mL, 2,000 mL, Intravenous, Once, Romana Simpson APRN  •  sodium chloride 0.9 % flush 10 mL, 10 mL, Intravenous, Once PRN, Raffy Villela MD  •  sodium chloride 0.9 % flush 10 mL, 10 mL, Intravenous, PRN, Romana Simpson, ROSA  •  sodium chloride 0.9 % flush 10 mL, 10 mL, Intravenous, Q12H, Romana Simpson, ROSA  •  sodium chloride 0.9 % flush 10 mL, 10 mL, Intravenous, Once PRN, Romana Simpson, ROSA  •  sodium chloride 0.9 %  "flush 3 mL, 3 mL, Intravenous, Q12H, Raffy Villela MD  •  sodium chloride 0.9 % flush 3 mL, 3 mL, Intravenous, Q12H, Calvin, Romana R, APRN  •  sodium chloride 0.9 % flush 3-10 mL, 3-10 mL, Intravenous, PRN, Raffy Villela MD  •  sodium chloride 0.9 % flush 3-10 mL, 3-10 mL, Intravenous, PRN, Calvin, Romana R, APRN  •  sodium chloride 0.9 % infusion 40 mL, 40 mL, Intravenous, PRN, Raffy Villela MD  •  sodium chloride 0.9 % infusion 40 mL, 40 mL, Intravenous, PRN, Calvin, Romana R, APRN  •  sodium chloride 0.9 % infusion 40 mL, 40 mL, Intravenous, PRN, Dolores, Romana R, APRN  •  sodium chloride 0.9 % infusion, 250 mL/hr, Intravenous, Continuous PRN, aRffy Villela MD  •  sodium chloride 0.9 % infusion, 10 mL/hr, Intravenous, Continuous PRN, Raffy Villela MD  •  sodium chloride 0.9 % infusion, 250 mL/hr, Intravenous, Continuous PRN, Dolores, Romana R, APRN  •  sodium chloride 0.9 % infusion, 10 mL/hr, Intravenous, Continuous PRN, Calvin, Romana R, APRN  •  sodium chloride 0.9 % with KCl 20 mEq/L infusion, 250 mL/hr, Intravenous, Continuous PRN, Raffy Villela MD  •  sodium chloride 0.9 % with KCl 20 mEq/L infusion, 250 mL/hr, Intravenous, Continuous PRN, Dolores, Romana R, APRN  •  sodium chloride 0.9 % with KCl 40 mEq/L infusion, 250 mL/hr, Intravenous, Continuous PRN, Raffy Villela MD  •  sodium chloride 0.9 % with KCl 40 mEq/L infusion, 250 mL/hr, Intravenous, Continuous PRN, Calvin, Romana R, APRN    Allergies   Allergen Reactions   • Mobic [Meloxicam]      Blurred vision       Social History     Tobacco Use   • Smoking status: Former Smoker     Quit date: 1995     Years since quittin.8   • Smokeless tobacco: Never Used   • Tobacco comment: Quit \" a long time ago\"    Substance Use Topics   • Alcohol use: Yes     Comment: OCCASIONALLY   • Drug use: No       Family History   Problem Relation Age of Onset   • Hypertension Mother    • Hyperlipidemia Mother    • " "Atrial fibrillation Mother    • Stroke Mother    • Heart attack Father    • Hypertension Father    • Diabetes Father    • Alcohol abuse Father    • Dementia Father    • Stroke Sister    • Crohn's disease Sister    • Heart attack Brother    • Hypertension Brother    • Stroke Brother    • Esophageal cancer Brother    • Alzheimer's disease Paternal Grandmother    • Diabetes Paternal Aunt    • Breast cancer Paternal Aunt    • Rheum arthritis Other    • Colon cancer Maternal Uncle    • Ovarian cancer Neg Hx    • Deep vein thrombosis Neg Hx    • Pulmonary embolism Neg Hx        Review of Systems   Constitutional: Positive for activity change. Negative for appetite change, fatigue, fever and unexpected weight change.   Respiratory: Negative for cough and shortness of breath.    Cardiovascular: Negative for chest pain and palpitations.   Gastrointestinal: Negative for abdominal distention, abdominal pain, anal bleeding, constipation, diarrhea and nausea.   Endocrine: Negative for heat intolerance, polydipsia and polyuria.   Genitourinary: Negative for dyspareunia, dysuria, genital sores, menstrual problem, pelvic pain, vaginal bleeding, vaginal discharge and vaginal pain.   Skin: Negative for color change and rash.   Neurological: Negative for headaches.   Psychiatric/Behavioral: Positive for dysphoric mood (grief). The patient is not nervous/anxious.        /70   Ht 157.5 cm (62\")   Wt 57.2 kg (126 lb)   Breastfeeding No   BMI 23.05 kg/m²     Physical Exam   Constitutional: She is oriented to person, place, and time. She appears well-developed.   cachetic   HENT:   Head: Normocephalic and atraumatic.   Eyes: Conjunctivae are normal. No scleral icterus.   Neck: No thyromegaly present.   Cardiovascular: Normal rate and regular rhythm.   Pulmonary/Chest: Effort normal and breath sounds normal. Right breast exhibits no inverted nipple, no mass, no nipple discharge, no skin change and no tenderness. Left breast " exhibits no inverted nipple, no mass, no nipple discharge, no skin change and no tenderness.   Abdominal: Soft. Normal appearance and bowel sounds are normal. She exhibits no distension and no mass. There is no abdominal tenderness. There is no rebound and no guarding. No hernia.   Genitourinary:    Pelvic exam was performed with patient supine.   There is no rash, tenderness or lesion on the right labia. There is no rash, tenderness or lesion on the left labia. Uterus is enlarged. Uterus is not deviated, not fixed and not tender. Cervix exhibits no motion tenderness, no lesion, no discharge and no friability. Right adnexum displays no mass, no tenderness and no fullness. Left adnexum displays no mass, no tenderness and no fullness.    No vaginal discharge, erythema, tenderness or bleeding.   No erythema, tenderness or bleeding in the vagina.    No foreign body in the vagina.      No signs of injury in the vagina.      Genitourinary Comments: 12 cm uterus, irregular contour     Neurological: She is alert and oriented to person, place, and time.   Skin: Skin is warm and dry.   Psychiatric: Her behavior is normal. Mood, judgment and thought content normal.   Nursing note and vitals reviewed.         Assessment/Plan    1) GYN HM: normal pap/HPV 2/2020.  SBE demonstrated and encouraged.  2) STD screening: declines.Condoms encouraged.  3) Bone health - Weight bearing exercise, dietary calcium recommendations and vitamin D reviewed.   4) Diet and Exercise discussed  5) Smoking Status: Quit! Congratulations !  6) Social: grieving death of her brother.    7)MMG:  UTD 2/2021, B1. Repeat MMG 2/2022.  8) DEXA-pschedule  9)C scope-UTD 6/2020- repeat 5 years.  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 Refills sent in.  11) I saw the patient with a face mask, gloves and eye protection  The patient herself was masked.  Social distancing was observed as appropriate.  12) Parts of this document have been copied or forwarded from her  previous visits and have been reviewed, updated and edited as indicated.    13)Follow up prn and 1 year       Diagnoses and all orders for this visit:    1. Screening for condition (Primary)  -     POC Urinalysis Dipstick  -     Mammo Screening Bilateral With CAD; Future  -     DEXA Bone Density Axial; Future    2. HSV-2 (herpes simplex virus 2) infection    3. Encounter for gynecological examination without abnormal finding        Amaya rTan MD  021:20 EDT   4/12/2021

## 2021-04-14 ENCOUNTER — APPOINTMENT (OUTPATIENT)
Dept: CT IMAGING | Facility: HOSPITAL | Age: 56
End: 2021-04-14

## 2021-04-14 ENCOUNTER — HOSPITAL ENCOUNTER (OUTPATIENT)
Facility: HOSPITAL | Age: 56
Setting detail: OBSERVATION
Discharge: HOME | End: 2021-04-15
Attending: EMERGENCY MEDICINE | Admitting: HOSPITALIST

## 2021-04-14 ENCOUNTER — APPOINTMENT (OUTPATIENT)
Dept: GENERAL RADIOLOGY | Facility: HOSPITAL | Age: 56
End: 2021-04-14

## 2021-04-14 DIAGNOSIS — R10.9 ABDOMINAL PAIN, UNSPECIFIED ABDOMINAL LOCATION: ICD-10-CM

## 2021-04-14 DIAGNOSIS — Z91.14 NONCOMPLIANCE WITH MEDICATION REGIMEN: ICD-10-CM

## 2021-04-14 DIAGNOSIS — N17.9 ACUTE KIDNEY INJURY (HCC): ICD-10-CM

## 2021-04-14 DIAGNOSIS — E13.10 DIABETIC KETOACIDOSIS WITHOUT COMA ASSOCIATED WITH OTHER SPECIFIED DIABETES MELLITUS (HCC): Primary | ICD-10-CM

## 2021-04-14 DIAGNOSIS — R11.2 NAUSEA AND VOMITING, INTRACTABILITY OF VOMITING NOT SPECIFIED, UNSPECIFIED VOMITING TYPE: ICD-10-CM

## 2021-04-14 PROBLEM — E11.10 DIABETIC ACIDOSIS WITHOUT COMA: Status: ACTIVE | Noted: 2021-04-14

## 2021-04-14 LAB
ACETONE BLD QL: ABNORMAL
ALBUMIN SERPL-MCNC: 3.6 G/DL (ref 3.5–5.2)
ALBUMIN SERPL-MCNC: 4 G/DL (ref 3.5–5.2)
ALBUMIN SERPL-MCNC: 5 G/DL (ref 3.5–5.2)
ALBUMIN/GLOB SERPL: 1.4 G/DL
ALP SERPL-CCNC: 109 U/L (ref 39–117)
ALT SERPL W P-5'-P-CCNC: 36 U/L (ref 1–33)
AMORPH URATE CRY URNS QL MICRO: ABNORMAL /HPF
AMPHET+METHAMPHET UR QL: NEGATIVE
AMPHETAMINES UR QL: NEGATIVE
ANION GAP SERPL CALCULATED.3IONS-SCNC: 24.7 MMOL/L (ref 5–15)
ANION GAP SERPL CALCULATED.3IONS-SCNC: 39.9 MMOL/L (ref 5–15)
ANION GAP SERPL CALCULATED.3IONS-SCNC: 8.4 MMOL/L (ref 5–15)
ANION GAP SERPL CALCULATED.3IONS-SCNC: 9.5 MMOL/L (ref 5–15)
AST SERPL-CCNC: 26 U/L (ref 1–32)
ATMOSPHERIC PRESS: 741 MMHG
BACTERIA UR QL AUTO: ABNORMAL /HPF
BACTERIA UR QL AUTO: ABNORMAL /HPF
BARBITURATES UR QL SCN: NEGATIVE
BASE EXCESS BLDV CALC-SCNC: -23.3 MMOL/L (ref 0–2)
BASOPHILS # BLD AUTO: 0.05 10*3/MM3 (ref 0–0.2)
BASOPHILS NFR BLD AUTO: 0.2 % (ref 0–1.5)
BDY SITE: ABNORMAL
BENZODIAZ UR QL SCN: NEGATIVE
BILIRUB SERPL-MCNC: 0.2 MG/DL (ref 0–1.2)
BILIRUB UR QL STRIP: ABNORMAL
BILIRUB UR QL STRIP: ABNORMAL
BODY TEMPERATURE: 37 C
BUN SERPL-MCNC: 17 MG/DL (ref 6–20)
BUN SERPL-MCNC: 24 MG/DL (ref 6–20)
BUN SERPL-MCNC: 33 MG/DL (ref 6–20)
BUN SERPL-MCNC: 35 MG/DL (ref 6–20)
BUN/CREAT SERPL: 16.8 (ref 7–25)
BUN/CREAT SERPL: 22.1 (ref 7–25)
BUN/CREAT SERPL: 23.8 (ref 7–25)
BUN/CREAT SERPL: 24.6 (ref 7–25)
BUPRENORPHINE SERPL-MCNC: NEGATIVE NG/ML
CALCIUM SPEC-SCNC: 8 MG/DL (ref 8.6–10.5)
CALCIUM SPEC-SCNC: 8.1 MG/DL (ref 8.6–10.5)
CALCIUM SPEC-SCNC: 8.4 MG/DL (ref 8.6–10.5)
CALCIUM SPEC-SCNC: 9.5 MG/DL (ref 8.6–10.5)
CANNABINOIDS SERPL QL: POSITIVE
CHLORIDE SERPL-SCNC: 104 MMOL/L (ref 98–107)
CHLORIDE SERPL-SCNC: 110 MMOL/L (ref 98–107)
CHLORIDE SERPL-SCNC: 110 MMOL/L (ref 98–107)
CHLORIDE SERPL-SCNC: 85 MMOL/L (ref 98–107)
CLARITY UR: CLEAR
CLARITY UR: CLEAR
CO2 SERPL-SCNC: 18.5 MMOL/L (ref 22–29)
CO2 SERPL-SCNC: 19.6 MMOL/L (ref 22–29)
CO2 SERPL-SCNC: 6.1 MMOL/L (ref 22–29)
CO2 SERPL-SCNC: 8.3 MMOL/L (ref 22–29)
COCAINE UR QL: NEGATIVE
COLOR UR: YELLOW
COLOR UR: YELLOW
CREAT SERPL-MCNC: 0.69 MG/DL (ref 0.57–1)
CREAT SERPL-MCNC: 1.01 MG/DL (ref 0.57–1)
CREAT SERPL-MCNC: 1.49 MG/DL (ref 0.57–1)
CREAT SERPL-MCNC: 2.08 MG/DL (ref 0.57–1)
CREAT UR-MCNC: 50.1 MG/DL
D-LACTATE SERPL-SCNC: 2.6 MMOL/L (ref 0.5–2)
D-LACTATE SERPL-SCNC: 6.1 MMOL/L (ref 0.5–2)
DEPRECATED RDW RBC AUTO: 46.4 FL (ref 37–54)
EOSINOPHIL # BLD AUTO: 0 10*3/MM3 (ref 0–0.4)
EOSINOPHIL NFR BLD AUTO: 0 % (ref 0.3–6.2)
ERYTHROCYTE [DISTWIDTH] IN BLOOD BY AUTOMATED COUNT: 13 % (ref 12.3–15.4)
ETHANOL BLD-MCNC: <10 MG/DL (ref 0–10)
ETHANOL UR QL: <0.01 %
GFR SERPL CREATININE-BSD FRML MDRD: 25 ML/MIN/1.73
GFR SERPL CREATININE-BSD FRML MDRD: 36 ML/MIN/1.73
GFR SERPL CREATININE-BSD FRML MDRD: 57 ML/MIN/1.73
GFR SERPL CREATININE-BSD FRML MDRD: 88 ML/MIN/1.73
GLOBULIN UR ELPH-MCNC: 3.5 GM/DL
GLUCOSE BLDC GLUCOMTR-MCNC: 112 MG/DL (ref 70–130)
GLUCOSE BLDC GLUCOMTR-MCNC: 115 MG/DL (ref 70–130)
GLUCOSE BLDC GLUCOMTR-MCNC: 130 MG/DL (ref 70–130)
GLUCOSE BLDC GLUCOMTR-MCNC: 134 MG/DL (ref 70–130)
GLUCOSE BLDC GLUCOMTR-MCNC: 149 MG/DL (ref 70–130)
GLUCOSE BLDC GLUCOMTR-MCNC: 163 MG/DL (ref 70–130)
GLUCOSE BLDC GLUCOMTR-MCNC: 179 MG/DL (ref 70–130)
GLUCOSE BLDC GLUCOMTR-MCNC: 191 MG/DL (ref 70–130)
GLUCOSE BLDC GLUCOMTR-MCNC: 250 MG/DL (ref 70–130)
GLUCOSE BLDC GLUCOMTR-MCNC: 493 MG/DL (ref 70–130)
GLUCOSE BLDC GLUCOMTR-MCNC: 54 MG/DL (ref 70–130)
GLUCOSE BLDC GLUCOMTR-MCNC: 83 MG/DL (ref 70–130)
GLUCOSE BLDC GLUCOMTR-MCNC: >599 MG/DL (ref 70–130)
GLUCOSE SERPL-MCNC: 143 MG/DL (ref 65–99)
GLUCOSE SERPL-MCNC: 157 MG/DL (ref 65–99)
GLUCOSE SERPL-MCNC: 443 MG/DL (ref 65–99)
GLUCOSE SERPL-MCNC: 635 MG/DL (ref 65–99)
GLUCOSE SERPL-MCNC: 834 MG/DL (ref 65–99)
GLUCOSE UR STRIP-MCNC: ABNORMAL MG/DL
GLUCOSE UR STRIP-MCNC: ABNORMAL MG/DL
HBA1C MFR BLD: 8.9 % (ref 4.8–5.6)
HCO3 BLDV-SCNC: 6.8 MMOL/L (ref 22–28)
HCT VFR BLD AUTO: 45.8 % (ref 34–46.6)
HGB BLD-MCNC: 14.8 G/DL (ref 12–15.9)
HGB BLDA-MCNC: 15.4 G/DL (ref 13.5–17.5)
HGB UR QL STRIP.AUTO: ABNORMAL
HGB UR QL STRIP.AUTO: ABNORMAL
HYALINE CASTS UR QL AUTO: ABNORMAL /LPF
HYALINE CASTS UR QL AUTO: ABNORMAL /LPF
IMM GRANULOCYTES # BLD AUTO: 0.26 10*3/MM3 (ref 0–0.05)
IMM GRANULOCYTES NFR BLD AUTO: 1 % (ref 0–0.5)
KETONES UR QL STRIP: ABNORMAL
KETONES UR QL STRIP: ABNORMAL
LEUKOCYTE ESTERASE UR QL STRIP.AUTO: NEGATIVE
LEUKOCYTE ESTERASE UR QL STRIP.AUTO: NEGATIVE
LIPASE SERPL-CCNC: 30 U/L (ref 13–60)
LYMPHOCYTES # BLD AUTO: 0.66 10*3/MM3 (ref 0.7–3.1)
LYMPHOCYTES NFR BLD AUTO: 2.6 % (ref 19.6–45.3)
Lab: ABNORMAL
Lab: ABNORMAL
MAGNESIUM SERPL-MCNC: 1.4 MG/DL (ref 1.6–2.6)
MAGNESIUM SERPL-MCNC: 1.6 MG/DL (ref 1.6–2.6)
MAGNESIUM SERPL-MCNC: 2.1 MG/DL (ref 1.6–2.6)
MAGNESIUM SERPL-MCNC: 2.6 MG/DL (ref 1.6–2.6)
MCH RBC QN AUTO: 31.2 PG (ref 26.6–33)
MCHC RBC AUTO-ENTMCNC: 32.3 G/DL (ref 31.5–35.7)
MCV RBC AUTO: 96.6 FL (ref 79–97)
METHADONE UR QL SCN: NEGATIVE
MODALITY: ABNORMAL
MONOCYTES # BLD AUTO: 1.55 10*3/MM3 (ref 0.1–0.9)
MONOCYTES NFR BLD AUTO: 6.2 % (ref 5–12)
MUCOUS THREADS URNS QL MICRO: ABNORMAL /HPF
NEUTROPHILS NFR BLD AUTO: 22.63 10*3/MM3 (ref 1.7–7)
NEUTROPHILS NFR BLD AUTO: 90 % (ref 42.7–76)
NITRITE UR QL STRIP: NEGATIVE
NITRITE UR QL STRIP: NEGATIVE
NOTIFIED BY: ABNORMAL
NOTIFIED WHO: ABNORMAL
NRBC BLD AUTO-RTO: 0.1 /100 WBC (ref 0–0.2)
OPIATES UR QL: NEGATIVE
OSMOLALITY SERPL: 346 MOSM/KG (ref 275–300)
OSMOLALITY UR: 344 MOSM/KG
OXYCODONE UR QL SCN: NEGATIVE
PCO2 BLDV: 27.1 MM HG (ref 41–51)
PCP UR QL SCN: NEGATIVE
PH BLDV: 7.01 PH UNITS (ref 7.32–7.42)
PH UR STRIP.AUTO: 5.5 [PH] (ref 4.5–8)
PH UR STRIP.AUTO: <=5 [PH] (ref 4.5–8)
PHOSPHATE SERPL-MCNC: 1.8 MG/DL (ref 2.5–4.5)
PHOSPHATE SERPL-MCNC: 1.9 MG/DL (ref 2.5–4.5)
PHOSPHATE SERPL-MCNC: 2.7 MG/DL (ref 2.5–4.5)
PHOSPHATE SERPL-MCNC: 9.4 MG/DL (ref 2.5–4.5)
PLATELET # BLD AUTO: 365 10*3/MM3 (ref 140–450)
PMV BLD AUTO: 10.8 FL (ref 6–12)
PO2 BLDV: 47.6 MM HG (ref 27–53)
POTASSIUM SERPL-SCNC: 3.7 MMOL/L (ref 3.5–5.2)
POTASSIUM SERPL-SCNC: 4 MMOL/L (ref 3.5–5.2)
POTASSIUM SERPL-SCNC: 4.2 MMOL/L (ref 3.5–5.2)
POTASSIUM SERPL-SCNC: 4.3 MMOL/L (ref 3.5–5.2)
PROCALCITONIN SERPL-MCNC: 0.43 NG/ML (ref 0–0.25)
PROCALCITONIN SERPL-MCNC: 0.66 NG/ML (ref 0–0.25)
PROPOXYPH UR QL: NEGATIVE
PROT SERPL-MCNC: 8.5 G/DL (ref 6–8.5)
PROT UR QL STRIP: ABNORMAL
PROT UR QL STRIP: ABNORMAL
QT INTERVAL: 337 MS
RBC # BLD AUTO: 4.74 10*6/MM3 (ref 3.77–5.28)
RBC # UR: ABNORMAL /HPF
RBC # UR: ABNORMAL /HPF
REF LAB TEST METHOD: ABNORMAL
REF LAB TEST METHOD: ABNORMAL
SAO2 % BLDCOV: 71.2 % (ref 45–75)
SARS-COV-2 RNA PNL SPEC NAA+PROBE: NOT DETECTED
SODIUM SERPL-SCNC: 131 MMOL/L (ref 136–145)
SODIUM SERPL-SCNC: 137 MMOL/L (ref 136–145)
SODIUM SERPL-SCNC: 138 MMOL/L (ref 136–145)
SODIUM SERPL-SCNC: 138 MMOL/L (ref 136–145)
SP GR UR STRIP: 1.02 (ref 1–1.03)
SP GR UR STRIP: 1.02 (ref 1–1.03)
SQUAMOUS #/AREA URNS HPF: ABNORMAL /HPF
SQUAMOUS #/AREA URNS HPF: ABNORMAL /HPF
TRANS CELLS #/AREA URNS HPF: ABNORMAL /HPF
TRICYCLICS UR QL SCN: NEGATIVE
TROPONIN T SERPL-MCNC: <0.01 NG/ML (ref 0–0.03)
TSH SERPL DL<=0.05 MIU/L-ACNC: 1.13 UIU/ML (ref 0.27–4.2)
UROBILINOGEN UR QL STRIP: ABNORMAL
UROBILINOGEN UR QL STRIP: ABNORMAL
VENTILATOR MODE: ABNORMAL
WBC # BLD AUTO: 25.15 10*3/MM3 (ref 3.4–10.8)
WBC UR QL AUTO: ABNORMAL /HPF
WBC UR QL AUTO: ABNORMAL /HPF

## 2021-04-14 PROCEDURE — C9803 HOPD COVID-19 SPEC COLLECT: HCPCS

## 2021-04-14 PROCEDURE — 96366 THER/PROPH/DIAG IV INF ADDON: CPT

## 2021-04-14 PROCEDURE — 82077 ASSAY SPEC XCP UR&BREATH IA: CPT | Performed by: NURSE PRACTITIONER

## 2021-04-14 PROCEDURE — 25010000002 PIPERACILLIN SOD-TAZOBACTAM PER 1 G: Performed by: EMERGENCY MEDICINE

## 2021-04-14 PROCEDURE — 83735 ASSAY OF MAGNESIUM: CPT | Performed by: NURSE PRACTITIONER

## 2021-04-14 PROCEDURE — 96375 TX/PRO/DX INJ NEW DRUG ADDON: CPT

## 2021-04-14 PROCEDURE — 96376 TX/PRO/DX INJ SAME DRUG ADON: CPT

## 2021-04-14 PROCEDURE — 99285 EMERGENCY DEPT VISIT HI MDM: CPT | Performed by: EMERGENCY MEDICINE

## 2021-04-14 PROCEDURE — 84145 PROCALCITONIN (PCT): CPT | Performed by: HOSPITALIST

## 2021-04-14 PROCEDURE — 84484 ASSAY OF TROPONIN QUANT: CPT | Performed by: EMERGENCY MEDICINE

## 2021-04-14 PROCEDURE — 80069 RENAL FUNCTION PANEL: CPT | Performed by: HOSPITALIST

## 2021-04-14 PROCEDURE — 74176 CT ABD & PELVIS W/O CONTRAST: CPT

## 2021-04-14 PROCEDURE — 71046 X-RAY EXAM CHEST 2 VIEWS: CPT

## 2021-04-14 PROCEDURE — G0378 HOSPITAL OBSERVATION PER HR: HCPCS

## 2021-04-14 PROCEDURE — 84443 ASSAY THYROID STIM HORMONE: CPT | Performed by: NURSE PRACTITIONER

## 2021-04-14 PROCEDURE — 85025 COMPLETE CBC W/AUTO DIFF WBC: CPT | Performed by: EMERGENCY MEDICINE

## 2021-04-14 PROCEDURE — 96365 THER/PROPH/DIAG IV INF INIT: CPT

## 2021-04-14 PROCEDURE — 99220 PR INITIAL OBSERVATION CARE/DAY 70 MINUTES: CPT | Performed by: NURSE PRACTITIONER

## 2021-04-14 PROCEDURE — 25010000003 POTASSIUM CHLORIDE PER 2 MEQ: Performed by: EMERGENCY MEDICINE

## 2021-04-14 PROCEDURE — 80069 RENAL FUNCTION PANEL: CPT | Performed by: NURSE PRACTITIONER

## 2021-04-14 PROCEDURE — 83735 ASSAY OF MAGNESIUM: CPT | Performed by: HOSPITALIST

## 2021-04-14 PROCEDURE — 93005 ELECTROCARDIOGRAM TRACING: CPT | Performed by: EMERGENCY MEDICINE

## 2021-04-14 PROCEDURE — 25010000003 MAGNESIUM SULFATE 4 GM/100ML SOLUTION: Performed by: HOSPITALIST

## 2021-04-14 PROCEDURE — 99284 EMERGENCY DEPT VISIT MOD MDM: CPT

## 2021-04-14 PROCEDURE — 96367 TX/PROPH/DG ADDL SEQ IV INF: CPT

## 2021-04-14 PROCEDURE — 82570 ASSAY OF URINE CREATININE: CPT | Performed by: NURSE PRACTITIONER

## 2021-04-14 PROCEDURE — 80053 COMPREHEN METABOLIC PANEL: CPT | Performed by: EMERGENCY MEDICINE

## 2021-04-14 PROCEDURE — 83930 ASSAY OF BLOOD OSMOLALITY: CPT | Performed by: EMERGENCY MEDICINE

## 2021-04-14 PROCEDURE — 83690 ASSAY OF LIPASE: CPT | Performed by: EMERGENCY MEDICINE

## 2021-04-14 PROCEDURE — 25010000002 ONDANSETRON PER 1 MG: Performed by: EMERGENCY MEDICINE

## 2021-04-14 PROCEDURE — 82803 BLOOD GASES ANY COMBINATION: CPT

## 2021-04-14 PROCEDURE — P9612 CATHETERIZE FOR URINE SPEC: HCPCS

## 2021-04-14 PROCEDURE — 25010000002 ONDANSETRON PER 1 MG: Performed by: NURSE PRACTITIONER

## 2021-04-14 PROCEDURE — 93010 ELECTROCARDIOGRAM REPORT: CPT | Performed by: INTERNAL MEDICINE

## 2021-04-14 PROCEDURE — 80306 DRUG TEST PRSMV INSTRMNT: CPT | Performed by: NURSE PRACTITIONER

## 2021-04-14 PROCEDURE — 81001 URINALYSIS AUTO W/SCOPE: CPT | Performed by: EMERGENCY MEDICINE

## 2021-04-14 PROCEDURE — 96372 THER/PROPH/DIAG INJ SC/IM: CPT

## 2021-04-14 PROCEDURE — 84100 ASSAY OF PHOSPHORUS: CPT | Performed by: EMERGENCY MEDICINE

## 2021-04-14 PROCEDURE — 25010000002 ENOXAPARIN PER 10 MG: Performed by: NURSE PRACTITIONER

## 2021-04-14 PROCEDURE — 87635 SARS-COV-2 COVID-19 AMP PRB: CPT | Performed by: EMERGENCY MEDICINE

## 2021-04-14 PROCEDURE — 83605 ASSAY OF LACTIC ACID: CPT | Performed by: EMERGENCY MEDICINE

## 2021-04-14 PROCEDURE — 25010000002 VANCOMYCIN 1 G RECONSTITUTED SOLUTION 1 EACH VIAL: Performed by: EMERGENCY MEDICINE

## 2021-04-14 PROCEDURE — 82947 ASSAY GLUCOSE BLOOD QUANT: CPT | Performed by: NURSE PRACTITIONER

## 2021-04-14 PROCEDURE — 83036 HEMOGLOBIN GLYCOSYLATED A1C: CPT | Performed by: EMERGENCY MEDICINE

## 2021-04-14 PROCEDURE — 82962 GLUCOSE BLOOD TEST: CPT

## 2021-04-14 PROCEDURE — 84100 ASSAY OF PHOSPHORUS: CPT

## 2021-04-14 PROCEDURE — 83935 ASSAY OF URINE OSMOLALITY: CPT | Performed by: NURSE PRACTITIONER

## 2021-04-14 PROCEDURE — 82009 KETONE BODYS QUAL: CPT | Performed by: EMERGENCY MEDICINE

## 2021-04-14 PROCEDURE — 81001 URINALYSIS AUTO W/SCOPE: CPT | Performed by: NURSE PRACTITIONER

## 2021-04-14 PROCEDURE — 84145 PROCALCITONIN (PCT): CPT | Performed by: EMERGENCY MEDICINE

## 2021-04-14 PROCEDURE — 87040 BLOOD CULTURE FOR BACTERIA: CPT | Performed by: EMERGENCY MEDICINE

## 2021-04-14 PROCEDURE — 94799 UNLISTED PULMONARY SVC/PX: CPT

## 2021-04-14 PROCEDURE — 83735 ASSAY OF MAGNESIUM: CPT | Performed by: EMERGENCY MEDICINE

## 2021-04-14 PROCEDURE — 80048 BASIC METABOLIC PNL TOTAL CA: CPT | Performed by: EMERGENCY MEDICINE

## 2021-04-14 PROCEDURE — 96368 THER/DIAG CONCURRENT INF: CPT

## 2021-04-14 RX ORDER — SODIUM CHLORIDE AND POTASSIUM CHLORIDE 300; 900 MG/100ML; MG/100ML
250 INJECTION, SOLUTION INTRAVENOUS CONTINUOUS PRN
Status: DISCONTINUED | OUTPATIENT
Start: 2021-04-14 | End: 2021-04-15 | Stop reason: HOSPADM

## 2021-04-14 RX ORDER — SODIUM CHLORIDE 0.9 % (FLUSH) 0.9 %
10 SYRINGE (ML) INJECTION ONCE AS NEEDED
Status: DISCONTINUED | OUTPATIENT
Start: 2021-04-14 | End: 2021-04-15 | Stop reason: HOSPADM

## 2021-04-14 RX ORDER — SODIUM CHLORIDE 9 MG/ML
40 INJECTION, SOLUTION INTRAVENOUS AS NEEDED
Status: DISCONTINUED | OUTPATIENT
Start: 2021-04-14 | End: 2021-04-15 | Stop reason: HOSPADM

## 2021-04-14 RX ORDER — SODIUM CHLORIDE 9 MG/ML
250 INJECTION, SOLUTION INTRAVENOUS CONTINUOUS PRN
Status: DISCONTINUED | OUTPATIENT
Start: 2021-04-14 | End: 2021-04-15 | Stop reason: HOSPADM

## 2021-04-14 RX ORDER — SODIUM CHLORIDE AND POTASSIUM CHLORIDE 150; 450 MG/100ML; MG/100ML
250 INJECTION, SOLUTION INTRAVENOUS CONTINUOUS PRN
Status: DISCONTINUED | OUTPATIENT
Start: 2021-04-14 | End: 2021-04-15 | Stop reason: HOSPADM

## 2021-04-14 RX ORDER — DEXTROSE, SODIUM CHLORIDE, AND POTASSIUM CHLORIDE 5; .45; .3 G/100ML; G/100ML; G/100ML
150 INJECTION INTRAVENOUS CONTINUOUS PRN
Status: DISCONTINUED | OUTPATIENT
Start: 2021-04-14 | End: 2021-04-15 | Stop reason: HOSPADM

## 2021-04-14 RX ORDER — DEXTROSE, SODIUM CHLORIDE, AND POTASSIUM CHLORIDE 5; .9; .15 G/100ML; G/100ML; G/100ML
150 INJECTION INTRAVENOUS CONTINUOUS PRN
Status: DISCONTINUED | OUTPATIENT
Start: 2021-04-14 | End: 2021-04-14

## 2021-04-14 RX ORDER — POTASSIUM CHLORIDE, DEXTROSE MONOHYDRATE AND SODIUM CHLORIDE 300; 5; 900 MG/100ML; G/100ML; MG/100ML
150 INJECTION, SOLUTION INTRAVENOUS CONTINUOUS PRN
Status: DISCONTINUED | OUTPATIENT
Start: 2021-04-14 | End: 2021-04-15 | Stop reason: HOSPADM

## 2021-04-14 RX ORDER — SODIUM CHLORIDE 0.9 % (FLUSH) 0.9 %
3 SYRINGE (ML) INJECTION EVERY 12 HOURS SCHEDULED
Status: DISCONTINUED | OUTPATIENT
Start: 2021-04-14 | End: 2021-04-15 | Stop reason: HOSPADM

## 2021-04-14 RX ORDER — SODIUM CHLORIDE 0.9 % (FLUSH) 0.9 %
10 SYRINGE (ML) INJECTION EVERY 12 HOURS SCHEDULED
Status: DISCONTINUED | OUTPATIENT
Start: 2021-04-14 | End: 2021-04-15 | Stop reason: HOSPADM

## 2021-04-14 RX ORDER — ONDANSETRON 4 MG/1
4 TABLET, FILM COATED ORAL EVERY 6 HOURS PRN
Status: DISCONTINUED | OUTPATIENT
Start: 2021-04-14 | End: 2021-04-15 | Stop reason: HOSPADM

## 2021-04-14 RX ORDER — DEXTROSE MONOHYDRATE 25 G/50ML
12.5 INJECTION, SOLUTION INTRAVENOUS AS NEEDED
Status: DISCONTINUED | OUTPATIENT
Start: 2021-04-14 | End: 2021-04-15 | Stop reason: HOSPADM

## 2021-04-14 RX ORDER — ALBUTEROL SULFATE 2.5 MG/3ML
2.5 SOLUTION RESPIRATORY (INHALATION) EVERY 6 HOURS PRN
Status: DISCONTINUED | OUTPATIENT
Start: 2021-04-14 | End: 2021-04-15 | Stop reason: HOSPADM

## 2021-04-14 RX ORDER — DEXTROSE, SODIUM CHLORIDE, AND POTASSIUM CHLORIDE 5; .45; .3 G/100ML; G/100ML; G/100ML
150 INJECTION INTRAVENOUS CONTINUOUS PRN
Status: DISCONTINUED | OUTPATIENT
Start: 2021-04-14 | End: 2021-04-14

## 2021-04-14 RX ORDER — VANCOMYCIN HYDROCHLORIDE 500 MG/10ML
INJECTION, POWDER, LYOPHILIZED, FOR SOLUTION INTRAVENOUS
Status: DISPENSED
Start: 2021-04-14 | End: 2021-04-14

## 2021-04-14 RX ORDER — VALACYCLOVIR HYDROCHLORIDE 500 MG/1
500 TABLET, FILM COATED ORAL DAILY
Qty: 90 TABLET | Refills: 3 | Status: SHIPPED | OUTPATIENT
Start: 2021-04-14 | End: 2021-11-01

## 2021-04-14 RX ORDER — ACETAMINOPHEN 325 MG/1
650 TABLET ORAL EVERY 4 HOURS PRN
Status: DISCONTINUED | OUTPATIENT
Start: 2021-04-14 | End: 2021-04-15 | Stop reason: HOSPADM

## 2021-04-14 RX ORDER — ONDANSETRON 2 MG/ML
4 INJECTION INTRAMUSCULAR; INTRAVENOUS EVERY 6 HOURS PRN
Status: DISCONTINUED | OUTPATIENT
Start: 2021-04-14 | End: 2021-04-15 | Stop reason: HOSPADM

## 2021-04-14 RX ORDER — DEXTROSE MONOHYDRATE 25 G/50ML
25 INJECTION, SOLUTION INTRAVENOUS ONCE
Status: COMPLETED | OUTPATIENT
Start: 2021-04-14 | End: 2021-04-14

## 2021-04-14 RX ORDER — SODIUM CHLORIDE 450 MG/100ML
250 INJECTION, SOLUTION INTRAVENOUS CONTINUOUS PRN
Status: DISCONTINUED | OUTPATIENT
Start: 2021-04-14 | End: 2021-04-14

## 2021-04-14 RX ORDER — SODIUM CHLORIDE AND POTASSIUM CHLORIDE 150; 900 MG/100ML; MG/100ML
250 INJECTION, SOLUTION INTRAVENOUS CONTINUOUS PRN
Status: DISCONTINUED | OUTPATIENT
Start: 2021-04-14 | End: 2021-04-15 | Stop reason: HOSPADM

## 2021-04-14 RX ORDER — DEXTROSE AND SODIUM CHLORIDE 5; .9 G/100ML; G/100ML
150 INJECTION, SOLUTION INTRAVENOUS CONTINUOUS PRN
Status: DISCONTINUED | OUTPATIENT
Start: 2021-04-14 | End: 2021-04-15 | Stop reason: HOSPADM

## 2021-04-14 RX ORDER — DEXTROSE MONOHYDRATE 25 G/50ML
12.5 INJECTION, SOLUTION INTRAVENOUS AS NEEDED
Status: DISCONTINUED | OUTPATIENT
Start: 2021-04-14 | End: 2021-04-14

## 2021-04-14 RX ORDER — MAGNESIUM SULFATE HEPTAHYDRATE 40 MG/ML
4 INJECTION, SOLUTION INTRAVENOUS ONCE
Status: COMPLETED | OUTPATIENT
Start: 2021-04-14 | End: 2021-04-14

## 2021-04-14 RX ORDER — SODIUM CHLORIDE AND POTASSIUM CHLORIDE 150; 450 MG/100ML; MG/100ML
250 INJECTION, SOLUTION INTRAVENOUS CONTINUOUS PRN
Status: DISCONTINUED | OUTPATIENT
Start: 2021-04-14 | End: 2021-04-14

## 2021-04-14 RX ORDER — POTASSIUM CHLORIDE, DEXTROSE MONOHYDRATE AND SODIUM CHLORIDE 300; 5; 900 MG/100ML; G/100ML; MG/100ML
150 INJECTION, SOLUTION INTRAVENOUS CONTINUOUS PRN
Status: DISCONTINUED | OUTPATIENT
Start: 2021-04-14 | End: 2021-04-14

## 2021-04-14 RX ORDER — SODIUM CHLORIDE 9 MG/ML
10 INJECTION, SOLUTION INTRAVENOUS CONTINUOUS PRN
Status: DISCONTINUED | OUTPATIENT
Start: 2021-04-14 | End: 2021-04-15 | Stop reason: HOSPADM

## 2021-04-14 RX ORDER — SODIUM CHLORIDE 0.9 % (FLUSH) 0.9 %
10 SYRINGE (ML) INJECTION AS NEEDED
Status: DISCONTINUED | OUTPATIENT
Start: 2021-04-14 | End: 2021-04-15 | Stop reason: HOSPADM

## 2021-04-14 RX ORDER — SODIUM CHLORIDE 0.9 % (FLUSH) 0.9 %
3-10 SYRINGE (ML) INJECTION AS NEEDED
Status: DISCONTINUED | OUTPATIENT
Start: 2021-04-14 | End: 2021-04-15 | Stop reason: HOSPADM

## 2021-04-14 RX ORDER — DEXTROSE, SODIUM CHLORIDE, AND POTASSIUM CHLORIDE 5; .45; .15 G/100ML; G/100ML; G/100ML
150 INJECTION INTRAVENOUS CONTINUOUS PRN
Status: DISCONTINUED | OUTPATIENT
Start: 2021-04-14 | End: 2021-04-14

## 2021-04-14 RX ORDER — DEXTROSE AND SODIUM CHLORIDE 5; .45 G/100ML; G/100ML
150 INJECTION, SOLUTION INTRAVENOUS CONTINUOUS PRN
Status: DISCONTINUED | OUTPATIENT
Start: 2021-04-14 | End: 2021-04-15 | Stop reason: HOSPADM

## 2021-04-14 RX ORDER — DEXTROSE, SODIUM CHLORIDE, AND POTASSIUM CHLORIDE 5; .45; .15 G/100ML; G/100ML; G/100ML
150 INJECTION INTRAVENOUS CONTINUOUS PRN
Status: DISCONTINUED | OUTPATIENT
Start: 2021-04-14 | End: 2021-04-15 | Stop reason: HOSPADM

## 2021-04-14 RX ORDER — ACETAMINOPHEN 650 MG/1
650 SUPPOSITORY RECTAL EVERY 4 HOURS PRN
Status: DISCONTINUED | OUTPATIENT
Start: 2021-04-14 | End: 2021-04-15 | Stop reason: HOSPADM

## 2021-04-14 RX ORDER — DEXTROSE AND SODIUM CHLORIDE 5; .9 G/100ML; G/100ML
150 INJECTION, SOLUTION INTRAVENOUS CONTINUOUS PRN
Status: DISCONTINUED | OUTPATIENT
Start: 2021-04-14 | End: 2021-04-14

## 2021-04-14 RX ORDER — DEXTROSE AND SODIUM CHLORIDE 5; .45 G/100ML; G/100ML
150 INJECTION, SOLUTION INTRAVENOUS CONTINUOUS PRN
Status: DISCONTINUED | OUTPATIENT
Start: 2021-04-14 | End: 2021-04-14

## 2021-04-14 RX ORDER — DEXTROSE, SODIUM CHLORIDE, AND POTASSIUM CHLORIDE 5; .9; .15 G/100ML; G/100ML; G/100ML
150 INJECTION INTRAVENOUS CONTINUOUS PRN
Status: DISCONTINUED | OUTPATIENT
Start: 2021-04-14 | End: 2021-04-15 | Stop reason: HOSPADM

## 2021-04-14 RX ORDER — ONDANSETRON 2 MG/ML
4 INJECTION INTRAMUSCULAR; INTRAVENOUS ONCE
Status: COMPLETED | OUTPATIENT
Start: 2021-04-14 | End: 2021-04-14

## 2021-04-14 RX ORDER — VANCOMYCIN HYDROCHLORIDE 750 MG/15ML
INJECTION, POWDER, LYOPHILIZED, FOR SOLUTION INTRAVENOUS
Status: DISPENSED
Start: 2021-04-14 | End: 2021-04-14

## 2021-04-14 RX ORDER — SODIUM CHLORIDE 450 MG/100ML
250 INJECTION, SOLUTION INTRAVENOUS CONTINUOUS PRN
Status: DISCONTINUED | OUTPATIENT
Start: 2021-04-14 | End: 2021-04-15 | Stop reason: HOSPADM

## 2021-04-14 RX ADMIN — SODIUM CHLORIDE 2000 ML: 9 INJECTION, SOLUTION INTRAVENOUS at 05:01

## 2021-04-14 RX ADMIN — SODIUM CHLORIDE 2000 ML: 9 INJECTION, SOLUTION INTRAVENOUS at 05:41

## 2021-04-14 RX ADMIN — ONDANSETRON 4 MG: 2 INJECTION INTRAMUSCULAR; INTRAVENOUS at 04:16

## 2021-04-14 RX ADMIN — DEXTROSE MONOHYDRATE, SODIUM CHLORIDE, AND POTASSIUM CHLORIDE 150 ML/HR: 50; 4.5; 1.49 INJECTION, SOLUTION INTRAVENOUS at 11:12

## 2021-04-14 RX ADMIN — INSULIN HUMAN 1 UNITS/HR: 1 INJECTION, SOLUTION INTRAVENOUS at 23:50

## 2021-04-14 RX ADMIN — SODIUM CHLORIDE, PRESERVATIVE FREE 3 ML: 5 INJECTION INTRAVENOUS at 22:14

## 2021-04-14 RX ADMIN — MAGNESIUM SULFATE HEPTAHYDRATE 4 G: 40 INJECTION, SOLUTION INTRAVENOUS at 16:31

## 2021-04-14 RX ADMIN — ONDANSETRON 4 MG: 2 INJECTION INTRAMUSCULAR; INTRAVENOUS at 11:15

## 2021-04-14 RX ADMIN — INSULIN HUMAN 5 UNITS/HR: 1 INJECTION, SOLUTION INTRAVENOUS at 04:57

## 2021-04-14 RX ADMIN — POTASSIUM & SODIUM PHOSPHATES POWDER PACK 280-160-250 MG 2 PACKET: 280-160-250 PACK at 23:36

## 2021-04-14 RX ADMIN — DEXTROSE MONOHYDRATE 25 ML: 25 INJECTION, SOLUTION INTRAVENOUS at 18:20

## 2021-04-14 RX ADMIN — VANCOMYCIN HYDROCHLORIDE 1250 MG: 1 INJECTION, POWDER, LYOPHILIZED, FOR SOLUTION INTRAVENOUS at 06:09

## 2021-04-14 RX ADMIN — PIPERACILLIN AND TAZOBACTAM 3.38 G: 3; .375 INJECTION, POWDER, FOR SOLUTION INTRAVENOUS at 04:49

## 2021-04-14 RX ADMIN — DEXTROSE MONOHYDRATE, SODIUM CHLORIDE, AND POTASSIUM CHLORIDE 150 ML/HR: 50; 4.5; 1.49 INJECTION, SOLUTION INTRAVENOUS at 18:24

## 2021-04-14 RX ADMIN — SODIUM CHLORIDE, PRESERVATIVE FREE 3 ML: 5 INJECTION INTRAVENOUS at 22:13

## 2021-04-14 RX ADMIN — SODIUM CHLORIDE, PRESERVATIVE FREE 10 ML: 5 INJECTION INTRAVENOUS at 22:12

## 2021-04-14 RX ADMIN — POTASSIUM CHLORIDE AND SODIUM CHLORIDE 250 ML/HR: 450; 150 INJECTION, SOLUTION INTRAVENOUS at 06:50

## 2021-04-14 RX ADMIN — ENOXAPARIN SODIUM 30 MG: 30 INJECTION, SOLUTION INTRAVENOUS; SUBCUTANEOUS at 09:58

## 2021-04-14 RX ADMIN — SODIUM CHLORIDE, POTASSIUM CHLORIDE, SODIUM LACTATE AND CALCIUM CHLORIDE 1000 ML: 600; 310; 30; 20 INJECTION, SOLUTION INTRAVENOUS at 04:14

## 2021-04-14 NOTE — PROGRESS NOTES
Pharmacy consulted to dose lovenox    CrCl 26.4 mL/min this morning when lovenox was initiated.     Started Lovenox 30mg subq q24h adjusted for renal impairment.    Will continue to monitor pt and adjust dose if renal clearance improves.

## 2021-04-14 NOTE — PLAN OF CARE
Problem: Adult Inpatient Plan of Care  Goal: Patient-Specific Goal (Individualized)  4/14/2021 1615 by Brian Dueñas, RN  Flowsheets (Taken 4/14/2021 1615)  Individualized Care Needs: wants to rest- is tired   Goal Outcome Evaluation:  Plan of Care Reviewed With: patient  Progress: improving  Outcome Summary: on insulin gtt- BG is declining. resting well. WIll monitor. Labs obtained

## 2021-04-14 NOTE — PLAN OF CARE
Problem: Adult Inpatient Plan of Care  Goal: Plan of Care Review  Outcome: Ongoing, Progressing  Flowsheets (Taken 4/14/2021 1614)  Progress: improving  Plan of Care Reviewed With: patient  Outcome Summary: on insulin gtt- BG is declining. resting well. WIll monitor. Labs obtained   Goal Outcome Evaluation:  Plan of Care Reviewed With: patient  Progress: improving  Outcome Summary: on insulin gtt- BG is declining. resting well. WIll monitor. Labs obtained

## 2021-04-14 NOTE — PLAN OF CARE
Goal Outcome Evaluation:  Plan of Care Reviewed With: patient  Progress: no change  Outcome Summary: newly admitted insulin gtt infusing

## 2021-04-14 NOTE — ED NOTES
Pump malfunction while in CT. Insulin gtt had to be stopped and pump and fluid tubing re-primed only 5 minutes after initiation of gtt.     Insulin gtt restarted again at 0520. Will plan to check repeat 1 hr bg at 0620.      Raeann Andujar RN  04/14/21 0591

## 2021-04-14 NOTE — H&P
[facility] HOSPITALIST     Jaiden Villagomez MD    CHIEF COMPLAINT: N/V/D/abdominal pain    HISTORY OF PRESENT ILLNESS:  The patient is a pleasant 56-year-old female that presented to the emergency department secondary to 2 days of abdominal discomfort associated with small amounts of diarrhea that started Monday, then excessive nausea and vomiting that started Tuesday a.m. She feels that abdominal discomfort increased with n/v. She reports that she has not had any insulin since 4/9/2021 due to staying with a friend and forgot her medication, did not run out.  She reports several sick contacts at work with nausea and vomiting and thought that she had same until she became so much worse.  She reports admission for similar episode in 2019.  She has had chills but is unsure regarding fever.  She did eat out at [employer] 2 days ago.  She has been unable to tolerate anything by mouth in the past 2 days.    In ER, diagnostics revealed VBG with pH 7.006, HCO3 6.8, lactic acid 6.1, large acetone, glucose 834, creatinine 2.08, ALT 36, anion gap 39.9, WBC C 25.15, procalcitonin 0.66, A1c 8.9%.  CXR and CT abdomen and pelvis without contrast were essentially unremarkable for acute findings    She has a known history of DM2, HSV-2, Howell's esophagus/GERD, seasonal allergies.    She otherwise denies fever/headache/rhinorrhea/nasal congestion/lightheadedness/syncopal sensation/cough/soa/chest pain/recent illness/change in bladder habits/no weight change/bloody emesis or bloody stools/change in medications or any other new concerns.    Past Medical History:   Diagnosis Date   • Abnormal Pap smear of cervix     1 abnormal pap with normal f/u   • Howell esophagus    • Colon polyp    • Diabetes mellitus (CMS/HCC)    • Difficulty swallowing     at times   • Fibroid    • GERD (gastroesophageal reflux disease)    • HSV-2 (herpes simplex virus 2) infection 7/2/2017   • Menstrual disorder    • Migraines    • Rectal  "bleeding     bleeds with bowel movement   • Seasonal allergies      Past Surgical History:   Procedure Laterality Date   • CARPAL TUNNEL RELEASE WITH CUBITAL TUNNEL RELEASE Right    •  SECTION      X2   • COLONOSCOPY N/A 2016    Procedure: COLONOSCOPY with polypectomy;  Surgeon: Maryan Kent MD;  Location: [facility] OR;  Service:    • COLONOSCOPY N/A 6/3/2020    Procedure: COLONOSCOPY;  Surgeon: Dmitri Fung MD;  Location:  [facility] OR;  Service: Gastroenterology;  Laterality: N/A;  Poor prep, polyp   • ENDOMETRIAL ABLATION      thermachoice   • ENDOSCOPY N/A 6/3/2020    Procedure: ESOPHAGOGASTRODUODENOSCOPY;  Surgeon: Dmitri Fung MD;  Location: [facility] OR;  Service: Gastroenterology;  Laterality: N/A;  Reflux esophagitis, erosive gastritis, duodenitis   • HYSTEROSCOPY      AND ENDOMETRIAL  ABLATION   • NASAL SEPTUM SURGERY         • TUBAL ABDOMINAL LIGATION       Family History   Problem Relation Age of Onset   • Hypertension Mother    • Hyperlipidemia Mother    • Atrial fibrillation Mother    • Stroke Mother    • Heart attack Father    • Hypertension Father    • Diabetes Father    • Alcohol abuse Father    • Dementia Father    • Stroke Sister    • Crohn's disease Sister    • Heart attack Brother    • Hypertension Brother    • Stroke Brother    • Esophageal cancer Brother    • Alzheimer's disease Paternal Grandmother    • Diabetes Paternal Aunt    • Breast cancer Paternal Aunt    • Rheum arthritis Other    • Colon cancer Maternal Uncle    • Ovarian cancer Neg Hx    • Deep vein thrombosis Neg Hx    • Pulmonary embolism Neg Hx      Social History     Tobacco Use   • Smoking status: Former Smoker     Quit date: 1995     Years since quittin.8   • Smokeless tobacco: Never Used   • Tobacco comment: Quit \" a long time ago\"    Substance Use Topics   • Alcohol use: Yes     Comment: OCCASIONALLY   • Drug use: No     Medications Prior to Admission   Medication Sig " Dispense Refill Last Dose   • aspirin 81 MG chewable tablet Chew 81 mg Daily.   Past Week at Unknown time   • cetirizine (ZyrTEC) 10 MG tablet Take 10 mg by mouth daily   Past Week at Unknown time   • famotidine (PEPCID) 40 MG tablet Every 12 (Twelve) Hours.   Past Week at Unknown time   • FLUoxetine (PROzac) 20 MG capsule 20 mg Daily.   Past Week at Unknown time   • LANTUS SOLOSTAR 100 UNIT/ML injection pen 35 Units Every Night.   Past Week at Unknown time   • Multiple Vitamins-Minerals (MULTI-BETIC PO) Take 2 capsules by mouth Daily.   Past Week at Unknown time   • pantoprazole (PROTONIX) 40 MG EC tablet Take 1 tablet by mouth 2 (two) times a day. 180 tablet 3 Past Week at Unknown time   • potassium chloride (K-DUR,KLOR-CON) 20 MEQ CR tablet Take 99 mEq by mouth Daily.   Past Week at Unknown time   • promethazine (PHENERGAN) 25 MG tablet Take 25 mg by mouth every 6 (six) hours as needed for nausea or vomiting.   Past Week at Unknown time   • pseudoephedrine (SUDAFED) 120 MG 12 hr tablet Take 120 mg by mouth Every 12 (Twelve) Hours As Needed for Congestion.   Past Week at Unknown time   • SITagliptin-metFORMIN HCl ER (JANUMET XR)  MG tablet Take 2 tablets by mouth Daily.   Past Week at Unknown time   • sucralfate (Carafate) 1 g tablet Take 1 tablet by mouth 4 (Four) Times a Day. Crush tablet in wax paper mix with 1-2 tsp of water to make slurry and drink. 120 tablet 5 Past Week at Unknown time   • SUMAtriptan (IMITREX) 100 MG tablet sumatriptan 100 mg tablet   take 1 po at onset of symptoms. can repeat x 1 in 2 hours if headache persists   Past Week at Unknown time   • traZODone (DESYREL) 50 MG tablet Take 50 mg by mouth every night.   Past Week at Unknown time   • valACYclovir (VALTREX) 500 MG tablet TAKE ONE TABLET BY MOUTH DAILY 90 tablet 1 Past Week at Unknown time   • acetaminophen (TYLENOL) 325 MG tablet Take 2 tablets by mouth Every 4 (Four) Hours As Needed for Mild Pain . Over the counter      •  "Insulin Pen Needle (BD Pen Needle Yari 2nd Gen) 32G X 4 MM misc BD Ultra-Fine Yari Pen Needle 32 gauge x 5/32\"   USE ONCE DAILY WITH LANTUS TO INJECT INSULIN        Allergies:  Mobic [meloxicam]      There is no immunization history on file for this patient.    REVIEW OF SYSTEMS:  Please see the above history of present illness for pertinent positives and negatives.  The remainder of the patient's systems have been reviewed and are negative.     Vital Signs  Temp:  [97.2 °F (36.2 °C)-97.5 °F (36.4 °C)] 97.5 °F (36.4 °C)  Heart Rate:  [129-137] 134  Resp:  [22-26] 22  BP: (100-135)/(52-70) 135/64   Body mass index is 22.04 kg/m².    Flowsheet Rows      First Filed Value   Admission Height  157.5 cm (62\") Documented at 04/14/2021 0408   Admission Weight  57.2 kg (126 lb) Documented at 04/14/2021 0408           Physical Exam  Vitals reviewed.   Constitutional:       Appearance: Normal appearance. She is ill-appearing.      Comments: Appears in mild distress   HENT:      Head: Normocephalic and atraumatic.      Mouth/Throat:      Mouth: Mucous membranes are dry.   Eyes:      Extraocular Movements: Extraocular movements intact.      Pupils: Pupils are equal, round, and reactive to light.   Cardiovascular:      Rate and Rhythm: Regular rhythm. Tachycardia present.   Pulmonary:      Effort: Pulmonary effort is normal. No respiratory distress.      Breath sounds: Normal breath sounds. No wheezing or rales.   Abdominal:      General: Abdomen is flat. Bowel sounds are normal. There is no distension.      Palpations: Abdomen is soft.      Tenderness: There is no abdominal tenderness. There is no guarding.   Musculoskeletal:         General: No swelling.   Skin:     General: Skin is warm and dry.      Capillary Refill: Capillary refill takes less than 2 seconds.   Neurological:      General: No focal deficit present.      Mental Status: She is alert and oriented to person, place, and time.   Psychiatric:         Mood and " Affect: Mood normal.         Behavior: Behavior normal.       Results Review:    I reviewed the patient's new clinical results.  Lab Results (most recent)     Procedure Component Value Units Date/Time    Phosphorus [[id number]]  (Abnormal) Collected: 04/14/21 0410    Specimen: Blood Updated: 04/14/21 0453     Phosphorus 9.4 mg/dL     Magnesium [[id number]]  (Normal) Collected: 04/14/21 0410    Specimen: Blood Updated: 04/14/21 0453     Magnesium 2.1 mg/dL     Comprehensive Metabolic Panel [[id number]]  (Abnormal) Collected: 04/14/21 0410    Specimen: Blood Updated: 04/14/21 0451     Glucose 834 mg/dL      BUN 35 mg/dL      Creatinine 2.08 mg/dL      Sodium 131 mmol/L      Potassium 4.3 mmol/L      Chloride 85 mmol/L      CO2 6.1 mmol/L      Calcium 9.5 mg/dL      Total Protein 8.5 g/dL      Albumin 5.00 g/dL      ALT (SGPT) 36 U/L      AST (SGOT) 26 U/L      Alkaline Phosphatase 109 U/L      Total Bilirubin 0.2 mg/dL      eGFR Non African Amer 25 mL/min/1.73      Globulin 3.5 gm/dL      A/G Ratio 1.4 g/dL      BUN/Creatinine Ratio 16.8     Anion Gap 39.9 mmol/L     Narrative:      GFR Normal >60  Chronic Kidney Disease <60  Kidney Failure <15      Blood Culture - Blood, Arm, Right [[id number]] Collected: 04/14/21 0442    Specimen: Blood from Arm, Right Updated: 04/14/21 0447    Blood Culture - Blood, Arm, Left [[id number]] Collected: 04/14/21 0415    Specimen: Blood from Arm, Left Updated: 04/14/21 0447    COVID-19,[facility] IN-HOUSE,Nasal Swab No Transport Media 3-4 HR TAT - Swab, Nasal Cavity [[id number]] Collected: 04/14/21 0438    Specimen: Swab from Nasal Cavity Updated: 04/14/21 0447    Troponin [[id number]] Collected: 04/14/21 0410    Specimen: Blood Updated: 04/14/21 0444    Hemoglobin A1c [[id number]] Collected: 04/14/21 0410    Specimen: Blood Updated: 04/14/21 0444    Procalcitonin [[id number]] Collected: 04/14/21 0410    Specimen: Blood Updated: 04/14/21 0440    Lipase [[id number]]  (Normal) Collected: 04/14/21  0410    Specimen: Blood Updated: 04/14/21 0436     Lipase 30 U/L     Urinalysis, Microscopic Only - Urine, Clean Catch [[id number]]  (Abnormal) Collected: 04/14/21 0411    Specimen: Urine, Clean Catch Updated: 04/14/21 0436     RBC, UA 21-30 /HPF      WBC, UA 0-2 /HPF      Bacteria, UA 3+ /HPF      Squamous Epithelial Cells, UA 13-20 /HPF      Transitional Epithelial Cells, UA 0-2 /HPF      Hyaline Casts, UA 3-6 /LPF      Amorphous Crystals, UA Moderate/2+ /HPF      Mucus, UA Small/1+ /HPF      Methodology Manual Light Microscopy    Lactic Acid, Plasma [[id number]]  (Abnormal) Collected: 04/14/21 0414    Specimen: Blood Updated: 04/14/21 0435     Lactate 6.1 mmol/L     Ketone Bodies, Serum (Not performed at [facility]) [[id number]]  (Abnormal) Collected: 04/14/21 0411    Specimen: Blood Updated: 04/14/21 0435    Narrative:      The following orders were created for panel order Ketone Bodies, Serum (Not performed at [facility]).  Procedure                               Abnormality         Status                     ---------                               -----------         ------                     Acetone[[id number]]                      Abnormal            Final result                 Please view results for these tests on the individual orders.    Acetone [[id number]]  (Abnormal) Collected: 04/14/21 0411    Specimen: Blood Updated: 04/14/21 0435     Acetone Large    Blood Gas, Venous - [[id number]]  (Abnormal) Collected: 04/14/21 0420    Specimen: Venous Blood Updated: 04/14/21 0423     Site OTHER     pH, Venous 7.006 pH Units      Comment: 85 Value below critical limit        pCO2, Venous 27.1 mm Hg      Comment: 84 Value below reference range        pO2, Venous 47.6 mm Hg      HCO3, Venous 6.8 mmol/L      Comment: 84 Value below reference range        Base Excess, Venous -23.3 mmol/L      Comment: 84 Value below reference range        O2 Saturation, Venous 71.2 %      Hemoglobin, Blood Gas 15.4 g/dL      Temperature  37.0 C      Barometric Pressure for Blood Gas 741 mmHg      Modality Room Air     Ventilator Mode NA     Notified Who RB AND V DR MUKHERJEE     Notified By [id number]     Notified Time 04/14/2021 04:27     Collected by [id number]     Comment: Meter: [id number]     :  [id number]       CBC & Differential [[id number]]  (Abnormal) Collected: 04/14/21 0410    Specimen: Blood Updated: 04/14/21 0423    Narrative:      The following orders were created for panel order CBC & Differential.  Procedure                               Abnormality         Status                     ---------                               -----------         ------                     CBC Auto Differential[[id number]]        Abnormal            Final result                 Please view results for these tests on the individual orders.    CBC Auto Differential [[id number]]  (Abnormal) Collected: 04/14/21 0410    Specimen: Blood Updated: 04/14/21 0423     WBC 25.15 10*3/mm3      RBC 4.74 10*6/mm3      Hemoglobin 14.8 g/dL      Hematocrit 45.8 %      MCV 96.6 fL      MCH 31.2 pg      MCHC 32.3 g/dL      RDW 13.0 %      RDW-SD 46.4 fl      MPV 10.8 fL      Platelets 365 10*3/mm3      Neutrophil % 90.0 %      Lymphocyte % 2.6 %      Monocyte % 6.2 %      Eosinophil % 0.0 %      Basophil % 0.2 %      Immature Grans % 1.0 %      Neutrophils, Absolute 22.63 10*3/mm3      Lymphocytes, Absolute 0.66 10*3/mm3      Monocytes, Absolute 1.55 10*3/mm3      Eosinophils, Absolute 0.00 10*3/mm3      Basophils, Absolute 0.05 10*3/mm3      Immature Grans, Absolute 0.26 10*3/mm3      nRBC 0.1 /100 WBC     Urinalysis With Culture If Indicated - Urine, Clean Catch [[id number]]  (Abnormal) Collected: 04/14/21 0411    Specimen: Urine, Clean Catch Updated: 04/14/21 0423     Color, UA Yellow     Appearance, UA Clear     pH, UA <=5.0     Specific Gravity, UA 1.025     Glucose, UA >=1000 mg/dL (3+)     Ketones, UA 40 mg/dL (2+)     Bilirubin, UA Moderate (2+)     Blood, UA  Moderate (2+)     Protein,  mg/dL (2+)     Leuk Esterase, UA Negative     Nitrite, UA Negative     Urobilinogen, UA 0.2 E.U./dL          Imaging Results (Most Recent)     Procedure Component Value Units Date/Time    XR Chest 2 View [[id number]] Collected: 04/14/21 0542     Updated: 04/14/21 0544    Narrative:      CHEST X-RAY, 4/14/2021      HISTORY:    56-year-old female in the ED complaining of one day history diffuse abdomen pain, nausea and vomiting. Diabetic ketoacidosis. Kidney failure.      TECHNIQUE:  AP and lateral upright chest x-ray.      FINDINGS:  The lungs are expanded and clear. No visible pulmonary edema, and no focal or multifocal pulmonary infiltrate. No pleural effusion. Heart size and pulmonary vascularity are normal.      Impression:      Negative chest.    Signer Name: Leo Agrawal MD   Signed: 4/14/2021 5:42 AM   Workstation Name: [facility]    [facility]    CT Abdomen Pelvis Without Contrast [[id number]] Collected: 04/14/21 0541     Updated: 04/14/21 0543    Narrative:      CT ABDOMEN AND PELVIS, NONCONTRAST, 4/14/2021    HISTORY:  56-year-old female in the ED complaining of one day history diffuse abdomen pain, nausea and vomiting. Diabetic ketoacidosis. Kidney failure.    TECHNIQUE:  CT imaging of the abdomen and pelvis without oral or IV contrast. Radiation dose reduction techniques included automated exposure control. Radiation audit for CT and nuclear cardiology exams in the last 12 months: 0.    COMPARISON:  *  CT abdomen/pelvis, 1/3/2020.    ABDOMEN FINDINGS:  Tiny nonobstructing calculus in the lower pole right kidney measuring 1 to 2 mm. Kidneys, ureters and bladder are otherwise negative. No evidence of upper urinary tract obstruction.    Liver, pancreas and spleen are normal in size and appearance. No gallbladder distention or bile duct dilatation.    Small bowel and colon are normal in caliber and appearance, as imaged. The appendix is normal.  Fluid-filled stomach is nondistended. No distal esophageal dilatation. Normal caliber abdominal aorta.    PELVIS FINDINGS:  Urinary bladder, retroverted uterus, adnexal regions and rectum are within normal limits. No free pelvic fluid. No inguinal hernia.    Lung base images show no active disease.      Impression:      1. No acute abnormality within the abdomen or pelvis.  2. Tiny nonobstructing right lower pole renal calculus.    Signer Name: Leo Agrawal MD   Signed: 4/14/2021 5:41 AM   Workstation Name: [email or web address]-    [facility]        reviewed    ECG/EMG Results (most recent)     None        Pending     Assessment/Plan   DKA: A1C 8.9%  HAGMA: CO2 6.1  N/V/D:  Lactic acidosis:   Leukocytosis:  Psuedohyponatremia:   CLINT:   All above secondary to fluid losses/dehydration related to DKA  NPO/Aggressive IV hydration with DKA protocol initiated  Recheck cath UA  Check GI panel/c-diff (+sick contacts at work)  CT abdomen pelvis/CXR unremarkable for acute findings with procalcitonin 0.66  Given doses of Vanc/zosyn in ER, await above to determine need to continue  Trend labs  Monitor closely in ICU    GERD:  Howell's esophagus:  Add IV protonix twice daily when creatinine improves    UDS pending    I discussed the patient's findings and my recommendations with patient and staff.     Romana Simpson, APRN  04/14/21  06:26 EDT

## 2021-04-14 NOTE — CONSULTS
"Adult Nutrition  Assessment/PES    Patient Name:  Lona Dupree  YOB: 1965  MRN: [id number]  Admit Date:  4/14/2021    Assessment Date:  4/14/2021    Comments:  Adv diet as indicated per DKA protocol    Reason for Assessment     Row Name 04/14/21 1041          Reason for Assessment    Reason For Assessment  physician consult     Diagnosis  diabetes diagnosis/complications DKA N/V/Abd pain         Nutrition/Diet History     Row Name 04/14/21 1104          Nutrition/Diet History    Typical Food/Fluid Intake  Spoke w pt at bedside. NKFA. Denies issue chew/swallow. Reports stayed w friend forgot medds which led to being sick. Lost 6# due to emesis. Reports drinks water & unsweet tea mostly. Does admit to reg coke on Friday w [employer] & [employer] when sick yesterday. reports she eats 4-5 smaller amounts b/c GERD/Howell's. Knows CHO is what she is to be careful with. Denies need for edu.     Meal/Snack Patterns  4-5 smaller meals     Supplemental Drinks/Foods/Additives  water/unsweet tea w truvia         Anthropometrics     Row Name 04/14/21 1106 04/14/21 0616       Anthropometrics    Height  --  157.5 cm (62\")    Weight  -- 1205.#  54.7 kg (120 lb 8 oz)       Ideal Body Weight (IBW)    Ideal Body Weight (IBW) (kg)  --  50.43    % Ideal Body Weight  --  108.38       Usual Body Weight (UBW)    Usual Body Weight  57.2 kg (126 lb)  --       Body Mass Index (BMI)    BMI (kg/m2)  --  22.09    BMI Assessment  BMI 18.5-24.9: normal  --    Row Name 04/14/21 0408          Anthropometrics    Height  157.5 cm (62\")     Weight  57.2 kg (126 lb)        Ideal Body Weight (IBW)    Ideal Body Weight (IBW) (kg)  50.43     % Ideal Body Weight  113.33        Body Mass Index (BMI)    BMI (kg/m2)  23.09         Labs/Tests/Procedures/Meds     Row Name 04/14/21 1106          Labs/Procedures/Meds    Lab Results Reviewed  reviewed     Lab Results Comments  HgA1C 8.9        Diagnostic Tests/Procedures    Diagnostic Test/Procedure " "Reviewed  reviewed        Medications    Pertinent Medications Reviewed  reviewed     Pertinent Medications Comments  insulin           Estimated/Assessed Needs     Row Name 04/14/21 1106 04/14/21 0616       Calculation Measurements    Height  --  157.5 cm (62\")       Estimated/Assessed Needs    Additional Documentation  Calorie Requirements (Group);Fluid Requirements (Group);Protein Requirements (Group)  --       Calorie Requirements    Estimated Calorie Need Method  [place]-St Jeor  --    Estimated Calorie Requirement Comment  1315 kcal ( mifflin 1.2 ) 149 gm CHO, 45% kcal  --       Protein Requirements    Est Protein Requirement Amount (gms/kg)  1.0 gm protein 55 gm pro  --       Fluid Requirements    Estimated Fluid Requirement Method  RDA Method 1315 ml  --    Row Name 04/14/21 0408          Calculation Measurements    Height  157.5 cm (62\")         Nutrition Prescription Ordered     Row Name 04/14/21 1107          Nutrition Prescription PO    Current PO Diet  NPO         Evaluation of Received Nutrient/Fluid Intake     Row Name 04/14/21 1107          Fluid Intake Evaluation    IV Fluid (mL)  1550 >100%        PO Evaluation    Number of Days PO Intake Evaluated  Insufficient Data               Problem/Interventions:  Problem 1     Row Name 04/14/21 1107          Nutrition Diagnoses Problem 1    Problem 1  Altered Nutrition Related to Labs     Etiology (related to)  Medical Diagnosis     Endocrine  DM     Metabolic/ICU  Ketoacidosis     Signs/Symptoms (evidenced by)  Report/Observation               Intervention Goal     Row Name 04/14/21 1108          Intervention Goal    General  Provide information regarding MNT for treatment/condition;Meet nutritional needs for age/condition;Improved nutrition related lab(s)     PO  Establish PO;PO intake (%)     PO Intake %  50 % or greater     Weight  Maintain weight         Nutrition Intervention     Row Name 04/14/21 1103          Nutrition Intervention    RD/Tech Action "  Follow Tx progress         Nutrition Prescription     Row Name 04/14/21 1108          Other Orders    Other  Adv diet as indicated per DKA protocol         Education/Evaluation     Row Name 04/14/21 1108          Education    Education  Education offered and refused;Education topics;Advised regarding habits/behavior;Provided education regarding Pt denies need for edu. Eats 4 to 5 small meals per day. Knows CHO is what she has to watch.     Provided education regarding  Healthy eating for diabetes;Avoidance of associated complications     Education Topics  Diabetes     Advised Regarding Habits/Behavior  Eating pattern;Self monitor;Food choices        Monitor/Evaluation    Monitor  Per protocol;I&O;PO intake;Pertinent labs;Weight;Symptoms     Education Follow-up  Other (comment) uncertain about compliance           Electronically signed by:  Annie Aly RD  04/14/21 11:09 EDT

## 2021-04-14 NOTE — SIGNIFICANT NOTE
04/14/21 0611   Provider Notification   Reason for Communication Critical lab value  (glucose 635)   Provider Name   (not called-on insulin drip per DKA protocol)

## 2021-04-14 NOTE — NURSING NOTE
Discharge Planning Assessment   Aditi Do     Patient Name: Lona Dupree  MRN: [id number]  Today's Date: 4/14/2021    Admit Date: 4/14/2021    Discharge Needs Assessment     Row Name 04/14/21 1617       Living Environment    Lives With  child(yfn), adult    Current Living Arrangements  home/apartment/condo    Primary Care Provided by  self    Provides Primary Care For  no one    Family Caregiver if Needed  child(yfn), adult    Quality of Family Relationships  involved;supportive    Able to Return to Prior Arrangements  yes       Resource/Environmental Concerns    Resource/Environmental Concerns  none    Transportation Concerns  car, none       Transition Planning    Patient/Family Anticipates Transition to  home with family    Patient/Family Anticipated Services at Transition  none    Transportation Anticipated  family or friend will provide       Discharge Needs Assessment    Readmission Within the Last 30 Days  no previous admission in last 30 days    Equipment Currently Used at Home  glucometer    Concerns to be Addressed  no discharge needs identified    Anticipated Changes Related to Illness  none    Equipment Needed After Discharge  none    Provided Post Acute Provider List?  N/A    Provided Post Acute Provider Quality & Resource List?  N/A        Discharge Plan     Row Name 04/14/21 1610       Plan    Plan  plan home with family    Patient/Family in Agreement with Plan  yes    Plan Comments  Spoke with patient at bedside. Face sheet verified. Patient lives in a home with her adult son. She is independent of ADLs including working and driving. She uses a glucometer and denies additional DME. She has not used HH or inpatient rehab previously. She has a living will, encouraged to bring a copy to be scanned to medical record. She uses [facility] [place] and denies issues obtaining medications. She confirms she did not run out of her insulin, she just did not have it with her when visiting a friend.  She does not anticipate any needs at [place]. CM # placed on white board, will continue to follow.        Continued Care and Services - Admitted Since 4/14/2021    Coordination has not been started for this encounter.         Demographic Summary     Row Name 04/14/21 1613       General Information    Admission Type  observation    Arrived From  home    Referral Source  admission list    Reason for Consult  discharge planning    Preferred Language  English     Used During This Interaction  no       Contact Information    Permission Granted to Share Info With          Functional Status    No documentation.       Psychosocial    No documentation.       Abuse/Neglect    No documentation.       Legal    No documentation.       Substance Abuse    No documentation.       Patient Forms    No documentation.           Wade White RN

## 2021-04-15 VITALS
HEART RATE: 73 BPM | SYSTOLIC BLOOD PRESSURE: 109 MMHG | OXYGEN SATURATION: 96 % | HEIGHT: 62 IN | TEMPERATURE: 98.1 F | BODY MASS INDEX: 22.34 KG/M2 | DIASTOLIC BLOOD PRESSURE: 70 MMHG | WEIGHT: 121.4 LBS | RESPIRATION RATE: 18 BRPM

## 2021-04-15 LAB
ALBUMIN SERPL-MCNC: 3.3 G/DL (ref 3.5–5.2)
ALBUMIN/GLOB SERPL: 1.3 G/DL
ALP SERPL-CCNC: 64 U/L (ref 39–117)
ALT SERPL W P-5'-P-CCNC: 21 U/L (ref 1–33)
ANION GAP SERPL CALCULATED.3IONS-SCNC: 5.6 MMOL/L (ref 5–15)
AST SERPL-CCNC: 27 U/L (ref 1–32)
BASOPHILS # BLD AUTO: 0.03 10*3/MM3 (ref 0–0.2)
BASOPHILS NFR BLD AUTO: 0.2 % (ref 0–1.5)
BILIRUB SERPL-MCNC: 0.4 MG/DL (ref 0–1.2)
BUN SERPL-MCNC: 14 MG/DL (ref 6–20)
BUN/CREAT SERPL: 21.9 (ref 7–25)
CALCIUM SPEC-SCNC: 8.2 MG/DL (ref 8.6–10.5)
CHLORIDE SERPL-SCNC: 110 MMOL/L (ref 98–107)
CO2 SERPL-SCNC: 19.4 MMOL/L (ref 22–29)
CREAT SERPL-MCNC: 0.64 MG/DL (ref 0.57–1)
DEPRECATED RDW RBC AUTO: 43.7 FL (ref 37–54)
EOSINOPHIL # BLD AUTO: 0.03 10*3/MM3 (ref 0–0.4)
EOSINOPHIL NFR BLD AUTO: 0.2 % (ref 0.3–6.2)
ERYTHROCYTE [DISTWIDTH] IN BLOOD BY AUTOMATED COUNT: 13.2 % (ref 12.3–15.4)
GFR SERPL CREATININE-BSD FRML MDRD: 96 ML/MIN/1.73
GLOBULIN UR ELPH-MCNC: 2.5 GM/DL
GLUCOSE BLDC GLUCOMTR-MCNC: 129 MG/DL (ref 70–130)
GLUCOSE BLDC GLUCOMTR-MCNC: 186 MG/DL (ref 70–130)
GLUCOSE BLDC GLUCOMTR-MCNC: 212 MG/DL (ref 70–130)
GLUCOSE BLDC GLUCOMTR-MCNC: 218 MG/DL (ref 70–130)
GLUCOSE BLDC GLUCOMTR-MCNC: 270 MG/DL (ref 70–130)
GLUCOSE SERPL-MCNC: 284 MG/DL (ref 65–99)
HCT VFR BLD AUTO: 33.3 % (ref 34–46.6)
HGB BLD-MCNC: 11.6 G/DL (ref 12–15.9)
IMM GRANULOCYTES # BLD AUTO: 0.06 10*3/MM3 (ref 0–0.05)
IMM GRANULOCYTES NFR BLD AUTO: 0.4 % (ref 0–0.5)
LYMPHOCYTES # BLD AUTO: 1.85 10*3/MM3 (ref 0.7–3.1)
LYMPHOCYTES NFR BLD AUTO: 11.4 % (ref 19.6–45.3)
MAGNESIUM SERPL-MCNC: 2.1 MG/DL (ref 1.6–2.6)
MCH RBC QN AUTO: 31.8 PG (ref 26.6–33)
MCHC RBC AUTO-ENTMCNC: 34.8 G/DL (ref 31.5–35.7)
MCV RBC AUTO: 91.2 FL (ref 79–97)
MONOCYTES # BLD AUTO: 0.91 10*3/MM3 (ref 0.1–0.9)
MONOCYTES NFR BLD AUTO: 5.6 % (ref 5–12)
NEUTROPHILS NFR BLD AUTO: 13.38 10*3/MM3 (ref 1.7–7)
NEUTROPHILS NFR BLD AUTO: 82.2 % (ref 42.7–76)
NRBC BLD AUTO-RTO: 0 /100 WBC (ref 0–0.2)
PHOSPHATE SERPL-MCNC: 1.4 MG/DL (ref 2.5–4.5)
PLATELET # BLD AUTO: 204 10*3/MM3 (ref 140–450)
PMV BLD AUTO: 10.6 FL (ref 6–12)
POTASSIUM SERPL-SCNC: 4.7 MMOL/L (ref 3.5–5.2)
PROCALCITONIN SERPL-MCNC: 0.31 NG/ML (ref 0–0.25)
PROT SERPL-MCNC: 5.8 G/DL (ref 6–8.5)
RBC # BLD AUTO: 3.65 10*6/MM3 (ref 3.77–5.28)
SODIUM SERPL-SCNC: 135 MMOL/L (ref 136–145)
WBC # BLD AUTO: 16.26 10*3/MM3 (ref 3.4–10.8)

## 2021-04-15 PROCEDURE — 96376 TX/PRO/DX INJ SAME DRUG ADON: CPT

## 2021-04-15 PROCEDURE — 85025 COMPLETE CBC W/AUTO DIFF WBC: CPT | Performed by: NURSE PRACTITIONER

## 2021-04-15 PROCEDURE — G0378 HOSPITAL OBSERVATION PER HR: HCPCS

## 2021-04-15 PROCEDURE — 25010000002 ONDANSETRON PER 1 MG: Performed by: NURSE PRACTITIONER

## 2021-04-15 PROCEDURE — 84145 PROCALCITONIN (PCT): CPT | Performed by: NURSE PRACTITIONER

## 2021-04-15 PROCEDURE — 99217 PR OBSERVATION CARE DISCHARGE MANAGEMENT: CPT | Performed by: HOSPITALIST

## 2021-04-15 PROCEDURE — 63710000001 INSULIN ASPART PER 5 UNITS: Performed by: NURSE PRACTITIONER

## 2021-04-15 PROCEDURE — 80053 COMPREHEN METABOLIC PANEL: CPT | Performed by: NURSE PRACTITIONER

## 2021-04-15 PROCEDURE — 83735 ASSAY OF MAGNESIUM: CPT | Performed by: NURSE PRACTITIONER

## 2021-04-15 PROCEDURE — 82962 GLUCOSE BLOOD TEST: CPT

## 2021-04-15 PROCEDURE — 96366 THER/PROPH/DIAG IV INF ADDON: CPT

## 2021-04-15 PROCEDURE — 84100 ASSAY OF PHOSPHORUS: CPT | Performed by: NURSE PRACTITIONER

## 2021-04-15 PROCEDURE — 63710000001 INSULIN DETEMIR PER 5 UNITS: Performed by: NURSE PRACTITIONER

## 2021-04-15 RX ORDER — DEXTROSE MONOHYDRATE 25 G/50ML
25 INJECTION, SOLUTION INTRAVENOUS
Status: DISCONTINUED | OUTPATIENT
Start: 2021-04-15 | End: 2021-04-15 | Stop reason: HOSPADM

## 2021-04-15 RX ORDER — NICOTINE POLACRILEX 4 MG
15 LOZENGE BUCCAL
Status: DISCONTINUED | OUTPATIENT
Start: 2021-04-15 | End: 2021-04-15 | Stop reason: HOSPADM

## 2021-04-15 RX ADMIN — ONDANSETRON 4 MG: 2 INJECTION INTRAMUSCULAR; INTRAVENOUS at 09:46

## 2021-04-15 RX ADMIN — INSULIN ASPART 5 UNITS: 100 INJECTION, SOLUTION INTRAVENOUS; SUBCUTANEOUS at 11:49

## 2021-04-15 RX ADMIN — POTASSIUM CHLORIDE, DEXTROSE MONOHYDRATE AND SODIUM CHLORIDE 150 ML/HR: 300; 5; 450 INJECTION, SOLUTION INTRAVENOUS at 01:26

## 2021-04-15 RX ADMIN — SODIUM PHOSPHATE, MONOBASIC, MONOHYDRATE 15 MMOL: 276; 142 INJECTION, SOLUTION INTRAVENOUS at 07:50

## 2021-04-15 RX ADMIN — INSULIN DETEMIR 10 UNITS: 100 INJECTION, SOLUTION SUBCUTANEOUS at 06:59

## 2021-04-15 NOTE — PROGRESS NOTES
Pharmacokinetic Consult - Vancomycin Dosing    Lona Dupree is a 56 y.o. female who has been consulted for Lovenox for VTE prophylaxis.     Scr improved with CrCl = 86.4 ml/min.     Will change regimen to Lovenox 40 mg daily beginning tomorrow.     Relevant clinical data and objective history reviewed:  Lab Results   Component Value Date/Time    CREATININE 0.64 04/15/2021 05:29 AM    CREATININE 0.69 04/14/2021 10:07 PM    CREATININE 1.01 (H) 04/14/2021 02:24 PM    BUN 14 04/15/2021 05:29 AM    BUN 17 04/14/2021 10:07 PM    BUN 24 (H) 04/14/2021 02:24 PM     Estimated Creatinine Clearance: 86.4 mL/min (by C-G formula based on SCr of 0.64 mg/dL).      Temp Readings from Last 3 Encounters:   04/15/21 98.1 °F (36.7 °C) (Oral)   02/22/21 98.1 °F (36.7 °C) (Temporal)   10/26/20 97.8 °F (36.6 °C) (Temporal)     Weight: 55.1 kg (121 lb 6.4 oz)    Pharmacy will continue to follow the patient’s results clinical progress daily.    Carmelo Curtis, CalinD

## 2021-04-15 NOTE — DISCHARGE SUMMARY
Lona Dupree  1965  [id number]    Hospitalists Discharge Summary    Date of Admission: 4/14/2021  Date of Discharge:  4/15/2021    Primary Discharge Diagnoses:  1.  Diabetic Ketoacidosis  2.  Nausea, Vomiting, and Diarrhea  3.  Lactic Acidosis  4.  CLINT    Secondary Discharge Diagnoses:  1.  GERD  2.  Howell's Esophagus    History of Present Illness (taken from H&P):  The patient is a pleasant 56-year-old female that presented to the emergency department secondary to 2 days of abdominal discomfort associated with small amounts of diarrhea that started Monday, then excessive nausea and vomiting that started Tuesday a.m. She feels that abdominal discomfort increased with n/v. She reports that she has not had any insulin since 4/9/2021 due to staying with a friend and forgot her medication, did not run out.  She reports several sick contacts at work with nausea and vomiting and thought that she had same until she became so much worse.  She reports admission for similar episode in 2019.  She has had chills but is unsure regarding fever.  She did eat out at [employer] 2 days ago.  She has been unable to tolerate anything by mouth in the past 2 days.     In ER, diagnostics revealed VBG with pH 7.006, HCO3 6.8, lactic acid 6.1, large acetone, glucose 834, creatinine 2.08, ALT 36, anion gap 39.9, WBC C 25.15, procalcitonin 0.66, A1c 8.9%.  CXR and CT abdomen and pelvis without contrast were essentially unremarkable for acute findings     She has a known history of DM2, HSV-2, Howell's esophagus/GERD, seasonal allergies.     She otherwise denies fever/headache/rhinorrhea/nasal congestion/lightheadedness/syncopal sensation/cough/soa/chest pain/recent illness/change in bladder habits/no weight change/bloody emesis or bloody stools/change in medications or any other new concerns.    Hospital Course:  Ms. Dupree was admitted to the ICU under the DKA protocol.  Infectious work-up was negative so abx therapy was stopped.   She clinically responded to therapy, and her diet was started.  No further nausea or vomiting noted.    PCP  Patient Care Team:  Jaiden Villagomez MD as PCP - General  Jaiden Villagomez MD as PCP - Family Medicine    Consults:   Consults     No orders found for last 30 day(s).          Operations and Procedures Performed:       CT Abdomen Pelvis Without Contrast    Result Date: 4/14/2021  Narrative: CT ABDOMEN AND PELVIS, NONCONTRAST, 4/14/2021 HISTORY: 56-year-old female in the ED complaining of one day history diffuse abdomen pain, nausea and vomiting. Diabetic ketoacidosis. Kidney failure. TECHNIQUE: CT imaging of the abdomen and pelvis without oral or IV contrast. Radiation dose reduction techniques included automated exposure control. Radiation audit for CT and nuclear cardiology exams in the last 12 months: 0. COMPARISON: *  CT abdomen/pelvis, 1/3/2020. ABDOMEN FINDINGS: Tiny nonobstructing calculus in the lower pole right kidney measuring 1 to 2 mm. Kidneys, ureters and bladder are otherwise negative. No evidence of upper urinary tract obstruction. Liver, pancreas and spleen are normal in size and appearance. No gallbladder distention or bile duct dilatation. Small bowel and colon are normal in caliber and appearance, as imaged. The appendix is normal. Fluid-filled stomach is nondistended. No distal esophageal dilatation. Normal caliber abdominal aorta. PELVIS FINDINGS: Urinary bladder, retroverted uterus, adnexal regions and rectum are within normal limits. No free pelvic fluid. No inguinal hernia. Lung base images show no active disease.     Impression: 1. No acute abnormality within the abdomen or pelvis. 2. Tiny nonobstructing right lower pole renal calculus. Signer Name: Leo Agrawal MD  Signed: 4/14/2021 5:41 AM  Workstation Name: [email or web address]-  [facility]    XR Chest 2 View    Result Date: 4/14/2021  Narrative: CHEST X-RAY, 4/14/2021  HISTORY:  56-year-old female in the  "ED complaining of one day history diffuse abdomen pain, nausea and vomiting. Diabetic ketoacidosis. Kidney failure.  TECHNIQUE: AP and lateral upright chest x-ray.  FINDINGS: The lungs are expanded and clear. No visible pulmonary edema, and no focal or multifocal pulmonary infiltrate. No pleural effusion. Heart size and pulmonary vascularity are normal.     Impression: Negative chest. Signer Name: Leo Agrawal MD  Signed: 4/14/2021 5:42 AM  [facility]station [facility]: [facility]  [facility]      Allergies:  is allergic to mobic [meloxicam].    James  reviewed    Discharge Medications:     Discharge Medications      Continue These Medications      Instructions Start Date   acetaminophen 325 MG tablet  Commonly known as: TYLENOL   650 mg, Oral, Every 4 Hours PRN, Over the counter      aspirin 81 MG chewable tablet   81 mg, Oral, Daily      BD Pen Needle Yari 2nd Gen 32G X 4 MM misc  Generic drug: Insulin Pen Needle   BD Ultra-Fine Yari Pen Needle 32 gauge x 5/32\"   USE ONCE DAILY WITH LANTUS TO INJECT INSULIN      cetirizine 10 MG tablet  Commonly known as: zyrTEC   10 mg, Oral, Daily      famotidine 40 MG tablet  Commonly known as: PEPCID   Every 12 Hours Scheduled      FLUoxetine 20 MG capsule  Commonly known as: PROzac   20 mg, Daily      Janumet XR  MG tablet  Generic drug: SITagliptin-metFORMIN HCl ER   2 tablets, Oral, Daily      Lantus SoloStar 100 UNIT/ML injection pen  Generic drug: Insulin Glargine   35 Units, Nightly      multivitamin with minerals tablet tablet   2 capsules, Oral, Daily      pantoprazole 40 MG EC tablet  Commonly known as: PROTONIX   40 mg, Oral, 2 times daily      promethazine 25 MG tablet  Commonly known as: PHENERGAN   25 mg, Oral, Every 6 Hours PRN      pseudoephedrine 120 MG 12 hr tablet  Commonly known as: SUDAFED   120 mg, Oral, Every 12 Hours PRN      sucralfate 1 g tablet  Commonly known as: Carafate   1 g, Oral, 4 Times Daily, Crush tablet in wax " paper mix with 1-2 tsp of water to make slurry and drink.      SUMAtriptan 100 MG tablet  Commonly known as: IMITREX   sumatriptan 100 mg tablet   take 1 po at onset of symptoms. can repeat x 1 in 2 hours if headache persists      traZODone 50 MG tablet  Commonly known as: DESYREL   50 mg, Oral, Nightly      valACYclovir 500 MG tablet  Commonly known as: VALTREX   500 mg, Oral, Daily         Stop These Medications    potassium chloride 20 MEQ CR tablet  Commonly known as: K-DUR,KLOR-CON            Last Lab Results:   Lab Results (most recent)     Procedure Component Value Units Date/Time    POC Glucose Once [[id number]]  (Abnormal) Collected: 04/15/21 1144    Specimen: Blood Updated: 04/15/21 1213     Glucose 218 mg/dL     Procalcitonin [[id number]]  (Abnormal) Collected: 04/15/21 0529    Specimen: Blood Updated: 04/15/21 0833     Procalcitonin 0.31 ng/mL     Narrative:      Results may be falsely decreased if patient taking Biotin.     POC Glucose Once [[id number]]  (Normal) Collected: 04/15/21 0746    Specimen: Blood Updated: 04/15/21 0752     Glucose 129 mg/dL     Phosphorus [[id number]]  (Abnormal) Collected: 04/15/21 0529    Specimen: Blood Updated: 04/15/21 0602     Phosphorus 1.4 mg/dL     Comprehensive Metabolic Panel [[id number]]  (Abnormal) Collected: 04/15/21 0529    Specimen: Blood Updated: 04/15/21 0600     Glucose 284 mg/dL      BUN 14 mg/dL      Creatinine 0.64 mg/dL      Sodium 135 mmol/L      Potassium 4.7 mmol/L      Comment: Specimen hemolyzed.  Results may be affected.        Chloride 110 mmol/L      CO2 19.4 mmol/L      Calcium 8.2 mg/dL      Total Protein 5.8 g/dL      Albumin 3.30 g/dL      ALT (SGPT) 21 U/L      Comment: Specimen hemolyzed.  Results may be affected.        AST (SGOT) 27 U/L      Comment: Specimen hemolyzed.  Results may be affected.        Alkaline Phosphatase 64 U/L      Total Bilirubin 0.4 mg/dL      eGFR Non African Amer 96 mL/min/1.73      Globulin 2.5 gm/dL      A/G Ratio 1.3  g/dL      BUN/Creatinine Ratio 21.9     Anion Gap 5.6 mmol/L     Narrative:      GFR Normal >60  Chronic Kidney Disease <60  Kidney Failure <15      Magnesium [[id number]]  (Normal) Collected: 04/15/21 0529    Specimen: Blood Updated: 04/15/21 0559     Magnesium 2.1 mg/dL     CBC & Differential [[id number]]  (Abnormal) Collected: 04/15/21 0529    Specimen: Blood Updated: 04/15/21 0542    Narrative:      The following orders were created for panel order CBC & Differential.  Procedure                               Abnormality         Status                     ---------                               -----------         ------                     CBC Auto Differential[[id number]]        Abnormal            Final result                 Please view results for these tests on the individual orders.    CBC Auto Differential [[id number]]  (Abnormal) Collected: 04/15/21 0529    Specimen: Blood Updated: 04/15/21 0542     WBC 16.26 10*3/mm3      RBC 3.65 10*6/mm3      Hemoglobin 11.6 g/dL      Hematocrit 33.3 %      MCV 91.2 fL      MCH 31.8 pg      MCHC 34.8 g/dL      RDW 13.2 %      RDW-SD 43.7 fl      MPV 10.6 fL      Platelets 204 10*3/mm3      Neutrophil % 82.2 %      Lymphocyte % 11.4 %      Monocyte % 5.6 %      Eosinophil % 0.2 %      Basophil % 0.2 %      Immature Grans % 0.4 %      Neutrophils, Absolute 13.38 10*3/mm3      Lymphocytes, Absolute 1.85 10*3/mm3      Monocytes, Absolute 0.91 10*3/mm3      Eosinophils, Absolute 0.03 10*3/mm3      Basophils, Absolute 0.03 10*3/mm3      Immature Grans, Absolute 0.06 10*3/mm3      nRBC 0.0 /100 WBC     Blood Culture - Blood, Arm, Left [[id number]] Collected: 04/14/21 0415    Specimen: Blood from Arm, Left Updated: 04/15/21 0501     Blood Culture No growth at 24 hours    Blood Culture - Blood, Arm, Right [[id number]] Collected: 04/14/21 0442    Specimen: Blood from Arm, Right Updated: 04/15/21 0501     Blood Culture No growth at 24 hours    Renal Function Panel [[id number]]   (Abnormal) Collected: 04/14/21 2207    Specimen: Blood Updated: 04/14/21 2234     Glucose 143 mg/dL      BUN 17 mg/dL      Creatinine 0.69 mg/dL      Sodium 138 mmol/L      Potassium 3.7 mmol/L      Chloride 110 mmol/L      CO2 19.6 mmol/L      Calcium 8.0 mg/dL      Albumin 3.60 g/dL      Phosphorus 1.8 mg/dL      Anion Gap 8.4 mmol/L      BUN/Creatinine Ratio 24.6     eGFR Non African Amer 88 mL/min/1.73     Narrative:      GFR Normal >60  Chronic Kidney Disease <60  Kidney Failure <15      Magnesium [[id number]]  (Normal) Collected: 04/14/21 2207    Specimen: Blood Updated: 04/14/21 2233     Magnesium 2.6 mg/dL     Procalcitonin [[id number]]  (Abnormal) Collected: 04/14/21 1611    Specimen: Blood Updated: 04/14/21 1644     Procalcitonin 0.43 ng/mL     Narrative:      Results may be falsely decreased if patient taking Biotin.     Renal Function Panel [[id number]]  (Abnormal) Collected: 04/14/21 1424    Specimen: Blood Updated: 04/14/21 1453     Glucose 157 mg/dL      BUN 24 mg/dL      Creatinine 1.01 mg/dL      Sodium 138 mmol/L      Potassium 4.0 mmol/L      Chloride 110 mmol/L      CO2 18.5 mmol/L      Calcium 8.4 mg/dL      Albumin 4.00 g/dL      Phosphorus 1.9 mg/dL      Anion Gap 9.5 mmol/L      BUN/Creatinine Ratio 23.8     eGFR Non African Amer 57 mL/min/1.73     Narrative:      GFR Normal >60  Chronic Kidney Disease <60  Kidney Failure <15      Osmolality, Urine - Urine, Clean Catch [[id number]] Collected: 04/14/21 0411    Specimen: Urine, Clean Catch Updated: 04/14/21 1321     Osmolality, Urine 344 mOsm/kg     Narrative:      Osmo Normal Reference Ranges:    Random:  mOsm/kg H2O, depending on fluid intake.  Random: >850 mOsm/kg H20, after 12 hour fluid restriction.    24 Hour: 300-900 mOsm/kg H2O.    Osmolality, Serum [[id number]]  (Abnormal) Collected: 04/14/21 0636    Specimen: Blood Updated: 04/14/21 1056     Osmolality 346 mOsm/kg     Creatinine, Urine, Random - Urine, Clean Catch [[id number]]  Collected: 04/14/21 0411    Specimen: Urine, Clean Catch Updated: 04/14/21 1046     Creatinine, Urine 50.1 mg/dL     Narrative:      Reference intervals for random urine have not been established.  Clinical usage is dependent upon physician's interpretation in combination with other laboratory tests.       Urinalysis, Microscopic Only - Urine, Catheter In/Out [[id number]]  (Abnormal) Collected: 04/14/21 0949    Specimen: Urine, Catheter In/Out Updated: 04/14/21 1042     RBC, UA 3-5 /HPF      WBC, UA None Seen /HPF      Bacteria, UA None Seen /HPF      Squamous Epithelial Cells, UA None Seen /HPF      Hyaline Casts, UA None Seen /LPF      Methodology Manual Light Microscopy    Urinalysis With Culture If Indicated - Urine, Catheter In/Out [[id number]]  (Abnormal) Collected: 04/14/21 0949    Specimen: Urine, Catheter In/Out Updated: 04/14/21 1022     Color, UA Yellow     Appearance, UA Clear     pH, UA 5.5     Specific Gravity, UA 1.025     Glucose,  mg/dL (2+)     Ketones, UA 80 mg/dL (3+)     Bilirubin, UA Small (1+)     Blood, UA Small (1+)     Protein, UA 30 mg/dL (1+)     Leuk Esterase, UA Negative     Nitrite, UA Negative     Urobilinogen, UA 0.2 E.U./dL    Phosphorus [[id number]]  (Normal) Collected: 04/14/21 0754    Specimen: Blood Updated: 04/14/21 0832     Phosphorus 2.7 mg/dL     Basic Metabolic Panel [[id number]]  (Abnormal) Collected: 04/14/21 0754    Specimen: Blood Updated: 04/14/21 0832     Glucose 443 mg/dL      BUN 33 mg/dL      Creatinine 1.49 mg/dL      Sodium 137 mmol/L      Potassium 4.2 mmol/L      Chloride 104 mmol/L      CO2 8.3 mmol/L      Calcium 8.1 mg/dL      eGFR Non African Amer 36 mL/min/1.73      BUN/Creatinine Ratio 22.1     Anion Gap 24.7 mmol/L     Narrative:      GFR Normal >60  Chronic Kidney Disease <60  Kidney Failure <15      Timed Lactic Acid, Reflex [[id number]]  (Abnormal) Collected: 04/14/21 0755    Specimen: Blood Updated: 04/14/21 0832     Lactate 2.6 mmol/L     TSH  [[id number]]  (Normal) Collected: 04/14/21 0410    Specimen: Blood Updated: 04/14/21 0813     TSH 1.130 uIU/mL     Urine Drug Screen - Urine, Clean Catch [[id number]]  (Abnormal) Collected: 04/14/21 0411    Specimen: Urine, Clean Catch Updated: 04/14/21 0712     THC, Screen, Urine Positive     Phencyclidine (PCP), Urine Negative     Cocaine Screen, Urine Negative     Methamphetamine, Ur Negative     Opiate Screen Negative     Amphetamine Screen, Urine Negative     Benzodiazepine Screen, Urine Negative     Tricyclic Antidepressants Screen Negative     Methadone Screen, Urine Negative     Barbiturates Screen, Urine Negative     Oxycodone Screen, Urine Negative     Propoxyphene Screen Negative     Buprenorphine, Screen, Urine Negative    Narrative:      Urine drug screen results are to be used for medical purposes only.  They are not to be used for legal purposes such as employment testing.  Negative results do not necessarily mean the complete absence of a subtance, but rather that the result is less than the cutoff for that substance.  Positive results are unconfirmed and considered Preliminary Positive.  [facility] does not automatically confirm Postitive Unconfirmed results.  The physician may request (order) an Unconfirmed Positive result to be sent out for confirmation.      Negative Thresholds for Drugs Screened:    THC screen, urine                          50 ng/ml  Phenycyclidine (PCP), urine                25 ng/ml  Cocaine screen, urine                     150 ng/ml  Methamphetamine, urine                    500 ng/ml  Opiate screen, urine                      100 ng/ml  Amphetamine screen, urine                 500 ng/ml  Benzodiazepine screen, urine              150 ng/ml  Tricyclic Antidepressants screen, urine   300 ng/ml  Methadone screen, urine                   200 ng/ml  Barbiturates screen, urine                200 ng/ml  Oxycodone screen, urine                   100  ng/ml  Propoxyphene screen, urine                300 ng/ml  Buprenorphine screen, urine                10 ng/ml    Ethanol [[id number]] Collected: 04/14/21 0609    Specimen: Blood Updated: 04/14/21 0703     Ethanol <10 mg/dL      Ethanol % <0.010 %     Glucose, Random [[id number]]  (Abnormal) Collected: 04/14/21 0609    Specimen: Blood Updated: 04/14/21 0659     Glucose 635 mg/dL     COVID-19,[employer] [facility]-HOU[facility],Nasal Swab No Transport Media 3-4 HR TAT - Swab, Nasal Cavity [[id number]]  (Normal) Collected: 04/14/21 0438    Specimen: Swab from Nasal Cavity Updated: 04/14/21 0532     COVID19 Not Detected    Narrative:      Fact sheet for providers: https://www.fda.gov/media/983707/download     Fact sheet for patients: https://www.fda.gov/media/412823/download    Test performed by PCR.    Consider negative results in combination with clinical observations, patient history, and epidemiological information.    Troponin [[id number]]  (Normal) Collected: 04/14/21 0410    Specimen: Blood Updated: 04/14/21 0458     Troponin T <0.010 ng/mL     Narrative:      Troponin T Reference Range:  <= 0.03 ng/mL-   Negative for AMI  >0.03 ng/mL-     Abnormal for myocardial necrosis.  Clinicians would have to utilize clinical acumen, EKG, Troponin and serial changes to determine if it is an Acute Myocardial Infarction or myocardial injury due to an underlying chronic condition.       Results may be falsely decreased if patient taking Biotin.      Hemoglobin A1c [[id number]]  (Abnormal) Collected: 04/14/21 0410    Specimen: Blood Updated: 04/14/21 0457     Hemoglobin A1C 8.90 %     Narrative:      Hemoglobin A1C Ranges:    Increased Risk for Diabetes  5.7% to 6.4%  Diabetes                     >= 6.5%  Diabetic Goal                < 7.0%    Comprehensive Metabolic Panel [[id number]]  (Abnormal) Collected: 04/14/21 0410    Specimen: Blood Updated: 04/14/21 0451     Glucose 834 mg/dL      BUN 35 mg/dL      Creatinine 2.08 mg/dL      Sodium 131  mmol/L      Potassium 4.3 mmol/L      Chloride 85 mmol/L      CO2 6.1 mmol/L      Calcium 9.5 mg/dL      Total Protein 8.5 g/dL      Albumin 5.00 g/dL      ALT (SGPT) 36 U/L      AST (SGOT) 26 U/L      Alkaline Phosphatase 109 U/L      Total Bilirubin 0.2 mg/dL      eGFR Non African Amer 25 mL/min/1.73      Globulin 3.5 gm/dL      A/G Ratio 1.4 g/dL      BUN/Creatinine Ratio 16.8     Anion Gap 39.9 mmol/L     Narrative:      GFR Normal >60  Chronic Kidney Disease <60  Kidney Failure <15      Lipase [[id number]]  (Normal) Collected: 04/14/21 0410    Specimen: Blood Updated: 04/14/21 0436     Lipase 30 U/L     Urinalysis, Microscopic Only - Urine, Clean Catch [[id number]]  (Abnormal) Collected: 04/14/21 0411    Specimen: Urine, Clean Catch Updated: 04/14/21 0436     RBC, UA 21-30 /HPF      WBC, UA 0-2 /HPF      Bacteria, UA 3+ /HPF      Squamous Epithelial Cells, UA 13-20 /HPF      Transitional Epithelial Cells, UA 0-2 /HPF      Hyaline Casts, UA 3-6 /LPF      Amorphous Crystals, UA Moderate/2+ /HPF      Mucus, UA Small/1+ /HPF      Methodology Manual Light Microscopy    Lactic Acid, Plasma [[id number]]  (Abnormal) Collected: 04/14/21 0414    Specimen: Blood Updated: 04/14/21 0435     Lactate 6.1 mmol/L     Ketone Bodies, Serum (Not performed at [facility]) [[id number]]  (Abnormal) Collected: 04/14/21 0411    Specimen: Blood Updated: 04/14/21 0435    Narrative:      The following orders were created for panel order Ketone Bodies, Serum (Not performed at [facility]).  Procedure                               Abnormality         Status                     ---------                               -----------         ------                     Acetone[[id number]]                      Abnormal            Final result                 Please view results for these tests on the individual orders.    Acetone [[id number]]  (Abnormal) Collected: 04/14/21 0411    Specimen: Blood Updated: 04/14/21 0435     Acetone Large    Blood Gas, Venous -  [[id number]]  (Abnormal) Collected: 04/14/21 0420    Specimen: Venous Blood Updated: 04/14/21 0423     Site OTHER     pH, Venous 7.006 pH Units      Comment: 85 Value below critical limit        pCO2, Venous 27.1 mm Hg      Comment: 84 Value below reference range        pO2, Venous 47.6 mm Hg      HCO3, Venous 6.8 mmol/L      Comment: 84 Value below reference range        Base Excess, Venous -23.3 mmol/L      Comment: 84 Value below reference range        O2 Saturation, Venous 71.2 %      Hemoglobin, Blood Gas 15.4 g/dL      Temperature 37.0 C      Barometric Pressure for Blood Gas 741 mmHg      Modality Room Air     Ventilator Mode NA     Notified Who RB AND V DR [id number]     Notified By [id number]     Notified Time 04/14/2021 04:27     Collected by [id number]     Comment: Meter: [id number]     :  [id number]       CBC & Differential [[id number]]  (Abnormal) Collected: 04/14/21 0410    Specimen: Blood Updated: 04/14/21 0423    Narrative:      The following orders were created for panel order CBC & Differential.  Procedure                               Abnormality         Status                     ---------                               -----------         ------                     CBC Auto Differential[[id number]]        Abnormal            Final result                 Please view results for these tests on the individual orders.    CBC Auto Differential [[id number]]  (Abnormal) Collected: 04/14/21 0410    Specimen: Blood Updated: 04/14/21 0423     WBC 25.15 10*3/mm3      RBC 4.74 10*6/mm3      Hemoglobin 14.8 g/dL      Hematocrit 45.8 %      MCV 96.6 fL      MCH 31.2 pg      MCHC 32.3 g/dL      RDW 13.0 %      RDW-SD 46.4 fl      MPV 10.8 fL      Platelets 365 10*3/mm3      Neutrophil % 90.0 %      Lymphocyte % 2.6 %      Monocyte % 6.2 %      Eosinophil % 0.0 %      Basophil % 0.2 %      Immature Grans % 1.0 %      Neutrophils, Absolute 22.63 10*3/mm3      Lymphocytes, Absolute 0.66 10*3/mm3      Monocytes,  Absolute 1.55 10*3/mm3      Eosinophils, Absolute 0.00 10*3/mm3      Basophils, Absolute 0.05 10*3/mm3      Immature Grans, Absolute 0.26 10*3/mm3      nRBC 0.1 /100 WBC     Urinalysis With Culture If Indicated - Urine, Clean Catch [[id number]]  (Abnormal) Collected: 04/14/21 0411    Specimen: Urine, Clean Catch Updated: 04/14/21 0423     Color, UA Yellow     Appearance, UA Clear     pH, UA <=5.0     Specific Gravity, UA 1.025     Glucose, UA >=1000 mg/dL (3+)     Ketones, UA 40 mg/dL (2+)     Bilirubin, UA Moderate (2+)     Blood, UA Moderate (2+)     Protein,  mg/dL (2+)     Leuk Esterase, UA Negative     Nitrite, UA Negative     Urobilinogen, UA 0.2 E.U./dL        Imaging Results (Most Recent)     Procedure Component Value Units Date/Time    XR Chest 2 View [[id number]] Collected: 04/14/21 0542     Updated: 04/14/21 0544    Narrative:      CHEST X-RAY, 4/14/2021      HISTORY:    56-year-old female in the ED complaining of one day history diffuse abdomen pain, nausea and vomiting. Diabetic ketoacidosis. Kidney failure.      TECHNIQUE:  AP and lateral upright chest x-ray.      FINDINGS:  The lungs are expanded and clear. No visible pulmonary edema, and no focal or multifocal pulmonary infiltrate. No pleural effusion. Heart size and pulmonary vascularity are normal.      Impression:      Negative chest.    Signer Name: Leo Agrawal MD   Signed: 4/14/2021 5:42 AM   Workstation [facility]: [email or web address]    [facility]    CT Abdomen Pelvis Without Contrast [[id number]] Collected: 04/14/21 0541     Updated: 04/14/21 0543    Narrative:      CT ABDOMEN AND PELVIS, NONCONTRAST, 4/14/2021    HISTORY:  56-year-old female in the ED complaining of one day history diffuse abdomen pain, nausea and vomiting. Diabetic ketoacidosis. Kidney failure.    TECHNIQUE:  CT imaging of the abdomen and pelvis without oral or IV contrast. Radiation dose reduction techniques included automated exposure control.  Radiation audit for CT and nuclear cardiology exams in the last 12 months: 0.    COMPARISON:  *  CT abdomen/pelvis, 1/3/2020.    ABDOMEN FINDINGS:  Tiny nonobstructing calculus in the lower pole right kidney measuring 1 to 2 mm. Kidneys, ureters and bladder are otherwise negative. No evidence of upper urinary tract obstruction.    Liver, pancreas and spleen are normal in size and appearance. No gallbladder distention or bile duct dilatation.    Small bowel and colon are normal in caliber and appearance, as imaged. The appendix is normal. Fluid-filled stomach is nondistended. No distal esophageal dilatation. Normal caliber abdominal aorta.    PELVIS FINDINGS:  Urinary bladder, retroverted uterus, adnexal regions and rectum are within normal limits. No free pelvic fluid. No inguinal hernia.    Lung base images show no active disease.      Impression:      1. No acute abnormality within the abdomen or pelvis.  2. Tiny nonobstructing right lower pole renal calculus.    Signer Name: Leo Agrawal MD   Signed: 4/14/2021 5:41 AM   [facility]    [facility]          PROCEDURES      Condition on Discharge:  Stable    Physical Exam at Discharge  Vital Signs  Temp:  [98 °F (36.7 °C)-98.6 °F (37 °C)] 98.1 °F (36.7 °C)  Heart Rate:  [73-92] 73  Resp:  [14-18] 18  BP: ()/(60-91) 109/70    Physical Exam:  Physical Exam   Constitutional: Patient appears well-developed and well-nourished and in no acute distress   Cardiovascular: Regular rate, regular rhythm, S1 normal and S2 normal.  Exam reveals no gallop and no friction rub.  No murmur heard.  Pulmonary/Chest: Lungs are clear to auscultation bilaterally. No respiratory distress. No wheezes. No rhonchi. No rales.   Abdominal: Soft. Bowel sounds are normal. There is no tenderness.   Musculoskeletal: Normal Muscle tone  Extremities: No edema. No asymmetry.  Neurological: Cranial nerves II-XII are grossly intact with no focal  deficits.    Discharge Disposition  Home    Visiting Nurse:    No     Home PT/OT:  No     Home Safety Evaluation:  No     DME  None    Discharge Diet:      Dietary Orders (From admission, onward)     Start     Ordered    04/15/21 0613  Diet Regular; Consistent Carbohydrate  Diet Effective Now     Comments: CC foods of choice   Question Answer Comment   Diet Texture / Consistency Regular    Common Modifiers Consistent Carbohydrate        04/15/21 0613                Activity at Discharge:  As tolerated    Follow-up Appointments  Future Appointments   Date Time Provider Department Center   6/17/2021  1:00 PM Patricia Garcia APRN [facility] None   4/18/2022  9:15 AM Amaya Tran MD [facility]       Test Results Pending at Discharge  None     Aditya Carlos MD  04/15/21  14:23 EDT

## 2021-04-15 NOTE — PROGRESS NOTES
Added levemir now, d/c insulin drip in 2 hours, begin accuchecks ac/hs with moderate dose SSI with breakfast

## 2021-04-15 NOTE — PLAN OF CARE
Problem: Adult Inpatient Plan of Care  Goal: Plan of Care Review  Outcome: Met  Goal: Patient-Specific Goal (Individualized)  Outcome: Met  Goal: Absence of Hospital-Acquired Illness or Injury  Outcome: Met  Intervention: Identify and Manage Fall Risk  Recent Flowsheet Documentation  Taken 4/15/2021 1300 by Tera Birmingham RN  Safety Promotion/Fall Prevention:   safety round/check completed   room organization consistent   nonskid shoes/slippers when out of bed   fall prevention program maintained   assistive device/personal items within reach  Taken 4/15/2021 1200 by Tera Birmingham RN  Safety Promotion/Fall Prevention:   safety round/check completed   room organization consistent   nonskid shoes/slippers when out of bed   fall prevention program maintained   assistive device/personal items within reach  Taken 4/15/2021 1100 by Tera Birmingham RN  Safety Promotion/Fall Prevention:   safety round/check completed   room organization consistent   nonskid shoes/slippers when out of bed   fall prevention program maintained   assistive device/personal items within reach  Taken 4/15/2021 1000 by Tera Birmingham RN  Safety Promotion/Fall Prevention:   safety round/check completed   room organization consistent   nonskid shoes/slippers when out of bed   fall prevention program maintained   assistive device/personal items within reach  Taken 4/15/2021 0900 by Tera Birmingham RN  Safety Promotion/Fall Prevention:   safety round/check completed   room organization consistent   nonskid shoes/slippers when out of bed   fall prevention program maintained   assistive device/personal items within reach  Taken 4/15/2021 0800 by Tera Birmingham RN  Safety Promotion/Fall Prevention:   safety round/check completed   room organization consistent   nonskid shoes/slippers when out of bed   fall prevention program maintained   assistive device/personal items within reach  Intervention: Prevent Skin Injury  Recent Flowsheet Documentation  Taken  4/15/2021 1300 by Tera Birmingham RN  Body Position: position changed independently  Taken 4/15/2021 1200 by Tera Birmingham RN  Body Position: position changed independently  Taken 4/15/2021 1100 by Tera Birmingham RN  Body Position: position changed independently  Taken 4/15/2021 1000 by Tera Birmingham RN  Body Position: position changed independently  Taken 4/15/2021 0900 by Tera Birmingham RN  Body Position: position changed independently  Taken 4/15/2021 0800 by Tera Birmingham RN  Body Position: position changed independently  Skin Protection: adhesive use limited  Intervention: Prevent and Manage VTE (venous thromboembolism) Risk  Recent Flowsheet Documentation  Taken 4/15/2021 1200 by Tera Birmingham RN  VTE Prevention/Management: dorsiflexion/plantar flexion performed  Taken 4/15/2021 0800 by Tera Birmingham RN  VTE Prevention/Management: dorsiflexion/plantar flexion performed  Intervention: Prevent Infection  Recent Flowsheet Documentation  Taken 4/15/2021 1300 by Tera Birmingham RN  Infection Prevention: single patient room provided  Taken 4/15/2021 1200 by Tera Birmingham RN  Infection Prevention: single patient room provided  Taken 4/15/2021 1100 by Tera Birmingham RN  Infection Prevention: personal protective equipment utilized  Taken 4/15/2021 0900 by Tera Birmingham RN  Infection Prevention: single patient room provided  Taken 4/15/2021 0800 by Tera Birmingham RN  Infection Prevention: single patient room provided  Goal: Optimal Comfort and Wellbeing  Outcome: Met  Intervention: Provide Person-Centered Care  Recent Flowsheet Documentation  Taken 4/15/2021 0800 by Tera Birmingham RN  Trust Relationship/Rapport: care explained  Goal: Readiness for Transition of Care  Outcome: Met   Goal Outcome Evaluation:

## 2021-04-16 ENCOUNTER — READMISSION MANAGEMENT (OUTPATIENT)
Dept: CALL CENTER | Facility: HOSPITAL | Age: 56
End: 2021-04-16

## 2021-04-16 NOTE — OUTREACH NOTE
Prep Survey      Responses   [facility] facility patient discharged from?  [facility]   Is LACE score < 7 ?  Yes   Emergency Room discharge w/ pulse ox?  No   Eligibility  Readm Mgmt   Discharge diagnosis  DKA   Does the patient have one of the following disease processes/diagnoses(primary or secondary)?  Other   Does the patient have Home health ordered?  No   Is there a DME ordered?  No   Comments regarding appointments  call for apmt   Prep survey completed?  Yes          Yarely Menendez RN

## 2021-04-16 NOTE — NURSING NOTE
Case Management Discharge Note      Final Note: D/c home    Provided Post Acute Provider List?: N/A  Provided Post Acute Provider Quality & Resource List?: N/A    Selected Continued Care - Discharged on 4/15/2021 Admission date: 4/14/2021 - Discharge disposition: Home or Self Care    Destination    No services have been selected for the patient.              Durable Medical Equipment    No services have been selected for the patient.              Dialysis/Infusion    No services have been selected for the patient.              Home Medical Care    No services have been selected for the patient.              Therapy    No services have been selected for the patient.              Community Resources    No services have been selected for the patient.                       Final Discharge Disposition Code: 01 - home or self-care

## 2021-04-19 LAB
BACTERIA SPEC AEROBE CULT: NORMAL
BACTERIA SPEC AEROBE CULT: NORMAL

## 2021-04-20 ENCOUNTER — READMISSION MANAGEMENT (OUTPATIENT)
Dept: CALL CENTER | Facility: HOSPITAL | Age: 56
End: 2021-04-20

## 2021-04-20 NOTE — OUTREACH NOTE
LAG < 7 Survey      Responses   [employer] facility patient discharged from?  [facility]   Does the patient have one of the following disease processes/diagnoses(primary or secondary)?  Other   [facility]AG <7 Attempt successful?  No   Unsuccessful attempts  Attempt 1          Yajaira Betancur RN

## 2021-04-21 ENCOUNTER — APPOINTMENT (OUTPATIENT)
Dept: CT IMAGING | Facility: HOSPITAL | Age: 56
End: 2021-04-21

## 2021-04-21 ENCOUNTER — APPOINTMENT (OUTPATIENT)
Dept: MRI IMAGING | Facility: HOSPITAL | Age: 56
End: 2021-04-21

## 2021-04-21 ENCOUNTER — APPOINTMENT (OUTPATIENT)
Dept: GENERAL RADIOLOGY | Facility: HOSPITAL | Age: 56
End: 2021-04-21

## 2021-04-21 ENCOUNTER — HOSPITAL ENCOUNTER (OUTPATIENT)
Facility: HOSPITAL | Age: 56
Setting detail: OBSERVATION
Discharge: HOME | End: 2021-04-22
Attending: EMERGENCY MEDICINE | Admitting: HOSPITALIST

## 2021-04-21 ENCOUNTER — READMISSION MANAGEMENT (OUTPATIENT)
Dept: CALL CENTER | Facility: HOSPITAL | Age: 56
End: 2021-04-21

## 2021-04-21 DIAGNOSIS — G45.9 TIA (TRANSIENT ISCHEMIC ATTACK): Primary | ICD-10-CM

## 2021-04-21 LAB
ALBUMIN SERPL-MCNC: 4.3 G/DL (ref 3.5–5.2)
ALBUMIN/GLOB SERPL: 1.7 G/DL
ALP SERPL-CCNC: 61 U/L (ref 39–117)
ALT SERPL W P-5'-P-CCNC: 17 U/L (ref 1–33)
AMPHET+METHAMPHET UR QL: NEGATIVE
AMPHETAMINES UR QL: NEGATIVE
ANION GAP SERPL CALCULATED.3IONS-SCNC: 9.6 MMOL/L (ref 5–15)
APTT PPP: 24.1 SECONDS (ref 24.3–38.1)
AST SERPL-CCNC: 14 U/L (ref 1–32)
BARBITURATES UR QL SCN: NEGATIVE
BASOPHILS # BLD AUTO: 0.02 10*3/MM3 (ref 0–0.2)
BASOPHILS NFR BLD AUTO: 0.3 % (ref 0–1.5)
BENZODIAZ UR QL SCN: NEGATIVE
BILIRUB SERPL-MCNC: 0.3 MG/DL (ref 0–1.2)
BUN SERPL-MCNC: 15 MG/DL (ref 6–20)
BUN/CREAT SERPL: 21.7 (ref 7–25)
BUPRENORPHINE SERPL-MCNC: NEGATIVE NG/ML
CALCIUM SPEC-SCNC: 9.2 MG/DL (ref 8.6–10.5)
CANNABINOIDS SERPL QL: POSITIVE
CHLORIDE SERPL-SCNC: 104 MMOL/L (ref 98–107)
CHOLEST SERPL-MCNC: 141 MG/DL (ref 0–200)
CO2 SERPL-SCNC: 28.4 MMOL/L (ref 22–29)
COCAINE UR QL: NEGATIVE
CREAT SERPL-MCNC: 0.69 MG/DL (ref 0.57–1)
DEPRECATED RDW RBC AUTO: 49.8 FL (ref 37–54)
EOSINOPHIL # BLD AUTO: 0.1 10*3/MM3 (ref 0–0.4)
EOSINOPHIL NFR BLD AUTO: 1.6 % (ref 0.3–6.2)
ERYTHROCYTE [DISTWIDTH] IN BLOOD BY AUTOMATED COUNT: 13.6 % (ref 12.3–15.4)
GFR SERPL CREATININE-BSD FRML MDRD: 88 ML/MIN/1.73
GLOBULIN UR ELPH-MCNC: 2.6 GM/DL
GLUCOSE BLDC GLUCOMTR-MCNC: 182 MG/DL (ref 70–130)
GLUCOSE BLDC GLUCOMTR-MCNC: 187 MG/DL (ref 70–130)
GLUCOSE BLDC GLUCOMTR-MCNC: 204 MG/DL (ref 70–130)
GLUCOSE BLDC GLUCOMTR-MCNC: 207 MG/DL (ref 70–130)
GLUCOSE BLDC GLUCOMTR-MCNC: 37 MG/DL (ref 70–130)
GLUCOSE BLDC GLUCOMTR-MCNC: 37 MG/DL (ref 70–130)
GLUCOSE BLDC GLUCOMTR-MCNC: 39 MG/DL (ref 70–130)
GLUCOSE BLDC GLUCOMTR-MCNC: 53 MG/DL (ref 70–130)
GLUCOSE SERPL-MCNC: 59 MG/DL (ref 65–99)
HCT VFR BLD AUTO: 38.7 % (ref 34–46.6)
HDLC SERPL-MCNC: 49 MG/DL (ref 40–60)
HGB BLD-MCNC: 12.2 G/DL (ref 12–15.9)
IMM GRANULOCYTES # BLD AUTO: 0.01 10*3/MM3 (ref 0–0.05)
IMM GRANULOCYTES NFR BLD AUTO: 0.2 % (ref 0–0.5)
INR PPP: 0.95 (ref 0.9–1.1)
LDLC SERPL CALC-MCNC: 71 MG/DL (ref 0–100)
LDLC/HDLC SERPL: 1.41 {RATIO}
LYMPHOCYTES # BLD AUTO: 0.9 10*3/MM3 (ref 0.7–3.1)
LYMPHOCYTES NFR BLD AUTO: 14.1 % (ref 19.6–45.3)
MCH RBC QN AUTO: 31 PG (ref 26.6–33)
MCHC RBC AUTO-ENTMCNC: 31.5 G/DL (ref 31.5–35.7)
MCV RBC AUTO: 98.5 FL (ref 79–97)
METHADONE UR QL SCN: NEGATIVE
MONOCYTES # BLD AUTO: 0.61 10*3/MM3 (ref 0.1–0.9)
MONOCYTES NFR BLD AUTO: 9.6 % (ref 5–12)
NEUTROPHILS NFR BLD AUTO: 4.73 10*3/MM3 (ref 1.7–7)
NEUTROPHILS NFR BLD AUTO: 74.2 % (ref 42.7–76)
OPIATES UR QL: NEGATIVE
OXYCODONE UR QL SCN: NEGATIVE
PCP UR QL SCN: NEGATIVE
PLATELET # BLD AUTO: 247 10*3/MM3 (ref 140–450)
PMV BLD AUTO: 10.3 FL (ref 6–12)
POTASSIUM SERPL-SCNC: 3.4 MMOL/L (ref 3.5–5.2)
PROPOXYPH UR QL: NEGATIVE
PROT SERPL-MCNC: 6.9 G/DL (ref 6–8.5)
PROTHROMBIN TIME: 12.4 SECONDS (ref 12.1–15)
QT INTERVAL: 416 MS
RBC # BLD AUTO: 3.93 10*6/MM3 (ref 3.77–5.28)
SARS-COV-2 RNA PNL SPEC NAA+PROBE: NOT DETECTED
SODIUM SERPL-SCNC: 142 MMOL/L (ref 136–145)
TRICYCLICS UR QL SCN: NEGATIVE
TRIGL SERPL-MCNC: 114 MG/DL (ref 0–150)
TROPONIN T SERPL-MCNC: <0.01 NG/ML (ref 0–0.03)
VLDLC SERPL-MCNC: 21 MG/DL (ref 5–40)
WBC # BLD AUTO: 6.37 10*3/MM3 (ref 3.4–10.8)

## 2021-04-21 PROCEDURE — 0 IOPAMIDOL PER 1 ML: Performed by: EMERGENCY MEDICINE

## 2021-04-21 PROCEDURE — 80306 DRUG TEST PRSMV INSTRMNT: CPT | Performed by: EMERGENCY MEDICINE

## 2021-04-21 PROCEDURE — 63710000001 INSULIN ASPART PER 5 UNITS: Performed by: HOSPITALIST

## 2021-04-21 PROCEDURE — 82962 GLUCOSE BLOOD TEST: CPT

## 2021-04-21 PROCEDURE — 80053 COMPREHEN METABOLIC PANEL: CPT | Performed by: EMERGENCY MEDICINE

## 2021-04-21 PROCEDURE — 70498 CT ANGIOGRAPHY NECK: CPT

## 2021-04-21 PROCEDURE — 96376 TX/PRO/DX INJ SAME DRUG ADON: CPT

## 2021-04-21 PROCEDURE — G0378 HOSPITAL OBSERVATION PER HR: HCPCS

## 2021-04-21 PROCEDURE — 99285 EMERGENCY DEPT VISIT HI MDM: CPT | Performed by: EMERGENCY MEDICINE

## 2021-04-21 PROCEDURE — 99284 EMERGENCY DEPT VISIT MOD MDM: CPT

## 2021-04-21 PROCEDURE — 93010 ELECTROCARDIOGRAM REPORT: CPT | Performed by: INTERNAL MEDICINE

## 2021-04-21 PROCEDURE — 99204 OFFICE O/P NEW MOD 45 MIN: CPT | Performed by: STUDENT IN AN ORGANIZED HEALTH CARE EDUCATION/TRAINING PROGRAM

## 2021-04-21 PROCEDURE — 71045 X-RAY EXAM CHEST 1 VIEW: CPT

## 2021-04-21 PROCEDURE — 87635 SARS-COV-2 COVID-19 AMP PRB: CPT | Performed by: EMERGENCY MEDICINE

## 2021-04-21 PROCEDURE — 94799 UNLISTED PULMONARY SVC/PX: CPT

## 2021-04-21 PROCEDURE — 85610 PROTHROMBIN TIME: CPT | Performed by: EMERGENCY MEDICINE

## 2021-04-21 PROCEDURE — 99285 EMERGENCY DEPT VISIT HI MDM: CPT

## 2021-04-21 PROCEDURE — 70450 CT HEAD/BRAIN W/O DYE: CPT

## 2021-04-21 PROCEDURE — 85730 THROMBOPLASTIN TIME PARTIAL: CPT | Performed by: EMERGENCY MEDICINE

## 2021-04-21 PROCEDURE — 80061 LIPID PANEL: CPT | Performed by: HOSPITALIST

## 2021-04-21 PROCEDURE — 93005 ELECTROCARDIOGRAM TRACING: CPT | Performed by: EMERGENCY MEDICINE

## 2021-04-21 PROCEDURE — 85025 COMPLETE CBC W/AUTO DIFF WBC: CPT | Performed by: EMERGENCY MEDICINE

## 2021-04-21 PROCEDURE — 70551 MRI BRAIN STEM W/O DYE: CPT

## 2021-04-21 PROCEDURE — 25010000002 ONDANSETRON PER 1 MG: Performed by: EMERGENCY MEDICINE

## 2021-04-21 PROCEDURE — 70496 CT ANGIOGRAPHY HEAD: CPT

## 2021-04-21 PROCEDURE — 96375 TX/PRO/DX INJ NEW DRUG ADDON: CPT

## 2021-04-21 PROCEDURE — 99219 PR INITIAL OBSERVATION CARE/DAY 50 MINUTES: CPT | Performed by: HOSPITALIST

## 2021-04-21 PROCEDURE — 96374 THER/PROPH/DIAG INJ IV PUSH: CPT

## 2021-04-21 PROCEDURE — C9803 HOPD COVID-19 SPEC COLLECT: HCPCS

## 2021-04-21 PROCEDURE — 84484 ASSAY OF TROPONIN QUANT: CPT | Performed by: EMERGENCY MEDICINE

## 2021-04-21 RX ORDER — SODIUM CHLORIDE 9 MG/ML
40 INJECTION, SOLUTION INTRAVENOUS AS NEEDED
Status: DISCONTINUED | OUTPATIENT
Start: 2021-04-21 | End: 2021-04-22 | Stop reason: HOSPADM

## 2021-04-21 RX ORDER — CETIRIZINE HYDROCHLORIDE 10 MG/1
10 TABLET ORAL DAILY
Status: DISCONTINUED | OUTPATIENT
Start: 2021-04-21 | End: 2021-04-22 | Stop reason: HOSPADM

## 2021-04-21 RX ORDER — SODIUM CHLORIDE 0.9 % (FLUSH) 0.9 %
10 SYRINGE (ML) INJECTION EVERY 12 HOURS SCHEDULED
Status: DISCONTINUED | OUTPATIENT
Start: 2021-04-21 | End: 2021-04-22 | Stop reason: HOSPADM

## 2021-04-21 RX ORDER — NICOTINE POLACRILEX 4 MG
15 LOZENGE BUCCAL
Status: DISCONTINUED | OUTPATIENT
Start: 2021-04-21 | End: 2021-04-22 | Stop reason: HOSPADM

## 2021-04-21 RX ORDER — SODIUM CHLORIDE 0.9 % (FLUSH) 0.9 %
10 SYRINGE (ML) INJECTION AS NEEDED
Status: DISCONTINUED | OUTPATIENT
Start: 2021-04-21 | End: 2021-04-22 | Stop reason: HOSPADM

## 2021-04-21 RX ORDER — CLOPIDOGREL BISULFATE 75 MG/1
300 TABLET ORAL ONCE
Status: COMPLETED | OUTPATIENT
Start: 2021-04-21 | End: 2021-04-21

## 2021-04-21 RX ORDER — DEXTROSE MONOHYDRATE 25 G/50ML
25 INJECTION, SOLUTION INTRAVENOUS ONCE
Status: COMPLETED | OUTPATIENT
Start: 2021-04-21 | End: 2021-04-21

## 2021-04-21 RX ORDER — TRAZODONE HYDROCHLORIDE 50 MG/1
50 TABLET ORAL NIGHTLY
Status: DISCONTINUED | OUTPATIENT
Start: 2021-04-21 | End: 2021-04-22 | Stop reason: HOSPADM

## 2021-04-21 RX ORDER — DEXTROSE MONOHYDRATE 25 G/50ML
INJECTION, SOLUTION INTRAVENOUS
Status: COMPLETED
Start: 2021-04-21 | End: 2021-04-21

## 2021-04-21 RX ORDER — FLUOXETINE HYDROCHLORIDE 20 MG/1
20 CAPSULE ORAL DAILY
Status: DISCONTINUED | OUTPATIENT
Start: 2021-04-21 | End: 2021-04-22 | Stop reason: HOSPADM

## 2021-04-21 RX ORDER — PANTOPRAZOLE SODIUM 40 MG/1
40 TABLET, DELAYED RELEASE ORAL
Status: DISCONTINUED | OUTPATIENT
Start: 2021-04-21 | End: 2021-04-22 | Stop reason: HOSPADM

## 2021-04-21 RX ORDER — CLOPIDOGREL BISULFATE 75 MG/1
75 TABLET ORAL DAILY
Status: DISCONTINUED | OUTPATIENT
Start: 2021-04-21 | End: 2021-04-22 | Stop reason: HOSPADM

## 2021-04-21 RX ORDER — VALACYCLOVIR HYDROCHLORIDE 500 MG/1
500 TABLET, FILM COATED ORAL DAILY
Status: DISCONTINUED | OUTPATIENT
Start: 2021-04-21 | End: 2021-04-22 | Stop reason: HOSPADM

## 2021-04-21 RX ORDER — SUCRALFATE 1 G/1
1 TABLET ORAL 4 TIMES DAILY
Status: DISCONTINUED | OUTPATIENT
Start: 2021-04-21 | End: 2021-04-22 | Stop reason: HOSPADM

## 2021-04-21 RX ORDER — DEXTROSE MONOHYDRATE 25 G/50ML
25 INJECTION, SOLUTION INTRAVENOUS
Status: DISCONTINUED | OUTPATIENT
Start: 2021-04-21 | End: 2021-04-22 | Stop reason: HOSPADM

## 2021-04-21 RX ORDER — ONDANSETRON 2 MG/ML
8 INJECTION INTRAMUSCULAR; INTRAVENOUS ONCE
Status: COMPLETED | OUTPATIENT
Start: 2021-04-21 | End: 2021-04-21

## 2021-04-21 RX ORDER — ASPIRIN 81 MG/1
324 TABLET, CHEWABLE ORAL ONCE
Status: COMPLETED | OUTPATIENT
Start: 2021-04-21 | End: 2021-04-21

## 2021-04-21 RX ADMIN — IOPAMIDOL 100 ML: 755 INJECTION, SOLUTION INTRAVENOUS at 09:00

## 2021-04-21 RX ADMIN — SUCRALFATE 1 G: 1 TABLET ORAL at 21:03

## 2021-04-21 RX ADMIN — ONDANSETRON 8 MG: 2 INJECTION INTRAMUSCULAR; INTRAVENOUS at 09:08

## 2021-04-21 RX ADMIN — ASPIRIN 81 MG CHEWABLE TABLET 324 MG: 81 TABLET CHEWABLE at 10:50

## 2021-04-21 RX ADMIN — DEXTROSE MONOHYDRATE 25 G: 25 INJECTION, SOLUTION INTRAVENOUS at 11:09

## 2021-04-21 RX ADMIN — DEXTROSE MONOHYDRATE 25 G: 25 INJECTION, SOLUTION INTRAVENOUS at 07:12

## 2021-04-21 RX ADMIN — PANTOPRAZOLE SODIUM 40 MG: 40 TABLET, DELAYED RELEASE ORAL at 17:37

## 2021-04-21 RX ADMIN — TRAZODONE HYDROCHLORIDE 50 MG: 50 TABLET ORAL at 21:03

## 2021-04-21 RX ADMIN — CLOPIDOGREL BISULFATE 300 MG: 75 TABLET ORAL at 10:50

## 2021-04-21 RX ADMIN — VALACYCLOVIR 500 MG: 500 TABLET, FILM COATED ORAL at 13:58

## 2021-04-21 RX ADMIN — FLUOXETINE 20 MG: 20 CAPSULE ORAL at 13:58

## 2021-04-21 RX ADMIN — SODIUM CHLORIDE, PRESERVATIVE FREE 10 ML: 5 INJECTION INTRAVENOUS at 21:03

## 2021-04-21 RX ADMIN — SUCRALFATE 1 G: 1 TABLET ORAL at 17:37

## 2021-04-21 RX ADMIN — DEXTROSE MONOHYDRATE 25 G: 25 INJECTION, SOLUTION INTRAVENOUS at 13:54

## 2021-04-21 RX ADMIN — SODIUM CHLORIDE, PRESERVATIVE FREE 10 ML: 5 INJECTION INTRAVENOUS at 14:01

## 2021-04-21 RX ADMIN — SODIUM CHLORIDE 1000 ML: 9 INJECTION, SOLUTION INTRAVENOUS at 11:06

## 2021-04-21 RX ADMIN — INSULIN ASPART 4 UNITS: 100 INJECTION, SOLUTION INTRAVENOUS; SUBCUTANEOUS at 17:41

## 2021-04-21 RX ADMIN — CETIRIZINE HYDROCHLORIDE 10 MG: 10 TABLET, FILM COATED ORAL at 13:58

## 2021-04-21 NOTE — ED NOTES
Dr Villela aware of pt's complaint and recent admission of DKA.  BG 53.  Tika wants to correct BG and recheck symptoms.     Pt is unable to hold right arm or leg up.  They fall straight to bed upon examination.  Pt is able to move right arm enough to text on phone.     Ariela Sher RN  04/21/21 0717       Ariela Sher RN  04/21/21 0722       Ariela Sher RN  04/21/21 0724

## 2021-04-21 NOTE — ED NOTES
Pt returns from radiology. Complaining of nausea and is dry heaving. zofran requested at this time      Kaylen Hodgson, RN  04/21/21 0908       Kaylen Hodgson, RN  04/21/21 0909

## 2021-04-21 NOTE — SIGNIFICANT NOTE
04/21/21 1437   OTHER   Discipline occupational therapist   Rehab Time/Intention   Session Not Performed other (see comments)  (OT will hold evaluation per neurologist 24 hour bed rest order. will evaluate pt tomorrow.)

## 2021-04-21 NOTE — ED PROVIDER NOTES
"Subjective   Raul Dupree is a 56-year-old white female who presents secondary to right-sided weakness.  Patient awakened at 5: 45 this morning.  She noted before getting into bed that her brain felt \"foggy\".  When patient attempted to stand up she was unable to bear weight on her right leg.  She slumped to the floor.  No injury sustained.  Patient also noted weakness and inability to use her right upper extremity.  Patient was able to call her son.  He in turn called EMS.  Patient was stable in transport.  On arrival patient had noted right upper and lower extremity deficits as above.  Accu-Chek was 53.  Patient is a type II diabetic.  1 amp of D50 was given.  Per Dr. Abrahan Coelho who was working the night shift strength seemed to improve after glucose administration.  Stroke protocol was initiated.  Patient presents for evaluation.    Last known well time 9:00 and 9:30 p.m. last night.    Patient was hospitalized here at [facility] last week secondary to DKA.  Blood sugar was over 800.  Patient states her blood sugar usually runs 145-160.       History provided by:  Patient      Review of Systems   Neurological: Positive for weakness and headaches.   All other systems reviewed and are negative.      Past Medical History:   Diagnosis Date   • Abnormal Pap smear of cervix     1 abnormal pap with normal f/u   • Howell esophagus    • Colon polyp    • Diabetes mellitus (CMS/HCC)    • Difficulty swallowing     at times   • Fibroid    • GERD (gastroesophageal reflux disease)    • HSV-2 (herpes simplex virus 2) infection 2017   • Menstrual disorder    • Migraines    • Rectal bleeding     bleeds with bowel movement   • Seasonal allergies        Allergies   Allergen Reactions   • Mobic [Meloxicam]      Blurred vision       Past Surgical History:   Procedure Laterality Date   • CARPAL TUNNEL RELEASE WITH CUBITAL TUNNEL RELEASE Right    •  SECTION      X2   • COLONOSCOPY N/A 2016    Procedure: COLONOSCOPY " "with polypectomy;  Surgeon: Maryan Kent MD;  Location: [facility] OR;  Service:    • COLONOSCOPY N/A 6/3/2020    Procedure: COLONOSCOPY;  Surgeon: Dmitri Fung MD;  Location: [facility] OR;  Service: Gastroenterology;  Laterality: N/A;  Poor prep, polyp   • ENDOMETRIAL ABLATION      thermachoice   • ENDOSCOPY N/A 6/3/2020    Procedure: ESOPHAGOGASTRODUODENOSCOPY;  Surgeon: Dmitri Fung MD;  Location: [facility] OR;  Service: Gastroenterology;  Laterality: N/A;  Reflux esophagitis, erosive gastritis, duodenitis   • HYSTEROSCOPY      AND ENDOMETRIAL  ABLATION   • NASAL SEPTUM SURGERY         • TUBAL ABDOMINAL LIGATION         Family History   Problem Relation Age of Onset   • Hypertension Mother    • Hyperlipidemia Mother    • Atrial fibrillation Mother    • Stroke Mother    • Heart attack Father    • Hypertension Father    • Diabetes Father    • Alcohol abuse Father    • Dementia Father    • Stroke Sister    • Crohn's disease Sister    • Heart attack Brother    • Hypertension Brother    • Stroke Brother    • Esophageal cancer Brother    • Alzheimer's disease Paternal Grandmother    • Diabetes Paternal Aunt    • Breast cancer Paternal Aunt    • Rheum arthritis Other    • Colon cancer Maternal Uncle    • Ovarian cancer Neg Hx    • Deep vein thrombosis Neg Hx    • Pulmonary embolism Neg Hx        Social History     Socioeconomic History   • Marital status:      Spouse name: Not on file   • Number of children: Not on file   • Years of education: Not on file   • Highest education level: Not on file   Tobacco Use   • Smoking status: Former Smoker     Quit date: 1995     Years since quittin.9   • Smokeless tobacco: Never Used   • Tobacco comment: Quit \" a long time ago\"    Substance and Sexual Activity   • Alcohol use: Yes     Comment: OCCASIONALLY   • Drug use: No   • Sexual activity: Yes     Partners: Male     Birth control/protection: Surgical, Post-menopausal     Comment: " BTL           Objective   Physical Exam  Vitals and nursing note reviewed.   Constitutional:       General: She is not in acute distress.     Appearance: Normal appearance. She is well-developed. She is not ill-appearing, toxic-appearing or diaphoretic.      Comments: 56-year-old white female sitting in bed.  Patient appears in good overall health.  Vital signs are unremarkable.  Patient holding cell phone in her right hand talking with family when I entered the room.  Patient is friendly and cooperative.   HENT:      Head: Normocephalic and atraumatic.      Right Ear: Tympanic membrane, ear canal and external ear normal.      Left Ear: Tympanic membrane, ear canal and external ear normal.      Nose: Nose normal.      Mouth/Throat:      Mouth: Mucous membranes are moist.      Pharynx: Oropharynx is clear.   Eyes:      Extraocular Movements: Extraocular movements intact.      Conjunctiva/sclera: Conjunctivae normal.      Pupils: Pupils are equal, round, and reactive to light.   Cardiovascular:      Rate and Rhythm: Normal rate and regular rhythm.      Pulses: Normal pulses.      Heart sounds: Normal heart sounds. No murmur heard.   No friction rub. No gallop.    Pulmonary:      Effort: Pulmonary effort is normal.      Breath sounds: Normal breath sounds.   Abdominal:      General: Bowel sounds are normal. There is no distension.      Palpations: Abdomen is soft.      Tenderness: There is no abdominal tenderness.   Musculoskeletal:         General: Normal range of motion.      Cervical back: Normal range of motion and neck supple.   Skin:     General: Skin is warm and dry.   Neurological:      Mental Status: She is alert and oriented to person, place, and time.      Cranial Nerves: Cranial nerves are intact.      Motor: Weakness (Patient able to lift right leg off of bed and hold for a few seconds.  However notable weakness as compared to left lower extremity.) present.      Coordination: Finger-Nose-Finger Test and  "Heel to Shin Test normal. Impaired rapid alternating movements (Rapid alternating movement notably slower in right upper extremity than left.).      Deep Tendon Reflexes: Reflexes are normal and symmetric.   Psychiatric:         Mood and Affect: Mood normal.         Behavior: Behavior normal.         Procedures       EKG 12-lead  Date 4/21/2021  Time 07: 29  Normal sinus rhythm.  Normal rate.  Normal axis.  Normal intervals.  Normal ST segments.  Normal T waves.  Normal EKG.    Today's EKG shows T wave flattening but resolution of sinus tachycardia when compared to EKG dated 4/14/2021    ED Course  ED Course as of Apr 21 1705 Wed Apr 21, 2021   0715 Patient brought back to exam room and upon initial nursing triage she was noted to have right-sided deficits but also a blood glucose of 53.  I was summoned to room and upon entering nursing staff was pushing dextrose. On my exam immediately after pushing dextrose patient no longer had any right upper extremity deficits, she did have 4+ out of 5 plantar flexion to right lower extremity, otherwise appears to have no focal neuro deficits.  Stroke protocol was initiated and patient will be taken to CT prior to completion of full history and physical.  Patient states that upon waking this morning she \"felt weird\" and fell when she attempted to get out of bed.  Of note, patient admitted last week to this facility for DKA.    [TD]   0741 Patient is able to lift right leg off of bed but has difficulty maintaining it.  Currently no weakness in right upper extremity however patient has trouble performing rapid alternating movements.  She can perform this slowly using her right upper extremity.  No facial involvement.  Left side is unremarkable.  Will review lab work and radiology results available thus far.    [SS]   5861 Have discussed this case with Dr. Hyde -stroke team.  He will perform teleexam of the patient and determine best appropriate course of action.    [SS] "   1030 Patient continues to have slight weakness right lower extremity.  CTA shows questionable tiny aneurysm.  Stroke team did not feel this was significant.  However they recommend patient be hospitalized for further stroke evaluation.  Giving aspirin and Plavix per Dr. Hyde.  Discussed at length with patient all results, diagnoses, indications for hospitalization.  Patient acknowledges understanding.  Discussed with Dr. RT Carlos.  He will hospitalize for further care.    [SS]   1105 Patient now displaying right facial droop.  Right eyelid notably droopy.  Also some mild is slightly asymmetric with right facial weakness.  Discussed with Dr. Bates.  He wants to obtain MRI as quickly as possible.  This should be started in approximately 20 minutes.  Giving 1 L normal saline per our discussion.  Also performing Accu-Chek.  Dr. Hyde is still comfortable keeping the patient here at [facility].    [SS]   1108 Accu-Chek shows a blood sugar of 39.  Patient's neurologic deficits may be hypoglycemia induced.  Giving another amp of D50    [SS]      ED Course User Index  [SS] Lucien Boss MD  [TD] Raffy Villela MD      Labs Reviewed   COMPREHENSIVE METABOLIC PANEL - Abnormal; Notable for the following components:       Result Value    Glucose 59 (*)     Potassium 3.4 (*)     All other components within normal limits    Narrative:     GFR Normal >60  Chronic Kidney Disease <60  Kidney Failure <15     CBC WITH AUTO DIFFERENTIAL - Abnormal; Notable for the following components:    MCV 98.5 (*)     Lymphocyte % 14.1 (*)     All other components within normal limits   APTT - Abnormal; Notable for the following components:    PTT 24.1 (*)     All other components within normal limits    Narrative:     PTT = The equivalent PTT values for the therapeutic range of heparin levels at 0.1 to 0.7 U/ml are 53 to 110 seconds.     URINE DRUG SCREEN - Abnormal; Notable for the following components:    THC, Screen,  Urine Positive (*)     All other components within normal limits    Narrative:     Urine drug screen results are to be used for medical purposes only.  They are not to be used for legal purposes such as employment testing.  Negative results do not necessarily mean the complete absence of a subtance, but rather that the result is less than the cutoff for that substance.  Positive results are unconfirmed and considered Preliminary Positive.  [facility] does not automatically confirm Postitive Unconfirmed results.  The physician may request (order) an Unconfirmed Positive result to be sent out for confirmation.      Negative Thresholds for Drugs Screened:    THC screen, urine                          50 ng/ml  Phenycyclidine (PCP), urine                25 ng/ml  Cocaine screen, urine                     150 ng/ml  Methamphetamine, urine                    500 ng/ml  Opiate screen, urine                      100 ng/ml  Amphetamine screen, urine                 500 ng/ml  Benzodiazepine screen, urine              150 ng/ml  Tricyclic Antidepressants screen, urine   300 ng/ml  Methadone screen, urine                   200 ng/ml  Barbiturates screen, urine                200 ng/ml  Oxycodone screen, urine                   100 ng/ml  Propoxyphene screen, urine                300 ng/ml  Buprenorphine screen, urine                10 ng/ml   POCT GLUCOSE FINGERSTICK - Abnormal; Notable for the following components:    Glucose 53 (*)     All other components within normal limits   POCT GLUCOSE FINGERSTICK - Abnormal; Notable for the following components:    Glucose 182 (*)     All other components within normal limits   POCT GLUCOSE FINGERSTICK - Abnormal; Notable for the following components:    Glucose 39 (*)     All other components within normal limits   POCT GLUCOSE FINGERSTICK - Abnormal; Notable for the following components:    Glucose 37 (*)     All other components within normal limits   POCT GLUCOSE  FINGERSTICK - Abnormal; Notable for the following components:    Glucose 204 (*)     All other components within normal limits   POCT GLUCOSE FINGERSTICK - Abnormal; Notable for the following components:    Glucose 37 (*)     All other components within normal limits   POCT GLUCOSE FINGERSTICK - Abnormal; Notable for the following components:    Glucose 187 (*)     All other components within normal limits   POCT GLUCOSE FINGERSTICK - Abnormal; Notable for the following components:    Glucose 207 (*)     All other components within normal limits   COVID-19,[facility] IN-HOUSE,NASAL SWAB NO TRANSPORT MEDIA 2 HR TAT - Normal    Narrative:     Fact sheet for providers: https://www.fda.gov/media/927968/download     Fact sheet for patients: https://www.fda.gov/media/027732/download    Test performed by PCR.    Consider negative results in combination with clinical observations, patient history, and epidemiological information.   PROTIME-INR - Normal    Narrative:     Therapeutic Ranges for INR: 2.0-3.0 (PT 20-30)                              2.5-3.5 (PT 25-34)   TROPONIN (IN-HOUSE) - Normal    Narrative:     Troponin T Reference Range:  <= 0.03 ng/mL-   Negative for AMI  >0.03 ng/mL-     Abnormal for myocardial necrosis.  Clinicians would have to utilize clinical acumen, EKG, Troponin and serial changes to determine if it is an Acute Myocardial Infarction or myocardial injury due to an underlying chronic condition.       Results may be falsely decreased if patient taking Biotin.     COVID PRE-OP / PRE-PROCEDURE SCREENING ORDER (NO ISOLATION)    Narrative:     The following orders were created for panel order COVID PRE-OP / PRE-PROCEDURE SCREENING ORDER (NO ISOLATION) - Swab, Nasal Cavity.  Procedure                               Abnormality         Status                     ---------                               -----------         ------                     COVID-19,[facility]...[[id number]]  Normal               Final result                 Please view results for these tests on the individual orders.   LIPID PANEL    Narrative:     Cholesterol Reference Ranges  (U.S. Department of Health and Human Services ATP III Classifications)    Desirable          <200 mg/dL  Borderline High    200-239 mg/dL  High Risk          >240 mg/dL      Triglyceride Reference Ranges  (U.S. Department of Health and Human Services ATP III Classifications)    Normal           <150 mg/dL  Borderline High  150-199 mg/dL  High             200-499 mg/dL  Very High        >500 mg/dL    HDL Reference Ranges  (U.S. Department of Health and Human Services ATP III Classifcations)    Low     <40 mg/dl (major risk factor for CHD)  High    >60 mg/dl ('negative' risk factor for CHD)        LDL Reference Ranges  (U.S. Department of Health and Human Services ATP III Classifcations)    Optimal          <100 mg/dL  Near Optimal     100-129 mg/dL  Borderline High  130-159 mg/dL  High             160-189 mg/dL  Very High        >189 mg/dL   POCT GLUCOSE FINGERSTICK   POCT GLUCOSE FINGERSTICK   POCT GLUCOSE FINGERSTICK   POCT GLUCOSE FINGERSTICK   POCT GLUCOSE FINGERSTICK   POCT GLUCOSE FINGERSTICK   CBC AND DIFFERENTIAL    Narrative:     The following orders were created for panel order CBC & Differential.  Procedure                               Abnormality         Status                     ---------                               -----------         ------                     CBC Auto Differential[[id number]]        Abnormal            Final result                 Please view results for these tests on the individual orders.     CT Abdomen Pelvis Without Contrast    Result Date: 4/14/2021  Narrative: CT ABDOMEN AND PELVIS, NONCONTRAST, 4/14/2021 HISTORY: 56-year-old female in the ED complaining of one day history diffuse abdomen pain, nausea and vomiting. Diabetic ketoacidosis. Kidney failure. TECHNIQUE: CT imaging of the abdomen and pelvis without oral or IV  contrast. Radiation dose reduction techniques included automated exposure control. Radiation audit for CT and nuclear cardiology exams in the last 12 months: 0. COMPARISON: *  CT abdomen/pelvis, 1/3/2020. ABDOMEN FINDINGS: Tiny nonobstructing calculus in the lower pole right kidney measuring 1 to 2 mm. Kidneys, ureters and bladder are otherwise negative. No evidence of upper urinary tract obstruction. Liver, pancreas and spleen are normal in size and appearance. No gallbladder distention or bile duct dilatation. Small bowel and colon are normal in caliber and appearance, as imaged. The appendix is normal. Fluid-filled stomach is nondistended. No distal esophageal dilatation. Normal caliber abdominal aorta. PELVIS FINDINGS: Urinary bladder, retroverted uterus, adnexal regions and rectum are within normal limits. No free pelvic fluid. No inguinal hernia. Lung base images show no active disease.     Impression: 1. No acute abnormality within the abdomen or pelvis. 2. Tiny nonobstructing right lower pole renal calculus. Signer Name: Leo Agrawal MD  Signed: 4/14/2021 5:41 AM  Workstation Name: [email or web address]  [facility]    XR Chest 2 View    Result Date: 4/14/2021  Narrative: CHEST X-RAY, 4/14/2021  HISTORY:  56-year-old female in the ED complaining of one day history diffuse abdomen pain, nausea and vomiting. Diabetic ketoacidosis. Kidney failure.  TECHNIQUE: AP and lateral upright chest x-ray.  FINDINGS: The lungs are expanded and clear. No visible pulmonary edema, and no focal or multifocal pulmonary infiltrate. No pleural effusion. Heart size and pulmonary vascularity are normal.     Impression: Negative chest. Signer Name: Leo Agrawal MD  Signed: 4/14/2021 5:42 AM  Workstation Name: [email or web address]  [facility]    MRI Brain Without Contrast    Result Date: 4/21/2021  Narrative: MRI Brain WO INDICATION: Transient ischemic attack, right-sided numbness and  weakness this morning TECHNIQUE: MRI of the brain without IV contrast. COMPARISON:  Same day CT head and CTA head and neck FINDINGS: There are no areas of restricted diffusion to suggest vascular territory infarct. No evidence of acute intracranial hemorrhage. No mass or mass effect is identified. No hydrocephalus. Basal cisterns are not effaced. No unusual parenchymal signal abnormalities. Midline structures are within normal limits. The major intracranial flow voids are maintained. The orbits are grossly within normal limits. Calvarial marrow signal is within normal limits.     Impression: 1.  Normal MRI of the brain without contrast. Signer Name: LU ENGEL MD  Signed: 4/21/2021 12:01 PM  Workstation Name: [email or web address]  [facility]    XR Chest 1 View    Result Date: 4/21/2021  Narrative: XR CHEST 1 VW-: 4/21/2021 7:36 AM  INDICATION: Right-sided weakness and headache. Symptoms began early this morning.  COMPARISON:  04/14/2021.  FINDINGS: Single Portable AP view(s) of the chest. Cardiac silhouette unremarkable. The vascularity is normal. Lungs are clear. Old healed granulomatous disease and mild pulmonary hyperinflation.      Impression:  1. Mild pulmonary hyperinflation, otherwise negative chest.  This report was finalized on 4/21/2021 8:01 AM by Dr. Asael Olsen MD.      CT Angiogram Carotids    Result Date: 4/21/2021  Narrative: CTA Neck, CTA Head HISTORY: Weakness of right leg and right arm upon waking this morning TECHNIQUE: CT angiogram of the head and neck with IV contrast. 3-D reconstructions were obtained and reviewed. Evaluation for a significant carotid arterial stenosis is based on NASCET criteria. Radiation dose reduction techniques included automated exposure control. Radiation audit for known CT and nuclear cardiology exams in the last 12 months: 2. COMPARISON: Same day CT head CTA NECK: Normal aortic arch branching pattern with no proximal great vessel stenosis. The vertebral  arteries are widely patent and codominant. Cervical carotid bifurcations demonstrate no evidence of hemodynamically significant stenoses by NASCET criteria. Cervical soft tissues are unremarkable. No acute osseous abnormality. CTA HEAD: Tiny 1 mm conically-shaped outpouching at the posterior right supraclinoid ICA near the expected origin location of the right posterior communicating artery. Most likely represents an infundibular origin or less likely a tiny aneurysm. Fetal-type configuration of the left PCA. No intracranial arterial vascular occlusion or high-grade stenosis of the anterior posterior circulation. Symmetric distal vascular opacification intracranially Other: Pleural parenchymal scarring at the lung apices. Degenerative changes of the spine.     Impression: 1.  No intracranial arterial vascular occlusion or high-grade stenosis. 2.  No flow-limiting stenosis of the arterial vasculature of the neck. 3.  Tiny 1 mm conically-shaped outpouching at the posterior right supraclinoid ICA near the expected origin location of the right posterior communicating artery. Most likely represents an infundibular origin or less likely a tiny aneurysm.. Signer Name: LU ENGEL MD  Signed: 4/21/2021 9:54 AM  Workstation Name: [email or web address]  [facility] [place] [place]    CT Angiogram Head    Result Date: 4/21/2021  Narrative: CTA Neck, CTA Head HISTORY: Weakness of right leg and right arm upon waking this morning TECHNIQUE: CT angiogram of the head and neck with IV contrast. 3-D reconstructions were obtained and reviewed. Evaluation for a significant carotid arterial stenosis is based on NASCET criteria. Radiation dose reduction techniques included automated exposure control. Radiation audit for known CT and nuclear cardiology exams in the last 12 months: 2. COMPARISON: Same day CT head CTA NECK: Normal aortic arch branching pattern with no proximal great vessel stenosis. The vertebral arteries are widely patent and  codominant. Cervical carotid bifurcations demonstrate no evidence of hemodynamically significant stenoses by NASCET criteria. Cervical soft tissues are unremarkable. No acute osseous abnormality. CTA HEAD: Tiny 1 mm conically-shaped outpouching at the posterior right supraclinoid ICA near the expected origin location of the right posterior communicating artery. Most likely represents an infundibular origin or less likely a tiny aneurysm. Fetal-type configuration of the left PCA. No intracranial arterial vascular occlusion or high-grade stenosis of the anterior posterior circulation. Symmetric distal vascular opacification intracranially Other: Pleural parenchymal scarring at the lung apices. Degenerative changes of the spine.     Impression: 1.  No intracranial arterial vascular occlusion or high-grade stenosis. 2.  No flow-limiting stenosis of the arterial vasculature of the neck. 3.  Tiny 1 mm conically-shaped outpouching at the posterior right supraclinoid ICA near the expected origin location of the right posterior communicating artery. Most likely represents an infundibular origin or less likely a tiny aneurysm.. Signer Name: LU ENGEL MD  Signed: 4/21/2021 9:54 AM  Workstation Name: [email or web address]  [facility]    CT Head Without Contrast Stroke Protocol    Result Date: 4/21/2021  Narrative: CT Head WO Code Stroke HISTORY: Stroke follow-up weakness right leg and right arm upon waking this morning patient in house for DKA, admission last week. Patient states does not feel right upon waking today. Hypoglycemia TECHNIQUE: Axial unenhanced head CT. Radiation dose reduction techniques included automated exposure control or exposure modulation based on body size. Count of known CT and cardiac nuc med studies performed in previous 12 months: 0. Time of scan: 7:21 AM COMPARISON: None. FINDINGS: No intracranial hemorrhage, mass, or infarct. No hydrocephalus or extra-axial fluid collection. Brain  parenchymal density is normal. The skull base, calvarium, and extracranial soft tissues are normal.     Impression: No acute intracranial process by CT. Consider MRI if indicated. NOTIFICATION: Critical Value/emergent results were called by telephone at the time of interpretation on 4/21/2021 7:35 AM to Isha, who verbally acknowledged these results. Signer Name: Peace Griffin MD  Signed: 4/21/2021 7:38 AM  Workstation Name: [facility]  [facility] [place]    My differential diagnosis includes but is not limited to generalized weakness, CVA, TIA, Bell's palsy, acute MI, GI bleed, urinary tract infection, systemic infections including sepsis, alcohol abuse, drug abuse including prescription and street drug.    My differential diagnosis for stroke includes but is not limited to hemorrhagic stroke, embolic stroke, ischemic stroke, toxins, neurologic disorders, intracerebral infections, Bell's palsy and seizure disorder       Labs Reviewed   COMPREHENSIVE METABOLIC PANEL - Abnormal; Notable for the following components:       Result Value    Glucose 59 (*)     Potassium 3.4 (*)     All other components within normal limits    Narrative:     GFR Normal >60  Chronic Kidney Disease <60  Kidney Failure <15     CBC WITH AUTO DIFFERENTIAL - Abnormal; Notable for the following components:    MCV 98.5 (*)     Lymphocyte % 14.1 (*)     All other components within normal limits   APTT - Abnormal; Notable for the following components:    PTT 24.1 (*)     All other components within normal limits    Narrative:     PTT = The equivalent PTT values for the therapeutic range of heparin levels at 0.1 to 0.7 U/ml are 53 to 110 seconds.     URINE DRUG SCREEN - Abnormal; Notable for the following components:    THC, Screen, Urine Positive (*)     All other components within normal limits    Narrative:     Urine drug screen results are to be used for medical purposes only.  They are not to be used for legal purposes such  as employment testing.  Negative results do not necessarily mean the complete absence of a subtance, but rather that the result is less than the cutoff for that substance.  Positive results are unconfirmed and considered Preliminary Positive.  [facility] does not automatically confirm Postitive Unconfirmed results.  The physician may request (order) an Unconfirmed Positive result to be sent out for confirmation.      Negative Thresholds for Drugs Screened:    THC screen, urine                          50 ng/ml  Phenycyclidine (PCP), urine                25 ng/ml  Cocaine screen, urine                     150 ng/ml  Methamphetamine, urine                    500 ng/ml  Opiate screen, urine                      100 ng/ml  Amphetamine screen, urine                 500 ng/ml  Benzodiazepine screen, urine              150 ng/ml  Tricyclic Antidepressants screen, urine   300 ng/ml  Methadone screen, urine                   200 ng/ml  Barbiturates screen, urine                200 ng/ml  Oxycodone screen, urine                   100 ng/ml  Propoxyphene screen, urine                300 ng/ml  Buprenorphine screen, urine                10 ng/ml   POCT GLUCOSE FINGERSTICK - Abnormal; Notable for the following components:    Glucose 53 (*)     All other components within normal limits   POCT GLUCOSE FINGERSTICK - Abnormal; Notable for the following components:    Glucose 182 (*)     All other components within normal limits   POCT GLUCOSE FINGERSTICK - Abnormal; Notable for the following components:    Glucose 39 (*)     All other components within normal limits   POCT GLUCOSE FINGERSTICK - Abnormal; Notable for the following components:    Glucose 37 (*)     All other components within normal limits   POCT GLUCOSE FINGERSTICK - Abnormal; Notable for the following components:    Glucose 204 (*)     All other components within normal limits   POCT GLUCOSE FINGERSTICK - Abnormal; Notable for the following  components:    Glucose 37 (*)     All other components within normal limits   POCT GLUCOSE FINGERSTICK - Abnormal; Notable for the following components:    Glucose 187 (*)     All other components within normal limits   POCT GLUCOSE FINGERSTICK - Abnormal; Notable for the following components:    Glucose 207 (*)     All other components within normal limits   COVID-19,[facility] IN-HOUSE,NASAL SWAB NO TRANSPORT MEDIA 2 HR TAT - Normal    Narrative:     Fact sheet for providers: https://www.fda.gov/media/509771/download     Fact sheet for patients: https://www.fda.gov/media/137716/download    Test performed by PCR.    Consider negative results in combination with clinical observations, patient history, and epidemiological information.   PROTIME-INR - Normal    Narrative:     Therapeutic Ranges for INR: 2.0-3.0 (PT 20-30)                              2.5-3.5 (PT 25-34)   TROPONIN (IN-HOUSE) - Normal    Narrative:     Troponin T Reference Range:  <= 0.03 ng/mL-   Negative for AMI  >0.03 ng/mL-     Abnormal for myocardial necrosis.  Clinicians would have to utilize clinical acumen, EKG, Troponin and serial changes to determine if it is an Acute Myocardial Infarction or myocardial injury due to an underlying chronic condition.       Results may be falsely decreased if patient taking Biotin.     COVID PRE-OP / PRE-PROCEDURE SCREENING ORDER (NO ISOLATION)    Narrative:     The following orders were created for panel order COVID PRE-OP / PRE-PROCEDURE SCREENING ORDER (NO ISOLATION) - Swab, Nasal Cavity.  Procedure                               Abnormality         Status                     ---------                               -----------         ------                     COVID-19,[facility]...[[id number]]  Normal              Final result                 Please view results for these tests on the individual orders.   LIPID PANEL    Narrative:     Cholesterol Reference Ranges  (U.S. Department of Health and Human  Services ATP III Classifications)    Desirable          <200 mg/dL  Borderline High    200-239 mg/dL  High Risk          >240 mg/dL      Triglyceride Reference Ranges  (U.S. Department of Health and Human Services ATP III Classifications)    Normal           <150 mg/dL  Borderline High  150-199 mg/dL  High             200-499 mg/dL  Very High        >500 mg/dL    HDL Reference Ranges  (U.S. Department of Health and Human Services ATP III Classifcations)    Low     <40 mg/dl (major risk factor for CHD)  High    >60 mg/dl ('negative' risk factor for CHD)        LDL Reference Ranges  (U.S. Department of Health and Human Services ATP III Classifcations)    Optimal          <100 mg/dL  Near Optimal     100-129 mg/dL  Borderline High  130-159 mg/dL  High             160-189 mg/dL  Very High        >189 mg/dL   POCT GLUCOSE FINGERSTICK   POCT GLUCOSE FINGERSTICK   POCT GLUCOSE FINGERSTICK   POCT GLUCOSE FINGERSTICK   POCT GLUCOSE FINGERSTICK   POCT GLUCOSE FINGERSTICK   CBC AND DIFFERENTIAL    Narrative:     The following orders were created for panel order CBC & Differential.  Procedure                               Abnormality         Status                     ---------                               -----------         ------                     CBC Auto Differential[[id number]]        Abnormal            Final result                 Please view results for these tests on the individual orders.     CT Abdomen Pelvis Without Contrast    Result Date: 4/14/2021  Narrative: CT ABDOMEN AND PELVIS, NONCONTRAST, 4/14/2021 HISTORY: 56-year-old female in the ED complaining of one day history diffuse abdomen pain, nausea and vomiting. Diabetic ketoacidosis. Kidney failure. TECHNIQUE: CT imaging of the abdomen and pelvis without oral or IV contrast. Radiation dose reduction techniques included automated exposure control. Radiation audit for CT and nuclear cardiology exams in the last 12 months: 0. COMPARISON: *  CT abdomen/pelvis,  1/3/2020. ABDOMEN FINDINGS: Tiny nonobstructing calculus in the lower pole right kidney measuring 1 to 2 mm. Kidneys, ureters and bladder are otherwise negative. No evidence of upper urinary tract obstruction. Liver, pancreas and spleen are normal in size and appearance. No gallbladder distention or bile duct dilatation. Small bowel and colon are normal in caliber and appearance, as imaged. The appendix is normal. Fluid-filled stomach is nondistended. No distal esophageal dilatation. Normal caliber abdominal aorta. PELVIS FINDINGS: Urinary bladder, retroverted uterus, adnexal regions and rectum are within normal limits. No free pelvic fluid. No inguinal hernia. Lung base images show no active disease.     Impression: 1. No acute abnormality within the abdomen or pelvis. 2. Tiny nonobstructing right lower pole renal calculus. Signer Name: Leo Agrawal MD  Signed: 4/14/2021 5:41 AM  Workstation Name: [facility]    XR Chest 2 View    Result Date: 4/14/2021  Narrative: CHEST X-RAY, 4/14/2021  HISTORY:  56-year-old female in the ED complaining of one day history diffuse abdomen pain, nausea and vomiting. Diabetic ketoacidosis. Kidney failure.  TECHNIQUE: AP and lateral upright chest x-ray.  FINDINGS: The lungs are expanded and clear. No visible pulmonary edema, and no focal or multifocal pulmonary infiltrate. No pleural effusion. Heart size and pulmonary vascularity are normal.     Impression: Negative chest. Signer Name: Leo Agrawal MD  Signed: 4/14/2021 5:42 AM  Workstation Name: [facility]    MRI Brain Without Contrast    Result Date: 4/21/2021  Narrative: MRI Brain WO INDICATION: Transient ischemic attack, right-sided numbness and weakness this morning TECHNIQUE: MRI of the brain without IV contrast. COMPARISON:  Same day CT head and CTA head and neck FINDINGS: There are no areas of restricted diffusion to suggest vascular  territory infarct. No evidence of acute intracranial hemorrhage. No mass or mass effect is identified. No hydrocephalus. Basal cisterns are not effaced. No unusual parenchymal signal abnormalities. Midline structures are within normal limits. The major intracranial flow voids are maintained. The orbits are grossly within normal limits. Calvarial marrow signal is within normal limits.     Impression: 1.  Normal MRI of the brain without contrast. Signer Name: LU ENGEL MD  Signed: 4/21/2021 12:01 PM  Workstation Name: [email or web address]  [facility]    XR Chest 1 View    Result Date: 4/21/2021  Narrative: XR CHEST 1 VW-: 4/21/2021 7:36 AM  INDICATION: Right-sided weakness and headache. Symptoms began early this morning.  COMPARISON:  04/14/2021.  FINDINGS: Single Portable AP view(s) of the chest. Cardiac silhouette unremarkable. The vascularity is normal. Lungs are clear. Old healed granulomatous disease and mild pulmonary hyperinflation.      Impression:  1. Mild pulmonary hyperinflation, otherwise negative chest.  This report was finalized on 4/21/2021 8:01 AM by Dr. Asael Olsen MD.      CT Angiogram Carotids    Result Date: 4/21/2021  Narrative: CTA Neck, CTA Head HISTORY: Weakness of right leg and right arm upon waking this morning TECHNIQUE: CT angiogram of the head and neck with IV contrast. 3-D reconstructions were obtained and reviewed. Evaluation for a significant carotid arterial stenosis is based on NASCET criteria. Radiation dose reduction techniques included automated exposure control. Radiation audit for known CT and nuclear cardiology exams in the last 12 months: 2. COMPARISON: Same day CT head CTA NECK: Normal aortic arch branching pattern with no proximal great vessel stenosis. The vertebral arteries are widely patent and codominant. Cervical carotid bifurcations demonstrate no evidence of hemodynamically significant stenoses by NASCET criteria. Cervical soft tissues are unremarkable.  No acute osseous abnormality. CTA HEAD: Tiny 1 mm conically-shaped outpouching at the posterior right supraclinoid ICA near the expected origin location of the right posterior communicating artery. Most likely represents an infundibular origin or less likely a tiny aneurysm. Fetal-type configuration of the left PCA. No intracranial arterial vascular occlusion or high-grade stenosis of the anterior posterior circulation. Symmetric distal vascular opacification intracranially Other: Pleural parenchymal scarring at the lung apices. Degenerative changes of the spine.     Impression: 1.  No intracranial arterial vascular occlusion or high-grade stenosis. 2.  No flow-limiting stenosis of the arterial vasculature of the neck. 3.  Tiny 1 mm conically-shaped outpouching at the posterior right supraclinoid ICA near the expected origin location of the right posterior communicating artery. Most likely represents an infundibular origin or less likely a tiny aneurysm.. Signer Name: LU ENGEL MD  Signed: 4/21/2021 9:54 AM  Workstation Name: [email or web address]  [facility] [place]    CT Angiogram Head    Result Date: 4/21/2021  Narrative: CTA Neck, CTA Head HISTORY: Weakness of right leg and right arm upon waking this morning TECHNIQUE: CT angiogram of the head and neck with IV contrast. 3-D reconstructions were obtained and reviewed. Evaluation for a significant carotid arterial stenosis is based on NASCET criteria. Radiation dose reduction techniques included automated exposure control. Radiation audit for known CT and nuclear cardiology exams in the last 12 months: 2. COMPARISON: Same day CT head CTA NECK: Normal aortic arch branching pattern with no proximal great vessel stenosis. The vertebral arteries are widely patent and codominant. Cervical carotid bifurcations demonstrate no evidence of hemodynamically significant stenoses by NASCET criteria. Cervical soft tissues are unremarkable. No acute osseous abnormality. CTA  HEAD: Tiny 1 mm conically-shaped outpouching at the posterior right supraclinoid ICA near the expected origin location of the right posterior communicating artery. Most likely represents an infundibular origin or less likely a tiny aneurysm. Fetal-type configuration of the left PCA. No intracranial arterial vascular occlusion or high-grade stenosis of the anterior posterior circulation. Symmetric distal vascular opacification intracranially Other: Pleural parenchymal scarring at the lung apices. Degenerative changes of the spine.     Impression: 1.  No intracranial arterial vascular occlusion or high-grade stenosis. 2.  No flow-limiting stenosis of the arterial vasculature of the neck. 3.  Tiny 1 mm conically-shaped outpouching at the posterior right supraclinoid ICA near the expected origin location of the right posterior communicating artery. Most likely represents an infundibular origin or less likely a tiny aneurysm.. Signer Name: LU ENGEL MD  Signed: 4/21/2021 9:54 AM  Workstation Name: [email or web address]  [facility]    CT Head Without Contrast Stroke Protocol    Result Date: 4/21/2021  Narrative: CT Head WO Code Stroke HISTORY: Stroke follow-up weakness right leg and right arm upon waking this morning patient in house for DKA, admission last week. Patient states does not feel right upon waking today. Hypoglycemia TECHNIQUE: Axial unenhanced head CT. Radiation dose reduction techniques included automated exposure control or exposure modulation based on body size. Count of known CT and cardiac nuc med studies performed in previous 12 months: 0. Time of scan: 7:21 AM COMPARISON: None. FINDINGS: No intracranial hemorrhage, mass, or infarct. No hydrocephalus or extra-axial fluid collection. Brain parenchymal density is normal. The skull base, calvarium, and extracranial soft tissues are normal.     Impression: No acute intracranial process by CT. Consider MRI if indicated. NOTIFICATION: Critical  Value/emergent results were called by telephone at the time of interpretation on 4/21/2021 7:35 AM to Isha, who verbally acknowledged these results. Signer Name: Peace Griffin MD  Signed: 4/21/2021 7:38 AM  Workstation Name: [email or web address]  [facility]    Final diagnoses:   TIA (transient ischemic attack)       ED Disposition  ED Disposition     ED Disposition Condition Comment    Decision to Admit  Level of Care: Telemetry [5]   Admitting Physician: YING PROCTOR [[id number]]   Attending Physician: YING PROCTOR [[id number]]   Patient Class: Observation [104]            No follow-up provider specified.       Medication List      Changed    sucralfate 1 g tablet  Commonly known as: Carafate  Take 1 tablet by mouth 4 (Four) Times a Day. Crush tablet in wax paper mix with 1-2 tsp of water to make slurry and drink.  What changed: when to take this             Lucien Boss MD  04/21/21 1035       Lucien Boss MD  04/21/21 1703

## 2021-04-21 NOTE — ED NOTES
Pt resting comfortably with eyes closed. States she is feeling much better     Kaylen Hodgson RN  04/21/21 0958

## 2021-04-21 NOTE — ED NOTES
conrad rose daughter, [phone] called for update.  Confirmed pt was on site and stable at this time but will need to ask pt permission to give further info to family.  Will let primary RN know daughter called.     Keesha Ya RN  04/21/21 0903

## 2021-04-21 NOTE — OUTREACH NOTE
LAG < 7 Survey      Responses   [facility] facility patient discharged from?  [place]   Does the patient have one of the following disease processes/diagnoses(primary or secondary)?  Other   BHLAG <7 Attempt successful?  No   Unsuccessful attempts  Attempt 2   Rescheduled  Revoked   Revoke  Readmitted          Willa Muro RN

## 2021-04-21 NOTE — SIGNIFICANT NOTE
04/21/21 1421   OTHER   Discipline physical therapist   Rehab Time/Intention   Session Not Performed other (see comments)  (PT: Will hold PT evaluation per neurologist orders of bed rest x 24 hours. Will check back tomorrow.)

## 2021-04-21 NOTE — PLAN OF CARE
Goal Outcome Evaluation:  Plan of Care Reviewed With: patient, daughter  Progress: improving  Outcome Summary: C/o H/A; glucose levels fluctuating; D50 given for glucose 37; tingling of right foot and right arm remain (denies numbness); telemetry shows SR

## 2021-04-21 NOTE — CONSULTS
Stroke Consult Note    Patient Name: Lona Dupree   MRN: [id number]  Age: 56 y.o.  Sex: female  : 1965    Primary Care Physician: Jaiden Villagomez MD  Referring Physician:  No ref. provider found    TIME STROKE TEAM CALLED: 0815 EST     TIME PATIENT SEEN: 08 EST    Handedness: R  Race: W    Chief Complaint/Reason for Consultation: Right-sided weakness    Subjective .  HPI:   This is 56-year-old with history of hypertension, tobacco abuse, diabetes type 2 who was in her usual state of health until last night before she went to bed, who woke up with right-sided weakness, suffered a fall and presents to the emergency department.     She had multiple episodes of hypoglycemia but never had weakness associated with it.    /62  No h/o stroke, TIA, afib  At baseline, patient     Last Known Normal Date/Time:  @ 9:30 PM   EST     Review of Systems   Constitutional: No fatigue  HENT: Negative for nosebleeds and rhinorrhea.    Eyes: Negative for redness.   Respiratory: Negative for cough.    Gastrointestinal: Negative for anal bleeding.   Endocrine: Negative for polydipsia.   Genitourinary: Negative for enuresis and urgency.   Musculoskeletal: Negative for joint swelling.   Neurological: Negative for tremors.   Psychiatric/Behavioral: Negative for hallucinations.     Temp:  [98.1 °F (36.7 °C)] 98.1 °F (36.7 °C)  Heart Rate:  [73-85] 79  Resp:  [16] 16  BP: (110-119)/(62-72) 117/64          GEN: NAD, pleasant, cooperative  Eyes-show anicteric sclera, moist conjunctiva with no lid lag, no redness  Neck-trachea midline.  There is no thyromegaly.  ENMT-oropharynx clear with moist mucous membranes and good dentition.  Skin-no rash, lesions or ulcers.  Cardiovascular exam-no pedal edema, regular rate and rhythm.  CHEST: No signs of resp distress, on room air  Abdomen-no abdominal distention, nontender.  Psychiatric exam-alert oriented x3 with intact judgment and insight      NEURO    MENTAL STATUS:  AAOx3, memory intact, fund of knowledge appropriate    LANG/SPEECH: Naming and repetition intact, fluent, follows 3-step commands    CRANIAL NERVES:      II: Pupils equal and reactive, no RAPD, no VF deficits, fundus(not done)      III, IV, VI: EOM intact, no gaze preference or deviation, no nystagmus.      V: normal sensation in V1, V2, and V3 segments bilaterally      VII: no asymmetry, no nasolabial fold flattening      VIII: normal hearing to speech      IX, X: normal palatal elevation, no uvular deviation      XI: 5/5 head turn and 5/5 shoulder shrug bilaterally      XII: midline tongue protrusion    MOTOR:  Mild drift on the right upper and right lower extremity(some of it is effort related/secondary to pain as patient suffered a fall]    REFLEXES:  no Glass's, no clonus    SENSORY:    Slightly decreased sensation on the right face and the right arm to mild touch    COORDINATION: Normal finger to nose and heel to shin, no tremor, no dysmetria    STATION: Not assessed due to patient condition    GAIT: Not assessed due to patient condition  Acute Stroke Data    Alteplase (tPA) Inclusion / Exclusion Criteria    Time: 08:55 EDT  Person Administering Scale: Alonzo Bello MD    Inclusion Criteria  [x]   18 years of age or greater   []   Onset of symptoms < 4.5 hours before beginning treatment (stroke onset = time patient was last seen well or without symptoms).   []   Diagnosis of acute ischemic stroke causing measurable disabling deficit (Complete Hemianopia, Any Aphasia, Visual or Sensory Extinction, Any weakness limiting sustained effort against gravity)   []   Any remaining deficit considered potentially disabling in view of patient and practitioner   Exclusion criteria (Do not proceed with Alteplase if any are checked under exclusion criteria)  [x]   Onset unknown or GREATER than 4.5 hours   []   ICH on CT/MRI   []   CT demonstrates hypodensity representing acute or subacute infarct   []   Significant  head trauma or prior stroke in the previous 3 months   []   Symptoms suggestive of subarachnoid hemorrhage   []   History of un-ruptured intracranial aneurysm GREATER than 10 mm   []   Recent intracranial or intraspinal surgery within the last 3 months   []   Arterial puncture at a non-compressible site in the previous 7 days   []   Active internal bleeding   []   Acute bleeding tendency   []   Platelet count LESS than 100,000 for known hematological diseases such as leukemia, thrombocytopenia or chronic cirrhosis   []   Current use of anticoagulant with INR GREATER than 1.7 or PT GREATER than 15 seconds, aPTT GREATER than 40 seconds   []   Heparin received within 48 hours, resulting in abnormally elevated aPTT GREATER than upper limit of normal   []   Current use of direct thrombin inhibitors or direct factor Xa inhibitors in the past 48 hours   []   Elevated blood pressure refractory to treatment (systolic GREATER than 185 mm/Hg or diastolic  GREATER than 110 mm/Hg   []   Suspected infective endocarditis and aortic arch dissection   []   Current use of therapeutic treatment dose of low-molecular-weight heparin (LMWH) within the previous 24 hours   []   Structural GI malignancy or bleed   Relative exclusion for all patients  []   Only minor non-disabling symptoms   []   Pregnancy   []   Seizure at onset with postictal residual neurological impairments   []   Major surgery or previous trauma within past 14 days   []   History of previous spontaneous ICH, intracranial neoplasm, or AV malformation   []   Postpartum (within previous 14 days)   []   Recent GI or urinary tract hemorrhage (within previous 21 days)   []   Recent acute MI (within previous 3 months)   []   History of un-ruptured intracranial aneurysm LESS than 10 mm   []   History of ruptured intracranial aneurysm   []   Blood glucose LESS than 50 mg/dL (2.7 mmol/L)   []   Dural puncture within the last 7 days   []   Known GREATER than 10 cerebral  microbleeds   Additional exclusions for patients with symptoms onset between 3 and 4.5 hours.  []   Age > 80.   []   On any anticoagulants regardless of INR  >>> Warfarin (Coumadin), Heparin, Enoxaparin (Lovenox), fondaparinux (Arixtra), bivalirudin (Angiomax), Argatroban, dabigatran (Pradaxa), rivaroxaban (Xarelto), or apixaban (Eliquis)   []   Severe stroke (NIHSS > 25).   []   History of BOTH diabetes and previous ischemic stroke.   []   The risks and benefits have been discussed with the patient or family related to the administration of IV Alteplase for stroke symptoms.   []   I have discussed and reviewed the patient's case and imaging with the attending prior to IV Alteplase.    Time Alteplase administered       Past Medical History:   Diagnosis Date   • Abnormal Pap smear of cervix     1 abnormal pap with normal f/u   • Howell esophagus    • Colon polyp    • Diabetes mellitus (CMS/HCC)    • Difficulty swallowing     at times   • Fibroid    • GERD (gastroesophageal reflux disease)    • HSV-2 (herpes simplex virus 2) infection 2017   • Menstrual disorder    • Migraines    • Rectal bleeding     bleeds with bowel movement   • Seasonal allergies      Past Surgical History:   Procedure Laterality Date   • CARPAL TUNNEL RELEASE WITH CUBITAL TUNNEL RELEASE Right    •  SECTION      X2   • COLONOSCOPY N/A 2016    Procedure: COLONOSCOPY with polypectomy;  Surgeon: Maryan Kent MD;  Location: [facility] OR;  Service:    • COLONOSCOPY N/A 6/3/2020    Procedure: COLONOSCOPY;  Surgeon: Dmitri Fung MD;  Location: [facility] OR;  Service: Gastroenterology;  Laterality: N/A;  Poor prep, polyp   • ENDOMETRIAL ABLATION      thermachoice   • ENDOSCOPY N/A 6/3/2020    Procedure: ESOPHAGOGASTRODUODENOSCOPY;  Surgeon: Dmitri Fung MD;  Location: [facility] OR;  Service: Gastroenterology;  Laterality: N/A;  Reflux esophagitis, erosive gastritis, duodenitis   • HYSTEROSCOPY      AND  "ENDOMETRIAL  ABLATION   • NASAL SEPTUM SURGERY         • TUBAL ABDOMINAL LIGATION       Family History   Problem Relation Age of Onset   • Hypertension Mother    • Hyperlipidemia Mother    • Atrial fibrillation Mother    • Stroke Mother    • Heart attack Father    • Hypertension Father    • Diabetes Father    • Alcohol abuse Father    • Dementia Father    • Stroke Sister    • Crohn's disease Sister    • Heart attack Brother    • Hypertension Brother    • Stroke Brother    • Esophageal cancer Brother    • Alzheimer's disease Paternal Grandmother    • Diabetes Paternal Aunt    • Breast cancer Paternal Aunt    • Rheum arthritis Other    • Colon cancer Maternal Uncle    • Ovarian cancer Neg Hx    • Deep vein thrombosis Neg Hx    • Pulmonary embolism Neg Hx      Social History     Socioeconomic History   • Marital status:      Spouse name: Not on file   • Number of children: Not on file   • Years of education: Not on file   • Highest education level: Not on file   Tobacco Use   • Smoking status: Former Smoker     Quit date: 1995     Years since quittin.9   • Smokeless tobacco: Never Used   • Tobacco comment: Quit \" a long time ago\"    Substance and Sexual Activity   • Alcohol use: Yes     Comment: OCCASIONALLY   • Drug use: No   • Sexual activity: Yes     Partners: Male     Birth control/protection: Surgical, Post-menopausal     Comment: BTL     Allergies   Allergen Reactions   • Mobic [Meloxicam]      Blurred vision     Prior to Admission medications    Medication Sig Start Date End Date Taking? Authorizing Provider   acetaminophen (TYLENOL) 325 MG tablet Take 2 tablets by mouth Every 4 (Four) Hours As Needed for Mild Pain . Over the counter 20   Loreto Morrow MD   aspirin 81 MG chewable tablet Chew 81 mg Daily.    Provider, MD Carolina   cetirizine (ZyrTEC) 10 MG tablet Take 10 mg by mouth daily    Provider, MD Carolina   famotidine (PEPCID) 40 MG tablet Every 12 (Twelve) " "Hours.    Carolina Cat MD   FLUoxetine (PROzac) 20 MG capsule 20 mg Daily. 10/15/20   Carolina Cat MD   Insulin Pen Needle (BD Pen Needle Yari 2nd Gen) 32G X 4 MM misc BD Ultra-Fine Yari Pen Needle 32 gauge x 5/32\"   USE ONCE DAILY WITH LANTUS TO INJECT INSULIN    Carolina Cat MD   LANTUS SOLOSTAR 100 UNIT/ML injection pen 35 Units Every Night. 2/6/20   Carolina Cat MD   Multiple Vitamins-Minerals (MULTI-BETIC PO) Take 2 capsules by mouth Daily.    Carolina Cat MD   pantoprazole (PROTONIX) 40 MG EC tablet Take 1 tablet by mouth 2 (two) times a day. 8/27/20   Patricia Garcia APRN   promethazine (PHENERGAN) 25 MG tablet Take 25 mg by mouth every 6 (six) hours as needed for nausea or vomiting.    Carolina Cat MD   pseudoephedrine (SUDAFED) 120 MG 12 hr tablet Take 120 mg by mouth Every 12 (Twelve) Hours As Needed for Congestion.    Carolina Cat MD   SITagliptin-metFORMIN HCl ER (JANUMET XR)  MG tablet Take 2 tablets by mouth Daily.    Carolina Cat MD   sucralfate (Carafate) 1 g tablet Take 1 tablet by mouth 4 (Four) Times a Day. Crush tablet in wax paper mix with 1-2 tsp of water to make slurry and drink. 10/26/20   Patricia Garcia APRN   SUMAtriptan (IMITREX) 100 MG tablet sumatriptan 100 mg tablet   take 1 po at onset of symptoms. can repeat x 1 in 2 hours if headache persists    Emergency, Nurse Epic, RN   traZODone (DESYREL) 50 MG tablet Take 50 mg by mouth every night.    Carolina Cat MD   valACYclovir (VALTREX) 500 MG tablet Take 1 tablet by mouth Daily. 4/14/21   Amaya Tran MD       [facility] Meds:  Scheduled-    Infusions-     PRNs- •  [COMPLETED] Insert peripheral IV **AND** sodium chloride  •  sodium chloride    Functional Status Prior to Current Stroke/[place] Score: 0    NIH Stroke Scale  Time: 08:55 EDT  Person Administering Scale: Alonzo Bello MD    1a  Level of consciousness: 0=alert; keenly " responsive   1b. LOC questions:  0=Performs both tasks correctly   1c. LOC commands: 0=Performs both tasks correctly   2.  Best Gaze: 0=normal   3.  Visual: 0=No visual loss   4. Facial Palsy: 0=Normal symmetric movement   5a.  Motor left arm: 0=No drift, limb holds 90 (or 45) degrees for full 10 seconds   5b.  Motor right arm: 1=Drift, limb holds 90 (or 45) degrees but drifts down before full 10 seconds: does not hit bed   6a. motor left le=No drift, limb holds 90 (or 45) degrees for full 10 seconds   6b  Motor right le=Drift, limb holds 90 (or 45) degrees but drifts down before full 10 seconds: does not hit bed   7. Limb Ataxia: 0=Absent   8.  Sensory: 1=Mild to moderate sensory loss; patient feels pinprick is less sharp or is dull on the affected side; there is a loss of superficial pain with pinprick but patient is aware She is being touched   9. Best Language:  0=No aphasia, normal   10. Dysarthria: 0=Normal   11. Extinction and Inattention: 0=No abnormality    Total:    3       Results Reviewed:  I have personally reviewed current lab, radiology, and data and agree with results.              Assessment/Plan:      This is 56-year-old with history of hypertension, tobacco abuse, diabetes type 2 who woke up with right-sided weakness, suffered a fall and presents to the emergency department.  On exam, her symptoms are mild, nondisabling.  Other window for TPA administration.    CT head showed no acute abnormality,   CTAhead and neck evidence no significant atherosclerosis, with the left PCA, patent extracranial intracranial circulation with no intracranial proximal occlusion.  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    1. Right sided weakness-this could be a minor ischemic stroke versus symptoms related to hypoglycemia.    Plan  -Initiate ischemic stroke order set with thrombolytic therapy  -MRI of the brain without  -Aspirin 325 + loaded with 300 of Plavix, Lipitor 80  -Systolic blood pressure  goal 120-1 80  -PT/OT can work with her if her MRI is negative  -Recommend TTE, lipid panel, A1c  -Correct her electrolytes    Stroke neurology will continue to follow              Alonzo Bello MD  April 21, 2021  08:55 EDT    Verbal consent taken.  Patient agreeable to be seen via telemedicine.    This was an audio and video enabled telemedicine encounter.

## 2021-04-21 NOTE — ED NOTES
Symptoms have improved but will go ahead with head CT and stroke protocol.  Right arm and leg are able to stay up for 10 seconds with slight drift.     Ariela Sher RN  04/21/21 0721       Ariela Sher, [place]  04/21/21 0760

## 2021-04-21 NOTE — ED NOTES
After pt walked to bathroom and plavix given, nurse was talking with pt and right eye drooping noted.  MD aware and in to re-evaluate.  Dr Boss called neurologist again to update.  Will get MRI and still plan to take pt to med surg.     Ariela Sher, RN  04/21/21 3049

## 2021-04-21 NOTE — H&P
"[facility] HOSPITALIST     Jaiden Villagomez MD    CHIEF COMPLAINT:  Right arm and Leg weakness    HISTORY OF PRESENT ILLNESS:    Ms. Dupree is a pleasant, 57 y/o  female who presents this morning after being awoken by her alarm, but not being able to reach over w/ her right arm and hit the snooze.  She reports her arm felt \"heavy\" and she was unable to use her arm to bear weight.  She also reports the same sensation in her right leg...not numb just heavy.  She actually fell out of her bed because she couldn't handle her weight with her arm or leg.  She also reports feeling \"warm\" and diaphoretic.  She had recently reported to Dr. Villagomez that her sugar has been dropping in the morning so her basal insulin dose was moved to suppertime.  She denies chest pain and palpitations.  She denies near syncope.  She does describe some chronic nausea for which she smokes THC.  She denies blurry vision.  She denies fever/chills.  Blood glucose is averaging 180's, but she has had hypoglycemia events.      Past Medical History:   Diagnosis Date   • Abnormal Pap smear of cervix     1 abnormal pap with normal f/u   • Howell esophagus    • Colon polyp    • Diabetes mellitus (CMS/HCC)    • Difficulty swallowing     at times   • Fibroid    • GERD (gastroesophageal reflux disease)    • HSV-2 (herpes simplex virus 2) infection 2017   • Menstrual disorder    • Migraines    • Rectal bleeding     bleeds with bowel movement   • Seasonal allergies      Past Surgical History:   Procedure Laterality Date   • CARPAL TUNNEL RELEASE WITH CUBITAL TUNNEL RELEASE Right    •  SECTION      X2   • COLONOSCOPY N/A 2016    Procedure: COLONOSCOPY with polypectomy;  Surgeon: Maryan Kent MD;  Location: [facility] OR;  Service:    • COLONOSCOPY N/A 6/3/2020    Procedure: COLONOSCOPY;  Surgeon: Dmitri Fung MD;  Location: [facility] OR;  Service: Gastroenterology;  Laterality: N/A;  Poor prep, polyp " "  • ENDOMETRIAL ABLATION      thermachoice   • ENDOSCOPY N/A 6/3/2020    Procedure: ESOPHAGOGASTRODUODENOSCOPY;  Surgeon: Dmitri Fung MD;  Location: [facility];  Service: Gastroenterology;  Laterality: N/A;  Reflux esophagitis, erosive gastritis, duodenitis   • HYSTEROSCOPY      AND ENDOMETRIAL  ABLATION   • NASAL SEPTUM SURGERY         • TUBAL ABDOMINAL LIGATION       Family History   Problem Relation Age of Onset   • Hypertension Mother    • Hyperlipidemia Mother    • Atrial fibrillation Mother    • Stroke Mother    • Heart attack Father    • Hypertension Father    • Diabetes Father    • Alcohol abuse Father    • Dementia Father    • Stroke Sister    • Crohn's disease Sister    • Heart attack Brother    • Hypertension Brother    • Stroke Brother    • Esophageal cancer Brother    • Alzheimer's disease Paternal Grandmother    • Diabetes Paternal Aunt    • Breast cancer Paternal Aunt    • Rheum arthritis Other    • Colon cancer Maternal Uncle    • Ovarian cancer Neg Hx    • Deep vein thrombosis Neg Hx    • Pulmonary embolism Neg Hx      Social History     Tobacco Use   • Smoking status: Former Smoker     Quit date: 1995     Years since quittin.9   • Smokeless tobacco: Never Used   • Tobacco comment: Quit \" a long time ago\"    Substance Use Topics   • Alcohol use: Yes     Comment: OCCASIONALLY   • Drug use: No     Medications Prior to Admission   Medication Sig Dispense Refill Last Dose   • aspirin 81 MG chewable tablet Chew 81 mg Daily.   2021 at am   • cetirizine (ZyrTEC) 10 MG tablet Take 10 mg by mouth daily   2021 at am   • famotidine (PEPCID) 40 MG tablet Every 12 (Twelve) Hours.   2021 at pm   • FLUoxetine (PROzac) 20 MG capsule 20 mg Daily.   2021 at am   • LANTUS SOLOSTAR 100 UNIT/ML injection pen 35 Units Every Evening.   2021 at bedtime   • Multiple Vitamins-Minerals (MULTI-BETIC PO) Take 2 capsules by mouth Daily.   2021 at am   • pantoprazole " "(PROTONIX) 40 MG EC tablet Take 1 tablet by mouth 2 (two) times a day. 180 tablet 3 4/20/2021 at pm   • promethazine (PHENERGAN) 25 MG tablet Take 25 mg by mouth every 6 (six) hours as needed for nausea or vomiting.   Past Week at Unknown time   • pseudoephedrine (SUDAFED) 120 MG 12 hr tablet Take 120 mg by mouth Every 12 (Twelve) Hours As Needed for Congestion.   4/20/2021 at am   • SITagliptin-metFORMIN HCl ER (JANUMET XR)  MG tablet Take 2 tablets by mouth Daily.   4/20/2021 at am   • sucralfate (Carafate) 1 g tablet Take 1 tablet by mouth 4 (Four) Times a Day. Crush tablet in wax paper mix with 1-2 tsp of water to make slurry and drink. (Patient taking differently: Take 1 g by mouth 2 (Two) Times a Day. Crush tablet in wax paper mix with 1-2 tsp of water to make slurry and drink.) 120 tablet 5 4/20/2021 at pm   • traZODone (DESYREL) 50 MG tablet Take 50 mg by mouth every night.   4/20/2021 at pm   • valACYclovir (VALTREX) 500 MG tablet Take 1 tablet by mouth Daily. 90 tablet 3 4/20/2021 at am   • acetaminophen (TYLENOL) 325 MG tablet Take 2 tablets by mouth Every 4 (Four) Hours As Needed for Mild Pain . Over the counter   More than a month at Unknown time   • Insulin Pen Needle (BD Pen Needle Yari 2nd Gen) 32G X 4 MM misc BD Ultra-Fine Yari Pen Needle 32 gauge x 5/32\"   USE ONCE DAILY WITH LANTUS TO INJECT INSULIN      • SUMAtriptan (IMITREX) 100 MG tablet sumatriptan 100 mg tablet   take 1 po at onset of symptoms. can repeat x 1 in 2 hours if headache persists   More than a month at Unknown time     Allergies:  Mobic [meloxicam]    REVIEW OF SYSTEMS:  Please see the above history of present illness for pertinent positives and negatives.  The remainder of the patient's systems have been reviewed and are negative.    Vital Signs  Temp:  [97.8 °F (36.6 °C)-98.1 °F (36.7 °C)] 97.8 °F (36.6 °C)  Heart Rate:  [73-85] 79  Resp:  [16] 16  BP: (104-130)/(56-84) 114/66  Oxygen Therapy  SpO2: 99 %  Device (Oxygen " "Therapy): room air}  Body mass index is 23.87 kg/m².     Flowsheet Rows      First Filed Value   Admission Height  157.5 cm (62\") Documented at 04/21/2021 0741   Admission Weight  64.9 kg (143 lb) Documented at 04/21/2021 0735           Physical Exam:  Physical Exam   Constitutional: Patient appears well-developed and well-nourished and in no acute distress.   HEENT:   Head: Normocephalic and atraumatic.   Eyes:  Pupils are equal, round, and reactive to light. EOM are intact. Sclerae are anicteric and noninjected.  Mouth and Throat: Patient has moist mucous membranes. Oropharynx is clear of any erythema or exudate.     Neck: Neck supple. No JVD present. No thyromegaly present. No lymphadenopathy present.  Cardiovascular: Regular rate, regular rhythm, S1 normal and S2 normal.  Exam reveals no gallop and no friction rub.  No murmur heard.  Radial pulses are 2+ and symmetric.  Pulmonary/Chest: Lungs are clear to auscultation bilaterally. No respiratory distress. No wheezes. No rhonchi. No rales.   Abdominal: Soft. Bowel sounds are normal. No distension and no mass.  There is no tenderness.   Musculoskeletal: Normal muscle tone  Extremities: No edema. Motor 5/5 BUE and BLE in all groups tested.  Neurological: Cranial nerves II-XII are grossly intact with no focal deficits.  Skin: Skin is warm. No rash noted. Nails show no clubbing.  No cyanosis or erythema.    Emotional Behavior:    Judgement and Insight: Average   Mental Status:  Alertness  Normal   Memory:  Intact   Mood and Affect:         Depression  None               Anxiety  None    Debilities:   Physical Weakness  As per HPI   Handicaps  None   Disabilities  None   Agitation  None     Results Review:    I reviewed the patient's new clinical results.  Lab Results (most recent)     Procedure Component Value Units Date/Time    Urine Drug Screen - Urine, Clean Catch [[id number]]  (Abnormal) Collected: 04/21/21 1052    Specimen: Urine, Clean Catch Updated: 04/21/21 " 1207     THC, Screen, Urine Positive     Phencyclidine (PCP), Urine Negative     Cocaine Screen, Urine Negative     Methamphetamine, Ur Negative     Opiate Screen Negative     Amphetamine Screen, Urine Negative     Benzodiazepine Screen, Urine Negative     Tricyclic Antidepressants Screen Negative     Methadone Screen, Urine Negative     Barbiturates Screen, Urine Negative     Oxycodone Screen, Urine Negative     Propoxyphene Screen Negative     Buprenorphine, Screen, Urine Negative    Narrative:      Urine drug screen results are to be used for medical purposes only.  They are not to be used for legal purposes such as employment testing.  Negative results do not necessarily mean the complete absence of a subtance, but rather that the result is less than the cutoff for that substance.  Positive results are unconfirmed and considered Preliminary Positive.  [facility] does not automatically confirm Postitive Unconfirmed results.  The physician may request (order) an Unconfirmed Positive result to be sent out for confirmation.      Negative Thresholds for Drugs Screened:    THC screen, urine                          50 ng/ml  Phenycyclidine (PCP), urine                25 ng/ml  Cocaine screen, urine                     150 ng/ml  Methamphetamine, urine                    500 ng/ml  Opiate screen, urine                      100 ng/ml  Amphetamine screen, urine                 500 ng/ml  Benzodiazepine screen, urine              150 ng/ml  Tricyclic Antidepressants screen, urine   300 ng/ml  Methadone screen, urine                   200 ng/ml  Barbiturates screen, urine                200 ng/ml  Oxycodone screen, urine                   100 ng/ml  Propoxyphene screen, urine                300 ng/ml  Buprenorphine screen, urine                10 ng/ml    COVID PRE-OP / PRE-PROCEDURE SCREENING ORDER (NO ISOLATION) - Swab, Nasal Cavity [[id number]]  (Normal) Collected: 04/21/21 1123    Specimen: Swab from  Nasal Cavity Updated: 04/21/21 1207    Narrative:      The following orders were created for panel order COVID PRE-OP / PRE-PROCEDURE SCREENING ORDER (NO ISOLATION) - Swab, Nasal Cavity.  Procedure                               Abnormality         Status                     ---------                               -----------         ------                     COVID-19,[facility]...[[id number]]  Normal              Final result                 Please view results for these tests on the individual orders.    COVID-19,[facility],Nasal Swab No Transport Media 3-4 HR TAT - Swab, Nasal Cavity [[id number]]  (Normal) Collected: 04/21/21 1123    Specimen: Swab from Nasal Cavity Updated: 04/21/21 1207     COVID19 Not Detected    Narrative:      Fact sheet for providers: https://www.fda.gov/media/883246/download     Fact sheet for patients: https://www.fda.gov/media/433866/download    Test performed by PCR.    Consider negative results in combination with clinical observations, patient history, and epidemiological information.    POC Glucose Once [[id number]]  (Abnormal) Collected: 04/21/21 1125    Specimen: Blood Updated: 04/21/21 1137     Glucose 204 mg/dL     POC Glucose Once [[id number]]  (Abnormal) Collected: 04/21/21 1107    Specimen: Blood Updated: 04/21/21 1114     Glucose 37 mg/dL     Troponin [[id number]]  (Normal) Collected: 04/21/21 0713    Specimen: Blood Updated: 04/21/21 0748     Troponin T <0.010 ng/mL     Narrative:      Troponin T Reference Range:  <= 0.03 ng/mL-   Negative for AMI  >0.03 ng/mL-     Abnormal for myocardial necrosis.  Clinicians would have to utilize clinical acumen, EKG, Troponin and serial changes to determine if it is an Acute Myocardial Infarction or myocardial injury due to an underlying chronic condition.       Results may be falsely decreased if patient taking Biotin.      Comprehensive Metabolic Panel [[id number]]  (Abnormal) Collected: 04/21/21 0713    Specimen: Blood Updated:  04/21/21 0741     Glucose 59 mg/dL      BUN 15 mg/dL      Creatinine 0.69 mg/dL      Sodium 142 mmol/L      Potassium 3.4 mmol/L      Chloride 104 mmol/L      CO2 28.4 mmol/L      Calcium 9.2 mg/dL      Total Protein 6.9 g/dL      Albumin 4.30 g/dL      ALT (SGPT) 17 U/L      AST (SGOT) 14 U/L      Alkaline Phosphatase 61 U/L      Total Bilirubin 0.3 mg/dL      eGFR Non African Amer 88 mL/min/1.73      Globulin 2.6 gm/dL      A/G Ratio 1.7 g/dL      BUN/Creatinine Ratio 21.7     Anion Gap 9.6 mmol/L     Narrative:      GFR Normal >60  Chronic Kidney Disease <60  Kidney Failure <15      Protime-INR [[id number]]  (Normal) Collected: 04/21/21 0722    Specimen: Blood Updated: 04/21/21 0730     Protime 12.4 Seconds      INR 0.95    Narrative:      Therapeutic Ranges for INR: 2.0-3.0 (PT 20-30)                              2.5-3.5 (PT 25-34)    aPTT [[id number]]  (Abnormal) Collected: 04/21/21 0722    Specimen: Blood Updated: 04/21/21 0730     PTT 24.1 seconds     Narrative:      PTT = The equivalent PTT values for the therapeutic range of heparin levels at 0.1 to 0.7 U/ml are 53 to 110 seconds.      CBC & Differential [[id number]]  (Abnormal) Collected: 04/21/21 0713    Specimen: Blood Updated: 04/21/21 0721    Narrative:      The following orders were created for panel order CBC & Differential.  Procedure                               Abnormality         Status                     ---------                               -----------         ------                     CBC Auto Differential[[id number]]        Abnormal            Final result                 Please view results for these tests on the individual orders.    CBC Auto Differential [[id number]]  (Abnormal) Collected: 04/21/21 0713    Specimen: Blood Updated: 04/21/21 0721     WBC 6.37 10*3/mm3      RBC 3.93 10*6/mm3      Hemoglobin 12.2 g/dL      Hematocrit 38.7 %      MCV 98.5 fL      MCH 31.0 pg      MCHC 31.5 g/dL      RDW 13.6 %      RDW-SD 49.8 fl      MPV 10.3  fL      Platelets 247 10*3/mm3      Neutrophil % 74.2 %      Lymphocyte % 14.1 %      Monocyte % 9.6 %      Eosinophil % 1.6 %      Basophil % 0.3 %      Immature Grans % 0.2 %      Neutrophils, Absolute 4.73 10*3/mm3      Lymphocytes, Absolute 0.90 10*3/mm3      Monocytes, Absolute 0.61 10*3/mm3      Eosinophils, Absolute 0.10 10*3/mm3      Basophils, Absolute 0.02 10*3/mm3      Immature Grans, Absolute 0.01 10*3/mm3           Imaging Results (Most Recent)     Procedure Component Value Units Date/Time    MRI Brain Without Contrast [[id number]] Collected: 04/21/21 1201     Updated: 04/21/21 1203    Narrative:      MRI Brain WO    INDICATION:   Transient ischemic attack, right-sided numbness and weakness this morning    TECHNIQUE:   MRI of the brain without IV contrast.    COMPARISON:    Same day CT head and CTA head and neck    FINDINGS:    There are no areas of restricted diffusion to suggest vascular territory infarct.    No evidence of acute intracranial hemorrhage. No mass or mass effect is identified. No hydrocephalus. Basal cisterns are not effaced.    No unusual parenchymal signal abnormalities.    Midline structures are within normal limits.    The major intracranial flow voids are maintained.    The orbits are grossly within normal limits.    Calvarial marrow signal is within normal limits.        Impression:      1.  Normal MRI of the brain without contrast.    Signer Name: LU ENGEL MD   Signed: 4/21/2021 12:01 PM   Workstation Name: [id number]    [facility]    CT Angiogram Head [[id number]] Collected: 04/21/21 0954     Updated: 04/21/21 0956    Narrative:      CTA Neck, CTA Head    HISTORY:  Weakness of right leg and right arm upon waking this morning    TECHNIQUE:  CT angiogram of the head and neck with IV contrast. 3-D reconstructions were obtained and reviewed. Evaluation for a significant carotid arterial stenosis is based on NASCET criteria. Radiation dose reduction techniques  included automated exposure  control. Radiation audit for known CT and nuclear cardiology exams in the last 12 months: 2.    COMPARISON:  Same day CT head    CTA NECK:  Normal aortic arch branching pattern with no proximal great vessel stenosis. The vertebral arteries are widely patent and codominant. Cervical carotid bifurcations demonstrate no evidence of hemodynamically significant stenoses by NASCET criteria.    Cervical soft tissues are unremarkable. No acute osseous abnormality.    CTA HEAD:    Tiny 1 mm conically-shaped outpouching at the posterior right supraclinoid ICA near the expected origin location of the right posterior communicating artery. Most likely represents an infundibular origin or less likely a tiny aneurysm.    Fetal-type configuration of the left PCA.    No intracranial arterial vascular occlusion or high-grade stenosis of the anterior posterior circulation. Symmetric distal vascular opacification intracranially    Other: Pleural parenchymal scarring at the lung apices. Degenerative changes of the spine.      Impression:      1.  No intracranial arterial vascular occlusion or high-grade stenosis.  2.  No flow-limiting stenosis of the arterial vasculature of the neck.  3.  Tiny 1 mm conically-shaped outpouching at the posterior right supraclinoid ICA near the expected origin location of the right posterior communicating artery. Most likely represents an infundibular origin or less likely a tiny aneurysm..     Signer Name: LU ENGEL MD   Signed: 4/21/2021 9:54 AM   Workstation Name: [email or web address]    [facility]    CT Angiogram Carotids [[id number]] Collected: 04/21/21 0954     Updated: 04/21/21 0956    Narrative:      CTA Neck, CTA Head    HISTORY:  Weakness of right leg and right arm upon waking this morning    TECHNIQUE:  CT angiogram of the head and neck with IV contrast. 3-D reconstructions were obtained and reviewed. Evaluation for a significant carotid arterial stenosis  is based on NASCET criteria. Radiation dose reduction techniques included automated exposure  control. Radiation audit for known CT and nuclear cardiology exams in the last 12 months: 2.    COMPARISON:  Same day CT head    CTA NECK:  Normal aortic arch branching pattern with no proximal great vessel stenosis. The vertebral arteries are widely patent and codominant. Cervical carotid bifurcations demonstrate no evidence of hemodynamically significant stenoses by NASCET criteria.    Cervical soft tissues are unremarkable. No acute osseous abnormality.    CTA HEAD:    Tiny 1 mm conically-shaped outpouching at the posterior right supraclinoid ICA near the expected origin location of the right posterior communicating artery. Most likely represents an infundibular origin or less likely a tiny aneurysm.    Fetal-type configuration of the left PCA.    No intracranial arterial vascular occlusion or high-grade stenosis of the anterior posterior circulation. Symmetric distal vascular opacification intracranially    Other: Pleural parenchymal scarring at the lung apices. Degenerative changes of the spine.      Impression:      1.  No intracranial arterial vascular occlusion or high-grade stenosis.  2.  No flow-limiting stenosis of the arterial vasculature of the neck.  3.  Tiny 1 mm conically-shaped outpouching at the posterior right supraclinoid ICA near the expected origin location of the right posterior communicating artery. Most likely represents an infundibular origin or less likely a tiny aneurysm..     Signer Name: LU ENGEL MD   Signed: 4/21/2021 9:54 AM   Workstation Name: [email or web address]    [facility]    XR Chest 1 View [[id number]] Collected: 04/21/21 0759     Updated: 04/21/21 0803    Narrative:      XR CHEST 1 VW-: 4/21/2021 7:36 AM     INDICATION:   Right-sided weakness and headache. Symptoms began early this morning.      COMPARISON:    04/14/2021.     FINDINGS:  Single Portable AP view(s) of the  chest. Cardiac silhouette  unremarkable. The vascularity is normal. Lungs are clear. Old healed  granulomatous disease and mild pulmonary hyperinflation.       Impression:         1. Mild pulmonary hyperinflation, otherwise negative chest.     This report was finalized on 4/21/2021 8:01 AM by Dr. Asael Olsen MD.       CT Head Without Contrast Stroke Protocol [[id number]] Collected: 04/21/21 0738     Updated: 04/21/21 0741    Narrative:      CT Head WO Code Stroke    HISTORY:   Stroke follow-up weakness right leg and right arm upon waking this morning patient in house for DKA, admission last week. Patient states does not feel right upon waking today. Hypoglycemia    TECHNIQUE:   Axial unenhanced head CT. Radiation dose reduction techniques included automated exposure control or exposure modulation based on body size. Count of known CT and cardiac nuc med studies performed in previous 12 months: 0.     Time of scan: 7:21 AM    COMPARISON:   None.    FINDINGS:   No intracranial hemorrhage, mass, or infarct. No hydrocephalus or extra-axial fluid collection. Brain parenchymal density is normal. The skull base, calvarium, and extracranial soft tissues are normal.      Impression:      No acute intracranial process by CT. Consider MRI if indicated.      NOTIFICATION: Critical Value/emergent results were called by telephone at the time of interpretation on 4/21/2021 7:35 AM to Isha, who verbally acknowledged these results.    Signer Name: Peace Griffin MD   Signed: 4/21/2021 7:38 AM   Workstation Name: [email or web address]    [facility]        reviewed    ECG/EMG Results (most recent)     Procedure Component Value Units Date/Time    ECG 12 Lead [[id number]] Collected: 04/21/21 0729     Updated: 04/21/21 0833     QT Interval 416 ms     Narrative:      HEART RATE= 80  bpm  RR Interval= 748  ms  CA Interval= 137  ms  P Horizontal Axis= 17  deg  P Front Axis= 80  deg  QRSD Interval= 90  ms  QT Interval= 416   ms  QRS Axis= 10  deg  T Wave Axis= 42  deg  - NORMAL ECG -  Sinus rhythm  No change from prior tracing  Electronically Signed By: Caitlin Moser ([facility]) 21-Apr-2021 08:32:42  Date and Time of Study: 2021-04-21 07:29:41        Assessment/Plan     1.  Extremity Weakness: Seen by [facility] in the ER and concern for lacunar infarct although MRI is negative.  Symptoms have resolved and her blood glucose has stabilized.  Will monitor overnight.    2.  Diabetes Mellitus, Type 2: Last A1c was 8.9.  I think that we should dose her basal insulin in the morning so I will order that.  Continue SSI tonight in anticipation of this change.    I discussed the patients findings and my recommendations with patient.     Aditya Carlos MD  04/21/21  13:40 EDT

## 2021-04-21 NOTE — ED NOTES
This rn to bedside. There is a slight R droop of the patients eye lidand R mouth  that is significantly different from previous exam. Dr Ying and dr foley notified at this time.      Kaylen Hodgson, RN  04/21/21 1111

## 2021-04-21 NOTE — ED NOTES
Pt stated it is ok to share health information with daughter and son over the phone.  TENNILLE Titus informed of interaction     Keesha Ya RN  04/21/21 [id number]

## 2021-04-22 VITALS
DIASTOLIC BLOOD PRESSURE: 67 MMHG | WEIGHT: 130.51 LBS | BODY MASS INDEX: 24.02 KG/M2 | RESPIRATION RATE: 20 BRPM | TEMPERATURE: 97.7 F | OXYGEN SATURATION: 95 % | SYSTOLIC BLOOD PRESSURE: 101 MMHG | HEIGHT: 62 IN | HEART RATE: 91 BPM

## 2021-04-22 PROBLEM — E16.2 HYPOGLYCEMIA: Status: ACTIVE | Noted: 2021-04-22

## 2021-04-22 LAB
ANION GAP SERPL CALCULATED.3IONS-SCNC: 6.7 MMOL/L (ref 5–15)
BUN SERPL-MCNC: 13 MG/DL (ref 6–20)
BUN/CREAT SERPL: 19.7 (ref 7–25)
CALCIUM SPEC-SCNC: 9.6 MG/DL (ref 8.6–10.5)
CHLORIDE SERPL-SCNC: 105 MMOL/L (ref 98–107)
CO2 SERPL-SCNC: 25.3 MMOL/L (ref 22–29)
CREAT SERPL-MCNC: 0.66 MG/DL (ref 0.57–1)
GFR SERPL CREATININE-BSD FRML MDRD: 93 ML/MIN/1.73
GLUCOSE BLDC GLUCOMTR-MCNC: 144 MG/DL (ref 70–130)
GLUCOSE BLDC GLUCOMTR-MCNC: 151 MG/DL (ref 70–130)
GLUCOSE BLDC GLUCOMTR-MCNC: 174 MG/DL (ref 70–130)
GLUCOSE BLDC GLUCOMTR-MCNC: 291 MG/DL (ref 70–130)
GLUCOSE BLDC GLUCOMTR-MCNC: 41 MG/DL (ref 70–130)
GLUCOSE BLDC GLUCOMTR-MCNC: 48 MG/DL (ref 70–130)
GLUCOSE SERPL-MCNC: 128 MG/DL (ref 65–99)
POTASSIUM SERPL-SCNC: 4.3 MMOL/L (ref 3.5–5.2)
SODIUM SERPL-SCNC: 137 MMOL/L (ref 136–145)

## 2021-04-22 PROCEDURE — G0378 HOSPITAL OBSERVATION PER HR: HCPCS

## 2021-04-22 PROCEDURE — 82962 GLUCOSE BLOOD TEST: CPT

## 2021-04-22 PROCEDURE — 80048 BASIC METABOLIC PNL TOTAL CA: CPT | Performed by: NURSE PRACTITIONER

## 2021-04-22 PROCEDURE — 63710000001 INSULIN ASPART PER 5 UNITS: Performed by: NURSE PRACTITIONER

## 2021-04-22 PROCEDURE — 97161 PT EVAL LOW COMPLEX 20 MIN: CPT

## 2021-04-22 PROCEDURE — 94799 UNLISTED PULMONARY SVC/PX: CPT

## 2021-04-22 PROCEDURE — 99217 PR OBSERVATION CARE DISCHARGE MANAGEMENT: CPT | Performed by: HOSPITALIST

## 2021-04-22 PROCEDURE — 99214 OFFICE O/P EST MOD 30 MIN: CPT | Performed by: STUDENT IN AN ORGANIZED HEALTH CARE EDUCATION/TRAINING PROGRAM

## 2021-04-22 PROCEDURE — 97165 OT EVAL LOW COMPLEX 30 MIN: CPT

## 2021-04-22 RX ORDER — ASPIRIN 325 MG
325 TABLET, DELAYED RELEASE (ENTERIC COATED) ORAL DAILY
Qty: 30 TABLET | Refills: 0 | Status: SHIPPED | OUTPATIENT
Start: 2021-04-22 | End: 2021-05-22

## 2021-04-22 RX ADMIN — METFORMIN HYDROCHLORIDE 500 MG: 500 TABLET ORAL at 17:35

## 2021-04-22 RX ADMIN — CETIRIZINE HYDROCHLORIDE 10 MG: 10 TABLET, FILM COATED ORAL at 09:32

## 2021-04-22 RX ADMIN — SUCRALFATE 1 G: 1 TABLET ORAL at 09:32

## 2021-04-22 RX ADMIN — INSULIN ASPART 2 UNITS: 100 INJECTION, SOLUTION INTRAVENOUS; SUBCUTANEOUS at 17:35

## 2021-04-22 RX ADMIN — PANTOPRAZOLE SODIUM 40 MG: 40 TABLET, DELAYED RELEASE ORAL at 17:35

## 2021-04-22 RX ADMIN — CLOPIDOGREL BISULFATE 75 MG: 75 TABLET ORAL at 09:32

## 2021-04-22 RX ADMIN — VALACYCLOVIR 500 MG: 500 TABLET, FILM COATED ORAL at 09:32

## 2021-04-22 RX ADMIN — SUCRALFATE 1 G: 1 TABLET ORAL at 12:44

## 2021-04-22 RX ADMIN — INSULIN ASPART 4 UNITS: 100 INJECTION, SOLUTION INTRAVENOUS; SUBCUTANEOUS at 12:44

## 2021-04-22 RX ADMIN — SUCRALFATE 1 G: 1 TABLET ORAL at 17:35

## 2021-04-22 RX ADMIN — SODIUM CHLORIDE, PRESERVATIVE FREE 10 ML: 5 INJECTION INTRAVENOUS at 09:32

## 2021-04-22 RX ADMIN — FLUOXETINE 20 MG: 20 CAPSULE ORAL at 09:32

## 2021-04-22 RX ADMIN — PANTOPRAZOLE SODIUM 40 MG: 40 TABLET, DELAYED RELEASE ORAL at 09:32

## 2021-04-22 NOTE — PLAN OF CARE
Goal Outcome Evaluation:  Plan of Care Reviewed With: patient     Outcome Summary: Physical therapy evaluation complete.  Patient reports all symptoms resolved and states she has been up ambulating in room without assistance.  Patient performs all bed mobility and transfers independently and gait x100 feet without use of device.  Patient reports no concerns regarding return home, no further PT needs at this time.

## 2021-04-22 NOTE — PROGRESS NOTES
Stroke Progress Note       Chief Complaint:  Right hemiparesis    Subjective    Subjective     Subjective:  Symptoms improved         Objective      Temp:  [97.8 °F (36.6 °C)-98.3 °F (36.8 °C)] 98.3 °F (36.8 °C)  Heart Rate:  [69-84] 69  Resp:  [16-18] 16  BP: (104-149)/(52-84) 110/66      GEN: NAD, pleasant, cooperative  Eyes-show anicteric sclera, moist conjunctiva with no lid lag, no redness  Neck-trachea midline.  There is no thyromegaly.  ENMT-oropharynx clear with moist mucous membranes and good dentition.  Skin-no rash, lesions or ulcers.  Cardiovascular exam-no pedal edema, regular rate and rhythm.  CHEST: No signs of resp distress, on room air  Abdomen-no abdominal distention, nontender.  Psychiatric exam-alert oriented x3 with intact judgment and insight        NEURO     MENTAL STATUS: AAOx3, memory intact, fund of knowledge appropriate     LANG/SPEECH: Naming and repetition intact, fluent, follows 3-step commands     CRANIAL NERVES:       II: Pupils equal and reactive, no RAPD, no VF deficits, fundus(not done)       III, IV, VI: EOM intact, no gaze preference or deviation, no nystagmus.       V: normal sensation in V1, V2, and V3 segments bilaterally       VII: no asymmetry, no nasolabial fold flattening       VIII: normal hearing to speech       IX, X: normal palatal elevation, no uvular deviation       XI: 5/5 head turn and 5/5 shoulder shrug bilaterally       XII: midline tongue protrusion     MOTOR:  Mild drift on the right upper and right lower extremity(some of it is effort related/secondary to pain as patient suffered a fall]     REFLEXES:  no Glass's, no clonus     SENSORY:     Slightly decreased sensation on the right face and the right arm to mild touch     COORDINATION: Normal finger to nose and heel to shin, no tremor, no dysmetria     STATION: Not assessed due to patient condition     GAIT: Not assessed due to patient condition  Results Review:    I reviewed the patient's new clinical  results.    Lab Results (last 24 hours)     Procedure Component Value Units Date/Time    Basic Metabolic Panel [[id number]]  (Abnormal) Collected: 04/22/21 0733    Specimen: Blood Updated: 04/22/21 0803     Glucose 128 mg/dL      BUN 13 mg/dL      Creatinine 0.66 mg/dL      Sodium 137 mmol/L      Potassium 4.3 mmol/L      Chloride 105 mmol/L      CO2 25.3 mmol/L      Calcium 9.6 mg/dL      eGFR Non African Amer 93 mL/min/1.73      BUN/Creatinine Ratio 19.7     Anion Gap 6.7 mmol/L     Narrative:      GFR Normal >60  Chronic Kidney Disease <60  Kidney Failure <15      POC Glucose Once [[id number]]  (Abnormal) Collected: 04/22/21 0736    Specimen: Blood Updated: 04/22/21 0742     Glucose 144 mg/dL     POC Glucose Once [[id number]]  (Abnormal) Collected: 04/22/21 0251    Specimen: Blood Updated: 04/22/21 0257     Glucose 151 mg/dL     POC Glucose Once [[id number]]  (Abnormal) Collected: 04/22/21 0158    Specimen: Blood Updated: 04/22/21 0205     Glucose 41 mg/dL     POC Glucose Once [[id number]]  (Abnormal) Collected: 04/22/21 0157    Specimen: Blood Updated: 04/22/21 0204     Glucose 48 mg/dL     POC Glucose Once [[id number]]  (Abnormal) Collected: 04/21/21 1631    Specimen: Blood Updated: 04/21/21 1642     Glucose 207 mg/dL     Lipid Panel [[id number]] Collected: 04/21/21 0713    Specimen: Blood Updated: 04/21/21 1503     Total Cholesterol 141 mg/dL      Triglycerides 114 mg/dL      HDL Cholesterol 49 mg/dL      LDL Cholesterol  71 mg/dL      VLDL Cholesterol 21 mg/dL      LDL/HDL Ratio 1.41    Narrative:      Cholesterol Reference Ranges  (U.[place]. Department of Health and Human Services ATP III Classifications)    Desirable          <200 mg/dL  Borderline High    200-239 mg/dL  High Risk          >240 mg/dL      Triglyceride Reference Ranges  (U.S. Department of Health and Human Services ATP III Classifications)    Normal           <150 mg/dL  Borderline High  150-199 mg/dL  High             200-499 mg/dL  Very High         >500 mg/dL    HDL Reference Ranges  (U.S. Department of Health and Human Services ATP III Classifcations)    Low     <40 mg/dl (major risk factor for CHD)  High    >60 mg/dl ('negative' risk factor for CHD)        LDL Reference Ranges  (U.S. Department of Health and Human Services ATP III Classifcations)    Optimal          <100 mg/dL  Near Optimal     100-129 mg/dL  Borderline High  130-159 mg/dL  High             160-189 mg/dL  Very High        >189 mg/dL    POC Glucose Once [[id number]]  (Abnormal) Collected: 04/21/21 1427    Specimen: Blood Updated: 04/21/21 1433     Glucose 187 mg/dL     POC Glucose Once [[id number]]  (Abnormal) Collected: 04/21/21 1348    Specimen: Blood Updated: 04/21/21 1402     Glucose 37 mg/dL         MRI Brain Without Contrast    Result Date: 4/21/2021  1.  Normal MRI of the brain without contrast. Signer Name: LU ENGEL MD  Signed: 4/21/2021 12:01 PM  Workstation Name: [email or web address]  [facility]    XR Chest 1 View    Result Date: 4/21/2021   1. Mild pulmonary hyperinflation, otherwise negative chest.  This report was finalized on 4/21/2021 8:01 AM by Dr. Asael Olsen MD.      CT Angiogram Carotids    Result Date: 4/21/2021  1.  No intracranial arterial vascular occlusion or high-grade stenosis. 2.  No flow-limiting stenosis of the arterial vasculature of the neck. 3.  Tiny 1 mm conically-shaped outpouching at the posterior right supraclinoid ICA near the expected origin location of the right posterior communicating artery. Most likely represents an infundibular origin or less likely a tiny aneurysm.. Signer Name: LU ENGEL MD  Signed: 4/21/2021 9:54 AM  Workstation Name: [facility]  [facility]    CT Angiogram Head    Result Date: 4/21/2021  1.  No intracranial arterial vascular occlusion or high-grade stenosis. 2.  No flow-limiting stenosis of the arterial vasculature of the neck. 3.  Tiny 1 mm conically-shaped outpouching at the posterior right  supraclinoid ICA near the expected origin location of the right posterior communicating artery. Most likely represents an infundibular origin or less likely a tiny aneurysm.. Signer Name: LU ENGEL MD  Signed: 4/21/2021 9:54 AM  Workstation Name: [id number]  [facility]    CT Head Without Contrast Stroke Protocol    Result Date: 4/21/2021  No acute intracranial process by CT. Consider MRI if indicated. NOTIFICATION: Critical Value/emergent results were called by telephone at the time of interpretation on 4/21/2021 7:35 AM to Isha, who verbally acknowledged these results. Signer Name: Peace Griffin MD  Signed: 4/21/2021 7:38 AM  Workstation Name: [id number]  [facility]              Assessment/Plan     Assessment/Plan:      This is 56-year-old with history of hypertension, tobacco abuse, diabetes type 2 who woke up with right-sided weakness, suffered a fall and presents to the emergency department.  On exam, her symptoms are mild, nondisabling.  Other window for TPA administration.    CT head showed no acute abnormality,   CTAhead and neck evidence no significant atherosclerosis, with the left PCA, patent extracranial intracranial circulation with no intracranial proximal occlusion.  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   MRI Brain WO- no acute infarct.     1. Right sided weakness-2/2  Hypoglycemia.      Plan  -Discontinue Plavix  -increase the dose of aspirin to full dose  -Normal blood pressure goals.      Neurology will sign off, please call us for any questions.                        Alonzo Bello MD  04/22/21  08:35 EDT    This was an audio and video enabled telemedicine encounter.

## 2021-04-22 NOTE — DISCHARGE SUMMARY
"Lona Dupree  1965  [id number]    Hospitalists Discharge Summary    Date of Admission: 4/21/2021  Date of Discharge:  4/22/2021    Primary Discharge Diagnoses:  1.  Hypoglycemia  2.  Uncontrolled Diabetes Mellitus A1c of 8.9%  3.  THC use    History of Present Illness (taken from H&P):  Ms. Dupree is a pleasant, 55 y/o  female who presents this morning after being awoken by her alarm, but not being able to reach over w/ her right arm and hit the snooze.  She reports her arm felt \"heavy\" and she was unable to use her arm to bear weight.  She also reports the same sensation in her right leg...not numb just heavy.  She actually fell out of her bed because she couldn't handle her weight with her arm or leg.  She also reports feeling \"warm\" and diaphoretic.  She had recently reported to Dr. Villagomez that her sugar has been dropping in the morning so her basal insulin dose was moved to suppertime.  She denies chest pain and palpitations.  She denies near syncope.  She does describe some chronic nausea for which she smokes THC.  She denies blurry vision.  She denies fever/chills.  Blood glucose is averaging 180's, but she has had hypoglycemia events.    Hospital Course:  Ms. Dupree was admitted to the Med/Surg unit.  She was still having episodes of hypoglycemia that were treated by protocol.  I held her basal insulin as well as her oral regimen.  No evidence of acute infarct was identified.  I spoke to her concerning her insulin regimen which had been prescribed in the evening so I felt there would be less risk of hypoglycemia by dosing during the day.  I had recently admitted her for DKA as she had gone out of town and had not taken her insulin with her.  We also agrrd that she needed to see an Endocrinologist for better glucose control as she was almost to goal.  I was also concerned as she was using THC regularly to treat nausea, but I had no evidence of early gastroparesis.  At discharge, I reduced " her basal dose of insulin and started her on straight Metformin.  I stopped Januvia.  Will defer further titration of regimen to Endo.  Her LDL was at goal w/o therapy.     PCP  Patient Care Team:  Jaiden Villagomez MD as PCP - General  Jaiden Villagomez MD as PCP - Family Medicine    Consults:   Consults     Date and Time Order Name Status Description    4/21/2021  7:18 AM Inpatient Neurology Consult Stroke Completed     4/21/2021  7:18 AM Inpatient Neurology Consult Stroke Completed           Operations and Procedures Performed:       CT Abdomen Pelvis Without Contrast    Result Date: 4/14/2021  Narrative: CT ABDOMEN AND PELVIS, NONCONTRAST, 4/14/2021 HISTORY: 56-year-old female in the ED complaining of one day history diffuse abdomen pain, nausea and vomiting. Diabetic ketoacidosis. Kidney failure. TECHNIQUE: CT imaging of the abdomen and pelvis without oral or IV contrast. Radiation dose reduction techniques included automated exposure control. Radiation audit for CT and nuclear cardiology exams in the last 12 months: 0. COMPARISON: *  CT abdomen/pelvis, 1/3/2020. ABDOMEN FINDINGS: Tiny nonobstructing calculus in the lower pole right kidney measuring 1 to 2 mm. Kidneys, ureters and bladder are otherwise negative. No evidence of upper urinary tract obstruction. Liver, pancreas and spleen are normal in size and appearance. No gallbladder distention or bile duct dilatation. Small bowel and colon are normal in caliber and appearance, as imaged. The appendix is normal. Fluid-filled stomach is nondistended. No distal esophageal dilatation. Normal caliber abdominal aorta. PELVIS FINDINGS: Urinary bladder, retroverted uterus, adnexal regions and rectum are within normal limits. No free pelvic fluid. No inguinal hernia. Lung base images show no active disease.     Impression: 1. No acute abnormality within the abdomen or pelvis. 2. Tiny nonobstructing right lower pole renal calculus. Signer Name: Leo Agrawal MD   Signed: 4/14/2021 5:41 AM  Workstation Name: [email or web address]  [facility]    XR Chest 2 View    Result Date: 4/14/2021  Narrative: CHEST X-RAY, 4/14/2021  HISTORY:  56-year-old female in the ED complaining of one day history diffuse abdomen pain, nausea and vomiting. Diabetic ketoacidosis. Kidney failure.  TECHNIQUE: AP and lateral upright chest x-ray.  FINDINGS: The lungs are expanded and clear. No visible pulmonary edema, and no focal or multifocal pulmonary infiltrate. No pleural effusion. Heart size and pulmonary vascularity are normal.     Impression: Negative chest. Signer Name: Leo Agrawal MD  Signed: 4/14/2021 5:42 AM  Workstation Name: [facility]    MRI Brain Without Contrast    Result Date: 4/21/2021  Narrative: MRI Brain WO INDICATION: Transient ischemic attack, right-sided numbness and weakness this morning TECHNIQUE: MRI of the brain without IV contrast. COMPARISON:  Same day CT head and CTA head and neck FINDINGS: There are no areas of restricted diffusion to suggest vascular territory infarct. No evidence of acute intracranial hemorrhage. No mass or mass effect is identified. No hydrocephalus. Basal cisterns are not effaced. No unusual parenchymal signal abnormalities. Midline structures are within normal limits. The major intracranial flow voids are maintained. The orbits are grossly within normal limits. Calvarial marrow signal is within normal limits.     Impression: 1.  Normal MRI of the brain without contrast. Signer Name: LU ENGEL MD  Signed: 4/21/2021 12:01 PM  Workstation Name: [email or web address]  Radiology [facility]    XR Chest 1 View    Result Date: 4/21/2021  Narrative: XR CHEST 1 VW-: 4/21/2021 7:36 AM  INDICATION: Right-sided weakness and headache. Symptoms began early this morning.  COMPARISON:  04/14/2021.  FINDINGS: Single Portable AP view(s) of the chest. Cardiac silhouette unremarkable. The vascularity is  normal. Lungs are clear. Old healed granulomatous disease and mild pulmonary hyperinflation.      Impression:  1. Mild pulmonary hyperinflation, otherwise negative chest.  This report was finalized on 4/21/2021 8:01 AM by Dr. Asael Olsen MD.      CT Angiogram Carotids    Result Date: 4/21/2021  Narrative: CTA Neck, CTA Head HISTORY: Weakness of right leg and right arm upon waking this morning TECHNIQUE: CT angiogram of the head and neck with IV contrast. 3-D reconstructions were obtained and reviewed. Evaluation for a significant carotid arterial stenosis is based on NASCET criteria. Radiation dose reduction techniques included automated exposure control. Radiation audit for known CT and nuclear cardiology exams in the last 12 months: 2. COMPARISON: Same day CT head CTA NECK: Normal aortic arch branching pattern with no proximal great vessel stenosis. The vertebral arteries are widely patent and codominant. Cervical carotid bifurcations demonstrate no evidence of hemodynamically significant stenoses by NASCET criteria. Cervical soft tissues are unremarkable. No acute osseous abnormality. CTA HEAD: Tiny 1 mm conically-shaped outpouching at the posterior right supraclinoid ICA near the expected origin location of the right posterior communicating artery. Most likely represents an infundibular origin or less likely a tiny aneurysm. Fetal-type configuration of the left PCA. No intracranial arterial vascular occlusion or high-grade stenosis of the anterior posterior circulation. Symmetric distal vascular opacification intracranially Other: Pleural parenchymal scarring at the lung apices. Degenerative changes of the spine.     Impression: 1.  No intracranial arterial vascular occlusion or high-grade stenosis. 2.  No flow-limiting stenosis of the arterial vasculature of the neck. 3.  Tiny 1 mm conically-shaped outpouching at the posterior right supraclinoid ICA near the expected origin location of the right posterior  communicating artery. Most likely represents an infundibular origin or less likely a tiny aneurysm.. Signer Name: LU ENGEL MD  Signed: 4/21/2021 9:54 AM  Workstation Name: [email or web address]  [facility]    CT Angiogram Head    Result Date: 4/21/2021  Narrative: CTA Neck, CTA Head HISTORY: Weakness of right leg and right arm upon waking this morning TECHNIQUE: CT angiogram of the head and neck with IV contrast. 3-D reconstructions were obtained and reviewed. Evaluation for a significant carotid arterial stenosis is based on NASCET criteria. Radiation dose reduction techniques included automated exposure control. Radiation audit for known CT and nuclear cardiology exams in the last 12 months: 2. COMPARISON: Same day CT head CTA NECK: Normal aortic arch branching pattern with no proximal great vessel stenosis. The vertebral arteries are widely patent and codominant. Cervical carotid bifurcations demonstrate no evidence of hemodynamically significant stenoses by NASCET criteria. Cervical soft tissues are unremarkable. No acute osseous abnormality. CTA HEAD: Tiny 1 mm conically-shaped outpouching at the posterior right supraclinoid ICA near the expected origin location of the right posterior communicating artery. Most likely represents an infundibular origin or less likely a tiny aneurysm. Fetal-type configuration of the left PCA. No intracranial arterial vascular occlusion or high-grade stenosis of the anterior posterior circulation. Symmetric distal vascular opacification intracranially Other: Pleural parenchymal scarring at the lung apices. Degenerative changes of the spine.     Impression: 1.  No intracranial arterial vascular occlusion or high-grade stenosis. 2.  No flow-limiting stenosis of the arterial vasculature of the neck. 3.  Tiny 1 mm conically-shaped outpouching at the posterior right supraclinoid ICA near the expected origin location of the right posterior communicating artery. Most likely  represents an infundibular origin or less likely a tiny aneurysm.. Signer Name: LU ENGEL MD  Signed: 4/21/2021 9:54 AM  Workstation Name: [email or web address]  [facility]    CT Head Without Contrast Stroke Protocol    Result Date: 4/21/2021  Narrative: CT Head WO Code Stroke HISTORY: Stroke follow-up weakness right leg and right arm upon waking this morning patient in house for DKA, admission last week. Patient states does not feel right upon waking today. Hypoglycemia TECHNIQUE: Axial unenhanced head CT. Radiation dose reduction techniques included automated exposure control or exposure modulation based on body size. Count of known CT and cardiac nuc med studies performed in previous 12 months: 0. Time of scan: 7:21 AM COMPARISON: None. FINDINGS: No intracranial hemorrhage, mass, or infarct. No hydrocephalus or extra-axial fluid collection. Brain parenchymal density is normal. The skull base, calvarium, and extracranial soft tissues are normal.     Impression: No acute intracranial process by CT. Consider MRI if indicated. NOTIFICATION: Critical Value/emergent results were called by telephone at the time of interpretation on 4/21/2021 7:35 AM to Isha, who verbally acknowledged these results. Signer Name: Peace Griffin MD  Signed: 4/21/2021 7:38 AM  Workstation Name: [email or web address]  [facility]      Allergies:  is allergic to mobic [meloxicam].    James  reviewed    Discharge Medications:     Discharge Medications      New Medications      Instructions Start Date   aspirin  MG tablet  Replaces: aspirin 81 MG chewable tablet   325 mg, Oral, Daily      insulin detemir 100 UNIT/ML injection  Commonly known as: LEVEMIR   15 Units, Subcutaneous, Daily      metFORMIN 500 MG tablet  Commonly known as: GLUCOPHAGE   500 mg, Oral, 2 Times Daily With Meals         Changes to Medications      Instructions Start Date   sucralfate 1 g tablet  Commonly known as: Carafate  What changed:  "when to take this   1 g, Oral, 4 Times Daily, Crush tablet in wax paper mix with 1-2 tsp of water to make slurry and drink.         Continue These Medications      Instructions Start Date   acetaminophen 325 MG tablet  Commonly known as: TYLENOL   650 mg, Oral, Every 4 Hours PRN, Over the counter      BD Pen Needle Yari 2nd Gen 32G X 4 MM misc  Generic drug: Insulin Pen Needle   BD Ultra-Fine Yari Pen Needle 32 gauge x 5/32\"   USE ONCE DAILY WITH LANTUS TO INJECT INSULIN      cetirizine 10 MG tablet  Commonly known as: zyrTEC   10 mg, Oral, Daily      famotidine 40 MG tablet  Commonly known as: PEPCID   Every 12 Hours Scheduled      FLUoxetine 20 MG capsule  Commonly known as: PROzac   20 mg, Daily      multivitamin with minerals tablet tablet   2 capsules, Oral, Daily      pantoprazole 40 MG EC tablet  Commonly known as: PROTONIX   40 mg, Oral, 2 times daily      promethazine 25 MG tablet  Commonly known as: PHENERGAN   25 mg, Oral, Every 6 Hours PRN      pseudoephedrine 120 MG 12 hr tablet  Commonly known as: SUDAFED   120 mg, Oral, Every 12 Hours PRN      SUMAtriptan 100 MG tablet  Commonly known as: IMITREX   sumatriptan 100 mg tablet   take 1 po at onset of symptoms. can repeat x 1 in 2 hours if headache persists      traZODone 50 MG tablet  Commonly known as: DESYREL   50 mg, Oral, Nightly      valACYclovir 500 MG tablet  Commonly known as: VALTREX   500 mg, Oral, Daily         Stop These Medications    aspirin 81 MG chewable tablet  Replaced by: aspirin  MG tablet     Janumet XR  MG tablet  Generic drug: SITagliptin-metFORMIN HCl ER     Lantus SoloStar 100 UNIT/ML injection pen  Generic drug: Insulin Glargine            Last Lab Results:   Lab Results (most recent)     Procedure Component Value Units Date/Time    POC Glucose Once [[id number]]  (Abnormal) Collected: 04/22/21 1629    Specimen: Blood Updated: 04/22/21 1636     Glucose 174 mg/dL     POC Glucose Once [[id number]]  (Abnormal) Collected: " 04/22/21 1138    Specimen: Blood Updated: 04/22/21 1152     Glucose 291 mg/dL     Basic Metabolic Panel [[id number]]  (Abnormal) Collected: 04/22/21 0733    Specimen: Blood Updated: 04/22/21 0803     Glucose 128 mg/dL      BUN 13 mg/dL      Creatinine 0.66 mg/dL      Sodium 137 mmol/L      Potassium 4.3 mmol/L      Chloride 105 mmol/L      CO2 25.3 mmol/L      Calcium 9.6 mg/dL      eGFR Non African Amer 93 mL/min/1.73      BUN/Creatinine Ratio 19.7     Anion Gap 6.7 mmol/L     Narrative:      GFR Normal >60  Chronic Kidney Disease <60  Kidney Failure <15      Lipid Panel [[id number]] Collected: 04/21/21 0713    Specimen: Blood Updated: 04/21/21 1503     Total Cholesterol 141 mg/dL      Triglycerides 114 mg/dL      HDL Cholesterol 49 mg/dL      LDL Cholesterol  71 mg/dL      VLDL Cholesterol 21 mg/dL      LDL/HDL Ratio 1.41    Narrative:      Cholesterol Reference Ranges  (U.S. Department of Health and Human Services ATP III Classifications)    Desirable          <200 mg/dL  Borderline High    200-239 mg/dL  High Risk          >240 mg/dL      Triglyceride Reference Ranges  (U.S. Department of Health and Human Services ATP III Classifications)    Normal           <150 mg/dL  Borderline High  150-199 mg/dL  High             200-499 mg/dL  Very High        >500 mg/dL    HDL Reference Ranges  (U.S. Department of Health and Human Services ATP III Classifcations)    Low     <40 mg/dl (major risk factor for CHD)  High    >60 mg/dl ('negative' risk factor for CHD)        LDL Reference Ranges  (U.S. Department of Health and Human Services ATP III Classifcations)    Optimal          <100 mg/dL  Near Optimal     100-129 mg/dL  Borderline High  130-159 mg/dL  High             160-189 mg/dL  Very High        >189 mg/dL    Urine Drug Screen - Urine, Clean Catch [[id number]]  (Abnormal) Collected: 04/21/21 1052    Specimen: Urine, Clean Catch Updated: 04/21/21 1207     THC, Screen, Urine Positive     Phencyclidine (PCP), Urine  Negative     Cocaine Screen, Urine Negative     Methamphetamine, Ur Negative     Opiate Screen Negative     Amphetamine Screen, Urine Negative     Benzodiazepine Screen, Urine Negative     Tricyclic Antidepressants Screen Negative     Methadone Screen, Urine Negative     Barbiturates Screen, Urine Negative     Oxycodone Screen, Urine Negative     Propoxyphene Screen Negative     Buprenorphine, Screen, Urine Negative    Narrative:      Urine drug screen results are to be used for medical purposes only.  They are not to be used for legal purposes such as employment testing.  Negative results do not necessarily mean the complete absence of a subtance, but rather that the result is less than the cutoff for that substance.  Positive results are unconfirmed and considered Preliminary Positive.  [facility] does not automatically confirm Postitive Unconfirmed results.  The physician may request (order) an Unconfirmed Positive result to be sent out for confirmation.      Negative Thresholds for Drugs Screened:    THC screen, urine                          50 ng/ml  Phenycyclidine (PCP), urine                25 ng/ml  Cocaine screen, urine                     150 ng/ml  Methamphetamine, urine                    500 ng/ml  Opiate screen, urine                      100 ng/ml  Amphetamine screen, urine                 500 ng/ml  Benzodiazepine screen, urine              150 ng/ml  Tricyclic Antidepressants screen, urine   300 ng/ml  Methadone screen, urine                   200 ng/ml  Barbiturates screen, urine                200 ng/ml  Oxycodone screen, urine                   100 ng/ml  Propoxyphene screen, urine                300 ng/ml  Buprenorphine screen, urine                10 ng/ml    COVID PRE-OP / PRE-PROCEDURE SCREENING ORDER (NO ISOLATION) - Swab, Nasal Cavity [[id number]]  (Normal) Collected: 04/21/21 1123    Specimen: Swab from Nasal Cavity Updated: 04/21/21 1207    Narrative:      The following  orders were created for panel order COVID PRE-OP / PRE-PROCEDURE SCREENING ORDER (NO ISOLATION) - Swab, Nasal Cavity.  Procedure                               Abnormality         Status                     ---------                               -----------         ------                     COVID-19,[facility]...[[id number]]  Normal              Final result                 Please view results for these tests on the individual orders.    COVID-19,[facility],Nasal Swab No Transport Media 3-4 HR TAT - Swab, Nasal Cavity [[id number]]  (Normal) Collected: 04/21/21 1123    Specimen: Swab from Nasal Cavity Updated: 04/21/21 1207     COVID19 Not Detected    Narrative:      Fact sheet for providers: https://www.fda.gov/media/153160/download     Fact sheet for patients: https://www.fda.gov/media/791143/download    Test performed by PCR.    Consider negative results in combination with clinical observations, patient history, and epidemiological information.    Troponin [[id number]]  (Normal) Collected: 04/21/21 0713    Specimen: Blood Updated: 04/21/21 0748     Troponin T <0.010 ng/mL     Narrative:      Troponin T Reference Range:  <= 0.03 ng/mL-   Negative for AMI  >0.03 ng/mL-     Abnormal for myocardial necrosis.  Clinicians would have to utilize clinical acumen, EKG, Troponin and serial changes to determine if it is an Acute Myocardial Infarction or myocardial injury due to an underlying chronic condition.       Results may be falsely decreased if patient taking Biotin.      Comprehensive Metabolic Panel [[id number]]  (Abnormal) Collected: 04/21/21 0713    Specimen: Blood Updated: 04/21/21 0741     Glucose 59 mg/dL      BUN 15 mg/dL      Creatinine 0.69 mg/dL      Sodium 142 mmol/L      Potassium 3.4 mmol/L      Chloride 104 mmol/L      CO2 28.4 mmol/L      Calcium 9.2 mg/dL      Total Protein 6.9 g/dL      Albumin 4.30 g/dL      ALT (SGPT) 17 U/L      AST (SGOT) 14 U/L      Alkaline Phosphatase 61 U/L       Total Bilirubin 0.3 mg/dL      eGFR Non African Amer 88 mL/min/1.73      Globulin 2.6 gm/dL      A/G Ratio 1.7 g/dL      BUN/Creatinine Ratio 21.7     Anion Gap 9.6 mmol/L     Narrative:      GFR Normal >60  Chronic Kidney Disease <60  Kidney Failure <15      Protime-INR [[id number]]  (Normal) Collected: 04/21/21 0722    Specimen: Blood Updated: 04/21/21 0730     Protime 12.4 Seconds      INR 0.95    Narrative:      Therapeutic Ranges for INR: 2.0-3.0 (PT 20-30)                              2.5-3.5 (PT 25-34)    aPTT [[id number]]  (Abnormal) Collected: 04/21/21 0722    Specimen: Blood Updated: 04/21/21 0730     PTT 24.1 seconds     Narrative:      PTT = The equivalent PTT values for the therapeutic range of heparin levels at 0.1 to 0.7 U/ml are 53 to 110 seconds.      CBC & Differential [[id number]]  (Abnormal) Collected: 04/21/21 0713    Specimen: Blood Updated: 04/21/21 0721    Narrative:      The following orders were created for panel order CBC & Differential.  Procedure                               Abnormality         Status                     ---------                               -----------         ------                     CBC Auto Differential[[id number]]        Abnormal            Final result                 Please view results for these tests on the individual orders.    CBC Auto Differential [[id number]]  (Abnormal) Collected: 04/21/21 0713    Specimen: Blood Updated: 04/21/21 0721     WBC 6.37 10*3/mm3      RBC 3.93 10*6/mm3      Hemoglobin 12.2 g/dL      Hematocrit 38.7 %      MCV 98.5 fL      MCH 31.0 pg      MCHC 31.5 g/dL      RDW 13.6 %      RDW-SD 49.8 fl      MPV 10.3 fL      Platelets 247 10*3/mm3      Neutrophil % 74.2 %      Lymphocyte % 14.1 %      Monocyte % 9.6 %      Eosinophil % 1.6 %      Basophil % 0.3 %      Immature Grans % 0.2 %      Neutrophils, Absolute 4.73 10*3/mm3      Lymphocytes, Absolute 0.90 10*3/mm3      Monocytes, Absolute 0.61 10*3/mm3      Eosinophils, Absolute 0.10  10*3/mm3      Basophils, Absolute 0.02 10*3/mm3      Immature Grans, Absolute 0.01 10*3/mm3         Imaging Results (Most Recent)     Procedure Component Value Units Date/Time    SCANNED - IMAGING [[id number]] Resulted: 04/21/21     Updated: 04/22/21 1113    MRI Brain Without Contrast [[id number]] Collected: 04/21/21 1201     Updated: 04/21/21 1203    Narrative:      MRI Brain WO    INDICATION:   Transient ischemic attack, right-sided numbness and weakness this morning    TECHNIQUE:   MRI of the brain without IV contrast.    COMPARISON:    Same day CT head and CTA head and neck    FINDINGS:    There are no areas of restricted diffusion to suggest vascular territory infarct.    No evidence of acute intracranial hemorrhage. No mass or mass effect is identified. No hydrocephalus. Basal cisterns are not effaced.    No unusual parenchymal signal abnormalities.    Midline structures are within normal limits.    The major intracranial flow voids are maintained.    The orbits are grossly within normal limits.    Calvarial marrow signal is within normal limits.        Impression:      1.  Normal MRI of the brain without contrast.    Signer Name: LU ENGEL MD   Signed: 4/21/2021 12:01 PM   Workstation Name: [email or web address]    [facility]    CT Angiogram Head [[id number]] Collected: 04/21/21 0954     Updated: 04/21/21 0956    Narrative:      CTA Neck, CTA Head    HISTORY:  Weakness of right leg and right arm upon waking this morning    TECHNIQUE:  CT angiogram of the head and neck with IV contrast. 3-D reconstructions were obtained and reviewed. Evaluation for a significant carotid arterial stenosis is based on NASCET criteria. Radiation dose reduction techniques included automated exposure  control. Radiation audit for known CT and nuclear cardiology exams in the last 12 months: 2.    COMPARISON:  Same day CT head    CTA NECK:  Normal aortic arch branching pattern with no proximal great vessel stenosis. The  vertebral arteries are widely patent and codominant. Cervical carotid bifurcations demonstrate no evidence of hemodynamically significant stenoses by NASCET criteria.    Cervical soft tissues are unremarkable. No acute osseous abnormality.    CTA HEAD:    Tiny 1 mm conically-shaped outpouching at the posterior right supraclinoid ICA near the expected origin location of the right posterior communicating artery. Most likely represents an infundibular origin or less likely a tiny aneurysm.    Fetal-type configuration of the left PCA.    No intracranial arterial vascular occlusion or high-grade stenosis of the anterior posterior circulation. Symmetric distal vascular opacification intracranially    Other: Pleural parenchymal scarring at the lung apices. Degenerative changes of the spine.      Impression:      1.  No intracranial arterial vascular occlusion or high-grade stenosis.  2.  No flow-limiting stenosis of the arterial vasculature of the neck.  3.  Tiny 1 mm conically-shaped outpouching at the posterior right supraclinoid ICA near the expected origin location of the right posterior communicating artery. Most likely represents an infundibular origin or less likely a tiny aneurysm..     Signer Name: LU ENGEL MD   Signed: 4/21/2021 9:54 AM   Workstation Name: [email or web address]    [facility]    CT Angiogram Carotids [[id number]] Collected: 04/21/21 0954     Updated: 04/21/21 0956    Narrative:      CTA Neck, CTA Head    HISTORY:  Weakness of right leg and right arm upon waking this morning    TECHNIQUE:  CT angiogram of the head and neck with IV contrast. 3-D reconstructions were obtained and reviewed. Evaluation for a significant carotid arterial stenosis is based on NASCET criteria. Radiation dose reduction techniques included automated exposure  control. Radiation audit for known CT and nuclear cardiology exams in the last 12 months: 2.    COMPARISON:  Same day CT head    CTA NECK:  Normal aortic  arch branching pattern with no proximal great vessel stenosis. The vertebral arteries are widely patent and codominant. Cervical carotid bifurcations demonstrate no evidence of hemodynamically significant stenoses by NASCET criteria.    Cervical soft tissues are unremarkable. No acute osseous abnormality.    CTA HEAD:    Tiny 1 mm conically-shaped outpouching at the posterior right supraclinoid ICA near the expected origin location of the right posterior communicating artery. Most likely represents an infundibular origin or less likely a tiny aneurysm.    Fetal-type configuration of the left PCA.    No intracranial arterial vascular occlusion or high-grade stenosis of the anterior posterior circulation. Symmetric distal vascular opacification intracranially    Other: Pleural parenchymal scarring at the lung apices. Degenerative changes of the spine.      Impression:      1.  No intracranial arterial vascular occlusion or high-grade stenosis.  2.  No flow-limiting stenosis of the arterial vasculature of the neck.  3.  Tiny 1 mm conically-shaped outpouching at the posterior right supraclinoid ICA near the expected origin location of the right posterior communicating artery. Most likely represents an infundibular origin or less likely a tiny aneurysm..     Signer Name: LU ENGEL MD   Signed: 4/21/2021 9:54 AM   Workstation Name: [email or web address]    [facility]    XR Chest 1 View [[id number]] Collected: 04/21/21 0759     Updated: 04/21/21 0803    Narrative:      XR CHEST 1 VW-: 4/21/2021 7:36 AM     INDICATION:   Right-sided weakness and headache. Symptoms began early this morning.      COMPARISON:    04/14/2021.     FINDINGS:  Single Portable AP view(s) of the chest. Cardiac silhouette  unremarkable. The vascularity is normal. Lungs are clear. Old healed  granulomatous disease and mild pulmonary hyperinflation.       Impression:         1. Mild pulmonary hyperinflation, otherwise negative chest.     This  report was finalized on 4/21/2021 8:01 AM by Dr. Asael Olsen MD.       CT Head Without Contrast Stroke Protocol [[id number]] Collected: 04/21/21 0738     Updated: 04/21/21 0741    Narrative:      CT Head WO Code Stroke    HISTORY:   Stroke follow-up weakness right leg and right arm upon waking this morning patient in house for DKA, admission last week. Patient states does not feel right upon waking today. Hypoglycemia    TECHNIQUE:   Axial unenhanced head CT. Radiation dose reduction techniques included automated exposure control or exposure modulation based on body size. Count of known CT and cardiac nuc med studies performed in previous 12 months: 0.     Time of scan: 7:21 AM    COMPARISON:   None.    FINDINGS:   No intracranial hemorrhage, mass, or infarct. No hydrocephalus or extra-axial fluid collection. Brain parenchymal density is normal. The skull base, calvarium, and extracranial soft tissues are normal.      Impression:      No acute intracranial process by CT. Consider MRI if indicated.      NOTIFICATION: Critical Value/emergent results were called by telephone at the time of interpretation on 4/21/2021 7:35 AM to Isha, who verbally acknowledged these results.    Signer Name: Peace Griffin MD   Signed: 4/21/2021 7:38 AM   Workstation Name: [facility]    [facility]          PROCEDURES      Condition on Discharge:  Stable    Physical Exam at Discharge  Vital Signs  Temp:  [97.5 °F (36.4 °C)-98.3 °F (36.8 °C)] 97.7 °F (36.5 °C)  Heart Rate:  [69-91] 91  Resp:  [16-20] 20  BP: (101-114)/(52-72) 101/67    Physical Exam:  Physical Exam   Constitutional: Patient appears stated age and in no acute distress   Cardiovascular: Regular rate, regular rhythm, S1 normal and S2 normal.  Exam reveals no gallop and no friction rub.  No murmur heard.  Pulmonary/Chest: Lungs are clear to auscultation bilaterally. No respiratory distress. No wheezes. No rhonchi. No rales.   Abdominal: Soft. Bowel  sounds are normal. No distension and no mass. There is no hepatosplenomegaly. There is no tenderness.   Musculoskeletal: Normal Muscle tone  Extremities: No edema.   Neurological: Cranial nerves II-XII are grossly intact with no focal deficits.    Discharge Disposition  Home    Visiting Nurse:    No     Home PT/OT:  No     Home Safety Evaluation:  No     DME  None    Discharge Diet:      Dietary Orders (From admission, onward)     Start     Ordered    04/21/21 1429  Diet Regular; Cardiac, Consistent Carbohydrate  Diet Effective Now     Question Answer Comment   Diet Texture / Consistency Regular    Common Modifiers Cardiac    Common Modifiers Consistent Carbohydrate        04/21/21 1428                Activity at Discharge:  As tolerated    Follow-up Appointments  Future Appointments   Date Time Provider Department Center   4/26/2021  1:30 PM [facility] DEXA 1 BH [facility] DEXA None   6/17/2021  1:00 PM Patricia Garcia APRN [facility]   4/18/2022  9:15 AM Amaya Tran MD [facility] OB TC LG [facility]     Additional Instructions for the Follow-ups that You Need to Schedule     Discharge Follow-up with PCP   As directed       Currently Documented PCP:    Jaiden Villagomez MD    PCP Phone Number:    [phone]     Follow Up Details: 2 weeks         Discharge Follow-up with Specified Provider: [facility] on Monday, April 26th at 09:00.   As directed      To: [facility] on Monday, April 26th at 09:00.    Follow Up Details: [address] [facility], [address], [address]               Test Results Pending at Discharge  None     Aditya Carlos MD  04/22/21  19:06 EDT

## 2021-04-22 NOTE — THERAPY DISCHARGE NOTE
Acute Care - Occupational Therapy Discharge   Aditi Do    Patient Name: Lona Dupree  : 1965    MRN: [id number]                              Today's Date: 2021       Admit Date: 2021    Visit Dx:     ICD-10-CM ICD-9-CM   1. TIA (transient ischemic attack)  G45.9 435.9     Patient Active Problem List   Diagnosis   • History of bloody stools   • HSV-2 (herpes simplex virus 2) infection   • Fibroids   • Smoker   • Headache   • Diabetes mellitus (CMS/HCC)   • Well woman exam with routine gynecological exam   • High anion gap metabolic acidosis   • Methamphetamine use (CMS/HCC)   • Cocaine use   • Nausea & vomiting   • Diabetes mellitus with ketosis (CMS/HCC)   • Esophageal dysphagia   • Personal history of colonic polyps   • Gastroesophageal reflux disease with esophagitis without hemorrhage   • Howell's esophagus with dysplasia   • Esophageal dysmotility   • Diabetic acidosis without coma (CMS/HCC)   • TIA (transient ischemic attack)     Past Medical History:   Diagnosis Date   • Abnormal Pap smear of cervix     1 abnormal pap with normal f/u   • Howell esophagus    • Colon polyp    • Diabetes mellitus (CMS/HCC)    • Difficulty swallowing     at times   • Fibroid    • GERD (gastroesophageal reflux disease)    • HSV-2 (herpes simplex virus 2) infection 2017   • Menstrual disorder    • Migraines    • Rectal bleeding     bleeds with bowel movement   • Seasonal allergies      Past Surgical History:   Procedure Laterality Date   • CARPAL TUNNEL RELEASE WITH CUBITAL TUNNEL RELEASE Right    •  SECTION      X2   • COLONOSCOPY N/A 2016    Procedure: COLONOSCOPY with polypectomy;  Surgeon: Maryan Kent MD;  Location: [facility] OR;  Service:    • COLONOSCOPY N/A 6/3/2020    Procedure: COLONOSCOPY;  Surgeon: Dmitri Fung MD;  Location: [facility] OR;  Service: Gastroenterology;  Laterality: N/A;  Poor prep, polyp   • ENDOMETRIAL ABLATION      thermachoice   • ENDOSCOPY  N/A 6/3/2020    Procedure: ESOPHAGOGASTRODUODENOSCOPY;  Surgeon: Dmitri Fung MD;  Location: [facility];  Service: Gastroenterology;  Laterality: N/A;  Reflux esophagitis, erosive gastritis, duodenitis   • HYSTEROSCOPY      AND ENDOMETRIAL  ABLATION   • NASAL SEPTUM SURGERY      2004   • TUBAL ABDOMINAL LIGATION       General Information     Row Name 04/22/21 1121          OT Time and Intention    Document Type  discharge evaluation/summary  -SD     Mode of Treatment  occupational therapy  -SD     Row Name 04/22/21 1121          General Information    Patient Profile Reviewed  yes  -SD     Prior Level of Function  independent:;ADL's;all household mobility;community mobility;home management  -SD     Existing Precautions/Restrictions  no known precautions/restrictions  -SD     Barriers to Rehab  none identified  -SD     Row Name 04/22/21 1121          Occupational Profile    Reason for Services/Referral (Occupational Profile)  stroke protocol  -SD     Successful Occupations (Occupational Profile)  pt is independent with daily tasks.  -SD     Environmental Supports and Barriers (Occupational Profile)  child and boyfriend are available to provide support at home as needed  -SD     Patient Goals (Occupational Profile)  go home.  have her glucose levels regulated better.  -SD     Row Name 04/22/21 1121          Living Environment    Lives With  child(yfn), adult  -SD     Row Name 04/22/21 1121          Cognition    Orientation Status (Cognition)  oriented x 4  -SD       User Key  (r) = Recorded By, (t) = Taken By, (c) = Cosigned By    Initials Name Provider Type    SD Otoniel Gallego OTR Occupational Therapist        Mobility/ADL's     Row Name 04/22/21 1123          Bed Mobility    Bed Mobility  bed mobility (all) activities  -SD     All Activities, [place] (Bed Mobility)  independent  -SD     Row Name 04/22/21 1123          Transfers    Sit-Stand [place] (Transfers)  independent  -SD     Row  Name 04/22/21 1123          Functional Mobility    Functional Mobility- Ind. Level  independent  -SD     Functional Mobility-Distance (Feet)  100  -SD     Row Name 04/22/21 1123          Activities of Daily Living    BADL Assessment/Intervention  lower body dressing  -SD     Row Name 04/22/21 1123          Lower Body Dressing Assessment/Training    [place] Level (Lower Body Dressing)  lower body dressing skills;don;shoes/slippers;independent  -SD     Position (Lower Body Dressing)  edge of bed sitting  -SD     Comment (Lower Body Dressing)  pt reports no concern for management of daily tasks upon discharge to home  -SD       User Key  (r) = Recorded By, (t) = Taken By, (c) = Cosigned By    Initials Name Provider Type    Otoniel Oscar, OTR Occupational Therapist        Obj/Interventions     Row Name 04/22/21 1125          Sensory Assessment (Somatosensory)    Sensory Assessment (Somatosensory)  sensation intact  -SD     Row Name 04/22/21 1125          Vision Assessment/Intervention    Visual Impairment/Limitations  WFL  -SD     Row Name 04/22/21 1125          Range of Motion Comprehensive    General Range of Motion  no range of motion deficits identified  -SD     Comment, General Range of Motion  BUE rom wfl  -SD     Row Name 04/22/21 1125          Strength Comprehensive (MMT)    General Manual Muscle Testing (MMT) Assessment  no strength deficits identified  -SD     Comment, General Manual Muscle Testing (MMT) Assessment  BUE strength wfl  -SD       User Key  (r) = Recorded By, (t) = Taken By, (c) = Cosigned By    Initials Name Provider Type    Otoniel Oscar, OTR Occupational Therapist        Goals/Plan    No documentation.       Clinical Impression     Row Name 04/22/21 1126          Plan of Care Review    Plan of Care Reviewed With  patient  -SD     Outcome Summary  OT evaluation completed. Pt reports symptoms of right side weakness have resolved. Pt was able to independently manage bed  mobility, transfes and functional mobility. Pt reports no concerns for management of daily tasks upon discharge to home. No OT services recommended.  -SD     Row Name 04/22/21 1126          Therapy Assessment/Plan (OT)    Criteria for Skilled Therapeutic Interventions Met (OT)  no problems identified which require skilled intervention  -SD     Therapy Frequency (OT)  evaluation only  -SD     Row Name 04/22/21 1126          Positioning and Restraints    Pre-Treatment Position  in bed  -SD     Post Treatment Position  chair  -SD     In Chair  sitting;call light within reach  -SD       User Key  (r) = Recorded By, (t) = Taken By, (c) = Cosigned By    Initials Name Provider Type    Otoniel Oscar OTR Occupational Therapist        Outcome Measures     Row Name 04/22/21 1129          How much help from another is currently needed...    Putting on and taking off regular lower body clothing?  4  -SD     Bathing (including washing, rinsing, and drying)  4  -SD     Toileting (which includes using toilet bed pan or urinal)  4  -SD     Putting on and taking off regular upper body clothing  4  -SD     Taking care of personal grooming (such as brushing teeth)  4  -SD     Eating meals  4  -SD     AM-PAC 6 Clicks Score (OT)  24  -SD     Row Name 04/22/21 1129          Modified [place] Scale    Pre-Stroke Modified [place] Scale  0 - No Symptoms at all.  -SD     Modified [place] Scale  0 - No Symptoms at all.  -SD     Row Name 04/22/21 1129          Functional Assessment    Outcome Measure Options  AM-PAC 6 Clicks Daily Activity (OT);Modified [place]  -SD       User Key  (r) = Recorded By, (t) = Taken By, (c) = Cosigned By    Initials Name Provider Type    Otoniel Oscar OTR Occupational Therapist        Occupational Therapy Education                 Title: PT OT SLP Therapies (Resolved)     Topic: Occupational Therapy (Resolved)     Point: ADL training (Resolved)     Description:   Instruct learner(s) on proper safety  adaptation and remediation techniques during self care or transfers.   Instruct in proper use of assistive devices.              Learning Progress Summary           Patient Acceptance, E, VU by SD at 4/22/2021 1130    Comment: Education regarding OT services and role of OT with stroke protocol. Pt reports no concerns for discharge to home. She states her symptoms of right side weakness have resolved. No OT services rec. Pt in agreement.                               User Key     Initials Effective Dates Name Provider Type Discipline    SD 07/05/20 -  Otoniel Gallego OTR Occupational Therapist OT              OT Recommendation and Plan  Therapy Frequency (OT): evaluation only  Plan of Care Review  Plan of Care Reviewed With: patient  Outcome Summary: OT evaluation completed. Pt reports symptoms of right side weakness have resolved. Pt was able to independently manage bed mobility, transfes and functional mobility. Pt reports no concerns for management of daily tasks upon discharge to home. No OT services recommended.     Time Calculation:   Time Calculation- OT     Row Name 04/22/21 1132             Time Calculation- OT    OT Start Time  0820  -SD      OT Stop Time  0838  -SD      OT Time Calculation (min)  18 min  -SD         Untimed Charges    OT Eval/Re-eval Minutes  18  -SD         Total Minutes    Untimed Charges Total Minutes  18  -SD       Total Minutes  18  -SD        User Key  (r) = Recorded By, (t) = Taken By, (c) = Cosigned By    Initials Name Provider Type    SD Otoniel Gallego OTR Occupational Therapist        Therapy Charges for Today     Code Description Service Date Service Provider Modifiers Qty    [id number]  OT EVAL LOW COMPLEXITY 1 4/22/2021 Otoniel Gallego OTR GO 1               JANAK Voss  4/22/2021

## 2021-04-22 NOTE — PLAN OF CARE
Goal Outcome Evaluation:  Plan of Care Reviewed With: patient     Outcome Summary: OT evaluation completed. Pt reports symptoms of right side weakness have resolved. Pt was able to independently manage bed mobility, transfes and functional mobility. Pt reports no concerns for management of daily tasks upon discharge to home. No OT services recommended.

## 2021-04-22 NOTE — PLAN OF CARE
Goal Outcome Evaluation:  Plan of Care Reviewed With: patient  Progress: improving  Outcome Summary: Changed testing to ACHS, rechecked BG late=41. notified Quynh Simpson, gave OJ and sandwich. rechecked 30 min. later BG= 151. Rested well remainder of the night.

## 2021-04-22 NOTE — PLAN OF CARE
Goal Outcome Evaluation:  Plan of Care Reviewed With: patient     Outcome Summary: vss. pt resting in room. NIH score of '0'. seen by tele neuro this a.m. anticipated dc to home this evening. pt has no complaints at this time.

## 2021-04-22 NOTE — SIGNIFICANT NOTE
04/22/21 1621   Rehab Evaluation   Evaluation Not Performed, Comment Pt with no communication deficits. Normal sp/lang skills per neuro. No stroke per imaging and MD notes. No need for formal sp/lang evaluation at this time.

## 2021-04-22 NOTE — CASE MANAGEMENT/SOCIAL WORK
Discharge Planning Assessment   Aditi Do     Patient Name: Lona Dupree  MRN: [id number]  Today's Date: 4/22/2021    Admit Date: 4/21/2021    Discharge Needs Assessment     Row Name 04/22/21 1016       Living Environment    Lives With  child(yfn), adult    Name(s) of Who Lives With Patient  Norberto Dupree, shelbi    Current Living Arrangements  home/apartment/condo single story house with 2 to 5 steps to gain entry    Duration at Residence  26 years    Potentially Unsafe Housing Conditions  -- none    Primary Care Provided by  self    Provides Primary Care For  no one    Caregiving Concerns  no care giving concerns voiced by patient at this time.    Family Caregiver if Needed  child(yfn), adult    Family Caregiver Names  Norberto, son and Kerrie, daughter    Quality of Family Relationships  helpful;involved;supportive    Able to Return to Prior Arrangements  yes    Living Arrangement Comments  Pt states that she lives in a single story home and her son lives there as well and has two to five steps to gain entry       Resource/Environmental Concerns    Resource/Environmental Concerns  none    Transportation Concerns  -- none       Transition Planning    Patient/Family Anticipates Transition to  home with family    Patient/Family Anticipated Services at Transition  none    Transportation Anticipated  family or friend will provide Pt states that her daughter will be able to provide ride home at discharge       Discharge Needs Assessment    Readmission Within the Last 30 Days  -- pt was here at [facility] for DKA one week ago    Current Outpatient/Agency/Support Group  -- none    Equipment Currently Used at Home  glucometer    Concerns to be Addressed  no discharge needs identified;denies needs/concerns at this time    Concerns Comments  no discharge needs voiced by patient at this time.    Anticipated Changes Related to Illness  none    Equipment Needed After Discharge  none    Outpatient/Agency/Support Group Needs  --  pt does not think she will need these services at discharge    Discharge Facility/Level of Care Needs  -- pt does not feel like she will need these services at discharge    Provided Post Acute Provider List?  Refused    Refused Provider List Comment  Offered community resources but patient declines the need for them at this time.    Patient's Choice of Community Agency(s)  none    Current Discharge Risk  -- none    Discharge Coordination/Progress  Patient states that she plans on returning home at discharge with her son and daughter to help as needed, no discharge needs voiced by patient at this time.        Discharge Plan     Row Name 04/22/21 1027       Plan    Plan  Home with family    Patient/Family in Agreement with Plan  yes    Plan Comments  Into room and introduced self and role of CM. Discussed discharge disposition with patient and her daughter Kerrie with permission. Patient is currently sitting up in the chair finished with breakfast. Patient confirms that the info on her faces sheet is correct and that she see's Dr. Jaiden Villagomez as PCP. She states that she uses [facility] in [place] and has no problem picking up or paying for her medications. She also states that she does have a living will and it is on file here. Patient states she lives with her son in a single story house with two to five steps to gain entry and has no problem maneuvering the steps or within the home. She states that she is independent with her ADL's and drives but her daughter Kerrie will be able to provide ride home at discharge. She also states that she uses a glucometer at home and does not think she will need any other equipment at discharge. Patient states that she has not used a home health agency and does not think she will need this service at discharge. CM offered community resources but patient declines the need for them at this time. Patient and daughter had concerns regarding the breakfast tray she received  "this morning stating \"I have diabetes and some of the stuff on the tray like orange slices and orange juice is not on a diabetic diet\". CM offered to have a nutritionist to come in to review diet selections and she states \"someone is coming by to discuss option for my lunch\". Notified CESIA Lucía of patient's concerns. Patient states she plans on returning home at discharge with her daughter and son to help as needed, no discharge needs voiced by patient at this time. Patient and daughter had no other questions or concerns regarding discharge plans. Name and number placed on white board in room. CM will continue to follow for needs.        Continued Care and Services - Admitted Since 4/21/2021    Coordination has not been started for this encounter.         Demographic Summary     Row Name 04/22/21 1016       General Information    Admission Type  observation    Arrived From  home    Referral Source  admission list    Reason for Consult  discharge planning    Preferred Language  English     Used During This Interaction  no       Contact Information    Permission Granted to Share Info With          Functional Status    No documentation.       Psychosocial    No documentation.       Abuse/Neglect    No documentation.       Legal    No documentation.       Substance Abuse    No documentation.       Patient Forms    No documentation.           Monique Betancur RN    "

## 2021-04-22 NOTE — PROGRESS NOTES
0208 am:  Contacted by staff regarding glucose 41. Staff gave OJ and sandwich  Decrease SSI to low dose, re-evaluate in am whether full dose levemir can be utilitzed

## 2021-04-22 NOTE — THERAPY DISCHARGE NOTE
Patient Name: Lona Dupree  : 1965    MRN: [id number]                              Today's Date: 2021       Admit Date: 2021    Visit Dx:     ICD-10-CM ICD-9-CM   1. TIA (transient ischemic attack)  G45.9 435.9     Patient Active Problem List   Diagnosis   • History of bloody stools   • HSV-2 (herpes simplex virus 2) infection   • Fibroids   • Smoker   • Headache   • Diabetes mellitus (CMS/HCC)   • Well woman exam with routine gynecological exam   • High anion gap metabolic acidosis   • Methamphetamine use (CMS/HCC)   • Cocaine use   • Nausea & vomiting   • Diabetes mellitus with ketosis (CMS/HCC)   • Esophageal dysphagia   • Personal history of colonic polyps   • Gastroesophageal reflux disease with esophagitis without hemorrhage   • Howell's esophagus with dysplasia   • Esophageal dysmotility   • Diabetic acidosis without coma (CMS/HCC)   • TIA (transient ischemic attack)     Past Medical History:   Diagnosis Date   • Abnormal Pap smear of cervix     1 abnormal pap with normal f/u   • Howell esophagus    • Colon polyp    • Diabetes mellitus (CMS/HCC)    • Difficulty swallowing     at times   • Fibroid    • GERD (gastroesophageal reflux disease)    • HSV-2 (herpes simplex virus 2) infection 2017   • Menstrual disorder    • Migraines    • Rectal bleeding     bleeds with bowel movement   • Seasonal allergies      Past Surgical History:   Procedure Laterality Date   • CARPAL TUNNEL RELEASE WITH CUBITAL TUNNEL RELEASE Right    •  SECTION      X2   • COLONOSCOPY N/A 2016    Procedure: COLONOSCOPY with polypectomy;  Surgeon: Maryan Kent MD;  Location: [facility] OR;  Service:    • COLONOSCOPY N/A 6/3/2020    Procedure: COLONOSCOPY;  Surgeon: Dmitri Fung MD;  Location: [facility] OR;  Service: Gastroenterology;  Laterality: N/A;  Poor prep, polyp   • ENDOMETRIAL ABLATION      thermachoice   • ENDOSCOPY N/A 6/3/2020    Procedure: ESOPHAGOGASTRODUODENOSCOPY;   Surgeon: Dmitri Fung MD;  [facility];  Service: Gastroenterology;  Laterality: N/A;  Reflux esophagitis, erosive gastritis, duodenitis   • HYSTEROSCOPY      AND ENDOMETRIAL  ABLATION   • NASAL SEPTUM SURGERY      2004   • TUBAL ABDOMINAL LIGATION       General Information     Row Name 04/22/21 0828          Physical Therapy Time and Intention    Document Type  discharge evaluation/summary  -     Mode of Treatment  physical therapy  -     Row Name 04/22/21 0828          General Information    Patient Profile Reviewed  yes  -JW     Prior Level of Function  independent:;community mobility;all household mobility;ADL's  -     Existing Precautions/Restrictions  no known precautions/restrictions  -     Barriers to Rehab  none identified  -     Row Name 04/22/21 0828          Living Environment    Lives With  child(yfn), adult son  -     Row Name 04/22/21 0828          Home Main Entrance    Number of Stairs, Main Entrance  none  -     Stair Railings, Main Entrance  none  -     Row Name 04/22/21 0828          Cognition    Orientation Status (Cognition)  oriented x 4  -       User Key  (r) = Recorded By, (t) = Taken By, (c) = Cosigned By    Initials Name Provider Type     Shila Diaz, PT Physical Therapist        Mobility     Row Name 04/22/21 0828          Bed Mobility    Bed Mobility  bed mobility (all) activities  -     All Activities, [place] (Bed Mobility)  independent  -     Row Name 04/22/21 0828          Sit-Stand Transfer    Sit-Stand [place] (Transfers)  independent  -     Assistive Device (Sit-Stand Transfers)  other (see comments) no DME used  -     Row Name 04/22/21 0828          Gait/Stairs (Locomotion)    [place] Level (Gait)  independent  -     Assistive Device (Gait)  -- no DME used  -     Distance in Feet (Gait)  100  -JW     Bilateral Gait Deviations  forward flexed posture  -     Comment (Gait/Stairs)  Patient able to navigate  external obstacles with multiple direction changes without difficulty.  -       User Key  (r) = Recorded By, (t) = Taken By, (c) = Cosigned By    Initials Name Provider Type    Shila Santizo, MATT Physical Therapist        Obj/Interventions     Row Name 04/22/21 0828          Range of Motion Comprehensive    General Range of Motion  bilateral lower extremity ROM WNL  -     Row Name 04/22/21 0828          Strength Comprehensive (MMT)    Comment, General Manual Muscle Testing (MMT) Assessment  LE strength functional bilaterally  -     Row Name 04/22/21 0828          Sensory Assessment (Somatosensory)    Sensory Assessment (Somatosensory)  sensation intact  -       User Key  (r) = Recorded By, (t) = Taken By, (c) = Cosigned By    Initials Name Provider Type    Shila Santizo, PT Physical Therapist        Goals/Plan    No documentation.       Clinical Impression     Row Name 04/22/21 0828          Pain    Additional Documentation  -- no c/o pain  -[facility] Name 04/22/21 0828          Plan of Care Review    Plan of Care Reviewed With  patient  -     Outcome Summary  Physical therapy evaluation complete.  Patient reports all symptoms resolved and states she has been up ambulating in room without assistance.  Patient performs all bed mobility and transfers independently and gait x100 feet without use of device.  Patient reports no concerns regarding return home, no further PT needs at this time.  -     Row Name 04/22/21 0828          Therapy Assessment/Plan (PT)    Patient/Family Therapy Goals Statement (PT)  go home  -     Criteria for Skilled Interventions Met (PT)  no problems identified which require skilled intervention  -     Row Name 04/22/21 0828          Positioning and Restraints    Pre-Treatment Position  in bed  -     Post Treatment Position  chair  -     In Chair  reclined;call light within reach;encouraged to call for assist  -       User Key  (r) = Recorded By, (t) = Taken By,  (c) = Cosigned By    Initials Name Provider Type    Shila Santizo, MATT Physical Therapist        Outcome Measures     Row Name 04/22/21 1033          How much help from another person do you currently need...    Turning from your back to your side while in flat bed without using bedrails?  4  -JW     Moving from lying on back to sitting on the side of a flat bed without bedrails?  4  -JW     Moving to and from a bed to a chair (including a wheelchair)?  4  -JW     Standing up from a chair using your arms (e.g., wheelchair, bedside chair)?  4  -JW     Climbing 3-5 steps with a railing?  3  -JW     To walk in hospital room?  4  -     AM-PAC 6 Clicks Score (PT)  23  -     Row Name 04/22/21 1033          Modified Glide Scale    Pre-Stroke Modified [place]ide Scale  0 - No Symptoms at all.  -     Modified [place] Scale  0 - No Symptoms at all.  -     Row Name 04/22/21 1033          Functional Assessment    Outcome Measure Options  AM-PAC 6 Clicks Basic Mobility (PT);Modified [place]  -       User Key  (r) = Recorded By, (t) = Taken By, (c) = Cosigned By    Initials Name Provider Type    Shila Santizo PT Physical Therapist        Physical Therapy Education                 Title: PT OT SLP Therapies (Resolved)     Topic: Physical Therapy (Resolved)     Point: Mobility training (Resolved)     Learning Progress Summary           Patient Acceptance, E,TB, VU by  at 4/22/2021 1033                               User Key     Initials Effective Dates Name Provider Type Critical access hospital 04/03/18 -  Shila Diaz PT Physical Therapist PT              PT Recommendation and Plan     Plan of Care Reviewed With: patient  Outcome Summary: Physical therapy evaluation complete.  Patient reports all symptoms resolved and states she has been up ambulating in room without assistance.  Patient performs all bed mobility and transfers independently and gait x100 feet without use of device.  Patient reports no concerns  regarding return home, no further PT needs at this time.     Time Calculation:   PT Charges     Row Name 04/22/21 0828             Time Calculation    Start Time  0828  -      Stop Time  0840  -      Time Calculation (min)  12 min  -      PT Received On  04/22/21  -REN        User Key  (r) = Recorded By, (t) = Taken By, (c) = Cosigned By    Initials Name Provider Type    Shila Santizo, PT Physical Therapist        Therapy Charges for Today     Code Description Service Date Service Provider Modifiers Qty    [id number] HC PT EVAL LOW COMPLEXITY 1 4/22/2021 Shila Diaz, PT GP 1          PT G-Codes  Outcome Measure Options: AM-PAC 6 Clicks Basic Mobility (PT), Modified [place]  AM-PAC 6 Clicks Score (PT): 23  Modified [place] Scale: 0 - No Symptoms at all.    PT Discharge Summary  Anticipated Discharge Disposition (PT): home    Shila Diaz PT  4/22/2021

## 2021-04-23 ENCOUNTER — READMISSION MANAGEMENT (OUTPATIENT)
Dept: CALL CENTER | Facility: HOSPITAL | Age: 56
End: 2021-04-23

## 2021-04-23 NOTE — CASE MANAGEMENT/SOCIAL WORK
Case Management Discharge Note      Final Note: D/C home    Provided Post Acute Provider List?: Refused  Refused Provider List Comment: Offered community resources but patient declines the need for them at this time.    Selected Continued Care - Discharged on 4/22/2021 Admission date: 4/21/2021 - Discharge disposition: Home or Self Care    Destination    No services have been selected for the patient.              Durable Medical Equipment    No services have been selected for the patient.              Dialysis/Infusion    No services have been selected for the patient.              Home Medical Care    No services have been selected for the patient.              Therapy    No services have been selected for the patient.              Community Resources    No services have been selected for the patient.                       Final Discharge Disposition Code: 01 - home or self-care

## 2021-04-23 NOTE — OUTREACH NOTE
Prep Survey      Responses   Gnosticism facility patient discharged from?  [place]   Is LACE score < 7 ?  Yes   Emergency Room discharge w/ pulse ox?  No   Eligibility  Readm Mgmt   Discharge diagnosis  Right arm and Leg weakness   Does the patient have one of the following disease processes/diagnoses(primary or secondary)?  Other   Does the patient have Home health ordered?  No   Is there a DME ordered?  No   Prep survey completed?  Yes          Elda Morley RN

## 2021-04-26 ENCOUNTER — APPOINTMENT (OUTPATIENT)
Dept: BONE DENSITY | Facility: HOSPITAL | Age: 56
End: 2021-04-26

## 2021-04-26 ENCOUNTER — READMISSION MANAGEMENT (OUTPATIENT)
Dept: CALL CENTER | Facility: HOSPITAL | Age: 56
End: 2021-04-26

## 2021-04-26 DIAGNOSIS — Z13.9 SCREENING FOR CONDITION: ICD-10-CM

## 2021-04-26 PROCEDURE — 77080 DXA BONE DENSITY AXIAL: CPT

## 2021-04-26 NOTE — OUTREACH NOTE
LAG < 7 Survey      Responses   [facility] patient discharged from?  [place]   Does the patient have one of the following disease processes/diagnoses(primary or secondary)?  Other   BHLAG <7 Attempt successful?  Yes   Call start time  1736   Call end time  1737   Discharge diagnosis  Right arm and Leg weakness   Does the patient have all medications ordered at discharge?  Yes   Is the patient taking all medications as directed (includes completed medication regime)?  Yes   Comments regarding appointments  saw endocrinologist today   Does the patient have a primary care provider?   Yes   Does the patient have an appointment with their PCP within 7 days of discharge?  Yes   Comments regarding PCP  f/u appt with Dr. Villagomez in 2 weeks   Has the patient kept scheduled appointments due by today?  Yes   Psychosocial issues?  No   Did the patient receive a copy of their discharge instructions?  Yes   Nursing interventions  Reviewed instructions with patient   What is the patient's perception of their health status since discharge?  Improving   Is the patient/caregiver able to teach back the hierarchy of who to call/visit for symptoms/problems? PCP, Specialist, Home health nurse, Urgent Care, ED, 911  Yes   Graduated  Yes   Is the patient interested in additional calls from an ambulatory ?  NOTE:  applies to high risk patients requiring additional follow-up.  No   Wrap up additional comments  States she is doing well, saw her endocrinologist today and will see PCP in 2 weeks, no questions or concerns at this time.          Rina Andujar RN

## 2021-07-01 ENCOUNTER — OFFICE VISIT (OUTPATIENT)
Dept: GASTROENTEROLOGY | Facility: CLINIC | Age: 56
End: 2021-07-01

## 2021-07-01 VITALS
BODY MASS INDEX: 22.6 KG/M2 | HEIGHT: 62 IN | SYSTOLIC BLOOD PRESSURE: 136 MMHG | DIASTOLIC BLOOD PRESSURE: 82 MMHG | WEIGHT: 122.8 LBS

## 2021-07-01 DIAGNOSIS — K22.4 ESOPHAGEAL DYSMOTILITY: ICD-10-CM

## 2021-07-01 DIAGNOSIS — K22.719 BARRETT'S ESOPHAGUS WITH DYSPLASIA: Primary | ICD-10-CM

## 2021-07-01 DIAGNOSIS — K21.00 GASTROESOPHAGEAL REFLUX DISEASE WITH ESOPHAGITIS WITHOUT HEMORRHAGE: ICD-10-CM

## 2021-07-01 DIAGNOSIS — F50.2 VOMITING ASSOCIATED WITH BULIMIA NERVOSA WITH NAUSEA: ICD-10-CM

## 2021-07-01 DIAGNOSIS — R11.14 BILIOUS VOMITING WITH NAUSEA: ICD-10-CM

## 2021-07-01 DIAGNOSIS — N18.1 DIABETES MELLITUS DUE TO UNDERLYING CONDITION WITH STAGE 1 CHRONIC KIDNEY DISEASE, UNSPECIFIED WHETHER LONG TERM INSULIN USE (HCC): ICD-10-CM

## 2021-07-01 DIAGNOSIS — E08.22 DIABETES MELLITUS DUE TO UNDERLYING CONDITION WITH STAGE 1 CHRONIC KIDNEY DISEASE, UNSPECIFIED WHETHER LONG TERM INSULIN USE (HCC): ICD-10-CM

## 2021-07-01 DIAGNOSIS — R13.19 ESOPHAGEAL DYSPHAGIA: ICD-10-CM

## 2021-07-01 PROCEDURE — 99213 OFFICE O/P EST LOW 20 MIN: CPT | Performed by: NURSE PRACTITIONER

## 2021-07-01 RX ORDER — METFORMIN HYDROCHLORIDE 500 MG/1
1000 TABLET, EXTENDED RELEASE ORAL 2 TIMES DAILY
COMMUNITY
Start: 2021-04-26 | End: 2022-10-13

## 2021-07-01 RX ORDER — PANTOPRAZOLE SODIUM 40 MG/1
40 TABLET, DELAYED RELEASE ORAL 2 TIMES DAILY
Qty: 180 TABLET | Refills: 3 | Status: SHIPPED | OUTPATIENT
Start: 2021-07-01 | End: 2022-07-25 | Stop reason: SDUPTHER

## 2021-07-01 RX ORDER — FAMOTIDINE 40 MG/1
40 TABLET, FILM COATED ORAL 2 TIMES DAILY
Qty: 180 TABLET | Refills: 3 | Status: SHIPPED | OUTPATIENT
Start: 2021-07-01 | End: 2022-07-18 | Stop reason: SDUPTHER

## 2021-07-01 RX ORDER — ONDANSETRON 4 MG/1
4 TABLET, ORALLY DISINTEGRATING ORAL EVERY 8 HOURS PRN
Qty: 90 TABLET | Refills: 3 | Status: SHIPPED | OUTPATIENT
Start: 2021-07-01

## 2021-07-01 RX ORDER — SUCRALFATE 1 G/1
1 TABLET ORAL 4 TIMES DAILY
Qty: 120 TABLET | Refills: 5 | Status: SHIPPED | OUTPATIENT
Start: 2021-07-01 | End: 2022-05-16

## 2021-07-01 RX ORDER — INSULIN GLARGINE 100 [IU]/ML
15 INJECTION, SOLUTION SUBCUTANEOUS
COMMUNITY
Start: 2021-06-15

## 2021-07-01 NOTE — PROGRESS NOTES
PATIENT INFORMATION  Lona Dupree     - 1965    CHIEF COMPLAINT  Chief Complaint   Patient presents with   • Howell's   • Heartburn       HISTORY OF PRESENT ILLNESS  Hx: lost both brothers and father last year.    55-year-old female with dysphagia. Patient reports for  the past year she feels like food gets stuck in the back of her throat  and mid chest. Exacerbating foods include meats and breads. Occasionally  feels mid chest pressure when the food feels stuck. Currently  asymptomatic. Symptoms have been present for approximately 1 year. Her  brother recently  in January of esophageal cancer.     3/17/20 Fl upper GI no stricture or mass  Impression:  1. Mild esophageal dysmotility demonstrated by intermittent tertiary  contractions.   2. Small sliding hiatal hernia with a small amount of spontaneous GE  reflux observed refluxing to the midthoracic esophagus.3. Atrophic changes with obscured small areae gastricae which is often  seen with gastritis.     6/3/20 EGD colon: for Dysphagia,Polyps biopsy was Positive for SSBarrett's  Negative for Celiac.HP, Dysplasia   0/1 TA 5yr recall.   low acid diet instructions needed, last A1c 8.9 from 12.05!!!! discussed risk for GI issues achalasia/gastroparesis with this.      Today:      She is doing ok except when she drinks carbonation and a few issues with swallowing and feeling painful on the way down and is just bothering he when she eats something fried or with tomatoes. Removes the breading off her fried food and is less than 3 times a week. Usually like the food is getting hung up right above her stomach. Some bloating but rare when she eats but not really early satiety.  A couple of mornings in the last month has woken up and started coughing and ran to the br with bilious emesis.  She feels her allergies are also a big factor in this.  Takes sudafed and zyrtec and allergy eye drops.  Used to be on allergy injections but are too costly and burdensome.   May look into allergy drops.      Tried a piece of pizza a couple of weeks ago and chewed slowly and tolerated it better.      She has been on her PROTONIX 40 bid. , famotidine 40 bid and carafate bid and this is working well for her. She continues to work on her blood sugars and enc her to continue this.          REVIEWED PERTINENT RESULTS/ LABS  Lab Results   Component Value Date    CASEREPORT  06/03/2020     Surgical Pathology Report                         Case: [id number]                                  Authorizing Provider:  Dmitri Fung        Collected:           06/03/2020 01:56 PM                                 MD Goran                                                                   Ordering Location:     [facility]   Received:            06/03/2020 05:23 PM                                 OR                                                                           Pathologist:           Norberto Mcgregor MD                                                         Specimens:   1) - Small Intestine, Duodenum                                                                      2) - Gastric, Body                                                                                  3) - Esophagus, Distal                                                                              4) - Large Intestine, Rectum                                                               FINALDX  06/03/2020     1.  Duodenum Biopsy: Benign duodenal and small intestinal mucosa with   A. Normal villous architecture.   B. No active cryptitis, crypt abscess formation, granulomatous inflammation nor         intestinal parasites identified.    2.  Gastric Biopsy:  Benign gastric mucosa with     A.  Mild chronic inflammation.    B.  No H. pylori-like organisms identified by routine staining.      3.  Distal Esophagus Biopsy:  Benign squamous and glandular mucosa with   A.  Focally active mild chronic inflammation  "with rare eosinophil noted suggesting chronic reflux.    B.  Focal, rare intestinal metaplasia by special staining consistent with developing Howell's esophagus                      (see comment).   C.  No dysplasia nor malignancy identified.      4.  Rectum Biopsy:    A.  Hyperplastic polyp.    B.  No glandular dyplasia nor malignancy identified.     jat/brb        Lab Results   Component Value Date    HGB 12.2 04/21/2021    MCV 98.5 (H) 04/21/2021     04/21/2021    ALT 17 04/21/2021    AST 14 04/21/2021    HGBA1C 8.90 (H) 04/14/2021    INR 0.95 04/21/2021    TRIG 114 04/21/2021      No results found.    CURRENT MEDICATIONS    Current Outpatient Medications:   •  acetaminophen (TYLENOL) 325 MG tablet, Take 2 tablets by mouth Every 4 (Four) Hours As Needed for Mild Pain . Over the counter, Disp: , Rfl:   •  cetirizine (ZyrTEC) 10 MG tablet, Take 10 mg by mouth daily, Disp: , Rfl:   •  famotidine (PEPCID) 40 MG tablet, Take 1 tablet by mouth 2 (Two) Times a Day., Disp: 180 tablet, Rfl: 3  •  FLUoxetine (PROzac) 20 MG capsule, 20 mg Daily., Disp: , Rfl:   •  insulin detemir (LEVEMIR) 100 UNIT/ML injection, Inject 15 Units under the skin into the appropriate area as directed Daily., Disp: , Rfl: 12  •  Insulin Pen Needle (BD Pen Needle Yari 2nd Gen) 32G X 4 MM misc, BD Ultra-Fine Yari Pen Needle 32 gauge x 5/32\"  USE ONCE DAILY WITH LANTUS TO INJECT INSULIN, Disp: , Rfl:   •  Lantus SoloStar 100 UNIT/ML injection pen, 15 Units Every Morning Before Breakfast., Disp: , Rfl:   •  metFORMIN ER (GLUCOPHAGE-XR) 500 MG 24 hr tablet, 1,000 mg 2 (two) times a day., Disp: , Rfl:   •  Multiple Vitamins-Minerals (MULTI-BETIC PO), Take 2 capsules by mouth Daily., Disp: , Rfl:   •  pantoprazole (PROTONIX) 40 MG EC tablet, Take 1 tablet by mouth 2 (two) times a day., Disp: 180 tablet, Rfl: 3  •  promethazine (PHENERGAN) 25 MG tablet, Take 25 mg by mouth every 6 (six) hours as needed for nausea or vomiting., Disp: , Rfl:   •  " pseudoephedrine (SUDAFED) 120 MG 12 hr tablet, Take 120 mg by mouth Every 12 (Twelve) Hours As Needed for Congestion., Disp: , Rfl:   •  sucralfate (Carafate) 1 g tablet, Take 1 tablet by mouth 4 (Four) Times a Day. Crush tablet in wax paper mix with 1-2 tsp of water to make slurry and drink., Disp: 120 tablet, Rfl: 5  •  SUMAtriptan (IMITREX) 100 MG tablet, sumatriptan 100 mg tablet  take 1 po at onset of symptoms. can repeat x 1 in 2 hours if headache persists, Disp: , Rfl:   •  traZODone (DESYREL) 50 MG tablet, Take 50 mg by mouth every night., Disp: , Rfl:   •  valACYclovir (VALTREX) 500 MG tablet, Take 1 tablet by mouth Daily., Disp: 90 tablet, Rfl: 3  •  metFORMIN (GLUCOPHAGE) 500 MG tablet, Take 1 tablet by mouth 2 (Two) Times a Day With Meals for 30 days., Disp: 60 tablet, Rfl: 0  •  ondansetron ODT (ZOFRAN-ODT) 4 MG disintegrating tablet, Place 1 tablet on the tongue Every 8 (Eight) Hours As Needed for Nausea or Vomiting., Disp: 90 tablet, Rfl: 3  •  SALINE NASAL MIST NA, Saline Nasal Mist, Disp: , Rfl:     ALLERGIES  Mobic [meloxicam]    REVIEW OF SYSTEMS  ROS: 14 point review of systems was performed and all other systems were reviewed and are negative except for documented findings in HPI and today's encounter.   Review of Systems   Constitutional: Negative for activity change, chills, fever and unexpected weight change.   HENT: Negative for congestion.    Eyes: Negative for visual disturbance.   Respiratory: Negative for shortness of breath.    Cardiovascular: Negative for chest pain and palpitations.   Gastrointestinal: Positive for abdominal pain, nausea and vomiting. Negative for blood in stool.   Endocrine: Negative for cold intolerance and heat intolerance.   Genitourinary: Negative for hematuria.   Musculoskeletal: Negative for gait problem.   Skin: Negative for color change.   Allergic/Immunologic: Negative for immunocompromised state.   Neurological: Negative for weakness and light-headedness.    Hematological: Negative for adenopathy.   Psychiatric/Behavioral: Negative for sleep disturbance. The patient is not nervous/anxious.          History     Last Reviewed by Patricia Garcia APRN on 7/1/2021 at  4:19 PM    Sections Reviewed    Tobacco, Family, Medical, Surgical      Problem list reviewed by Patricia Garcia APRN on 7/1/2021 at  4:19 PM  Medicines reviewed by Patricia Garcia APRN on 7/1/2021 at  4:19 PM  Allergies reviewed by Patricia Garcai APRN on 7/1/2021 at  4:19 PM      ACTIVE PROBLEMS  Patient Active Problem List    Diagnosis    • Hypoglycemia [E16.2]    • Diabetic acidosis without coma (CMS/HCC) [E11.10]    • Gastroesophageal reflux disease with esophagitis without hemorrhage [K21.00]    • Howell's esophagus with dysplasia [K22.719]    • Esophageal dysmotility [K22.4]    • Esophageal dysphagia [R13.10]    • Personal history of colonic polyps [Z86.010]    • High anion gap metabolic acidosis [E87.2]    • Methamphetamine use (CMS/HCC) [F15.10]    • Cocaine use [F14.90]    • Nausea & vomiting [R11.2]    • Diabetes mellitus with ketosis (CMS/HCC) [E13.10]    • Well woman exam with routine gynecological exam [Z01.419]    • HSV-2 (herpes simplex virus 2) infection [B00.9]    • Fibroids [D21.9]    • Smoker [F17.200]    • Headache [R51.9]    • Diabetes mellitus (CMS/HCC) [E11.9]    • History of bloody stools [Z87.19]          PAST MEDICAL HISTORY  Past Medical History:   Diagnosis Date   • Abnormal Pap smear of cervix     1 abnormal pap with normal f/u   • Howell esophagus    • Colon polyp    • Diabetes mellitus (CMS/HCC)    • Difficulty swallowing     at times   • Fibroid    • GERD (gastroesophageal reflux disease)    • HSV-2 (herpes simplex virus 2) infection 7/2/2017   • Menstrual disorder    • Migraines    • Rectal bleeding     bleeds with bowel movement   • Seasonal allergies          SURGICAL HISTORY  Past Surgical History:   Procedure Laterality Date   • CARPAL TUNNEL RELEASE WITH CUBITAL TUNNEL  "RELEASE Right    •  SECTION      X2   • COLONOSCOPY N/A 2016    Procedure: COLONOSCOPY with polypectomy;  Surgeon: Maryan Kent MD;  Location: [facility] OR;  Service:    • COLONOSCOPY N/A 6/3/2020    Procedure: COLONOSCOPY;  Surgeon: Dmitri Fung MD;  Location: [facility] OR;  Service: Gastroenterology;  Laterality: N/A;  Poor prep, polyp   • ENDOMETRIAL ABLATION      thermachoice   • ENDOSCOPY N/A 6/3/2020    Procedure: ESOPHAGOGASTRODUODENOSCOPY;  Surgeon: Dmitri Fung MD;  Location: [facility] OR;  Service: Gastroenterology;  Laterality: N/A;  Reflux esophagitis, erosive gastritis, duodenitis   • HYSTEROSCOPY      AND ENDOMETRIAL  ABLATION   • NASAL SEPTUM SURGERY         • TUBAL ABDOMINAL LIGATION           FAMILY HISTORY  Family History   Problem Relation Age of Onset   • Hypertension Mother    • Hyperlipidemia Mother    • Atrial fibrillation Mother    • Stroke Mother    • Heart attack Father    • Hypertension Father    • Diabetes Father    • Alcohol abuse Father    • Dementia Father    • Stroke Sister    • Crohn's disease Sister    • Heart attack Brother    • Hypertension Brother    • Stroke Brother    • Esophageal cancer Brother    • Alzheimer's disease Paternal Grandmother    • Diabetes Paternal Aunt    • Breast cancer Paternal Aunt    • Rheum arthritis Other    • Colon cancer Maternal Uncle    • Ovarian cancer Neg Hx    • Deep vein thrombosis Neg Hx    • Pulmonary embolism Neg Hx          SOCIAL HISTORY  Social History     Occupational History   • Not on file   Tobacco Use   • Smoking status: Former Smoker     Quit date: 1995     Years since quittin.0   • Smokeless tobacco: Never Used   • Tobacco comment: Quit \" a long time ago\"    Substance and Sexual Activity   • Alcohol use: Yes     Comment: OCCASIONALLY   • Drug use: No   • Sexual activity: Yes     Partners: Male     Birth control/protection: Surgical, Post-menopausal     Comment: BTL         LAST " RESULTS  See also labs reviewed above.   Admission on 04/21/2021, Discharged on 04/22/2021   Component Date Value Ref Range Status   • Glucose 04/21/2021 59* 65 - 99 mg/dL Final   • BUN 04/21/2021 15  6 - 20 mg/dL Final   • Creatinine 04/21/2021 0.69  0.57 - 1.00 mg/dL Final   • Sodium 04/21/2021 142  136 - 145 mmol/L Final   • Potassium 04/21/2021 3.4* 3.5 - 5.2 mmol/L Final   • Chloride 04/21/2021 104  98 - 107 mmol/L Final   • CO2 04/21/2021 28.4  22.0 - 29.0 mmol/L Final   • Calcium 04/21/2021 9.2  8.6 - 10.5 mg/dL Final   • Total Protein 04/21/2021 6.9  6.0 - 8.5 g/dL Final   • Albumin 04/21/2021 4.30  3.50 - 5.20 g/dL Final   • ALT (SGPT) 04/21/2021 17  1 - 33 U/L Final   • AST (SGOT) 04/21/2021 14  1 - 32 U/L Final   • Alkaline Phosphatase 04/21/2021 61  39 - 117 U/L Final   • Total Bilirubin 04/21/2021 0.3  0.0 - 1.2 mg/dL Final   • eGFR Non African Amer 04/21/2021 88  >60 mL/min/1.73 Final   • Globulin 04/21/2021 2.6  gm/dL Final   • A/G Ratio 04/21/2021 1.7  g/dL Final   • BUN/Creatinine Ratio 04/21/2021 21.7  7.0 - 25.0 Final   • Anion Gap 04/21/2021 9.6  5.0 - 15.0 mmol/L Final   • WBC 04/21/2021 6.37  3.40 - 10.80 10*3/mm3 Final   • RBC 04/21/2021 3.93  3.77 - 5.28 10*6/mm3 Final   • Hemoglobin 04/21/2021 12.2  12.0 - 15.9 g/dL Final   • Hematocrit 04/21/2021 38.7  34.0 - 46.6 % Final   • MCV 04/21/2021 98.5* 79.0 - 97.0 fL Final   • MCH 04/21/2021 31.0  26.6 - 33.0 pg Final   • MCHC 04/21/2021 31.5  31.5 - 35.7 g/dL Final   • RDW 04/21/2021 13.6  12.3 - 15.4 % Final   • RDW-SD 04/21/2021 49.8  37.0 - 54.0 fl Final   • MPV 04/21/2021 10.3  6.0 - 12.0 fL Final   • Platelets 04/21/2021 247  140 - 450 10*3/mm3 Final   • Neutrophil % 04/21/2021 74.2  42.7 - 76.0 % Final   • Lymphocyte % 04/21/2021 14.1* 19.6 - 45.3 % Final   • Monocyte % 04/21/2021 9.6  5.0 - 12.0 % Final   • Eosinophil % 04/21/2021 1.6  0.3 - 6.2 % Final   • Basophil % 04/21/2021 0.3  0.0 - 1.5 % Final   • Immature Grans % 04/21/2021 0.2   0.0 - 0.5 % Final   • Neutrophils, Absolute 04/21/2021 4.73  1.70 - 7.00 10*3/mm3 Final   • Lymphocytes, Absolute 04/21/2021 0.90  0.70 - 3.10 10*3/mm3 Final   • Monocytes, Absolute 04/21/2021 0.61  0.10 - 0.90 10*3/mm3 Final   • Eosinophils, Absolute 04/21/2021 0.10  0.00 - 0.40 10*3/mm3 Final   • Basophils, Absolute 04/21/2021 0.02  0.00 - 0.20 10*3/mm3 Final   • Immature Grans, Absolute 04/21/2021 0.01  0.00 - 0.05 10*3/mm3 Final   • QT Interval 04/21/2021 416  ms Final   • Protime 04/21/2021 12.4  12.1 - 15.0 Seconds Final   • INR 04/21/2021 0.95  0.90 - 1.10 Final   • PTT 04/21/2021 24.1* 24.3 - 38.1 seconds Final   • Troponin T 04/21/2021 <0.010  0.000 - 0.030 ng/mL Final   • Glucose 04/21/2021 53* 70 - 130 mg/dL Final   • Glucose 04/21/2021 182* 70 - 130 mg/dL Final   • THC, Screen, Urine 04/21/2021 Positive* Negative Final   • Phencyclidine (PCP), Urine 04/21/2021 Negative  Negative Final   • Cocaine Screen, Urine 04/21/2021 Negative  Negative Final   • Methamphetamine, Ur 04/21/2021 Negative  Negative Final   • Opiate Screen 04/21/2021 Negative  Negative Final   • Amphetamine Screen, Urine 04/21/2021 Negative  Negative Final   • Benzodiazepine Screen, Urine 04/21/2021 Negative  Negative Final   • Tricyclic Antidepressants Screen 04/21/2021 Negative  Negative Final   • Methadone Screen, Urine 04/21/2021 Negative  Negative Final   • Barbiturates Screen, Urine 04/21/2021 Negative  Negative Final   • Oxycodone Screen, Urine 04/21/2021 Negative  Negative Final   • Propoxyphene Screen 04/21/2021 Negative  Negative Final   • Buprenorphine, Screen, Urine 04/21/2021 Negative  Negative Final   • Glucose 04/21/2021 39* 70 - 130 mg/dL Final   • Glucose 04/21/2021 37* 70 - 130 mg/dL Final   • COVID19 04/21/2021 Not Detected  Not Detected - Ref. Range Final   • Glucose 04/21/2021 204* 70 - 130 mg/dL Final   • Total Cholesterol 04/21/2021 141  0 - 200 mg/dL Final   • Triglycerides 04/21/2021 114  0 - 150 mg/dL Final   •  "HDL Cholesterol 04/21/2021 49  40 - 60 mg/dL Final   • LDL Cholesterol  04/21/2021 71  0 - 100 mg/dL Final   • VLDL Cholesterol 04/21/2021 21  5 - 40 mg/dL Final   • LDL/HDL Ratio 04/21/2021 1.41   Final   • Glucose 04/21/2021 37* 70 - 130 mg/dL Final   • Glucose 04/21/2021 187* 70 - 130 mg/dL Final   • Glucose 04/21/2021 207* 70 - 130 mg/dL Final   • Glucose 04/22/2021 48* 70 - 130 mg/dL Final   • Glucose 04/22/2021 41* 70 - 130 mg/dL Final   • Glucose 04/22/2021 151* 70 - 130 mg/dL Final   • Glucose 04/22/2021 128* 65 - 99 mg/dL Final   • BUN 04/22/2021 13  6 - 20 mg/dL Final   • Creatinine 04/22/2021 0.66  0.57 - 1.00 mg/dL Final   • Sodium 04/22/2021 137  136 - 145 mmol/L Final   • Potassium 04/22/2021 4.3  3.5 - 5.2 mmol/L Final   • Chloride 04/22/2021 105  98 - 107 mmol/L Final   • CO2 04/22/2021 25.3  22.0 - 29.0 mmol/L Final   • Calcium 04/22/2021 9.6  8.6 - 10.5 mg/dL Final   • eGFR Non African Amer 04/22/2021 93  >60 mL/min/1.73 Final   • BUN/Creatinine Ratio 04/22/2021 19.7  7.0 - 25.0 Final   • Anion Gap 04/22/2021 6.7  5.0 - 15.0 mmol/L Final   • Glucose 04/22/2021 144* 70 - 130 mg/dL Final   • Glucose 04/22/2021 291* 70 - 130 mg/dL Final   • Glucose 04/22/2021 174* 70 - 130 mg/dL Final     No results found.    PHYSICAL EXAM  Debilities/Disabilities Identified: None  Emotional Behavior: Appropriate  56 y.o. female  Wt Readings from Last 3 Encounters:   07/01/21 55.7 kg (122 lb 12.8 oz)   04/21/21 59.2 kg (130 lb 8.2 oz)   04/15/21 55.1 kg (121 lb 6.4 oz)     Ht Readings from Last 1 Encounters:   07/01/21 157.5 cm (62\")     Body mass index is 22.46 kg/m².  Vitals:    07/01/21 1608   BP: 136/82   BP Location: Left arm   Patient Position: Sitting   Cuff Size: Adult   Weight: 55.7 kg (122 lb 12.8 oz)   Height: 157.5 cm (62\")     Vital signs reviewed.          Physical Exam      CLINICAL DATA REVIEWED   reviewed previous lab results and integrated with today's visit, reviewed notes from other physicians " and/or last GI encounter, reviewed previous endoscopy results and available photos, reviewed surgical pathology results from previous biopsies      ASSESSMENT  Diagnoses and all orders for this visit:    Howell's esophagus with dysplasia  -     pantoprazole (PROTONIX) 40 MG EC tablet; Take 1 tablet by mouth 2 (two) times a day.  -     sucralfate (Carafate) 1 g tablet; Take 1 tablet by mouth 4 (Four) Times a Day. Crush tablet in wax paper mix with 1-2 tsp of water to make slurry and drink.  -     famotidine (PEPCID) 40 MG tablet; Take 1 tablet by mouth 2 (Two) Times a Day.    Diabetes mellitus due to underlying condition with stage 1 chronic kidney disease, unspecified whether long term insulin use (CMS/[facility])    Esophageal dysmotility  -     pantoprazole (PROTONIX) 40 MG EC tablet; Take 1 tablet by mouth 2 (two) times a day.  -     famotidine (PEPCID) 40 MG tablet; Take 1 tablet by mouth 2 (Two) Times a Day.    Esophageal dysphagia  -     pantoprazole (PROTONIX) 40 MG EC tablet; Take 1 tablet by mouth 2 (two) times a day.  -     sucralfate (Carafate) 1 g tablet; Take 1 tablet by mouth 4 (Four) Times a Day. Crush tablet in wax paper mix with 1-2 tsp of water to make slurry and drink.  -     famotidine (PEPCID) 40 MG tablet; Take 1 tablet by mouth 2 (Two) Times a Day.    Gastroesophageal reflux disease with esophagitis without hemorrhage  -     pantoprazole (PROTONIX) 40 MG EC tablet; Take 1 tablet by mouth 2 (two) times a day.  -     sucralfate (Carafate) 1 g tablet; Take 1 tablet by mouth 4 (Four) Times a Day. Crush tablet in wax paper mix with 1-2 tsp of water to make slurry and drink.  -     famotidine (PEPCID) 40 MG tablet; Take 1 tablet by mouth 2 (Two) Times a Day.    Vomiting associated with bulimia nervosa with nausea    Bilious vomiting with nausea  -     ondansetron ODT (ZOFRAN-ODT) 4 MG disintegrating tablet; Place 1 tablet on the tongue Every 8 (Eight) Hours As Needed for Nausea or Vomiting.    Other  orders  -     SALINE NASAL MIST NA; Saline Nasal Mist  -     Lantus SoloStar 100 UNIT/ML injection pen; 15 Units Every Morning Before Breakfast.  -     metFORMIN ER (GLUCOPHAGE-XR) 500 MG 24 hr tablet; 1,000 mg 2 (two) times a day.          PLAN  Return in about 6 months (around 1/1/2022).   Recheck EGD 6/2023 for Barretts recheck.     Make sure to take small bites of bread and meat and chew thoroughly.       Discussed the importance of taking Pantoprazole/Protonix, 40mg twice daily before breakfast and dinner permanently due to known risk with long term acid reflux of Howell's esophagus/esophageal cancer., In addition, take famotidine (Pepcid) 40mg twice a day with lunch and at bedtime. Use the sucralfate as needed for flares.      Don't eat late, don't eat fried and don't eat too much at one time. Avoid tomato based products like pizza, lasagna, spagetti.  If you want to have these take an over the counter Pepcid immediately before you eat them.  Do not eat within 3-4 hrs of bedtime. Limit caffeine and carbonated beverages, if you must have them do not have later in the day.  If reflux is severe elevate the head of the bed. You may also use TUMS, maalox, or mylanta for breakthrough heartburn.    I have discussed the above plan with the patient.  They verbalize understanding and are in agreement with the plan.  They have been advised to contact the office for any questions, concerns, or changes related to their health.    20 min spent with patient today >50% spent counseling about natural history and expected course of assessed complaint and reviewed treatment options that have been tried and not tried and those currently available. Questions answered.

## 2021-07-01 NOTE — PATIENT INSTRUCTIONS
Recheck EGD 6/2023 for Barretts recheck.     Make sure to take small bites of bread and meat and chew thoroughly.       Discussed the importance of taking Pantoprazole/Protonix, 40mg twice daily before breakfast and dinner permanently due to known risk with long term acid reflux of Howell's esophagus/esophageal cancer., In addition, take famotidine (Pepcid) 40mg twice a day with lunch and at bedtime. Use the sucralfate as needed for flares.      Don't eat late, don't eat fried and don't eat too much at one time. Avoid tomato based products like pizza, lasagna, spagetti.  If you want to have these take an over the counter Pepcid immediately before you eat them.  Do not eat within 3-4 hrs of bedtime. Limit caffeine and carbonated beverages, if you must have them do not have later in the day.  If reflux is severe elevate the head of the bed. You may also use TUMS, maalox, or mylanta for breakthrough heartburn.

## 2021-10-31 NOTE — TELEPHONE ENCOUNTER
Patient calling for an RX refill of her HYDROcodone-acetaminophen (NORCO) 5-325 MG per tablet- 1 tablet every 6 hours prn for moderate pain.    Patient was last seen on 10.26.2017 for a Right fourth metacarpal fracture her next appointment is 11.16.2017.    This is Dr. Franco's patient but he is out on [facility].     Left forehead laceration sutured. Patient tolerated well. Denies neurological symptoms/deficits but will rule out intracranial hemorrhage with CT. will fu imaging. CT head negative no cervical fractures. Will walk and dc if tolerates Zhanna LY: Received sign out from my colleague Dr. Solis pt presents s/p mech fall w head trauma lac repaired, pending CT read at time of sign out, CT non actionable, ambulatory w steady gait in ED, pending repeat vitals, if acceptable will dc w close pmd fu strict return precautions.

## 2021-11-01 RX ORDER — VALACYCLOVIR HYDROCHLORIDE 500 MG/1
TABLET, FILM COATED ORAL
Qty: 90 TABLET | Refills: 2 | Status: SHIPPED | OUTPATIENT
Start: 2021-11-01 | End: 2022-05-23 | Stop reason: SDUPTHER

## 2022-05-16 DIAGNOSIS — K22.719 BARRETT'S ESOPHAGUS WITH DYSPLASIA: ICD-10-CM

## 2022-05-16 DIAGNOSIS — R13.19 ESOPHAGEAL DYSPHAGIA: ICD-10-CM

## 2022-05-16 DIAGNOSIS — K21.00 GASTROESOPHAGEAL REFLUX DISEASE WITH ESOPHAGITIS WITHOUT HEMORRHAGE: ICD-10-CM

## 2022-05-16 RX ORDER — SUCRALFATE 1 G/1
TABLET ORAL
Qty: 120 TABLET | Refills: 1 | Status: SHIPPED | OUTPATIENT
Start: 2022-05-16 | End: 2022-08-08 | Stop reason: SDUPTHER

## 2022-05-23 ENCOUNTER — OFFICE VISIT (OUTPATIENT)
Dept: OBSTETRICS AND GYNECOLOGY | Facility: CLINIC | Age: 57
End: 2022-05-23

## 2022-05-23 VITALS
BODY MASS INDEX: 25.69 KG/M2 | HEIGHT: 62 IN | WEIGHT: 139.6 LBS | SYSTOLIC BLOOD PRESSURE: 118 MMHG | DIASTOLIC BLOOD PRESSURE: 60 MMHG

## 2022-05-23 DIAGNOSIS — Z12.31 ENCOUNTER FOR SCREENING MAMMOGRAM FOR MALIGNANT NEOPLASM OF BREAST: ICD-10-CM

## 2022-05-23 DIAGNOSIS — Z11.51 ENCOUNTER FOR SCREENING FOR HUMAN PAPILLOMAVIRUS (HPV): ICD-10-CM

## 2022-05-23 DIAGNOSIS — Z01.419 PAP SMEAR, LOW-RISK: Primary | ICD-10-CM

## 2022-05-23 DIAGNOSIS — B00.9 HSV-2 (HERPES SIMPLEX VIRUS 2) INFECTION: ICD-10-CM

## 2022-05-23 DIAGNOSIS — Z01.419 ROUTINE GYNECOLOGICAL EXAMINATION: ICD-10-CM

## 2022-05-23 PROCEDURE — 81002 URINALYSIS NONAUTO W/O SCOPE: CPT | Performed by: OBSTETRICS & GYNECOLOGY

## 2022-05-23 PROCEDURE — 99396 PREV VISIT EST AGE 40-64: CPT | Performed by: OBSTETRICS & GYNECOLOGY

## 2022-05-23 RX ORDER — ATORVASTATIN CALCIUM 10 MG/1
TABLET, FILM COATED ORAL
COMMUNITY

## 2022-05-23 RX ORDER — FLUOXETINE HYDROCHLORIDE 40 MG/1
CAPSULE ORAL
COMMUNITY

## 2022-05-23 RX ORDER — FLASH GLUCOSE SCANNING READER
EACH MISCELLANEOUS
COMMUNITY

## 2022-05-23 RX ORDER — VALACYCLOVIR HYDROCHLORIDE 500 MG/1
500 TABLET, FILM COATED ORAL DAILY
Qty: 90 TABLET | Refills: 3 | Status: SHIPPED | OUTPATIENT
Start: 2022-05-23

## 2022-05-23 NOTE — PROGRESS NOTES
GYN Annual Exam     CC- Here for annual exam.     Lona Dupree is a 57 y.o. female who presents for annual well woman exam. No  VB . She has had another bad year and lost 2 brothers within 10 months of each other. She is on Valtrex for suppression. She needs her MMG.  She had a possible TIA but they determined it was low blood sugar ( ?)    OB History        2    Para   2    Term   2            AB        Living   2       SAB        IAB        Ectopic        Molar        Multiple        Live Births              Obstetric Comments   2 C/S             Menarche:14  Menopause: 52  HRT:none  Current contraception: tubal ligation,   History of abnormal Pap smear: yes - 1 abnormal with normal f/u  History of abnormal mammogram: no  Family history of uterine, colon or ovarian cancer: yes - uncle had colon or pancreatic, brother with esphopahgeal cancer  Family history of breast cancer: no  STD: HSV 2+  Last pap: 2020 normal pap/HPV  HOMERO: none    Health Maintenance   Topic Date Due   • ANNUAL PHYSICAL  Never done   • Pneumococcal Vaccine 0-64 (1 - PCV) Never done   • Hepatitis B (1 of 3 - Risk 3-dose series) Never done   • TDAP/TD VACCINES (1 - Tdap) Never done   • ZOSTER VACCINE (1 of 2) Never done   • DIABETIC FOOT EXAM  Never done   • DIABETIC EYE EXAM  Never done   • COVID-19 Vaccine (3 - Booster) 2021   • HEMOGLOBIN A1C  10/14/2021   • Annual Gynecologic Pelvic and Breast Exam  2022   • URINE MICROALBUMIN  2022   • INFLUENZA VACCINE  2022   • PAP SMEAR  2023   • MAMMOGRAM  2023   • GASTROSCOPY (EGD)  2023   • COLORECTAL CANCER SCREENING  2025   • HEPATITIS C SCREENING  Completed       Past Medical History:   Diagnosis Date   • Abnormal Pap smear of cervix     1 abnormal pap with normal f/u   • Howell esophagus    • Colon polyp    • Diabetes mellitus (HCC)    • Difficulty swallowing     at times   • Fibroid    • GERD (gastroesophageal reflux  disease)    • HSV-2 (herpes simplex virus 2) infection 2017   • Menstrual disorder    • Migraines    • Rectal bleeding     bleeds with bowel movement   • Seasonal allergies        Past Surgical History:   Procedure Laterality Date   • CARPAL TUNNEL RELEASE WITH CUBITAL TUNNEL RELEASE Right    •  SECTION      X2   • COLONOSCOPY N/A 2016    Procedure: COLONOSCOPY with polypectomy;  Surgeon: Maryan Kent MD;  Location: [facility] OR;  Service:    • COLONOSCOPY N/A 6/3/2020    Procedure: COLONOSCOPY;  Surgeon: Dmitri Fung MD;  Location: [facility] OR;  Service: Gastroenterology;  Laterality: N/A;  Poor prep, polyp   • ENDOMETRIAL ABLATION      thermachoice   • ENDOSCOPY N/A 6/3/2020    Procedure: ESOPHAGOGASTRODUODENOSCOPY;  Surgeon: Dmitri Fung MD;  Location: [facility] OR;  Service: Gastroenterology;  Laterality: N/A;  Reflux esophagitis, erosive gastritis, duodenitis   • HYSTEROSCOPY      AND ENDOMETRIAL  ABLATION   • NASAL SEPTUM SURGERY         • TUBAL ABDOMINAL LIGATION           Current Outpatient Medications:   •  valACYclovir (VALTREX) 500 MG tablet, Take 1 tablet by mouth Daily., Disp: 90 tablet, Rfl: 3  •  acetaminophen (TYLENOL) 325 MG tablet, Take 2 tablets by mouth Every 4 (Four) Hours As Needed for Mild Pain . Over the counter, Disp: , Rfl:   •  atorvastatin (LIPITOR) 10 MG tablet, atorvastatin 10 mg tablet, Disp: , Rfl:   •  Continuous Blood Gluc  (FreeStyle Annalee 14 Day [place]) device, FreeStyle Annalee 14 Day [place]  USE TO CHECK BLOOD SUGAR 6-8 TIMES DAILY AS NEEDED, Disp: , Rfl:   •  famotidine (PEPCID) 40 MG tablet, Take 1 tablet by mouth 2 (Two) Times a Day., Disp: 180 tablet, Rfl: 3  •  FLUoxetine (PROzac) 20 MG capsule, 20 mg Daily., Disp: , Rfl:   •  FLUoxetine (PROzac) 40 MG capsule, fluoxetine 40 mg capsule  Take 1 capsule every day by oral route for 90 days., Disp: , Rfl:   •  Insulin Pen Needle (BD Pen Needle Yari 2nd Gen) 32G X 4 MM  "misc, BD Ultra-Fine Yari Pen Needle 32 gauge x \"  USE ONCE DAILY WITH LANTUS TO INJECT INSULIN, Disp: , Rfl:   •  Lantus SoloStar 100 UNIT/ML injection pen, 15 Units Every Morning Before Breakfast., Disp: , Rfl:   •  metFORMIN (GLUCOPHAGE) 500 MG tablet, metformin 500 mg tablet  TAKE TWO TABLETS BY MOUTH TWICE A DAY WITH MEALS, Disp: , Rfl:   •  metFORMIN (GLUCOPHAGE) 500 MG tablet, , Disp: , Rfl:   •  metFORMIN ER (GLUCOPHAGE-XR) 500 MG 24 hr tablet, 1,000 mg 2 (two) times a day., Disp: , Rfl:   •  Multiple Vitamins-Minerals (MULTI-BETIC PO), Take 2 capsules by mouth Daily., Disp: , Rfl:   •  ondansetron ODT (ZOFRAN-ODT) 4 MG disintegrating tablet, Place 1 tablet on the tongue Every 8 (Eight) Hours As Needed for Nausea or Vomiting., Disp: 90 tablet, Rfl: 3  •  pantoprazole (PROTONIX) 40 MG EC tablet, Take 1 tablet by mouth 2 (two) times a day., Disp: 180 tablet, Rfl: 3  •  promethazine (PHENERGAN) 25 MG tablet, Take 25 mg by mouth every 6 (six) hours as needed for nausea or vomiting., Disp: , Rfl:   •  SALINE NASAL MIST NA, Saline Nasal Mist, Disp: , Rfl:   •  sucralfate (CARAFATE) 1 g tablet, TAKE ONE TABLET BY MOUTH FOUR TIMES A DAY CRUSH TABLET IN WAX PAPER MIX WITH 1-2 TEASPOONFUL OF WATER TO MAKE SLURRY AND DRINK, Disp: 120 tablet, Rfl: 1  •  SUMAtriptan (IMITREX) 100 MG tablet, sumatriptan 100 mg tablet  take 1 po at onset of symptoms. can repeat x 1 in 2 hours if headache persists, Disp: , Rfl:   •  traZODone (DESYREL) 50 MG tablet, Take 50 mg by mouth every night., Disp: , Rfl:     Allergies   Allergen Reactions   • Mobic [Meloxicam]      Blurred vision       Social History     Tobacco Use   • Smoking status: Former Smoker     Quit date: 1995     Years since quittin.9   • Smokeless tobacco: Never Used   • Tobacco comment: Quit \" a long time ago\"    Vaping Use   • Vaping Use: Never used   Substance Use Topics   • Alcohol use: Yes     Comment: OCCASIONALLY   • Drug use: No       Family History " "  Problem Relation Age of Onset   • Hypertension Mother    • Hyperlipidemia Mother    • Atrial fibrillation Mother    • Stroke Mother    • Heart attack Father    • Hypertension Father    • Diabetes Father    • Alcohol abuse Father    • Dementia Father    • Stroke Sister    • Crohn's disease Sister    • Heart attack Brother    • Hypertension Brother    • Stroke Brother    • Esophageal cancer Brother    • Alzheimer's disease Paternal Grandmother    • Diabetes Paternal Aunt    • Breast cancer Paternal Aunt    • Rheum arthritis Other    • Colon cancer Maternal Uncle    • Ovarian cancer Neg Hx    • Deep vein thrombosis Neg Hx    • Pulmonary embolism Neg Hx        Review of Systems   Constitutional: Positive for activity change. Negative for appetite change, fatigue, fever and unexpected weight change.   Respiratory: Negative for cough and shortness of breath.    Cardiovascular: Negative for chest pain and palpitations.   Gastrointestinal: Negative for abdominal distention, abdominal pain, anal bleeding, constipation, diarrhea and nausea.   Endocrine: Negative for heat intolerance, polydipsia and polyuria.   Genitourinary: Negative for dyspareunia, dysuria, genital sores, menstrual problem, pelvic pain, vaginal bleeding, vaginal discharge and vaginal pain.   Skin: Negative for color change and rash.   Neurological: Negative for headaches.   Psychiatric/Behavioral: Negative for dysphoric mood. The patient is not nervous/anxious.        /60   Ht 157.5 cm (62.01\")   Wt 63.3 kg (139 lb 9.6 oz)   Breastfeeding No   BMI 25.53 kg/m²     Physical Exam   Constitutional: She is oriented to person, place, and time. She appears well-developed.   cachetic   HENT:   Head: Normocephalic and atraumatic.   Eyes: Conjunctivae are normal. No scleral icterus.   Neck: No thyromegaly present.   Cardiovascular: Normal rate and regular rhythm.   Pulmonary/Chest: Effort normal and breath sounds normal. Right breast exhibits no inverted " nipple, no mass, no nipple discharge, no skin change and no tenderness. Left breast exhibits no inverted nipple, no mass, no nipple discharge, no skin change and no tenderness.   Abdominal: Soft. Normal appearance and bowel sounds are normal. She exhibits no distension and no mass. There is no abdominal tenderness. There is no rebound and no guarding. No hernia.   Genitourinary:    Pelvic exam was performed with patient supine.   There is no rash, tenderness or lesion on the right labia. There is no rash, tenderness or lesion on the left labia. Uterus is enlarged. Uterus is not deviated, not fixed and not tender. Cervix exhibits no motion tenderness, no lesion, no discharge and no friability. Right adnexum displays no mass, no tenderness and no fullness. Left adnexum displays no mass, no tenderness and no fullness.    No vaginal discharge, erythema, tenderness or bleeding.   No erythema, tenderness or bleeding in the vagina.    No foreign body in the vagina.      No signs of injury in the vagina.      Genitourinary Comments: 12 cm uterus, irregular contour     Neurological: She is alert and oriented to person, place, and time.   Skin: Skin is warm and dry.   Psychiatric: Her behavior is normal. Mood, judgment and thought content normal.   Nursing note and vitals reviewed.         Assessment/Plan    1) GYN HM: normal pap/HPV 2/2020. Check pap/HPV  SBE demonstrated and encouraged.  2) STD screening: declines.Condoms encouraged.  3) Bone health - Weight bearing exercise, dietary calcium recommendations and vitamin D reviewed.   4) Diet and Exercise discussed  5) Smoking Status: Quit! Congratulations !  6) Social: grieving death of her brothers and stepfather.    7)MMG:  UTD 2/2021, B1. Repeat MMG now  8) DEXA- UTD 4/2021- normal ,repeat in 3-5 years  9)C scope-UTD 6/2020- repeat 5 years.  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 Refills sent in.  11) I saw the patient with a face mask, gloves and eye protection  The patient  herself was masked.  Social distancing was observed as appropriate.  12) Parts of this document have been copied or forwarded from her previous visits and have been reviewed, updated and edited as indicated.    13)Follow up prn and 1 year annual        Diagnoses and all orders for this visit:    1. Pap smear, low-risk (Primary)  -     IgP, Aptima HPV    2. Routine gynecological examination  -     POC Urinalysis Dipstick    3. Encounter for screening for human papillomavirus (HPV)  -     IgP, Aptima HPV    4. Encounter for screening mammogram for malignant neoplasm of breast  -     Mammo Screening Bilateral With CAD; Future    5. HSV-2 (herpes simplex virus 2) infection    Other orders  -     valACYclovir (VALTREX) 500 MG tablet; Take 1 tablet by mouth Daily.  Dispense: 90 tablet; Refill: 3        Amaya Tran MD  009:08 EDT   5/23/2022

## 2022-05-24 ENCOUNTER — TRANSCRIBE ORDERS (OUTPATIENT)
Dept: ADMINISTRATIVE | Facility: HOSPITAL | Age: 57
End: 2022-05-24

## 2022-05-24 DIAGNOSIS — Z12.31 SCREENING MAMMOGRAM FOR HIGH-RISK PATIENT: Primary | ICD-10-CM

## 2022-05-26 ENCOUNTER — HOSPITAL ENCOUNTER (OUTPATIENT)
Dept: MAMMOGRAPHY | Facility: HOSPITAL | Age: 57
Discharge: HOME | End: 2022-05-26
Admitting: OBSTETRICS & GYNECOLOGY

## 2022-05-26 DIAGNOSIS — Z12.31 SCREENING MAMMOGRAM FOR HIGH-RISK PATIENT: ICD-10-CM

## 2022-05-26 LAB
CYTOLOGIST CVX/VAG CYTO: NORMAL
CYTOLOGY CVX/VAG DOC CYTO: NORMAL
CYTOLOGY CVX/VAG DOC THIN PREP: NORMAL
DX ICD CODE: NORMAL
HIV 1 & 2 AB SER-IMP: NORMAL
HPV I/H RISK 4 DNA CVX QL PROBE+SIG AMP: NEGATIVE
OTHER STN SPEC: NORMAL
STAT OF ADQ CVX/VAG CYTO-IMP: NORMAL

## 2022-05-26 PROCEDURE — 77063 BREAST TOMOSYNTHESIS BI: CPT

## 2022-05-26 PROCEDURE — 77067 SCR MAMMO BI INCL CAD: CPT

## 2022-07-18 DIAGNOSIS — R13.19 ESOPHAGEAL DYSPHAGIA: ICD-10-CM

## 2022-07-18 DIAGNOSIS — K22.719 BARRETT'S ESOPHAGUS WITH DYSPLASIA: ICD-10-CM

## 2022-07-18 DIAGNOSIS — K21.00 GASTROESOPHAGEAL REFLUX DISEASE WITH ESOPHAGITIS WITHOUT HEMORRHAGE: ICD-10-CM

## 2022-07-18 DIAGNOSIS — K22.4 ESOPHAGEAL DYSMOTILITY: ICD-10-CM

## 2022-07-18 RX ORDER — FAMOTIDINE 40 MG/1
40 TABLET, FILM COATED ORAL 2 TIMES DAILY
Qty: 180 TABLET | Refills: 3 | Status: SHIPPED | OUTPATIENT
Start: 2022-07-18

## 2022-07-25 DIAGNOSIS — K21.00 GASTROESOPHAGEAL REFLUX DISEASE WITH ESOPHAGITIS WITHOUT HEMORRHAGE: ICD-10-CM

## 2022-07-25 DIAGNOSIS — K22.719 BARRETT'S ESOPHAGUS WITH DYSPLASIA: ICD-10-CM

## 2022-07-25 DIAGNOSIS — R13.19 ESOPHAGEAL DYSPHAGIA: ICD-10-CM

## 2022-07-25 DIAGNOSIS — K22.4 ESOPHAGEAL DYSMOTILITY: ICD-10-CM

## 2022-07-25 RX ORDER — PANTOPRAZOLE SODIUM 40 MG/1
40 TABLET, DELAYED RELEASE ORAL 2 TIMES DAILY
Qty: 180 TABLET | Refills: 0 | Status: SHIPPED | OUTPATIENT
Start: 2022-07-25 | End: 2022-10-24

## 2022-08-08 DIAGNOSIS — K21.00 GASTROESOPHAGEAL REFLUX DISEASE WITH ESOPHAGITIS WITHOUT HEMORRHAGE: ICD-10-CM

## 2022-08-08 DIAGNOSIS — K22.719 BARRETT'S ESOPHAGUS WITH DYSPLASIA: ICD-10-CM

## 2022-08-08 DIAGNOSIS — R13.19 ESOPHAGEAL DYSPHAGIA: ICD-10-CM

## 2022-08-08 RX ORDER — SUCRALFATE 1 G/1
TABLET ORAL
Qty: 120 TABLET | Refills: 1 | Status: SHIPPED | OUTPATIENT
Start: 2022-08-08 | End: 2022-10-24

## 2022-10-24 DIAGNOSIS — K22.719 BARRETT'S ESOPHAGUS WITH DYSPLASIA: ICD-10-CM

## 2022-10-24 DIAGNOSIS — K21.00 GASTROESOPHAGEAL REFLUX DISEASE WITH ESOPHAGITIS WITHOUT HEMORRHAGE: ICD-10-CM

## 2022-10-24 DIAGNOSIS — R13.19 ESOPHAGEAL DYSPHAGIA: ICD-10-CM

## 2022-10-24 DIAGNOSIS — K22.4 ESOPHAGEAL DYSMOTILITY: ICD-10-CM

## 2022-10-24 RX ORDER — PANTOPRAZOLE SODIUM 40 MG/1
TABLET, DELAYED RELEASE ORAL
Qty: 180 TABLET | Refills: 0 | Status: SHIPPED | OUTPATIENT
Start: 2022-10-24 | End: 2023-02-01

## 2022-10-24 RX ORDER — SUCRALFATE 1 G/1
TABLET ORAL
Qty: 120 TABLET | Refills: 1 | Status: SHIPPED | OUTPATIENT
Start: 2022-10-24 | End: 2023-03-13

## 2022-10-27 ENCOUNTER — TELEPHONE (OUTPATIENT)
Dept: GASTROENTEROLOGY | Facility: CLINIC | Age: 57
End: 2022-10-27

## 2022-12-21 ENCOUNTER — OFFICE VISIT (OUTPATIENT)
Dept: GASTROENTEROLOGY | Facility: CLINIC | Age: 57
End: 2022-12-21

## 2022-12-21 VITALS
WEIGHT: 133 LBS | BODY MASS INDEX: 24.48 KG/M2 | SYSTOLIC BLOOD PRESSURE: 108 MMHG | DIASTOLIC BLOOD PRESSURE: 62 MMHG | HEIGHT: 62 IN

## 2022-12-21 DIAGNOSIS — K22.719 BARRETT'S ESOPHAGUS WITH DYSPLASIA: Primary | ICD-10-CM

## 2022-12-21 DIAGNOSIS — N18.1 DIABETES MELLITUS DUE TO UNDERLYING CONDITION WITH STAGE 1 CHRONIC KIDNEY DISEASE, UNSPECIFIED WHETHER LONG TERM INSULIN USE: ICD-10-CM

## 2022-12-21 DIAGNOSIS — K22.4 ESOPHAGEAL DYSMOTILITY: ICD-10-CM

## 2022-12-21 DIAGNOSIS — E08.22 DIABETES MELLITUS DUE TO UNDERLYING CONDITION WITH STAGE 1 CHRONIC KIDNEY DISEASE, UNSPECIFIED WHETHER LONG TERM INSULIN USE: ICD-10-CM

## 2022-12-21 PROCEDURE — 99213 OFFICE O/P EST LOW 20 MIN: CPT | Performed by: INTERNAL MEDICINE

## 2022-12-21 RX ORDER — CETIRIZINE HYDROCHLORIDE 10 MG/1
10 TABLET ORAL DAILY
COMMUNITY

## 2022-12-21 NOTE — PROGRESS NOTES
PATIENT INFORMATION  Lona Dupree       - 1965    CHIEF COMPLAINT  Chief Complaint   Patient presents with   • Heartburn       HISTORY OF PRESENT ILLNESS  Here in essence in follow up a year and a hlf later    Has started a cholesterol medicine, but is tolerating.  Her last HGB A1C was 8.2 Diagnosed with her DM in 2017    Labs from [facility] are NOT available will call.    Is on BID PPI and alternates with BID H2Ra and also uses Sucralfate and when the supply was short she went two months and COULD tell the difference is back on it now    Cassandra Harper is a sister and her Brother  of Esophageal Cancer as well    Last two colons were normal and does have SSBE      REVIEWED PERTINENT RESULTS/ LABS  Lab Results   Component Value Date    CASEREPORT  2020     Surgical Pathology Report                         Case: [id number]                                  Authorizing Provider:  Dmitri Fung        Collected:           2020 01:56 PM                                 MD Goran                                                                   Ordering Location:     [facility]   Received:            2020 05:23 PM                                 OR                                                                           Pathologist:           Norberto Mcgregor MD                                                         Specimens:   1) - Small Intestine, Duodenum                                                                      2) - Gastric, Body                                                                                  3) - Esophagus, Distal                                                                              4) - Large Intestine, Rectum                                                               FINALDX  2020     1.  Duodenum Biopsy: Benign duodenal and small intestinal mucosa with   A. Normal villous architecture.   B. No active cryptitis,  crypt abscess formation, granulomatous inflammation nor         intestinal parasites identified.    2.  Gastric Biopsy:  Benign gastric mucosa with     A.  Mild chronic inflammation.    B.  No H. pylori-like organisms identified by routine staining.      3.  Distal Esophagus Biopsy:  Benign squamous and glandular mucosa with   A.  Focally active mild chronic inflammation with rare eosinophil noted suggesting chronic reflux.    B.  Focal, rare intestinal metaplasia by special staining consistent with developing Howell's esophagus                      (see comment).   C.  No dysplasia nor malignancy identified.      4.  Rectum Biopsy:    A.  Hyperplastic polyp.    B.  No glandular dyplasia nor malignancy identified.     jat/brb        Lab Results   Component Value Date    HGB 12.2 04/21/2021    MCV 98.5 (H) 04/21/2021     04/21/2021    ALT 17 04/21/2021    AST 14 04/21/2021    HGBA1C 8.90 (H) 04/14/2021    INR 0.95 04/21/2021    TRIG 114 04/21/2021      No results found.    REVIEW OF SYSTEMS  Review of Systems   Constitutional: Negative.    HENT: Positive for congestion and postnasal drip.    Eyes: Negative.    Respiratory: Positive for cough.    Cardiovascular: Negative.    Gastrointestinal: Positive for vomiting (when not taking medication ).        GERD and hx barretts   Endocrine: Negative.    Genitourinary: Negative.    Musculoskeletal: Negative.    Skin: Negative.    Allergic/Immunologic: Negative.    Neurological: Positive for dizziness and headaches.   Hematological: Negative.    Psychiatric/Behavioral: Negative.          ACTIVE PROBLEMS  Patient Active Problem List    Diagnosis    • Hypoglycemia [E16.2]    • Diabetic acidosis without coma (HCC) [E11.10]    • Gastroesophageal reflux disease with esophagitis without hemorrhage [K21.00]    • Howell's esophagus with dysplasia [K22.719]    • Esophageal dysmotility [K22.4]    • Esophageal dysphagia [R13.19]    • Personal history of colonic polyps [Z86.010]     • High anion gap metabolic acidosis [E87.29]    • Methamphetamine use (HCC) [F15.10]    • Cocaine use [F14.90]    • Nausea & vomiting [R11.2]    • Diabetes mellitus with ketosis (HCC) [E13.10]    • HSV-2 (herpes simplex virus 2) infection [B00.9]    • Fibroids [D21.9]    • Smoker [F17.200]    • Headache [R51.9]    • Diabetes mellitus (HCC) [E11.9]    • History of bloody stools [Z87.19]          PAST MEDICAL HISTORY  Past Medical History:   Diagnosis Date   • Abnormal Pap smear of cervix     1 abnormal pap with normal f/u   • Howell esophagus    • Colon polyp    • Diabetes mellitus (HCC)    • Difficulty swallowing     at times   • Fibroid    • GERD (gastroesophageal reflux disease)    • HSV-2 (herpes simplex virus 2) infection 2017   • Menstrual disorder    • Migraines    • Rectal bleeding     bleeds with bowel movement   • Seasonal allergies          SURGICAL HISTORY  Past Surgical History:   Procedure Laterality Date   • CARPAL TUNNEL RELEASE WITH CUBITAL TUNNEL RELEASE Right    •  SECTION      X2   • COLONOSCOPY N/A 2016    Procedure: COLONOSCOPY with polypectomy;  Surgeon: Maryan Kent MD;  Location: [facility] OR;  Service:    • COLONOSCOPY N/A 6/3/2020    Procedure: COLONOSCOPY;  Surgeon: Dmitri Fung MD;  Location: [facility];  Service: Gastroenterology;  Laterality: N/A;  Poor prep, polyp   • ENDOMETRIAL ABLATION      thermachoice   • ENDOSCOPY N/A 6/3/2020    Procedure: ESOPHAGOGASTRODUODENOSCOPY;  Surgeon: Dmitri Fung MD;  Location: [facility];  Service: Gastroenterology;  Laterality: N/A;  Reflux esophagitis, erosive gastritis, duodenitis   • HYSTEROSCOPY      AND ENDOMETRIAL  ABLATION   • NASAL SEPTUM SURGERY         • TUBAL ABDOMINAL LIGATION           FAMILY HISTORY  Family History   Problem Relation Age of Onset   • Hypertension Mother    • Hyperlipidemia Mother    • Atrial fibrillation Mother    • Stroke Mother    • Heart attack Father    •  "Hypertension Father    • Diabetes Father    • Alcohol abuse Father    • Dementia Father    • Stroke Sister    • Crohn's disease Sister    • Heart attack Brother    • Hypertension Brother    • Stroke Brother    • Esophageal cancer Brother    • Alzheimer's disease Paternal Grandmother    • Diabetes Paternal Aunt    • Breast cancer Paternal Aunt    • Rheum arthritis Other    • Colon cancer Maternal Uncle    • Ovarian cancer Neg Hx    • Deep vein thrombosis Neg Hx    • Pulmonary embolism Neg Hx          SOCIAL HISTORY  Social History     Occupational History   • Not on file   Tobacco Use   • Smoking status: Former     Types: Cigarettes     Quit date: 1995     Years since quittin.5   • Smokeless tobacco: Never   • Tobacco comments:     Quit \" a long time ago\"    Vaping Use   • Vaping Use: Never used   Substance and Sexual Activity   • Alcohol use: Yes     Comment: OCCASIONALLY   • Drug use: No   • Sexual activity: Yes     Partners: Male     Birth control/protection: Surgical, Post-menopausal     Comment: BTL         CURRENT MEDICATIONS    Current Outpatient Medications:   •  acetaminophen (TYLENOL) 325 MG tablet, Take 2 tablets by mouth Every 4 (Four) Hours As Needed for Mild Pain . Over the counter, Disp: , Rfl:   •  aspirin 81 MG chewable tablet, Chew 1 tablet Daily., Disp: , Rfl:   •  atorvastatin (LIPITOR) 10 MG tablet, atorvastatin 10 mg tablet, Disp: , Rfl:   •  cetirizine (zyrTEC) 10 MG tablet, Take 10 mg by mouth Daily. PRN, Disp: , Rfl:   •  Continuous Blood Gluc  (FreeStyle Annalee 14 Day [place]) device, FreeStyle Annalee 14 Day [place]  USE TO CHECK BLOOD SUGAR 6-8 TIMES DAILY AS NEEDED, Disp: , Rfl:   •  famotidine (PEPCID) 40 MG tablet, Take 1 tablet by mouth 2 (Two) Times a Day., Disp: 180 tablet, Rfl: 3  •  FLUoxetine (PROzac) 40 MG capsule, fluoxetine 40 mg capsule  Take 1 capsule every day by oral route for 90 days., Disp: , Rfl:   •  Lantus SoloStar 100 UNIT/ML injection pen, 15 Units Every " "Morning Before Breakfast., Disp: , Rfl:   •  metFORMIN (GLUCOPHAGE) 500 MG tablet, , Disp: , Rfl:   •  Multiple Vitamins-Minerals (MULTI-BETIC PO), Take 2 capsules by mouth Daily., Disp: , Rfl:   •  ondansetron ODT (ZOFRAN-ODT) 4 MG disintegrating tablet, Place 1 tablet on the tongue Every 8 (Eight) Hours As Needed for Nausea or Vomiting., Disp: 90 tablet, Rfl: 3  •  pantoprazole (PROTONIX) 40 MG EC tablet, TAKE ONE TABLET BY MOUTH TWICE A DAY, Disp: 180 tablet, Rfl: 0  •  promethazine (PHENERGAN) 25 MG tablet, Take 25 mg by mouth every 6 (six) hours as needed for nausea or vomiting., Disp: , Rfl:   •  SALINE NASAL MIST NA, Saline Nasal Mist, Disp: , Rfl:   •  sucralfate (CARAFATE) 1 g tablet, TAKE ONE TABLET BY MOUTH FOUR TIMES A DAY. CRUSH TABLET IN WAX PAPER. MIX WITH 1-2 TEASPOONS OF WATER TO MAKE SLURRY AND DRINK., Disp: 120 tablet, Rfl: 1  •  SudoGest 12 Hour 120 MG 12 hr tablet, , Disp: , Rfl:   •  SUMAtriptan (IMITREX) 100 MG tablet, sumatriptan 100 mg tablet  take 1 po at onset of symptoms. can repeat x 1 in 2 hours if headache persists, Disp: , Rfl:   •  traZODone (DESYREL) 50 MG tablet, Take 50 mg by mouth every night., Disp: , Rfl:   •  valACYclovir (VALTREX) 500 MG tablet, Take 1 tablet by mouth Daily., Disp: 90 tablet, Rfl: 3    ALLERGIES  Mobic [meloxicam]    VITALS  Vitals:    12/21/22 0808   BP: 108/62   BP Location: Left arm   Patient Position: Sitting   Cuff Size: Adult   Weight: 60.3 kg (133 lb)   Height: 157.5 cm (62.01\")       PHYSICAL EXAM  Debilities/Disabilities Identified: None  Emotional Behavior: Appropriate  Wt Readings from Last 3 Encounters:   12/21/22 60.3 kg (133 lb)   10/13/22 61.2 kg (135 lb)   05/23/22 63.3 kg (139 lb 9.6 oz)     Ht Readings from Last 1 Encounters:   12/21/22 157.5 cm (62.01\")     Body mass index is 24.32 kg/m².  Physical Exam  Constitutional:       Appearance: She is well-developed. She is not diaphoretic.   HENT:      Head: Normocephalic and atraumatic.   Eyes: "      General: No scleral icterus.     Conjunctiva/sclera: Conjunctivae normal.      Pupils: Pupils are equal, round, and reactive to light.   Neck:      Thyroid: No thyromegaly.   Cardiovascular:      Rate and Rhythm: Normal rate and regular rhythm.      Heart sounds: Normal heart sounds. No murmur heard.    No gallop.   Pulmonary:      Effort: Pulmonary effort is normal.      Breath sounds: Normal breath sounds. No wheezing or rales.   Abdominal:      General: Bowel sounds are normal. There is no distension or abdominal bruit.      Palpations: Abdomen is soft. There is no shifting dullness, fluid wave or mass.      Tenderness: There is abdominal tenderness in the epigastric area. There is no guarding. Negative signs include Gonzalez's sign.      Hernia: There is no hernia in the ventral area.   Musculoskeletal:         General: Normal range of motion.      Cervical back: Normal range of motion and neck supple.   Lymphadenopathy:      Cervical: No cervical adenopathy.   Skin:     General: Skin is warm and dry.      Findings: No erythema or rash.   Neurological:      Mental Status: She is alert and oriented to person, place, and time.   Psychiatric:         Mood and Affect: Mood normal.         Behavior: Behavior normal.         CLINICAL DATA REVIEWED   reviewed previous lab results and integrated with today's visit, reviewed notes from other physicians and/or last GI encounter, reviewed previous endoscopy results and available photos, reviewed surgical pathology results from previous biopsies    ASSESSMENT  Diagnoses and all orders for this visit:    Howell's esophagus with dysplasia    Esophageal dysmotility    Diabetes mellitus due to underlying condition with stage 1 chronic kidney disease, unspecified whether long term insulin use (HCC)    Other orders  -     cetirizine (zyrTEC) 10 MG tablet; Take 10 mg by mouth Daily. PRN          PLAN    Return in about 1 year (around 12/21/2023).    I have discussed the above  plan with the patient.  They verbalize understanding and are in agreement with the plan.  They have been advised to contact the office for any questions, concerns, or changes related to their health.

## 2023-01-31 DIAGNOSIS — K22.4 ESOPHAGEAL DYSMOTILITY: ICD-10-CM

## 2023-01-31 DIAGNOSIS — R13.19 ESOPHAGEAL DYSPHAGIA: ICD-10-CM

## 2023-01-31 DIAGNOSIS — K21.00 GASTROESOPHAGEAL REFLUX DISEASE WITH ESOPHAGITIS WITHOUT HEMORRHAGE: ICD-10-CM

## 2023-01-31 DIAGNOSIS — K22.719 BARRETT'S ESOPHAGUS WITH DYSPLASIA: ICD-10-CM

## 2023-02-01 RX ORDER — PANTOPRAZOLE SODIUM 40 MG/1
TABLET, DELAYED RELEASE ORAL
Qty: 180 TABLET | Refills: 3 | Status: SHIPPED | OUTPATIENT
Start: 2023-02-01

## 2023-03-11 DIAGNOSIS — K21.00 GASTROESOPHAGEAL REFLUX DISEASE WITH ESOPHAGITIS WITHOUT HEMORRHAGE: ICD-10-CM

## 2023-03-11 DIAGNOSIS — K22.719 BARRETT'S ESOPHAGUS WITH DYSPLASIA: ICD-10-CM

## 2023-03-11 DIAGNOSIS — R13.19 ESOPHAGEAL DYSPHAGIA: ICD-10-CM

## 2023-03-13 RX ORDER — SUCRALFATE 1 G/1
TABLET ORAL
Qty: 120 TABLET | Refills: 7 | Status: SHIPPED | OUTPATIENT
Start: 2023-03-13

## 2023-04-05 ENCOUNTER — TELEPHONE (OUTPATIENT)
Dept: GASTROENTEROLOGY | Facility: CLINIC | Age: 58
End: 2023-04-05
Payer: COMMERCIAL

## 2023-04-05 NOTE — TELEPHONE ENCOUNTER
FAST TRACK - 3 YEAR RECALL 06/2023  LAST EGD & COLONOSCOPY 06/03/2020  [facility] AT [place].    COLONOSCOPY RECALL 06/2025

## 2023-04-14 NOTE — TELEPHONE ENCOUNTER
Return call from patient.  Explained about the GI guidelines has changed.  Not due until 2025.  Will do both EGD & Colonoscopy at that time.  She understands.

## 2023-05-10 ENCOUNTER — TRANSCRIBE ORDERS (OUTPATIENT)
Dept: ADMINISTRATIVE | Facility: HOSPITAL | Age: 58
End: 2023-05-10
Payer: COMMERCIAL

## 2023-05-10 DIAGNOSIS — Z12.31 SCREENING MAMMOGRAM, ENCOUNTER FOR: Primary | ICD-10-CM

## 2023-05-25 ENCOUNTER — OFFICE VISIT (OUTPATIENT)
Dept: OBSTETRICS AND GYNECOLOGY | Facility: CLINIC | Age: 58
End: 2023-05-25
Payer: COMMERCIAL

## 2023-05-25 VITALS
HEIGHT: 62 IN | BODY MASS INDEX: 24.29 KG/M2 | WEIGHT: 132 LBS | DIASTOLIC BLOOD PRESSURE: 70 MMHG | SYSTOLIC BLOOD PRESSURE: 110 MMHG

## 2023-05-25 DIAGNOSIS — Z12.31 ENCOUNTER FOR SCREENING MAMMOGRAM FOR MALIGNANT NEOPLASM OF BREAST: ICD-10-CM

## 2023-05-25 DIAGNOSIS — Z01.419 ROUTINE GYNECOLOGICAL EXAMINATION: Primary | ICD-10-CM

## 2023-05-25 DIAGNOSIS — B00.9 HSV-2 (HERPES SIMPLEX VIRUS 2) INFECTION: ICD-10-CM

## 2023-05-25 RX ORDER — VALACYCLOVIR HYDROCHLORIDE 500 MG/1
500 TABLET, FILM COATED ORAL DAILY
Qty: 90 TABLET | Refills: 3 | Status: SHIPPED | OUTPATIENT
Start: 2023-05-25

## 2023-05-25 NOTE — PROGRESS NOTES
GYN Annual Exam     CC- Here for annual exam.     Lona Dupree is a 58 y.o. female who presents for annual well woman exam. No  VB . No bladder issues. She plans on boating this Memorial Day.     OB History        2    Para   2    Term   2            AB        Living   2       SAB        IAB        Ectopic        Molar        Multiple        Live Births              Obstetric Comments   2 C/S             Menarche:14  Menopause: 52  HRT:none  Current contraception: tubal ligation,   History of abnormal Pap smear: yes - 1 abnormal with normal f/u  History of abnormal mammogram: no  Family history of uterine, colon or ovarian cancer: yes - uncle had colon or pancreatic, brother with esphopahgeal cancer  Family history of breast cancer: yes, p aunt > 50  STD: HSV 2+  Last pap: 2022-  normal pap/HPV  HOMERO: none    Health Maintenance   Topic Date Due   • Hepatitis B (1 of 3 - 3-dose series) Never done   • Pneumococcal Vaccine 0-64 (1 - PCV) Never done   • TDAP/TD VACCINES (1 - Tdap) Never done   • ZOSTER VACCINE (1 of 2) Never done   • ANNUAL PHYSICAL  Never done   • DIABETIC FOOT EXAM  Never done   • DIABETIC EYE EXAM  Never done   • HEMOGLOBIN A1C  10/14/2021   • COVID-19 Vaccine (5 - Booster) 2022   • URINE MICROALBUMIN  2022   • Annual Gynecologic Pelvic and Breast Exam  2023   • GASTROSCOPY (EGD)  2023   • INFLUENZA VACCINE  2023   • MAMMOGRAM  2024   • PAP SMEAR  2025   • COLORECTAL CANCER SCREENING  2025   • HEPATITIS C SCREENING  Completed       Past Medical History:   Diagnosis Date   • Abnormal Pap smear of cervix     1 abnormal pap with normal f/u   • Howell esophagus    • Colon polyp    • Diabetes mellitus    • Difficulty swallowing     at times   • Fibroid    • GERD (gastroesophageal reflux disease)    • HSV-2 (herpes simplex virus 2) infection 2017   • Menstrual disorder    • Migraines    • Rectal bleeding     bleeds with bowel  movement   • Seasonal allergies        Past Surgical History:   Procedure Laterality Date   • CARPAL TUNNEL RELEASE WITH CUBITAL TUNNEL RELEASE Right    •  SECTION      X2   • COLONOSCOPY N/A 2016    Procedure: COLONOSCOPY with polypectomy;  Surgeon: Maryan Kent MD;  Location: [facility] OR;  Service:    • COLONOSCOPY N/A 6/3/2020    Procedure: COLONOSCOPY;  Surgeon: Dmitri Fung MD;  Location: [facility] OR;  Service: Gastroenterology;  Laterality: N/A;  Poor prep, polyp   • ENDOMETRIAL ABLATION      thermachoice   • ENDOSCOPY N/A 6/3/2020    Procedure: ESOPHAGOGASTRODUODENOSCOPY;  Surgeon: Dmitri Fung MD;  Location: [facility] OR;  Service: Gastroenterology;  Laterality: N/A;  Reflux esophagitis, erosive gastritis, duodenitis   • HYSTEROSCOPY      AND ENDOMETRIAL  ABLATION   • NASAL SEPTUM SURGERY         • TUBAL ABDOMINAL LIGATION           Current Outpatient Medications:   •  acetaminophen (TYLENOL) 325 MG tablet, Take 2 tablets by mouth Every 4 (Four) Hours As Needed for Mild Pain . Over the counter, Disp: , Rfl:   •  aspirin 81 MG chewable tablet, Chew 1 tablet Daily., Disp: , Rfl:   •  atorvastatin (LIPITOR) 10 MG tablet, atorvastatin 10 mg tablet, Disp: , Rfl:   •  cetirizine (zyrTEC) 10 MG tablet, Take 1 tablet by mouth Daily. PRN, Disp: , Rfl:   •  Continuous Blood Gluc  (FreeStyle Annalee 14 Day [place]) device, FreeStyle Annalee 14 Day [place]  USE TO CHECK BLOOD SUGAR 6-8 TIMES DAILY AS NEEDED, Disp: , Rfl:   •  famotidine (PEPCID) 40 MG tablet, Take 1 tablet by mouth 2 (Two) Times a Day., Disp: 180 tablet, Rfl: 3  •  FLUoxetine (PROzac) 40 MG capsule, fluoxetine 40 mg capsule  Take 1 capsule every day by oral route for 90 days., Disp: , Rfl:   •  Lantus SoloStar 100 UNIT/ML injection pen, 15 Units Every Morning Before Breakfast., Disp: , Rfl:   •  metFORMIN (GLUCOPHAGE) 500 MG tablet, , Disp: , Rfl:   •  Multiple Vitamins-Minerals (MULTI-BETIC PO), Take 2  "tablets by mouth Daily., Disp: , Rfl:   •  ondansetron ODT (ZOFRAN-ODT) 4 MG disintegrating tablet, Place 1 tablet on the tongue Every 8 (Eight) Hours As Needed for Nausea or Vomiting., Disp: 90 tablet, Rfl: 3  •  pantoprazole (PROTONIX) 40 MG EC tablet, TAKE ONE TABLET BY MOUTH TWICE A DAY, Disp: 180 tablet, Rfl: 3  •  promethazine (PHENERGAN) 25 MG tablet, Take 1 tablet by mouth Every 6 (Six) Hours As Needed for Nausea or Vomiting., Disp: , Rfl:   •  SALINE NASAL MIST NA, Saline Nasal Mist, Disp: , Rfl:   •  sucralfate (CARAFATE) 1 g tablet, TAKE ONE TABLET BY MOUTH FOUR TIMES A DAY CRUSH TABLET IN WAX PAPER MIX WITH 5-10 MLS OF WATER TO MAKE SLURRY AND DRINK, Disp: 120 tablet, Rfl: 7  •  SudoGest 12 Hour 120 MG 12 hr tablet, , Disp: , Rfl:   •  SUMAtriptan (IMITREX) 100 MG tablet, sumatriptan 100 mg tablet  take 1 po at onset of symptoms. can repeat x 1 in 2 hours if headache persists, Disp: , Rfl:   •  traZODone (DESYREL) 50 MG tablet, Take 1 tablet by mouth Every Night., Disp: , Rfl:   •  valACYclovir (VALTREX) 500 MG tablet, Take 1 tablet by mouth Daily., Disp: 90 tablet, Rfl: 3    Allergies   Allergen Reactions   • Mobic [Meloxicam]      Blurred vision       Social History     Tobacco Use   • Smoking status: Former     Types: Cigarettes     Quit date: 1995     Years since quittin.9   • Smokeless tobacco: Never   • Tobacco comments:     Quit \" a long time ago\"    Vaping Use   • Vaping Use: Never used   Substance Use Topics   • Alcohol use: Yes     Comment: OCCASIONALLY   • Drug use: No       Family History   Problem Relation Age of Onset   • Heart attack Father    • Hypertension Father    • Diabetes Father    • Alcohol abuse Father    • Dementia Father    • Hypertension Mother    • Hyperlipidemia Mother    • Atrial fibrillation Mother    • Stroke Mother    • Heart attack Brother    • Hypertension Brother    • Stroke Brother    • Esophageal cancer Brother    • Stroke Sister    • Crohn's disease Sister  " "  • Alzheimer's disease Paternal Grandmother    • Colon cancer Maternal Uncle    • Diabetes Paternal Aunt    • Breast cancer Paternal Aunt    • Rheum arthritis Other    • Ovarian cancer Neg Hx    • Deep vein thrombosis Neg Hx    • Pulmonary embolism Neg Hx    • Uterine cancer Neg Hx        Review of Systems   Constitutional: Negative for activity change, appetite change, fatigue, fever and unexpected weight change.   Respiratory: Negative for cough and shortness of breath.    Cardiovascular: Negative for chest pain and palpitations.   Gastrointestinal: Negative for abdominal distention, abdominal pain, anal bleeding, constipation, diarrhea and nausea.   Endocrine: Negative for heat intolerance, polydipsia and polyuria.   Genitourinary: Negative for dyspareunia, dysuria, genital sores, menstrual problem, pelvic pain, vaginal bleeding, vaginal discharge and vaginal pain.   Skin: Negative for color change and rash.   Neurological: Negative for headaches.   Psychiatric/Behavioral: Negative for dysphoric mood. The patient is not nervous/anxious.        /70   Ht 157.5 cm (62\")   Wt 59.9 kg (132 lb)   LMP  (LMP Unknown)   Breastfeeding No   BMI 24.14 kg/m²     Physical Exam   Constitutional: She is oriented to person, place, and time. She appears well-developed.   cachetic   HENT:   Head: Normocephalic and atraumatic.   Eyes: Conjunctivae are normal. No scleral icterus.   Neck: No thyromegaly present.   Cardiovascular: Normal rate and regular rhythm.   Pulmonary/Chest: Effort normal and breath sounds normal. Right breast exhibits no inverted nipple, no mass, no nipple discharge, no skin change and no tenderness. Left breast exhibits no inverted nipple, no mass, no nipple discharge, no skin change and no tenderness.   Abdominal: Soft. Normal appearance and bowel sounds are normal. She exhibits no distension and no mass. There is no abdominal tenderness. There is no rebound and no guarding. No hernia. "   Genitourinary:    Pelvic exam was performed with patient supine.   There is no rash, tenderness or lesion on the right labia. There is no rash, tenderness or lesion on the left labia. Uterus is enlarged. Uterus is not deviated, not fixed and not tender. Cervix exhibits no motion tenderness, no lesion, no discharge and no friability. Right adnexum displays no mass, no tenderness and no fullness. Left adnexum displays no mass, no tenderness and no fullness.    No vaginal discharge, erythema, tenderness or bleeding.   No erythema, tenderness or bleeding in the vagina.    No foreign body in the vagina.      No signs of injury in the vagina.      Genitourinary Comments: 12 cm uterus, irregular contour     Neurological: She is alert and oriented to person, place, and time.   Skin: Skin is warm and dry.   Psychiatric: Her behavior is normal. Mood, judgment and thought content normal.   Nursing note and vitals reviewed.         Assessment/Plan    1) GYN HM: normal pap/HPV 5/2022  SBE demonstrated and encouraged.  2) STD screening: declines.Condoms encouraged.  3) Bone health - Weight bearing exercise, dietary calcium recommendations and vitamin D reviewed.   4) Diet and Exercise discussed  5) Smoking Status:  Still quit! Congratulations !  6) Social: no issues.    7)MMG:  UTD 5/2022, B2. Repeat MMG now  8) DEXA- UTD 4/2021- normal ,repeat in 3-5 years  9)C scope-UTD 6/2020- repeat 5 years.  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 Refills sent in.  11) Parts of this document have been copied or forwarded from her previous visits and have been reviewed, updated and edited as indicated.   23)Follow up prn and 1 year annual        Diagnoses and all orders for this visit:    1. Routine gynecological examination (Primary)  -     POC Urinalysis Dipstick    2. Encounter for screening mammogram for malignant neoplasm of breast  -     Mammo Screening Digital Tomosynthesis Bilateral With CAD; Future    3. HSV-2 (herpes simplex virus 2)  infection    Other orders  -     valACYclovir (VALTREX) 500 MG tablet; Take 1 tablet by mouth Daily.  Dispense: 90 tablet; Refill: 3        Amaya Tran MD  009:45 EDT   5/25/2023

## 2023-05-31 ENCOUNTER — HOSPITAL ENCOUNTER (OUTPATIENT)
Dept: MAMMOGRAPHY | Facility: HOSPITAL | Age: 58
Discharge: HOME | End: 2023-05-31
Admitting: OBSTETRICS & GYNECOLOGY

## 2023-05-31 DIAGNOSIS — Z12.31 SCREENING MAMMOGRAM, ENCOUNTER FOR: ICD-10-CM

## 2023-05-31 PROCEDURE — 77063 BREAST TOMOSYNTHESIS BI: CPT

## 2023-05-31 PROCEDURE — 77067 SCR MAMMO BI INCL CAD: CPT

## 2023-06-05 RX ORDER — VALACYCLOVIR HYDROCHLORIDE 500 MG/1
TABLET, FILM COATED ORAL
Qty: 90 TABLET | Refills: 3 | Status: SHIPPED | OUTPATIENT
Start: 2023-06-05

## 2024-01-25 DIAGNOSIS — K22.4 ESOPHAGEAL DYSMOTILITY: ICD-10-CM

## 2024-01-25 DIAGNOSIS — K22.719 BARRETT'S ESOPHAGUS WITH DYSPLASIA: ICD-10-CM

## 2024-01-25 DIAGNOSIS — K21.00 GASTROESOPHAGEAL REFLUX DISEASE WITH ESOPHAGITIS WITHOUT HEMORRHAGE: ICD-10-CM

## 2024-01-25 DIAGNOSIS — R13.19 ESOPHAGEAL DYSPHAGIA: ICD-10-CM

## 2024-01-25 RX ORDER — PANTOPRAZOLE SODIUM 40 MG/1
TABLET, DELAYED RELEASE ORAL
Qty: 180 TABLET | Refills: 0 | Status: SHIPPED | OUTPATIENT
Start: 2024-01-25

## 2024-02-14 DIAGNOSIS — R13.19 ESOPHAGEAL DYSPHAGIA: ICD-10-CM

## 2024-02-14 DIAGNOSIS — K22.719 BARRETT'S ESOPHAGUS WITH DYSPLASIA: ICD-10-CM

## 2024-02-14 DIAGNOSIS — K21.00 GASTROESOPHAGEAL REFLUX DISEASE WITH ESOPHAGITIS WITHOUT HEMORRHAGE: ICD-10-CM

## 2024-02-15 RX ORDER — SUCRALFATE 1 G/1
TABLET ORAL
Qty: 120 TABLET | Refills: 3 | Status: SHIPPED | OUTPATIENT
Start: 2024-02-15

## 2024-02-20 ENCOUNTER — OFFICE VISIT (OUTPATIENT)
Dept: GASTROENTEROLOGY | Facility: CLINIC | Age: 59
End: 2024-02-20
Payer: COMMERCIAL

## 2024-02-20 VITALS
BODY MASS INDEX: 25.14 KG/M2 | SYSTOLIC BLOOD PRESSURE: 96 MMHG | HEIGHT: 62 IN | WEIGHT: 136.6 LBS | DIASTOLIC BLOOD PRESSURE: 64 MMHG

## 2024-02-20 DIAGNOSIS — K22.4 ESOPHAGEAL DYSMOTILITY: ICD-10-CM

## 2024-02-20 DIAGNOSIS — R13.19 ESOPHAGEAL DYSPHAGIA: ICD-10-CM

## 2024-02-20 DIAGNOSIS — K22.719 BARRETT'S ESOPHAGUS WITH DYSPLASIA: ICD-10-CM

## 2024-02-20 DIAGNOSIS — K21.00 GASTROESOPHAGEAL REFLUX DISEASE WITH ESOPHAGITIS WITHOUT HEMORRHAGE: Primary | ICD-10-CM

## 2024-02-20 DIAGNOSIS — K22.89 PRESBYESOPHAGUS: ICD-10-CM

## 2024-02-20 PROCEDURE — 99214 OFFICE O/P EST MOD 30 MIN: CPT

## 2024-02-20 RX ORDER — SUCRALFATE 1 G/1
TABLET ORAL
Qty: 120 TABLET | Refills: 3 | Status: SHIPPED | OUTPATIENT
Start: 2024-02-20

## 2024-02-20 RX ORDER — TRAZODONE HYDROCHLORIDE 150 MG/1
150 TABLET ORAL NIGHTLY
COMMUNITY
Start: 2024-02-07

## 2024-02-20 RX ORDER — PANTOPRAZOLE SODIUM 40 MG/1
40 TABLET, DELAYED RELEASE ORAL 2 TIMES DAILY
Qty: 180 TABLET | Refills: 3 | Status: SHIPPED | OUTPATIENT
Start: 2024-02-20

## 2024-02-20 NOTE — PROGRESS NOTES
PATIENT INFORMATION  Lona Dupree       - 1965    CHIEF COMPLAINT  Chief Complaint   Patient presents with    Howell's Esophagus     Difficulty Swallowing       HISTORY OF PRESENT ILLNESS    Here today for GERD follow-up    Alternating PPI (AM and dinner) and H2RA (lunch and bedtime), taking sucralfate TID. Sucralfate is the only one that gives relief, when was out was having nausea and vomiting. With current regimen flares go with trigger foods, but difficult to avoid. Tums PRN. Dysphagia with foods, feels clogged at times if tries to drink, other times can feel sensation in GE. Generally does not have to cough anything up.    Nausea more with migraines stress related. Phenergan PRN helps nausea and migraines.     Diabetes dx in 2017, insulin and metforming.    Bowels are very regular, easy and well controlled after coffee in morning, no bleeding or straining.    Sister with crohns and brother passed of esophageal cancer.    6/3/2020 last EGD and Colon for dysphagia, reflux with barretts, negative for celiac, dysplasia. Colon with 0/1 adenomas, previous colon normal. Personal hx polyps.    Difficulty Swallowing  Associated symptoms include headaches, nausea, neck pain and vomiting. Pertinent negatives include no abdominal pain.       REVIEWED PERTINENT RESULTS/ LABS  Lab Results   Component Value Date    CASEREPORT  2020     Surgical Pathology Report                         Case: [id number]                                  Authorizing Provider:  Dmitri Fung        Collected:           2020 01:56 PM                                 MD Goran                                                                   Ordering [facility]   Received:            2020 05:23 PM                                 OR                                                                           Pathologist:           Norberto Mcgregor MD                                                          Specimens:   1) - Small Intestine, Duodenum                                                                      2) - Gastric, Body                                                                                  3) - Esophagus, Distal                                                                              4) - Large Intestine, Rectum                                                               FINALDX  06/03/2020     1.  Duodenum Biopsy: Benign duodenal and small intestinal mucosa with   A. Normal villous architecture.   B. No active cryptitis, crypt abscess formation, granulomatous inflammation nor         intestinal parasites identified.    2.  Gastric Biopsy:  Benign gastric mucosa with     A.  Mild chronic inflammation.    B.  No H. pylori-like organisms identified by routine staining.      3.  Distal Esophagus Biopsy:  Benign squamous and glandular mucosa with   A.  Focally active mild chronic inflammation with rare eosinophil noted suggesting chronic reflux.    B.  Focal, rare intestinal metaplasia by special staining consistent with developing Howell's esophagus                      (see comment).   C.  No dysplasia nor malignancy identified.      4.  Rectum Biopsy:    A.  Hyperplastic polyp.    B.  No glandular dyplasia nor malignancy identified.     jat/brb        Lab Results   Component Value Date    HGB 12.2 04/21/2021    MCV 98.5 (H) 04/21/2021     04/21/2021    ALT 17 04/21/2021    AST 14 04/21/2021    HGBA1C 8.90 (H) 04/14/2021    INR 0.95 04/21/2021    TRIG 114 04/21/2021      No results found.    REVIEW OF SYSTEMS  Review of Systems   HENT:  Positive for trouble swallowing.    Eyes: Negative.    Respiratory: Negative.     Cardiovascular: Negative.    Gastrointestinal:  Positive for nausea and vomiting. Negative for abdominal pain, constipation and diarrhea.        Howell's    Endocrine: Negative.    Genitourinary: Negative.    Musculoskeletal:  Positive for neck  pain.   Skin: Negative.    Allergic/Immunologic: Positive for environmental allergies and food allergies.   Neurological:  Positive for dizziness, light-headedness and headaches.   Hematological: Negative.    Psychiatric/Behavioral: Negative.           ACTIVE PROBLEMS  Patient Active Problem List    Diagnosis     Hypoglycemia [E16.2]     Diabetic acidosis without coma [E11.10]     Gastroesophageal reflux disease with esophagitis without hemorrhage [K21.00]     Howell's esophagus with dysplasia [K22.719]     Presbyesophagus [K22.89]     Esophageal dysphagia [R13.19]     Personal history of colonic polyps [Z86.010]     High anion gap metabolic acidosis [E87.29]     Methamphetamine use [F15.10]     Cocaine use [F14.90]     Nausea & vomiting [R11.2]     Diabetes mellitus with ketosis [E13.10]     HSV-2 (herpes simplex virus 2) infection [B00.9]     Fibroids [D21.9]     Smoker [F17.200]     Headache [R51.9]     Diabetes mellitus [E11.9]     History of bloody stools [Z87.19]          PAST MEDICAL HISTORY  Past Medical History:   Diagnosis Date    Abnormal Pap smear of cervix     1 abnormal pap with normal f/u    Howell esophagus     Colon polyp     Diabetes mellitus     Difficulty swallowing     at times    Fibroid     GERD (gastroesophageal reflux disease)     HSV-2 (herpes simplex virus 2) infection 2017    Menstrual disorder     Migraines     Rectal bleeding     bleeds with bowel movement    Seasonal allergies          SURGICAL HISTORY  Past Surgical History:   Procedure Laterality Date    CARPAL TUNNEL RELEASE WITH CUBITAL TUNNEL RELEASE Right      SECTION      X2    COLONOSCOPY N/A 2016    Procedure: COLONOSCOPY with polypectomy;  Surgeon: Maryan Kent MD;  Location: [facility] OR;  Service:     COLONOSCOPY N/A 6/3/2020    Procedure: COLONOSCOPY;  Surgeon: Dmitri Fung MD;  Location: [facility] OR;  Service: Gastroenterology;  Laterality: N/A;  Poor prep, polyp    ENDOMETRIAL ABLATION   "2015    thermachoice    ENDOSCOPY N/A 6/3/2020    Procedure: ESOPHAGOGASTRODUODENOSCOPY;  Surgeon: Dmitri Fung MD;  Location: [facility];  Service: Gastroenterology;  Laterality: N/A;  Reflux esophagitis, erosive gastritis, duodenitis    HYSTEROSCOPY      AND ENDOMETRIAL  ABLATION    NASAL SEPTUM SURGERY          TUBAL ABDOMINAL LIGATION           FAMILY HISTORY  Family History   Problem Relation Age of Onset    Heart attack Father     Hypertension Father     Diabetes Father     Alcohol abuse Father     Dementia Father     Hypertension Mother     Hyperlipidemia Mother     Atrial fibrillation Mother     Stroke Mother     Heart attack Brother     Hypertension Brother     Stroke Brother     Esophageal cancer Brother     Stroke Sister     Crohn's disease Sister     Alzheimer's disease Paternal Grandmother     Colon cancer Maternal Uncle     Diabetes Paternal Aunt     Breast cancer Paternal Aunt     Rheum arthritis Other     Ovarian cancer Neg Hx     Deep vein thrombosis Neg Hx     Pulmonary embolism Neg Hx     Uterine cancer Neg Hx          SOCIAL HISTORY  Social History     Occupational History    Not on file   Tobacco Use    Smoking status: Former     Types: Cigarettes     Quit date: 1995     Years since quittin.7    Smokeless tobacco: Never    Tobacco comments:     Quit \" a long time ago\"    Vaping Use    Vaping Use: Never used   Substance and Sexual Activity    Alcohol use: Yes     Comment: OCCASIONALLY    Drug use: No    Sexual activity: Yes     Partners: Male     Birth control/protection: Post-menopausal, Tubal ligation     Comment: BTL         CURRENT MEDICATIONS    Current Outpatient Medications:     acetaminophen (TYLENOL) 325 MG tablet, Take 2 tablets by mouth Every 4 (Four) Hours As Needed for Mild Pain . Over the counter, Disp: , Rfl:     aspirin 81 MG chewable tablet, Chew 1 tablet Daily., Disp: , Rfl:     atorvastatin (LIPITOR) 10 MG tablet, atorvastatin 10 mg tablet, Disp: , " "Rfl:     cetirizine (zyrTEC) 10 MG tablet, Take 1 tablet by mouth Daily. PRN, Disp: , Rfl:     Continuous Blood Gluc  (FreeStyle Annalee 14 Day [place]) device, FreeStyle Annalee 14 Day [place]  USE TO CHECK BLOOD SUGAR 6-8 TIMES DAILY AS NEEDED, Disp: , Rfl:     famotidine (PEPCID) 40 MG tablet, TAKE ONE TABLET BY MOUTH TWICE A DAY, Disp: 180 tablet, Rfl: 3    FLUoxetine (PROzac) 40 MG capsule, fluoxetine 40 mg capsule  Take 1 capsule every day by oral route for 90 days., Disp: , Rfl:     Lantus SoloStar 100 UNIT/ML injection pen, 15 Units Every Morning Before Breakfast., Disp: , Rfl:     metFORMIN (GLUCOPHAGE) 500 MG tablet, , Disp: , Rfl:     Multiple Vitamins-Minerals (MULTI-BETIC PO), Take 2 tablets by mouth Daily., Disp: , Rfl:     ondansetron ODT (ZOFRAN-ODT) 4 MG disintegrating tablet, Place 1 tablet on the tongue Every 8 (Eight) Hours As Needed for Nausea or Vomiting., Disp: 90 tablet, Rfl: 3    pantoprazole (PROTONIX) 40 MG EC tablet, Take 1 tablet by mouth 2 (Two) Times a Day., Disp: 180 tablet, Rfl: 3    promethazine (PHENERGAN) 25 MG tablet, Take 1 tablet by mouth Every 6 (Six) Hours As Needed for Nausea or Vomiting., Disp: , Rfl:     SALINE NASAL MIST NA, Saline Nasal Mist, Disp: , Rfl:     sucralfate (CARAFATE) 1 g tablet, Take 1 tab by mouth 4 times a day, Disp: 120 tablet, Rfl: 3    SudoGest 12 Hour 120 MG 12 hr tablet, , Disp: , Rfl:     SUMAtriptan (IMITREX) 100 MG tablet, sumatriptan 100 mg tablet  take 1 po at onset of symptoms. can repeat x 1 in 2 hours if headache persists, Disp: , Rfl:     traZODone (DESYREL) 150 MG tablet, Take 1 tablet by mouth Every Night., Disp: , Rfl:     valACYclovir (VALTREX) 500 MG tablet, TAKE ONE TABLET BY MOUTH DAILY, Disp: 90 tablet, Rfl: 3    ALLERGIES  Mobic [meloxicam]    VITALS  Vitals:    02/20/24 0824   BP: 96/64   BP Location: Left arm   Patient Position: Sitting   Cuff Size: Large Adult   Weight: 62 kg (136 lb 9.6 oz)   Height: 157.5 cm (62.01\") " "      PHYSICAL EXAM  Debilities/Disabilities Identified: None  Emotional Behavior: Appropriate  Wt Readings from Last 3 Encounters:   02/20/24 62 kg (136 lb 9.6 oz)   05/25/23 59.9 kg (132 lb)   12/21/22 60.3 kg (133 lb)     Ht Readings from Last 1 Encounters:   02/20/24 157.5 cm (62.01\")     Body mass index is 24.98 kg/m².  Physical Exam  Constitutional:       General: She is not in acute distress.     Appearance: Normal appearance. She is not ill-appearing.   HENT:      Head: Normocephalic and atraumatic.      Mouth/Throat:      Mouth: Mucous membranes are moist.      Pharynx: No posterior oropharyngeal erythema.   Eyes:      General: No scleral icterus.  Cardiovascular:      Rate and Rhythm: Normal rate and regular rhythm.      Heart sounds: Normal heart sounds.   Pulmonary:      Effort: Pulmonary effort is normal.      Breath sounds: Normal breath sounds.   Abdominal:      General: Abdomen is flat. Bowel sounds are normal. There is no distension.      Palpations: Abdomen is soft. There is no mass.      Tenderness: There is no abdominal tenderness in the right lower quadrant and periumbilical area. There is no guarding or rebound. Negative signs include Gonzalez's sign.      Hernia: No hernia is present.   Musculoskeletal:      Cervical back: Neck supple.   Skin:     General: Skin is warm.      Capillary Refill: Capillary refill takes less than 2 seconds.   Neurological:      General: No focal deficit present.      Mental Status: She is alert and oriented to person, place, and time.   Psychiatric:         Mood and Affect: Mood normal.         Behavior: Behavior normal.         Thought Content: Thought content normal.         Judgment: Judgment normal.         CLINICAL DATA REVIEWED   reviewed previous lab results and integrated with today's visit, reviewed notes from other physicians and/or last GI encounter, reviewed previous endoscopy results and available photos, reviewed surgical pathology results from previous " biopsies    ASSESSMENT  Diagnoses and all orders for this visit:    Gastroesophageal reflux disease with esophagitis without hemorrhage  -     pantoprazole (PROTONIX) 40 MG EC tablet; Take 1 tablet by mouth 2 (Two) Times a Day.  -     sucralfate (CARAFATE) 1 g tablet; Take 1 tab by mouth 4 times a day    Presbyesophagus    Howell's esophagus with dysplasia  -     pantoprazole (PROTONIX) 40 MG EC tablet; Take 1 tablet by mouth 2 (Two) Times a Day.  -     sucralfate (CARAFATE) 1 g tablet; Take 1 tab by mouth 4 times a day    Esophageal dysmotility  -     pantoprazole (PROTONIX) 40 MG EC tablet; Take 1 tablet by mouth 2 (Two) Times a Day.    Esophageal dysphagia  -     pantoprazole (PROTONIX) 40 MG EC tablet; Take 1 tablet by mouth 2 (Two) Times a Day.  -     sucralfate (CARAFATE) 1 g tablet; Take 1 tab by mouth 4 times a day    Other orders  -     traZODone (DESYREL) 150 MG tablet; Take 1 tablet by mouth Every Night.          PLAN    Will attempt to wean pepcid, continue sucralfate, add dose at night  EGD and Colon 6/2025    Return in about 6 months (around 8/20/2024).    I have discussed the above plan with the patient.  They verbalize understanding and are in agreement with the plan.  They have been advised to contact the office for any questions, concerns, or changes related to their health.

## 2024-06-01 ENCOUNTER — HOSPITAL ENCOUNTER (INPATIENT)
Facility: HOSPITAL | Age: 59
LOS: 2 days | Discharge: HOME | DRG: 638 | End: 2024-06-03
Attending: EMERGENCY MEDICINE | Admitting: INTERNAL MEDICINE
Payer: COMMERCIAL

## 2024-06-01 DIAGNOSIS — E87.5 HYPERKALEMIA: ICD-10-CM

## 2024-06-01 DIAGNOSIS — R11.14 BILIOUS VOMITING WITH NAUSEA: ICD-10-CM

## 2024-06-01 DIAGNOSIS — N17.9 ACUTE KIDNEY INJURY: ICD-10-CM

## 2024-06-01 DIAGNOSIS — E11.10 DIABETIC KETOACIDOSIS WITHOUT COMA ASSOCIATED WITH TYPE 2 DIABETES MELLITUS: Primary | ICD-10-CM

## 2024-06-01 LAB
ACETONE BLD QL: ABNORMAL
ALBUMIN SERPL-MCNC: 4.6 G/DL (ref 3.5–5.2)
ALBUMIN/GLOB SERPL: 1.3 G/DL
ALP SERPL-CCNC: 125 U/L (ref 39–117)
ALT SERPL W P-5'-P-CCNC: 17 U/L (ref 1–33)
AMPHET+METHAMPHET UR QL: NEGATIVE
AMPHETAMINES UR QL: NEGATIVE
ANION GAP SERPL CALCULATED.3IONS-SCNC: 13.6 MMOL/L (ref 5–15)
ANION GAP SERPL CALCULATED.3IONS-SCNC: 22.7 MMOL/L (ref 5–15)
ANION GAP SERPL CALCULATED.3IONS-SCNC: 33.2 MMOL/L (ref 5–15)
ANION GAP SERPL CALCULATED.3IONS-SCNC: 35.1 MMOL/L (ref 5–15)
ANION GAP SERPL CALCULATED.3IONS-SCNC: 9.4 MMOL/L (ref 5–15)
AST SERPL-CCNC: 20 U/L (ref 1–32)
ATMOSPHERIC PRESS: 744 MMHG
BACTERIA UR QL AUTO: ABNORMAL /HPF
BARBITURATES UR QL SCN: NEGATIVE
BASE EXCESS BLDV CALC-SCNC: 0 MMOL/L (ref 0–2)
BASOPHILS # BLD AUTO: 0.05 10*3/MM3 (ref 0–0.2)
BASOPHILS NFR BLD AUTO: 0.2 % (ref 0–1.5)
BDY SITE: ABNORMAL
BENZODIAZ UR QL SCN: NEGATIVE
BILIRUB SERPL-MCNC: 0.2 MG/DL (ref 0–1.2)
BILIRUB UR QL STRIP: ABNORMAL
BODY TEMPERATURE: 37
BUN SERPL-MCNC: 18 MG/DL (ref 6–20)
BUN SERPL-MCNC: 20 MG/DL (ref 6–20)
BUN SERPL-MCNC: 25 MG/DL (ref 6–20)
BUN SERPL-MCNC: 27 MG/DL (ref 6–20)
BUN SERPL-MCNC: 28 MG/DL (ref 6–20)
BUN/CREAT SERPL: 17.5 (ref 7–25)
BUN/CREAT SERPL: 19.9 (ref 7–25)
BUN/CREAT SERPL: 20 (ref 7–25)
BUN/CREAT SERPL: 21.1 (ref 7–25)
BUN/CREAT SERPL: 23.1 (ref 7–25)
BUPRENORPHINE SERPL-MCNC: NEGATIVE NG/ML
CALCIUM SPEC-SCNC: 7.8 MG/DL (ref 8.6–10.5)
CALCIUM SPEC-SCNC: 8 MG/DL (ref 8.6–10.5)
CALCIUM SPEC-SCNC: 8.8 MG/DL (ref 8.6–10.5)
CALCIUM SPEC-SCNC: 8.9 MG/DL (ref 8.6–10.5)
CALCIUM SPEC-SCNC: 9.8 MG/DL (ref 8.6–10.5)
CANNABINOIDS SERPL QL: POSITIVE
CHLORIDE SERPL-SCNC: 107 MMOL/L (ref 98–107)
CHLORIDE SERPL-SCNC: 109 MMOL/L (ref 98–107)
CHLORIDE SERPL-SCNC: 109 MMOL/L (ref 98–107)
CHLORIDE SERPL-SCNC: 93 MMOL/L (ref 98–107)
CHLORIDE SERPL-SCNC: 99 MMOL/L (ref 98–107)
CLARITY UR: CLEAR
CO2 SERPL-SCNC: 16.4 MMOL/L (ref 22–29)
CO2 SERPL-SCNC: 19.6 MMOL/L (ref 22–29)
CO2 SERPL-SCNC: 2.9 MMOL/L (ref 22–29)
CO2 SERPL-SCNC: 3.8 MMOL/L (ref 22–29)
CO2 SERPL-SCNC: 9.3 MMOL/L (ref 22–29)
COCAINE UR QL: NEGATIVE
COLOR UR: YELLOW
CREAT SERPL-MCNC: 0.78 MG/DL (ref 0.57–1)
CREAT SERPL-MCNC: 0.95 MG/DL (ref 0.57–1)
CREAT SERPL-MCNC: 1.25 MG/DL (ref 0.57–1)
CREAT SERPL-MCNC: 1.41 MG/DL (ref 0.57–1)
CREAT SERPL-MCNC: 1.54 MG/DL (ref 0.57–1)
DEPRECATED RDW RBC AUTO: 57.8 FL (ref 37–54)
EGFRCR SERPLBLD CKD-EPI 2021: 39 ML/MIN/1.73
EGFRCR SERPLBLD CKD-EPI 2021: 43.3 ML/MIN/1.73
EGFRCR SERPLBLD CKD-EPI 2021: 50.1 ML/MIN/1.73
EGFRCR SERPLBLD CKD-EPI 2021: 69.6 ML/MIN/1.73
EGFRCR SERPLBLD CKD-EPI 2021: 88.2 ML/MIN/1.73
EOSINOPHIL # BLD AUTO: 8.38 10*3/MM3 (ref 0–0.4)
EOSINOPHIL NFR BLD AUTO: 36.4 % (ref 0.3–6.2)
ERYTHROCYTE [DISTWIDTH] IN BLOOD BY AUTOMATED COUNT: 17.2 % (ref 12.3–15.4)
FLUAV SUBTYP SPEC NAA+PROBE: NOT DETECTED
FLUBV RNA ISLT QL NAA+PROBE: NOT DETECTED
GEN 5 2HR TROPONIN T REFLEX: 27 NG/L
GLOBULIN UR ELPH-MCNC: 3.6 GM/DL
GLUCOSE BLDC GLUCOMTR-MCNC: 151 MG/DL (ref 70–130)
GLUCOSE BLDC GLUCOMTR-MCNC: 162 MG/DL (ref 70–130)
GLUCOSE BLDC GLUCOMTR-MCNC: 180 MG/DL (ref 70–130)
GLUCOSE BLDC GLUCOMTR-MCNC: 194 MG/DL (ref 70–130)
GLUCOSE BLDC GLUCOMTR-MCNC: 198 MG/DL (ref 70–130)
GLUCOSE BLDC GLUCOMTR-MCNC: 202 MG/DL (ref 70–130)
GLUCOSE BLDC GLUCOMTR-MCNC: 229 MG/DL (ref 70–130)
GLUCOSE BLDC GLUCOMTR-MCNC: 263 MG/DL (ref 70–130)
GLUCOSE BLDC GLUCOMTR-MCNC: 275 MG/DL (ref 70–130)
GLUCOSE BLDC GLUCOMTR-MCNC: 358 MG/DL (ref 70–130)
GLUCOSE BLDC GLUCOMTR-MCNC: 486 MG/DL (ref 70–130)
GLUCOSE BLDC GLUCOMTR-MCNC: 522 MG/DL (ref 70–130)
GLUCOSE SERPL-MCNC: 132 MG/DL (ref 65–99)
GLUCOSE SERPL-MCNC: 188 MG/DL (ref 65–99)
GLUCOSE SERPL-MCNC: 275 MG/DL (ref 65–99)
GLUCOSE SERPL-MCNC: 668 MG/DL (ref 65–99)
GLUCOSE SERPL-MCNC: 735 MG/DL (ref 65–99)
GLUCOSE UR STRIP-MCNC: ABNORMAL MG/DL
HBA1C MFR BLD: 8.16 % (ref 4.8–5.6)
HCO3 BLDV-SCNC: 0 MMOL/L (ref 22–28)
HCT VFR BLD AUTO: 36.2 % (ref 34–46.6)
HGB BLD-MCNC: 10.6 G/DL (ref 12–15.9)
HGB BLDA-MCNC: 8.3 G/DL (ref 13.5–17.5)
HGB UR QL STRIP.AUTO: ABNORMAL
HOLD SPECIMEN: NORMAL
HOLD SPECIMEN: NORMAL
HYALINE CASTS UR QL AUTO: ABNORMAL /LPF
IMM GRANULOCYTES # BLD AUTO: 0.23 10*3/MM3 (ref 0–0.05)
IMM GRANULOCYTES NFR BLD AUTO: 1 % (ref 0–0.5)
KETONES UR QL STRIP: ABNORMAL
LEUKOCYTE ESTERASE UR QL STRIP.AUTO: NEGATIVE
LIPASE SERPL-CCNC: 16 U/L (ref 13–60)
LYMPHOCYTES # BLD AUTO: 0.9 10*3/MM3 (ref 0.7–3.1)
LYMPHOCYTES # BLD MANUAL: 1.84 10*3/MM3 (ref 0.7–3.1)
LYMPHOCYTES NFR BLD AUTO: 3.9 % (ref 19.6–45.3)
LYMPHOCYTES NFR BLD MANUAL: 1 % (ref 5–12)
Lab: ABNORMAL
Lab: ABNORMAL
MAGNESIUM SERPL-MCNC: 1.6 MG/DL (ref 1.6–2.6)
MAGNESIUM SERPL-MCNC: 1.7 MG/DL (ref 1.6–2.6)
MAGNESIUM SERPL-MCNC: 2.1 MG/DL (ref 1.6–2.6)
MAGNESIUM SERPL-MCNC: 2.6 MG/DL (ref 1.6–2.6)
MAGNESIUM SERPL-MCNC: 2.6 MG/DL (ref 1.6–2.6)
MCH RBC QN AUTO: 26.8 PG (ref 26.6–33)
MCHC RBC AUTO-ENTMCNC: 29.3 G/DL (ref 31.5–35.7)
MCV RBC AUTO: 91.6 FL (ref 79–97)
METHADONE UR QL SCN: NEGATIVE
MODALITY: ABNORMAL
MONOCYTES # BLD AUTO: 1.56 10*3/MM3 (ref 0.1–0.9)
MONOCYTES # BLD: 0.23 10*3/MM3 (ref 0.1–0.9)
MONOCYTES NFR BLD AUTO: 6.8 % (ref 5–12)
NEUTROPHILS # BLD AUTO: 20.94 10*3/MM3 (ref 1.7–7)
NEUTROPHILS NFR BLD AUTO: 11.89 10*3/MM3 (ref 1.7–7)
NEUTROPHILS NFR BLD AUTO: 51.7 % (ref 42.7–76)
NEUTROPHILS NFR BLD MANUAL: 91 % (ref 42.7–76)
NITRITE UR QL STRIP: NEGATIVE
NOTIFIED BY: ABNORMAL
NOTIFIED WHO: ABNORMAL
NRBC BLD AUTO-RTO: 0 /100 WBC (ref 0–0.2)
OPIATES UR QL: NEGATIVE
OXYCODONE UR QL SCN: NEGATIVE
PCO2 BLDV: <15.4 MM HG (ref 41–51)
PCP UR QL SCN: NEGATIVE
PH BLDV: 6.83 PH UNITS (ref 7.32–7.42)
PH UR STRIP.AUTO: <=5 [PH] (ref 4.5–8)
PHOSPHATE SERPL-MCNC: 1.4 MG/DL (ref 2.5–4.5)
PHOSPHATE SERPL-MCNC: 1.9 MG/DL (ref 2.5–4.5)
PHOSPHATE SERPL-MCNC: 6.2 MG/DL (ref 2.5–4.5)
PHOSPHATE SERPL-MCNC: 7.5 MG/DL (ref 2.5–4.5)
PLATELET # BLD AUTO: 376 10*3/MM3 (ref 140–450)
PMV BLD AUTO: 10.9 FL (ref 6–12)
PO2 BLDV: 87.9 MM HG (ref 27–53)
POTASSIUM SERPL-SCNC: 3.3 MMOL/L (ref 3.5–5.2)
POTASSIUM SERPL-SCNC: 3.8 MMOL/L (ref 3.5–5.2)
POTASSIUM SERPL-SCNC: 4.2 MMOL/L (ref 3.5–5.2)
POTASSIUM SERPL-SCNC: 4.8 MMOL/L (ref 3.5–5.2)
POTASSIUM SERPL-SCNC: 5.9 MMOL/L (ref 3.5–5.2)
PROT SERPL-MCNC: 8.2 G/DL (ref 6–8.5)
PROT UR QL STRIP: ABNORMAL
QT INTERVAL: 467 MS
QTC INTERVAL: 655 MS
RBC # BLD AUTO: 3.95 10*6/MM3 (ref 3.77–5.28)
RBC # UR STRIP: ABNORMAL /HPF
RBC MORPH BLD: NORMAL
REF LAB TEST METHOD: ABNORMAL
SAO2 % BLDCOV: 91.1 % (ref 45–75)
SARS-COV-2 RNA RESP QL NAA+PROBE: NOT DETECTED
SMALL PLATELETS BLD QL SMEAR: ABNORMAL
SODIUM SERPL-SCNC: 131 MMOL/L (ref 136–145)
SODIUM SERPL-SCNC: 136 MMOL/L (ref 136–145)
SODIUM SERPL-SCNC: 138 MMOL/L (ref 136–145)
SODIUM SERPL-SCNC: 139 MMOL/L (ref 136–145)
SODIUM SERPL-SCNC: 139 MMOL/L (ref 136–145)
SP GR UR STRIP: 1.02 (ref 1–1.03)
SQUAMOUS #/AREA URNS HPF: ABNORMAL /HPF
TRICYCLICS UR QL SCN: NEGATIVE
TROPONIN T DELTA: 4 NG/L
TROPONIN T SERPL HS-MCNC: 23 NG/L
UROBILINOGEN UR QL STRIP: ABNORMAL
VARIANT LYMPHS NFR BLD MANUAL: 8 % (ref 19.6–45.3)
WBC # UR STRIP: ABNORMAL /HPF
WBC MORPH BLD: NORMAL
WBC NRBC COR # BLD AUTO: 23.01 10*3/MM3 (ref 3.4–10.8)
WHOLE BLOOD HOLD COAG: NORMAL
WHOLE BLOOD HOLD SPECIMEN: NORMAL

## 2024-06-01 PROCEDURE — 82948 REAGENT STRIP/BLOOD GLUCOSE: CPT

## 2024-06-01 PROCEDURE — 25810000003 SODIUM CHLORIDE 0.9 % SOLUTION: Performed by: EMERGENCY MEDICINE

## 2024-06-01 PROCEDURE — 84100 ASSAY OF PHOSPHORUS: CPT | Performed by: INTERNAL MEDICINE

## 2024-06-01 PROCEDURE — 25810000003 SODIUM CHLORIDE 0.9 % SOLUTION

## 2024-06-01 PROCEDURE — 81001 URINALYSIS AUTO W/SCOPE: CPT | Performed by: EMERGENCY MEDICINE

## 2024-06-01 PROCEDURE — 25010000002 POTASSIUM CHLORIDE PER 2 MEQ: Performed by: INTERNAL MEDICINE

## 2024-06-01 PROCEDURE — 25810000003 SODIUM CHLORIDE 0.9 % SOLUTION 250 ML FLEX CONT: Performed by: INTERNAL MEDICINE

## 2024-06-01 PROCEDURE — 83735 ASSAY OF MAGNESIUM: CPT | Performed by: INTERNAL MEDICINE

## 2024-06-01 PROCEDURE — 83690 ASSAY OF LIPASE: CPT | Performed by: EMERGENCY MEDICINE

## 2024-06-01 PROCEDURE — 82803 BLOOD GASES ANY COMBINATION: CPT

## 2024-06-01 PROCEDURE — 85025 COMPLETE CBC W/AUTO DIFF WBC: CPT | Performed by: EMERGENCY MEDICINE

## 2024-06-01 PROCEDURE — 80306 DRUG TEST PRSMV INSTRMNT: CPT | Performed by: EMERGENCY MEDICINE

## 2024-06-01 PROCEDURE — 83036 HEMOGLOBIN GLYCOSYLATED A1C: CPT | Performed by: INTERNAL MEDICINE

## 2024-06-01 PROCEDURE — 85007 BL SMEAR W/DIFF WBC COUNT: CPT | Performed by: EMERGENCY MEDICINE

## 2024-06-01 PROCEDURE — 63710000001 INSULIN REGULAR HUMAN PER 5 UNITS: Performed by: EMERGENCY MEDICINE

## 2024-06-01 PROCEDURE — 99285 EMERGENCY DEPT VISIT HI MDM: CPT

## 2024-06-01 PROCEDURE — 84484 ASSAY OF TROPONIN QUANT: CPT | Performed by: INTERNAL MEDICINE

## 2024-06-01 PROCEDURE — 99223 1ST HOSP IP/OBS HIGH 75: CPT | Performed by: INTERNAL MEDICINE

## 2024-06-01 PROCEDURE — 83930 ASSAY OF BLOOD OSMOLALITY: CPT | Performed by: INTERNAL MEDICINE

## 2024-06-01 PROCEDURE — 87636 SARSCOV2 & INF A&B AMP PRB: CPT | Performed by: EMERGENCY MEDICINE

## 2024-06-01 PROCEDURE — G0378 HOSPITAL OBSERVATION PER HR: HCPCS

## 2024-06-01 PROCEDURE — 25810000003 SODIUM CHLORIDE 0.9 % SOLUTION: Performed by: INTERNAL MEDICINE

## 2024-06-01 PROCEDURE — 93010 ELECTROCARDIOGRAM REPORT: CPT | Performed by: INTERNAL MEDICINE

## 2024-06-01 PROCEDURE — 80053 COMPREHEN METABOLIC PANEL: CPT | Performed by: EMERGENCY MEDICINE

## 2024-06-01 PROCEDURE — 93005 ELECTROCARDIOGRAM TRACING: CPT | Performed by: INTERNAL MEDICINE

## 2024-06-01 PROCEDURE — 25010000002 ONDANSETRON PER 1 MG: Performed by: EMERGENCY MEDICINE

## 2024-06-01 PROCEDURE — 94799 UNLISTED PULMONARY SVC/PX: CPT

## 2024-06-01 PROCEDURE — 82009 KETONE BODYS QUAL: CPT | Performed by: EMERGENCY MEDICINE

## 2024-06-01 RX ORDER — FLUOXETINE 10 MG/1
40 CAPSULE ORAL DAILY
Status: DISCONTINUED | OUTPATIENT
Start: 2024-06-01 | End: 2024-06-03 | Stop reason: HOSPADM

## 2024-06-01 RX ORDER — BISACODYL 5 MG/1
5 TABLET, DELAYED RELEASE ORAL DAILY PRN
Status: DISCONTINUED | OUTPATIENT
Start: 2024-06-01 | End: 2024-06-03 | Stop reason: HOSPADM

## 2024-06-01 RX ORDER — SODIUM CHLORIDE 0.9 % (FLUSH) 0.9 %
10 SYRINGE (ML) INJECTION AS NEEDED
Status: DISCONTINUED | OUTPATIENT
Start: 2024-06-01 | End: 2024-06-03 | Stop reason: HOSPADM

## 2024-06-01 RX ORDER — DEXTROSE MONOHYDRATE AND SODIUM CHLORIDE 5; .9 G/100ML; G/100ML
150 INJECTION, SOLUTION INTRAVENOUS CONTINUOUS PRN
Status: DISCONTINUED | OUTPATIENT
Start: 2024-06-01 | End: 2024-06-02

## 2024-06-01 RX ORDER — DEXTROSE MONOHYDRATE, SODIUM CHLORIDE, AND POTASSIUM CHLORIDE 50; 1.49; 9 G/1000ML; G/1000ML; G/1000ML
150 INJECTION, SOLUTION INTRAVENOUS CONTINUOUS PRN
Status: DISCONTINUED | OUTPATIENT
Start: 2024-06-01 | End: 2024-06-02

## 2024-06-01 RX ORDER — PANTOPRAZOLE SODIUM 40 MG/10ML
40 INJECTION, POWDER, LYOPHILIZED, FOR SOLUTION INTRAVENOUS ONCE
Status: COMPLETED | OUTPATIENT
Start: 2024-06-01 | End: 2024-06-01

## 2024-06-01 RX ORDER — PANTOPRAZOLE SODIUM 40 MG/1
40 TABLET, DELAYED RELEASE ORAL 2 TIMES DAILY
Status: DISCONTINUED | OUTPATIENT
Start: 2024-06-01 | End: 2024-06-03 | Stop reason: HOSPADM

## 2024-06-01 RX ORDER — BISACODYL 10 MG
10 SUPPOSITORY, RECTAL RECTAL DAILY PRN
Status: DISCONTINUED | OUTPATIENT
Start: 2024-06-01 | End: 2024-06-03 | Stop reason: HOSPADM

## 2024-06-01 RX ORDER — ONDANSETRON 2 MG/ML
8 INJECTION INTRAMUSCULAR; INTRAVENOUS ONCE
Status: COMPLETED | OUTPATIENT
Start: 2024-06-01 | End: 2024-06-01

## 2024-06-01 RX ORDER — SODIUM CHLORIDE 9 MG/ML
250 INJECTION, SOLUTION INTRAVENOUS CONTINUOUS PRN
Status: DISCONTINUED | OUTPATIENT
Start: 2024-06-01 | End: 2024-06-02

## 2024-06-01 RX ORDER — SODIUM CHLORIDE AND POTASSIUM CHLORIDE 150; 450 MG/100ML; MG/100ML
250 INJECTION, SOLUTION INTRAVENOUS CONTINUOUS PRN
Status: DISCONTINUED | OUTPATIENT
Start: 2024-06-01 | End: 2024-06-02

## 2024-06-01 RX ORDER — DEXTROSE MONOHYDRATE, SODIUM CHLORIDE, AND POTASSIUM CHLORIDE 50; 2.98; 4.5 G/1000ML; G/1000ML; G/1000ML
150 INJECTION, SOLUTION INTRAVENOUS CONTINUOUS PRN
Status: DISCONTINUED | OUTPATIENT
Start: 2024-06-01 | End: 2024-06-02

## 2024-06-01 RX ORDER — SODIUM CHLORIDE AND POTASSIUM CHLORIDE 150; 900 MG/100ML; MG/100ML
250 INJECTION, SOLUTION INTRAVENOUS CONTINUOUS PRN
Status: DISCONTINUED | OUTPATIENT
Start: 2024-06-01 | End: 2024-06-02

## 2024-06-01 RX ORDER — DEXTROSE MONOHYDRATE 25 G/50ML
10-50 INJECTION, SOLUTION INTRAVENOUS
Status: DISCONTINUED | OUTPATIENT
Start: 2024-06-01 | End: 2024-06-03 | Stop reason: HOSPADM

## 2024-06-01 RX ORDER — SODIUM CHLORIDE 9 MG/ML
40 INJECTION, SOLUTION INTRAVENOUS AS NEEDED
Status: DISCONTINUED | OUTPATIENT
Start: 2024-06-01 | End: 2024-06-03 | Stop reason: HOSPADM

## 2024-06-01 RX ORDER — NICOTINE POLACRILEX 4 MG
15 LOZENGE BUCCAL
Status: DISCONTINUED | OUTPATIENT
Start: 2024-06-01 | End: 2024-06-03 | Stop reason: HOSPADM

## 2024-06-01 RX ORDER — AMOXICILLIN 250 MG
2 CAPSULE ORAL 2 TIMES DAILY PRN
Status: DISCONTINUED | OUTPATIENT
Start: 2024-06-01 | End: 2024-06-03 | Stop reason: HOSPADM

## 2024-06-01 RX ORDER — FAMOTIDINE 20 MG/1
40 TABLET, FILM COATED ORAL 2 TIMES DAILY
Status: DISCONTINUED | OUTPATIENT
Start: 2024-06-01 | End: 2024-06-03 | Stop reason: HOSPADM

## 2024-06-01 RX ORDER — DEXTROSE MONOHYDRATE, SODIUM CHLORIDE, AND POTASSIUM CHLORIDE 50; 1.49; 4.5 G/1000ML; G/1000ML; G/1000ML
150 INJECTION, SOLUTION INTRAVENOUS CONTINUOUS PRN
Status: DISCONTINUED | OUTPATIENT
Start: 2024-06-01 | End: 2024-06-02

## 2024-06-01 RX ORDER — IBUPROFEN 600 MG/1
1 TABLET ORAL
Status: DISCONTINUED | OUTPATIENT
Start: 2024-06-01 | End: 2024-06-03 | Stop reason: HOSPADM

## 2024-06-01 RX ORDER — TRAZODONE HYDROCHLORIDE 50 MG/1
150 TABLET ORAL NIGHTLY
Status: DISCONTINUED | OUTPATIENT
Start: 2024-06-01 | End: 2024-06-03 | Stop reason: HOSPADM

## 2024-06-01 RX ORDER — SODIUM CHLORIDE 450 MG/100ML
250 INJECTION, SOLUTION INTRAVENOUS CONTINUOUS PRN
Status: DISCONTINUED | OUTPATIENT
Start: 2024-06-01 | End: 2024-06-02

## 2024-06-01 RX ORDER — SODIUM CHLORIDE 0.9 % (FLUSH) 0.9 %
10 SYRINGE (ML) INJECTION EVERY 12 HOURS SCHEDULED
Status: DISCONTINUED | OUTPATIENT
Start: 2024-06-01 | End: 2024-06-03 | Stop reason: HOSPADM

## 2024-06-01 RX ORDER — SODIUM CHLORIDE 9 MG/ML
INJECTION, SOLUTION INTRAVENOUS
Status: COMPLETED
Start: 2024-06-01 | End: 2024-06-01

## 2024-06-01 RX ORDER — SUCRALFATE 1 G/1
1 TABLET ORAL
Status: DISCONTINUED | OUTPATIENT
Start: 2024-06-01 | End: 2024-06-03 | Stop reason: HOSPADM

## 2024-06-01 RX ORDER — SODIUM CHLORIDE AND POTASSIUM CHLORIDE 300; 900 MG/100ML; MG/100ML
250 INJECTION, SOLUTION INTRAVENOUS CONTINUOUS PRN
Status: DISCONTINUED | OUTPATIENT
Start: 2024-06-01 | End: 2024-06-02

## 2024-06-01 RX ORDER — DEXTROSE MONOHYDRATE AND SODIUM CHLORIDE 5; .45 G/100ML; G/100ML
150 INJECTION, SOLUTION INTRAVENOUS CONTINUOUS PRN
Status: DISCONTINUED | OUTPATIENT
Start: 2024-06-01 | End: 2024-06-02

## 2024-06-01 RX ORDER — POLYETHYLENE GLYCOL 3350 17 G/17G
17 POWDER, FOR SOLUTION ORAL DAILY PRN
Status: DISCONTINUED | OUTPATIENT
Start: 2024-06-01 | End: 2024-06-03 | Stop reason: HOSPADM

## 2024-06-01 RX ORDER — ONDANSETRON 2 MG/ML
4 INJECTION INTRAMUSCULAR; INTRAVENOUS EVERY 6 HOURS PRN
Status: DISCONTINUED | OUTPATIENT
Start: 2024-06-01 | End: 2024-06-03 | Stop reason: HOSPADM

## 2024-06-01 RX ORDER — DEXTROSE MONOHYDRATE, SODIUM CHLORIDE, AND POTASSIUM CHLORIDE 50; 2.98; 9 G/1000ML; G/1000ML; G/1000ML
150 INJECTION, SOLUTION INTRAVENOUS CONTINUOUS PRN
Status: DISCONTINUED | OUTPATIENT
Start: 2024-06-01 | End: 2024-06-02

## 2024-06-01 RX ADMIN — Medication 10 ML: at 12:57

## 2024-06-01 RX ADMIN — SUCRALFATE 1 G: 1 TABLET ORAL at 20:58

## 2024-06-01 RX ADMIN — TRAZODONE HYDROCHLORIDE 150 MG: 50 TABLET ORAL at 20:58

## 2024-06-01 RX ADMIN — SUCRALFATE 1 G: 1 TABLET ORAL at 17:15

## 2024-06-01 RX ADMIN — INSULIN HUMAN 12 UNITS: 100 INJECTION, SOLUTION PARENTERAL at 11:08

## 2024-06-01 RX ADMIN — DEXTROSE, SODIUM CHLORIDE, AND POTASSIUM CHLORIDE 150 ML/HR: 5; .45; .15 INJECTION INTRAVENOUS at 17:23

## 2024-06-01 RX ADMIN — SODIUM BICARBONATE: 84 INJECTION INTRAVENOUS at 14:42

## 2024-06-01 RX ADMIN — FAMOTIDINE 40 MG: 20 TABLET, FILM COATED ORAL at 20:58

## 2024-06-01 RX ADMIN — SODIUM CHLORIDE 1000 ML: 9 INJECTION, SOLUTION INTRAVENOUS at 09:51

## 2024-06-01 RX ADMIN — SUCRALFATE 1 G: 1 TABLET ORAL at 12:57

## 2024-06-01 RX ADMIN — FAMOTIDINE 40 MG: 20 TABLET, FILM COATED ORAL at 12:57

## 2024-06-01 RX ADMIN — POTASSIUM CHLORIDE AND SODIUM CHLORIDE 250 ML/HR: 450; 150 INJECTION, SOLUTION INTRAVENOUS at 16:40

## 2024-06-01 RX ADMIN — SODIUM BICARBONATE 50 MEQ: 84 INJECTION INTRAVENOUS at 11:20

## 2024-06-01 RX ADMIN — INSULIN HUMAN 5.3 UNITS/HR: 1 INJECTION, SOLUTION INTRAVENOUS at 19:18

## 2024-06-01 RX ADMIN — SODIUM CHLORIDE 1000 ML/HR: 9 INJECTION, SOLUTION INTRAVENOUS at 11:48

## 2024-06-01 RX ADMIN — SODIUM PHOSPHATE, MONOBASIC, MONOHYDRATE 10 MMOL: 276; 142 INJECTION, SOLUTION INTRAVENOUS at 17:14

## 2024-06-01 RX ADMIN — SODIUM BICARBONATE: 84 INJECTION INTRAVENOUS at 22:19

## 2024-06-01 RX ADMIN — SODIUM CHLORIDE 250 ML/HR: 4.5 INJECTION, SOLUTION INTRAVENOUS at 12:57

## 2024-06-01 RX ADMIN — SODIUM CHLORIDE 250 ML: 9 INJECTION, SOLUTION INTRAVENOUS at 17:15

## 2024-06-01 RX ADMIN — PANTOPRAZOLE SODIUM 40 MG: 40 TABLET, DELAYED RELEASE ORAL at 20:58

## 2024-06-01 RX ADMIN — Medication 10 ML: at 20:24

## 2024-06-01 RX ADMIN — PANTOPRAZOLE SODIUM 40 MG: 40 INJECTION, POWDER, FOR SOLUTION INTRAVENOUS at 09:50

## 2024-06-01 RX ADMIN — INSULIN HUMAN 5.4 UNITS/HR: 1 INJECTION, SOLUTION INTRAVENOUS at 11:52

## 2024-06-01 RX ADMIN — SODIUM PHOSPHATE, MONOBASIC, MONOHYDRATE AND SODIUM PHOSPHATE, DIBASIC, ANHYDROUS: 276; 142 INJECTION, SOLUTION INTRAVENOUS at 17:15

## 2024-06-01 RX ADMIN — PANTOPRAZOLE SODIUM 40 MG: 40 TABLET, DELAYED RELEASE ORAL at 12:57

## 2024-06-01 RX ADMIN — ONDANSETRON 8 MG: 2 INJECTION INTRAMUSCULAR; INTRAVENOUS at 09:50

## 2024-06-01 RX ADMIN — FLUOXETINE 40 MG: 10 CAPSULE ORAL at 12:57

## 2024-06-01 NOTE — PLAN OF CARE
Goal Outcome Evaluation:  Plan of Care Reviewed With: patient, son        Progress: improving  Outcome Evaluation: Patient admitted this shift from ER, in DKA on an Insulin drip. Patients temp upon arrival 93 rectally, started warming blanket, temp now normal. Patient states she is feeling some better, blood glucose much improved. Hypotensive upon arrival, b/p improved. Sinus tach on the monitor- in the 120's, has had a couple runs of v-tach. Q4

## 2024-06-01 NOTE — CASE MANAGEMENT/SOCIAL WORK
Discharge Planning Assessment   Aditi Do     Patient Name: Lona Dupree  MRN: [id number]  Today's Date: 6/1/2024    Admit Date: 6/1/2024    Plan: Home with son   Discharge Needs Assessment       Row Name 06/01/24 1312       Living Environment    People in Home child(yfn), adult    Name(s) of People in Home shelbi Reyes    Current Living Arrangements home    Duration at Residence 30 Y    Potentially Unsafe Housing Conditions none    In the past 12 months has the electric, gas, oil, or water company threatened to shut off services in your home? No    Primary Care Provided by self    Provides Primary Care For no one    Caregiving Concerns pt voiced no care giving concerns at this time.    Family Caregiver if Needed child(yfn), adult    Family Caregiver Names Norberto, son and Kerrie daughter    Quality of Family Relationships unable to assess    Able to Return to Prior Arrangements yes    Living Arrangement Comments Patient states she lives with her adult son Norberto in  single story house with two steps and no handrail to gain entry       Resource/Environmental Concerns    Resource/Environmental Concerns none    Transportation Concerns none       Transportation Needs    In the past 12 months, has lack of transportation kept you from medical appointments or from getting medications? no    In the past 12 months, has lack of transportation kept you from meetings, work, or from getting things needed for daily living? No       Food Insecurity    Within the past 12 months, you worried that your food would run out before you got the money to buy more. Never true    Within the past 12 months, the food you bought just didn't last and you didn't have money to get more. Never true       Transition Planning    Patient/Family Anticipates Transition to home with family    Patient/Family Anticipated Services at Transition none    Transportation Anticipated family or friend will provide  pt states her son Norberto or S/O  Amando will be able to provide ride home at discharge       Discharge Needs Assessment    Readmission Within the Last 30 Days no previous admission in last 30 days    Current Outpatient/Agency/Support Group --  none    Equipment Currently Used at Home glucometer    Concerns to be Addressed adjustment to diagnosis/illness;discharge planning    Concerns Comments Pt voiced no discharge needs at this time.    Anticipated Changes Related to Illness none    Equipment Needed After Discharge glucometer  pt states she would like a new glucometer at discharge    Outpatient/Agency/Support Group Needs --  pt declined services at this time    Discharge Facility/Level of Care Needs --  pt declined needing services at this time.    Provided Post Acute Provider List? Refused    Refused Provider List Comment pt declined    Patient's Choice of Community Agency(s) none    Current Discharge Risk chronically ill    Discharge Coordination/Progress Patient states she plans on returning home at discharge with her son and daughter to help if needed and voiced no discharge needs at this time.                   Discharge Plan       Row Name 06/01/24 1317       Plan    Plan Home with son    Patient/Family in Agreement with Plan yes    Plan Comments Into room and introduced self and role of CM. Discussed discharge disposition with patient at bedside with primary nurse present and permission was granted. Patient confirms the info on her face sheet is correct and she see's Dr. Jaiden Villagomez as PCP. She states she uses [facility] in [place] and currently has no problem picking up or paying for her med's. She also states she is unsure if she has a living will and CM informed her that there was one on file here and she verbalized understanding. Patient states she lives with  her adult son Norberto in a single story house with two steps with no handrail to gain entry and normally has no issues entering the home or maneuvering inside. She states  "she is independent with her ADL's, works and drives and  her son or S/O Amando will be able to provide ride home at discharge. She also states she uses a glucometer at home and states \" I would like to get a new one when I'm discharged. CM informed patient that is something we do not have here in the hospital in our office but she could speak with the doctor and get an order for a new one or talk with the diabetic educator when she see's her and she verbalized understanding. Patient states she has not used home health in the past and states she does not anticipate needing this service or any other services at discharge. Patient states she plans on returning home at discharge with  her son and daughter to help if needed. She had no other questions or concerns regarding discharge plans at this time. Name and number placed on white board in room. CM will follow.                  Continued Care and Services - Admitted Since 6/1/2024    No active coordination exists for this encounter.          Demographic Summary       Row Name 06/01/24 1312       General Information    Admission Type inpatient    Arrived From home    Referral Source admission list    Reason for Consult discharge planning    Preferred Language English       Contact Information    Permission Granted to Share Info With                    Functional Status    No documentation.                  Psychosocial    No documentation.                  Abuse/Neglect    No documentation.                  Legal    No documentation.                  Substance Abuse    No documentation.                  Patient Forms    No documentation.                     Monique Betancur RN    "

## 2024-06-01 NOTE — Clinical Note
Level of Care: Critical Care [6]   Diagnosis: Diabetic ketoacidosis [272202]   Admitting Physician: REI VASQUEZ [[id number]]   Isolate for COVID?: No [0]   Bed Request Comments: Acute DKA   Certification: I Certify That Inpatient Hospital Services Are Medically Necessary For Greater Than 2 Midnights

## 2024-06-01 NOTE — PLAN OF CARE
Goal Outcome Evaluation:  Plan of Care Reviewed With: patient, son        Progress: improving  Outcome Evaluation: Patient admitted this shift from ER, in DKA on an Insulin drip. Patients temp upon arrival 93 rectally, started warming blanket, temp now normal. Patient states she is feeling some better, blood glucose much improved. Hypotensive upon arrival, b/p now improved. Sinus tach on the monitor- in the 120's, has had a couple runs of v-tach. Q4 labs ordered, sodium phos currently being replaced. Just switched over to D5 1/2NS with 20K @150ml/hr. Pts son visited this shift.

## 2024-06-01 NOTE — SIGNIFICANT NOTE
Patient had a 41 beat run of v-tach. Patient alert and asymptomatic. MD informed. Strips printed and on chart.

## 2024-06-01 NOTE — H&P
[facility]     PCP: Jaiden Villagomez MD    CODE status: Full    CHIEF COMPLAINT: Nausea and vomiting    HISTORY OF PRESENT ILLNESS:    Patient is a 59-year-old female with past medical history significant for poorly controlled diabetes mellitus type 2 for which she has been admitted to this facility previously, Howell's esophagus, GERD, HSV-2 history, hyperlipidemia.  She presents to [facility] complaining of nausea and vomiting.  She has had nasal congestion and cough for the last 4 to 5 days however last night she developed nausea and has vomited numerous times, this caused her to seek evaluation in the ER today.  She is unable to keep anything down by mouth.  Her abdomen hurts due to amount of vomiting she has injured.     Workup in the ER revealed patient to be in DKA with anion gap of 35, ketones present in urine, small acetone present.  Therefore admission was sought.       Past Medical History:   Diagnosis Date    Abnormal Pap smear of cervix     1 abnormal pap with normal f/u    Howell esophagus     Colon polyp     Diabetes mellitus     Difficulty swallowing     at times    Fibroid     GERD (gastroesophageal reflux disease)     HSV-2 (herpes simplex virus 2) infection 2017    Menstrual disorder     Migraines     Rectal bleeding     bleeds with bowel movement    Seasonal allergies      Past Surgical History:   Procedure Laterality Date    CARPAL TUNNEL RELEASE WITH CUBITAL TUNNEL RELEASE Right      SECTION      X2    COLONOSCOPY N/A 2016    Procedure: COLONOSCOPY with polypectomy;  Surgeon: Maryan Kent MD;  Location: [facility] OR;  Service:     COLONOSCOPY N/A 6/3/2020    Procedure: COLONOSCOPY;  Surgeon: Dmitri Fung MD;  Location: [facility] OR;  Service: Gastroenterology;  Laterality: N/A;  Poor prep, polyp    ENDOMETRIAL ABLATION      thermachoice    ENDOSCOPY N/A 6/3/2020    Procedure: ESOPHAGOGASTRODUODENOSCOPY;  Surgeon:  "Dmitri Fung MD;  Location: [facility];  Service: Gastroenterology;  Laterality: N/A;  Reflux esophagitis, erosive gastritis, duodenitis    HYSTEROSCOPY      AND ENDOMETRIAL  ABLATION    NASAL SEPTUM SURGERY          TUBAL ABDOMINAL LIGATION       Family History   Problem Relation Age of Onset    Heart attack Father     Hypertension Father     Diabetes Father     Alcohol abuse Father     Dementia Father     Hypertension Mother     Hyperlipidemia Mother     Atrial fibrillation Mother     Stroke Mother     Heart attack Brother     Hypertension Brother     Stroke Brother     Esophageal cancer Brother     Stroke Sister     Crohn's disease Sister     Alzheimer's disease Paternal Grandmother     Colon cancer Maternal Uncle     Diabetes Paternal Aunt     Breast cancer Paternal Aunt     Rheum arthritis Other     Ovarian cancer Neg Hx     Deep vein thrombosis Neg Hx     Pulmonary embolism Neg Hx     Uterine cancer Neg Hx      Social History     Tobacco Use    Smoking status: Former     Current packs/day: 0.00     Types: Cigarettes     Quit date: 1995     Years since quittin.0    Smokeless tobacco: Never    Tobacco comments:     Quit \" a long time ago\"    Vaping Use    Vaping status: Never Used   Substance Use Topics    Alcohol use: Yes     Comment: OCCASIONALLY    Drug use: No     (Not in a hospital admission)    Allergies:  Mobic [meloxicam]    Immunization History   Administered Date(s) Administered    COVID-19 (MODERNA) 1st,2nd,3rd Dose Monovalent 2021, 2021    COVID-19 (MODERNA) Monovalent Original Booster 2021       REVIEW OF SYSTEMS:  Please see the above history of present illness for pertinent positives and negatives.  The remainder of the patient's systems have been reviewed and are negative.     Vital Signs  Temp:  [97.1 °F (36.2 °C)] 97.1 °F (36.2 °C)  Heart Rate:  [107] 107  Resp:  [16] 16  BP: (117)/(49) 117/49  Flowsheet Rows      Flowsheet Row First Filed Value " "  Admission Height 157.5 cm (62\") Documented at 06/01/2024 0923   Admission Weight 56.7 kg (125 lb) Documented at 06/01/2024 0923               Physical Exam:  General: Patient is awake and alert. Well developed and well nourished. No acute distress noted.   HENT: Head is atraumatic, normocephalic. Hearing is grossly intact. Nose is without obvious congestion and appears patent. Neck is supple and trachea is midline.   Eyes: Vision is grossly intact. Pupils appear equal and round.   Cardiovascular: Heart has regular rate and rhythm with no murmurs, rubs or gallops noted.   Respiratory: Lungs are clear to ausculation without wheezes, rhonchi or rales.   Abdominal/GI: Soft, nontender, bowel sounds present. No rebound or guarding present.   Extremities: No peripheral edema noted.   Musculoskeletal: Spontaneous movement of bilateral upper and lower extremities against gravity noted. No signs of injury or deformity noted.   Skin: Warm and dry.   Psych: Mood and affect are appropriate. Cooperative with exam.   Neuro: No facial asymmetry noted. No focal deficits noted, hearing and vision are grossly intact.    Emotional Behavior: all of the following were found to be normal   Judgment and Insight   Mental Status   Memory   Mood and Affect: neither of the following found acutely        Depression                 Anxiety     Debilities: no signs of the following found    Physical Weakness     Handicaps     Disabilities     Agitation         Results Review:    I reviewed the patient's new clinical results.  Lab Results (most recent)       Procedure Component Value Units Date/Time    COVID-19 and FLU A/B PCR, 1 HR TAT - Swab, Nasopharynx [[id number]]  (Normal) Collected: 06/01/24 0952    Specimen: Swab from Nasopharynx Updated: 06/01/24 1025     COVID19 Not Detected     Influenza A PCR Not Detected     Influenza B PCR Not Detected    Narrative:      Fact sheet for providers: https://www.fda.gov/media/082148/download    Fact " sheet for patients: https://www.[employer].gov/media/521348/download    Test performed by PCR.    Comprehensive Metabolic Panel [[id number]]  (Abnormal) Collected: 06/01/24 0952    Specimen: Blood Updated: 06/01/24 1021     Glucose 735 mg/dL      BUN 27 mg/dL      Creatinine 1.54 mg/dL      Sodium 131 mmol/L      Potassium 5.9 mmol/L      Chloride 93 mmol/L      CO2 2.9 mmol/L      Calcium 9.8 mg/dL      Total Protein 8.2 g/dL      Albumin 4.6 g/dL      ALT (SGPT) 17 U/L      AST (SGOT) 20 U/L      Alkaline Phosphatase 125 U/L      Total Bilirubin 0.2 mg/dL      Globulin 3.6 gm/dL      A/G Ratio 1.3 g/dL      BUN/Creatinine Ratio 17.5     Anion Gap 35.1 mmol/L      eGFR 39.0 mL/min/1.73     Narrative:      GFR Normal >60  Chronic Kidney Disease <60  Kidney Failure <15      Lipase [[id number]]  (Normal) Collected: 06/01/24 0952    Specimen: Blood Updated: 06/01/24 1012     Lipase 16 U/L     Acetone [[id number]]  (Abnormal) Collected: 06/01/24 0952    Specimen: Blood Updated: 06/01/24 1008     Acetone Small    Urinalysis, Microscopic Only - Urine, Clean Catch [[id number]]  (Abnormal) Collected: 06/01/24 0934    Specimen: Urine, Clean Catch Updated: 06/01/24 1008     RBC, UA None Seen /HPF      WBC, UA None Seen /HPF      Bacteria, UA Trace /HPF      Squamous Epithelial Cells, UA 0-2 /HPF      Hyaline Casts, UA None Seen /LPF      Methodology Manual Light Microscopy    CBC & Differential [[id number]]  (Abnormal) Collected: 06/01/24 0952    Specimen: Blood Updated: 06/01/24 1007    Narrative:      The following orders were created for panel order CBC & Differential.  Procedure                               Abnormality         Status                     ---------                               -----------         ------                     CBC Auto Differential[[id number]]        Abnormal            Final result               Scan Slide[[id number]]                                       In process                   Please view results  for these tests on the individual orders.    CBC Auto Differential [[id number]]  (Abnormal) Collected: 06/01/24 0952    Specimen: Blood Updated: 06/01/24 1007     WBC 23.01 10*3/mm3      RBC 3.95 10*6/mm3      Hemoglobin 10.6 g/dL      Hematocrit 36.2 %      MCV 91.6 fL      MCH 26.8 pg      MCHC 29.3 g/dL      RDW 17.2 %      RDW-SD 57.8 fl      MPV 10.9 fL      Platelets 376 10*3/mm3      Neutrophil % 51.7 %      Lymphocyte % 3.9 %      Monocyte % 6.8 %      Eosinophil % 36.4 %      Basophil % 0.2 %      Immature Grans % 1.0 %      Neutrophils, Absolute 11.89 10*3/mm3      Lymphocytes, Absolute 0.90 10*3/mm3      Monocytes, Absolute 1.56 10*3/mm3      Eosinophils, Absolute 8.38 10*3/mm3      Basophils, Absolute 0.05 10*3/mm3      Immature Grans, Absolute 0.23 10*3/mm3      nRBC 0.0 /100 WBC     Scan Slide [[id number]] Collected: 06/01/24 0952    Specimen: Blood Updated: 06/01/24 1001    Urine Drug Screen - Urine, Clean Catch [[id number]]  (Abnormal) Collected: 06/01/24 0934    Specimen: Urine, Clean Catch Updated: 06/01/24 0952     THC, Screen, Urine Positive     Phencyclidine (PCP), Urine Negative     Cocaine Screen, Urine Negative     Methamphetamine, Ur Negative     Opiate Screen Negative     Amphetamine Screen, Urine Negative     Benzodiazepine Screen, Urine Negative     Tricyclic Antidepressants Screen Negative     Methadone Screen, Urine Negative     Barbiturates Screen, Urine Negative     Oxycodone Screen, Urine Negative     Buprenorphine, Screen, Urine Negative    Narrative:      Cutoff For Drugs Screened:    Amphetamines               500 ng/ml  Barbiturates               200 ng/ml  Benzodiazepines            150 ng/ml  Cocaine                    150 ng/ml  Methadone                  200 ng/ml  Opiates                    100 ng/ml  Phencyclidine               25 ng/ml  THC                         50 ng/ml  Methamphetamine            500 ng/ml  Tricyclic Antidepressants  300 ng/ml  Oxycodone                   100 ng/ml  Buprenorphine               10 ng/ml    The normal value for all drugs tested is negative. This report includes unconfirmed screening results, with the cutoff values listed, to be used for medical treatment purposes only.  Unconfirmed results must not be used for non-medical purposes such as employment or legal testing.  Clinical consideration should be applied to any drug of abuse test, particularly when unconfirmed results are used.      Urinalysis With Microscopic If Indicated (No Culture) - Urine, Clean Catch [[id number]]  (Abnormal) Collected: 06/01/24 0934    Specimen: Urine, Clean Catch Updated: 06/01/24 0946     Color, UA Yellow     Appearance, UA Clear     pH, UA <=5.0     Specific Gravity, UA 1.025     Glucose,  mg/dL (2+)     Ketones, UA >=160 mg/dL (4+)     Bilirubin, UA Small (1+)     Blood, UA Small (1+)     Protein,  mg/dL (2+)     Leuk Esterase, UA Negative     Nitrite, UA Negative     Urobilinogen, UA 0.2 E.U./dL    [place] Draw [[id number]] Collected: 06/01/24 0930    Specimen: Blood Updated: 06/01/24 0945    Narrative:      The following orders were created for panel order [place] Draw.  Procedure                               Abnormality         Status                     ---------                               -----------         ------                     Green Top (Gel)[[id number]]                                  Final result               Lavender Top[[id number]]                                     Final result               Gold Top - SST[[id number]]                                   Final result               Light Blue Top[[id number]]                                   Final result                 Please view results for these tests on the individual orders.    Green Top (Gel) [[id number]] Collected: 06/01/24 0930    Specimen: Blood from Arm, Right Updated: 06/01/24 0945     Extra Tube Hold for add-ons.     Comment: Auto resulted.       Lavender Top [[id number]] Collected:  06/01/24 0930    Specimen: Blood from Arm, Right Updated: 06/01/24 0945     Extra Tube hold for add-on     Comment: Auto resulted       Gold Top - SST [[id number]] Collected: 06/01/24 0930    Specimen: Blood Updated: 06/01/24 0945     Extra Tube Hold for add-ons.     Comment: Auto resulted.       Light Blue Top [[id number]] Collected: 06/01/24 0930    Specimen: Blood Updated: 06/01/24 0945     Extra Tube Hold for add-ons.     Comment: Auto resulted               Imaging Results (Most Recent)       None          None    ECG/EMG Results (most recent)       None          None, pending    Assessment & Plan     Diabetic ketoacidosis  -Patient with known history of poorly controlled diabetes mellitus type 2  -Blood glucose on arrival 735, anion gap 35, bicarb 2.9, small amount of acetone present, ketones in urine  -Will place on Glucomander protocol for DKA with IV fluid administration, insulin infusion, also placed on electrolyte replacement protocol  -Will await anion gap closed x 2 with serial BMPs, and nausea to resolve before providing basal insulin, discontinuing drip and allowing diet  -Plan to adjust patient's home insulin regimen accordingly.     Hyperkalemia  -Suspect secondary to profound dehydration of DKA  -Patient received bolus dose of insulin in ED  -Check stat EKG  -Monitor closely with serial BMPs as above    Reactive leukocytosis  -No signs of infection present, therefore to assume leukocytosis due to DKA, will monitor carefully  -Patient is not septic    Illicit drug use  -Patient's urine drug screen shows THC      Chronic stable conditions to be managed with home medications include the following conditions listed below. Also please note: Every effort was made to accurately obtain patient's home medications. This was done utilizing extensive chart review, ED documentation, recent pharmacy records, and where possible thorough discussion with the patient if medications were known by the patient. I have  reviewed and edited the patient's home medications as per my efforts above and current med list can be found within home medications portion of this electronic medical record and is accurate as possible as of 06/01/24     Diabetes mellitus type 2-hold home insulin regimen and metformin, treat as per above with Glucomander    Depression-continue Prozac and trazodone    GERD-continue Pepcid and Protonix    ASCVD-hold home aspirin and Lipitor for now        DVT PPX: SCDs        I discussed the patient's findings and my recommendations with patient     Scott Sharma,   06/01/24  10:51 EDT    Time: 41 minutes    At [facility], we believe that sharing information builds trust and better relationships. You are receiving this note because you recently visited [facility]. It is possible you will see health information before a provider has talked with you about it. This kind of information can be easy to misunderstand. To help you fully understand what it means for your health, we urge you to discuss this note with your provider.

## 2024-06-02 ENCOUNTER — APPOINTMENT (OUTPATIENT)
Dept: GENERAL RADIOLOGY | Facility: HOSPITAL | Age: 59
DRG: 638 | End: 2024-06-02
Payer: COMMERCIAL

## 2024-06-02 LAB
ANION GAP SERPL CALCULATED.3IONS-SCNC: 10.5 MMOL/L (ref 5–15)
ANION GAP SERPL CALCULATED.3IONS-SCNC: 11.1 MMOL/L (ref 5–15)
ANION GAP SERPL CALCULATED.3IONS-SCNC: 11.9 MMOL/L (ref 5–15)
B PARAPERT DNA SPEC QL NAA+PROBE: NOT DETECTED
B PERT DNA SPEC QL NAA+PROBE: NOT DETECTED
BUN SERPL-MCNC: 12 MG/DL (ref 6–20)
BUN SERPL-MCNC: 15 MG/DL (ref 6–20)
BUN SERPL-MCNC: 16 MG/DL (ref 6–20)
BUN/CREAT SERPL: 16 (ref 7–25)
BUN/CREAT SERPL: 20.8 (ref 7–25)
BUN/CREAT SERPL: 22.2 (ref 7–25)
C PNEUM DNA NPH QL NAA+NON-PROBE: NOT DETECTED
CALCIUM SPEC-SCNC: 7.7 MG/DL (ref 8.6–10.5)
CALCIUM SPEC-SCNC: 7.9 MG/DL (ref 8.6–10.5)
CALCIUM SPEC-SCNC: 8 MG/DL (ref 8.6–10.5)
CHLORIDE SERPL-SCNC: 104 MMOL/L (ref 98–107)
CHLORIDE SERPL-SCNC: 106 MMOL/L (ref 98–107)
CHLORIDE SERPL-SCNC: 107 MMOL/L (ref 98–107)
CO2 SERPL-SCNC: 19.5 MMOL/L (ref 22–29)
CO2 SERPL-SCNC: 20.1 MMOL/L (ref 22–29)
CO2 SERPL-SCNC: 20.9 MMOL/L (ref 22–29)
CREAT SERPL-MCNC: 0.72 MG/DL (ref 0.57–1)
CREAT SERPL-MCNC: 0.72 MG/DL (ref 0.57–1)
CREAT SERPL-MCNC: 0.75 MG/DL (ref 0.57–1)
EGFRCR SERPLBLD CKD-EPI 2021: 92.4 ML/MIN/1.73
EGFRCR SERPLBLD CKD-EPI 2021: 97.1 ML/MIN/1.73
EGFRCR SERPLBLD CKD-EPI 2021: 97.1 ML/MIN/1.73
FLUAV SUBTYP SPEC NAA+PROBE: NOT DETECTED
FLUBV RNA ISLT QL NAA+PROBE: NOT DETECTED
GLUCOSE BLDC GLUCOMTR-MCNC: 103 MG/DL (ref 70–130)
GLUCOSE BLDC GLUCOMTR-MCNC: 107 MG/DL (ref 70–130)
GLUCOSE BLDC GLUCOMTR-MCNC: 112 MG/DL (ref 70–130)
GLUCOSE BLDC GLUCOMTR-MCNC: 122 MG/DL (ref 70–130)
GLUCOSE BLDC GLUCOMTR-MCNC: 133 MG/DL (ref 70–130)
GLUCOSE BLDC GLUCOMTR-MCNC: 138 MG/DL (ref 70–130)
GLUCOSE BLDC GLUCOMTR-MCNC: 152 MG/DL (ref 70–130)
GLUCOSE BLDC GLUCOMTR-MCNC: 178 MG/DL (ref 70–130)
GLUCOSE BLDC GLUCOMTR-MCNC: 187 MG/DL (ref 70–130)
GLUCOSE BLDC GLUCOMTR-MCNC: 203 MG/DL (ref 70–130)
GLUCOSE BLDC GLUCOMTR-MCNC: 262 MG/DL (ref 70–130)
GLUCOSE BLDC GLUCOMTR-MCNC: 318 MG/DL (ref 70–130)
GLUCOSE BLDC GLUCOMTR-MCNC: 70 MG/DL (ref 70–130)
GLUCOSE BLDC GLUCOMTR-MCNC: 75 MG/DL (ref 70–130)
GLUCOSE SERPL-MCNC: 105 MG/DL (ref 65–99)
GLUCOSE SERPL-MCNC: 147 MG/DL (ref 65–99)
GLUCOSE SERPL-MCNC: 312 MG/DL (ref 65–99)
HADV DNA SPEC NAA+PROBE: NOT DETECTED
HCOV 229E RNA SPEC QL NAA+PROBE: NOT DETECTED
HCOV HKU1 RNA SPEC QL NAA+PROBE: NOT DETECTED
HCOV NL63 RNA SPEC QL NAA+PROBE: NOT DETECTED
HCOV OC43 RNA SPEC QL NAA+PROBE: NOT DETECTED
HMPV RNA NPH QL NAA+NON-PROBE: NOT DETECTED
HPIV1 RNA ISLT QL NAA+PROBE: NOT DETECTED
HPIV2 RNA SPEC QL NAA+PROBE: NOT DETECTED
HPIV3 RNA NPH QL NAA+PROBE: NOT DETECTED
HPIV4 P GENE NPH QL NAA+PROBE: NOT DETECTED
M PNEUMO IGG SER IA-ACNC: NOT DETECTED
MAGNESIUM SERPL-MCNC: 1.5 MG/DL (ref 1.6–2.6)
MAGNESIUM SERPL-MCNC: 1.5 MG/DL (ref 1.6–2.6)
OSMOLALITY SERPL: 344 MOSM/KG (ref 275–300)
PHOSPHATE SERPL-MCNC: 1.7 MG/DL (ref 2.5–4.5)
PHOSPHATE SERPL-MCNC: 2.1 MG/DL (ref 2.5–4.5)
PHOSPHATE SERPL-MCNC: 2.6 MG/DL (ref 2.5–4.5)
POTASSIUM SERPL-SCNC: 3.5 MMOL/L (ref 3.5–5.2)
POTASSIUM SERPL-SCNC: 4 MMOL/L (ref 3.5–5.2)
POTASSIUM SERPL-SCNC: 4 MMOL/L (ref 3.5–5.2)
POTASSIUM SERPL-SCNC: 4.3 MMOL/L (ref 3.5–5.2)
POTASSIUM SERPL-SCNC: 4.4 MMOL/L (ref 3.5–5.2)
RHINOVIRUS RNA SPEC NAA+PROBE: DETECTED
RSV RNA NPH QL NAA+NON-PROBE: NOT DETECTED
SARS-COV-2 RNA NPH QL NAA+NON-PROBE: NOT DETECTED
SODIUM SERPL-SCNC: 134 MMOL/L (ref 136–145)
SODIUM SERPL-SCNC: 138 MMOL/L (ref 136–145)
SODIUM SERPL-SCNC: 139 MMOL/L (ref 136–145)

## 2024-06-02 PROCEDURE — 63710000001 INSULIN GLARGINE PER 5 UNITS: Performed by: INTERNAL MEDICINE

## 2024-06-02 PROCEDURE — 80048 BASIC METABOLIC PNL TOTAL CA: CPT | Performed by: INTERNAL MEDICINE

## 2024-06-02 PROCEDURE — 25010000002 ONDANSETRON PER 1 MG: Performed by: INTERNAL MEDICINE

## 2024-06-02 PROCEDURE — 25810000003 SODIUM CHLORIDE 0.9 % SOLUTION 250 ML FLEX CONT: Performed by: INTERNAL MEDICINE

## 2024-06-02 PROCEDURE — 71045 X-RAY EXAM CHEST 1 VIEW: CPT

## 2024-06-02 PROCEDURE — 25010000002 POTASSIUM CHLORIDE 10 MEQ/100ML SOLUTION: Performed by: INTERNAL MEDICINE

## 2024-06-02 PROCEDURE — 94799 UNLISTED PULMONARY SVC/PX: CPT

## 2024-06-02 PROCEDURE — 25010000002 POTASSIUM CHLORIDE PER 2 MEQ: Performed by: INTERNAL MEDICINE

## 2024-06-02 PROCEDURE — 83735 ASSAY OF MAGNESIUM: CPT | Performed by: INTERNAL MEDICINE

## 2024-06-02 PROCEDURE — 0202U NFCT DS 22 TRGT SARS-COV-2: CPT | Performed by: INTERNAL MEDICINE

## 2024-06-02 PROCEDURE — 25810000003 SODIUM CHLORIDE 0.9 % SOLUTION: Performed by: INTERNAL MEDICINE

## 2024-06-02 PROCEDURE — 84100 ASSAY OF PHOSPHORUS: CPT | Performed by: INTERNAL MEDICINE

## 2024-06-02 PROCEDURE — 25010000002 MAGNESIUM SULFATE IN D5W 1G/100ML (PREMIX) 1-5 GM/100ML-% SOLUTION: Performed by: INTERNAL MEDICINE

## 2024-06-02 PROCEDURE — 63710000001 INSULIN LISPRO (HUMAN) PER 5 UNITS: Performed by: INTERNAL MEDICINE

## 2024-06-02 PROCEDURE — 99232 SBSQ HOSP IP/OBS MODERATE 35: CPT | Performed by: INTERNAL MEDICINE

## 2024-06-02 PROCEDURE — 25010000002 PROCHLORPERAZINE 10 MG/2ML SOLUTION: Performed by: FAMILY MEDICINE

## 2024-06-02 PROCEDURE — 82948 REAGENT STRIP/BLOOD GLUCOSE: CPT

## 2024-06-02 PROCEDURE — 84132 ASSAY OF SERUM POTASSIUM: CPT | Performed by: INTERNAL MEDICINE

## 2024-06-02 RX ORDER — INSULIN LISPRO 100 [IU]/ML
1-200 INJECTION, SOLUTION INTRAVENOUS; SUBCUTANEOUS
Status: DISCONTINUED | OUTPATIENT
Start: 2024-06-02 | End: 2024-06-03 | Stop reason: HOSPADM

## 2024-06-02 RX ORDER — BENZONATATE 100 MG/1
100 CAPSULE ORAL 3 TIMES DAILY PRN
Status: DISCONTINUED | OUTPATIENT
Start: 2024-06-02 | End: 2024-06-03 | Stop reason: HOSPADM

## 2024-06-02 RX ORDER — POTASSIUM CHLORIDE 7.45 MG/ML
10 INJECTION INTRAVENOUS
Status: COMPLETED | OUTPATIENT
Start: 2024-06-02 | End: 2024-06-02

## 2024-06-02 RX ORDER — SODIUM BICARBONATE 650 MG/1
650 TABLET ORAL 2 TIMES DAILY
Status: DISCONTINUED | OUTPATIENT
Start: 2024-06-02 | End: 2024-06-03

## 2024-06-02 RX ORDER — INSULIN LISPRO 100 [IU]/ML
1-200 INJECTION, SOLUTION INTRAVENOUS; SUBCUTANEOUS AS NEEDED
Status: DISCONTINUED | OUTPATIENT
Start: 2024-06-02 | End: 2024-06-03 | Stop reason: HOSPADM

## 2024-06-02 RX ORDER — PROCHLORPERAZINE EDISYLATE 5 MG/ML
10 INJECTION INTRAMUSCULAR; INTRAVENOUS EVERY 6 HOURS PRN
Status: DISCONTINUED | OUTPATIENT
Start: 2024-06-02 | End: 2024-06-03 | Stop reason: HOSPADM

## 2024-06-02 RX ORDER — IBUPROFEN 600 MG/1
1 TABLET ORAL
Status: DISCONTINUED | OUTPATIENT
Start: 2024-06-02 | End: 2024-06-03 | Stop reason: HOSPADM

## 2024-06-02 RX ORDER — FENTANYL/ROPIVACAINE/NS/PF 2-625MCG/1
15 PLASTIC BAG, INJECTION (ML) EPIDURAL ONCE
Status: COMPLETED | OUTPATIENT
Start: 2024-06-02 | End: 2024-06-02

## 2024-06-02 RX ORDER — DEXTROSE MONOHYDRATE 25 G/50ML
10-50 INJECTION, SOLUTION INTRAVENOUS
Status: DISCONTINUED | OUTPATIENT
Start: 2024-06-02 | End: 2024-06-03 | Stop reason: HOSPADM

## 2024-06-02 RX ORDER — NICOTINE POLACRILEX 4 MG
15 LOZENGE BUCCAL
Status: DISCONTINUED | OUTPATIENT
Start: 2024-06-02 | End: 2024-06-03 | Stop reason: HOSPADM

## 2024-06-02 RX ORDER — MAGNESIUM SULFATE 1 G/100ML
1 INJECTION INTRAVENOUS
Status: COMPLETED | OUTPATIENT
Start: 2024-06-02 | End: 2024-06-02

## 2024-06-02 RX ORDER — POTASSIUM CHLORIDE 20 MEQ/1
40 TABLET, EXTENDED RELEASE ORAL EVERY 4 HOURS
Status: COMPLETED | OUTPATIENT
Start: 2024-06-02 | End: 2024-06-02

## 2024-06-02 RX ADMIN — MAGNESIUM SULFATE HEPTAHYDRATE 1 G: 1 INJECTION, SOLUTION INTRAVENOUS at 09:12

## 2024-06-02 RX ADMIN — POTASSIUM CHLORIDE 10 MEQ: 7.46 INJECTION, SOLUTION INTRAVENOUS at 02:13

## 2024-06-02 RX ADMIN — PANTOPRAZOLE SODIUM 40 MG: 40 TABLET, DELAYED RELEASE ORAL at 20:17

## 2024-06-02 RX ADMIN — SUCRALFATE 1 G: 1 TABLET ORAL at 17:30

## 2024-06-02 RX ADMIN — SUCRALFATE 1 G: 1 TABLET ORAL at 20:17

## 2024-06-02 RX ADMIN — POTASSIUM CHLORIDE AND SODIUM CHLORIDE 150 ML/HR: 450; 150 INJECTION, SOLUTION INTRAVENOUS at 16:27

## 2024-06-02 RX ADMIN — SODIUM BICARBONATE 650 MG: 650 TABLET ORAL at 20:17

## 2024-06-02 RX ADMIN — FAMOTIDINE 40 MG: 20 TABLET, FILM COATED ORAL at 09:22

## 2024-06-02 RX ADMIN — Medication 10 ML: at 09:11

## 2024-06-02 RX ADMIN — POTASSIUM CHLORIDE AND SODIUM CHLORIDE 150 ML/HR: 450; 150 INJECTION, SOLUTION INTRAVENOUS at 10:19

## 2024-06-02 RX ADMIN — ONDANSETRON 4 MG: 2 INJECTION INTRAMUSCULAR; INTRAVENOUS at 17:28

## 2024-06-02 RX ADMIN — SODIUM BICARBONATE 650 MG: 650 TABLET ORAL at 09:11

## 2024-06-02 RX ADMIN — Medication 15 MMOL: at 01:32

## 2024-06-02 RX ADMIN — INSULIN LISPRO 4 UNITS: 100 INJECTION, SOLUTION INTRAVENOUS; SUBCUTANEOUS at 13:02

## 2024-06-02 RX ADMIN — INSULIN HUMAN 2.2 UNITS/HR: 1 INJECTION, SOLUTION INTRAVENOUS at 00:59

## 2024-06-02 RX ADMIN — TRAZODONE HYDROCHLORIDE 150 MG: 50 TABLET ORAL at 20:16

## 2024-06-02 RX ADMIN — ONDANSETRON 4 MG: 2 INJECTION INTRAMUSCULAR; INTRAVENOUS at 09:24

## 2024-06-02 RX ADMIN — POTASSIUM CHLORIDE 10 MEQ: 7.46 INJECTION, SOLUTION INTRAVENOUS at 04:35

## 2024-06-02 RX ADMIN — FLUOXETINE 40 MG: 10 CAPSULE ORAL at 09:11

## 2024-06-02 RX ADMIN — INSULIN LISPRO 7 UNITS: 100 INJECTION, SOLUTION INTRAVENOUS; SUBCUTANEOUS at 17:30

## 2024-06-02 RX ADMIN — POTASSIUM CHLORIDE 10 MEQ: 7.46 INJECTION, SOLUTION INTRAVENOUS at 01:06

## 2024-06-02 RX ADMIN — SODIUM PHOSPHATE, MONOBASIC, MONOHYDRATE 20 MMOL: 276; 142 INJECTION, SOLUTION INTRAVENOUS at 16:24

## 2024-06-02 RX ADMIN — BENZONATATE 100 MG: 100 CAPSULE ORAL at 14:48

## 2024-06-02 RX ADMIN — SUCRALFATE 1 G: 1 TABLET ORAL at 13:02

## 2024-06-02 RX ADMIN — MAGNESIUM SULFATE HEPTAHYDRATE 1 G: 1 INJECTION, SOLUTION INTRAVENOUS at 11:26

## 2024-06-02 RX ADMIN — MAGNESIUM SULFATE HEPTAHYDRATE 1 G: 1 INJECTION, SOLUTION INTRAVENOUS at 10:13

## 2024-06-02 RX ADMIN — SUCRALFATE 1 G: 1 TABLET ORAL at 09:11

## 2024-06-02 RX ADMIN — POTASSIUM CHLORIDE 40 MEQ: 1500 TABLET, EXTENDED RELEASE ORAL at 10:08

## 2024-06-02 RX ADMIN — PROCHLORPERAZINE EDISYLATE 10 MG: 5 INJECTION INTRAMUSCULAR; INTRAVENOUS at 20:16

## 2024-06-02 RX ADMIN — DEXTROSE, SODIUM CHLORIDE, AND POTASSIUM CHLORIDE 150 ML/HR: 5; .45; .15 INJECTION INTRAVENOUS at 00:01

## 2024-06-02 RX ADMIN — FAMOTIDINE 40 MG: 20 TABLET, FILM COATED ORAL at 20:17

## 2024-06-02 RX ADMIN — INSULIN GLARGINE 14 UNITS: 100 INJECTION, SOLUTION SUBCUTANEOUS at 20:17

## 2024-06-02 RX ADMIN — POTASSIUM CHLORIDE 40 MEQ: 1500 TABLET, EXTENDED RELEASE ORAL at 14:21

## 2024-06-02 RX ADMIN — Medication 10 ML: at 20:17

## 2024-06-02 RX ADMIN — INSULIN GLARGINE 10 UNITS: 100 INJECTION, SOLUTION SUBCUTANEOUS at 09:12

## 2024-06-02 RX ADMIN — PANTOPRAZOLE SODIUM 40 MG: 40 TABLET, DELAYED RELEASE ORAL at 09:11

## 2024-06-02 RX ADMIN — POTASSIUM CHLORIDE 10 MEQ: 7.46 INJECTION, SOLUTION INTRAVENOUS at 03:26

## 2024-06-02 NOTE — PROGRESS NOTES
"SERVICE: [facility] HOSPITALIST    CONSULTANTS: None    CHIEF COMPLAINT: Nausea    SUBJECTIVE: Patient seen and examined at bedside. Patient reports feeling significantly better this morning.  She reports nausea has resolved.  Nursing staff notes that she is requiring 1 L of oxygen currently.  Discussed with patient she required large-volume IV resuscitation, and we will worry about this later as the most important thing is her DKA has resolved and dehydration therefore has improved.  She expresses understanding.  Patient has requested her and eggs via nutrition team.  I also discussed with patient initiating diet slowly.  She is agreeable.  No other acute issues or complaints.     OBJECTIVE:    Physical exam is largely unchanged from previous exam, except where documented below, examination is accurate as of 6/2/2024    Physical Exam:  General: Patient is awake and alert. Well developed and well nourished. No acute distress noted.   HENT: Head is atraumatic, normocephalic. Hearing is grossly intact. Nose is without obvious congestion and appears patent. Neck is supple and trachea is midline.   Eyes: Vision is grossly intact. Pupils appear equal and round.   Cardiovascular: Heart has regular rate and rhythm with no murmurs, rubs or gallops noted.   Respiratory: Lungs are clear to ausculation without wheezes, rhonchi or rales.   Abdominal/GI: Soft, nontender, bowel sounds present. No rebound or guarding present.   Extremities: No peripheral edema noted.   Musculoskeletal: Spontaneous movement of bilateral upper and lower extremities against gravity noted. No signs of injury or deformity noted.   Skin: Warm and dry.   Psych: Mood and affect are appropriate. Cooperative with exam.   Neuro: No facial asymmetry noted. No focal deficits noted, hearing and vision are grossly intact.     /80   Pulse 89   Temp 98.8 °F (37.1 °C) (Rectal)   Resp 20   Ht 160 cm (63\")   Wt 56.5 kg (124 lb 9 oz)   " LMP  (LMP Unknown)   SpO2 94%   BMI 22.06 kg/m²     MEDS/LABS REVIEWED AND ORDERED    famotidine, 40 mg, Oral, BID  FLUoxetine, 40 mg, Oral, Daily  Edit Initial Basal Insulin Transition Dose, 1 each, Does not apply, Once  insulin glargine, 1-200 Units, Subcutaneous, Nightly - Glucommander  insulin lispro, 1-200 Units, Subcutaneous, 4x Daily With Meals & Nightly  pantoprazole, 40 mg, Oral, BID  sodium chloride, 10 mL, Intravenous, Q12H  sucralfate, 1 g, Oral, 4x Daily AC & at Bedtime  traZODone, 150 mg, Oral, Nightly          LAB/DIAGNOSTICS:    Lab Results (last 24 hours)       Procedure Component Value Units Date/Time    Phosphorus [[id number]]  (Normal) Collected: 06/02/24 0600    Specimen: Blood Updated: 06/02/24 0629     Phosphorus 2.6 mg/dL     Basic Metabolic Panel [[id number]]  (Abnormal) Collected: 06/02/24 0600    Specimen: Blood Updated: 06/02/24 0629     Glucose 105 mg/dL      BUN 16 mg/dL      Creatinine 0.72 mg/dL      Sodium 139 mmol/L      Potassium 4.0 mmol/L      Chloride 107 mmol/L      CO2 20.9 mmol/L      Calcium 7.7 mg/dL      BUN/Creatinine Ratio 22.2     Anion Gap 11.1 mmol/L      eGFR 97.1 mL/min/1.73     Narrative:      GFR Normal >60  Chronic Kidney Disease <60  Kidney Failure <15      Magnesium [[id number]]  (Abnormal) Collected: 06/02/24 0600    Specimen: Blood Updated: 06/02/24 0629     Magnesium 1.5 mg/dL     Potassium [[id number]]  (Normal) Collected: 06/02/24 0600    Specimen: Blood Updated: 06/02/24 0622     Potassium 4.0 mmol/L     POC Glucose Once [[id number]]  (Normal) Collected: 06/02/24 0614    Specimen: Blood Updated: 06/02/24 0621     Glucose 122 mg/dL     POC Glucose Once [[id number]]  (Normal) Collected: 06/02/24 0515    Specimen: Blood Updated: 06/02/24 0521     Glucose 112 mg/dL     POC Glucose Once [[id number]]  (Abnormal) Collected: 06/02/24 0438    Specimen: Blood Updated: 06/02/24 0444     Glucose 133 mg/dL     POC Glucose Once [[id number]]  (Abnormal) Collected: 06/02/24  0332    Specimen: Blood Updated: 06/02/24 0338     Glucose 138 mg/dL     POC Glucose Once [[id number]]  (Normal) Collected: 06/02/24 0232    Specimen: Blood Updated: 06/02/24 0238     Glucose 70 mg/dL     POC Glucose Once [[id number]]  (Normal) Collected: 06/02/24 0210    Specimen: Blood Updated: 06/02/24 0217     Glucose 75 mg/dL     POC Glucose Once [[id number]]  (Normal) Collected: 06/02/24 0106    Specimen: Blood Updated: 06/02/24 0112     Glucose 103 mg/dL     POC Glucose Once [[id number]]  (Normal) Collected: 06/02/24 0026    Specimen: Blood Updated: 06/02/24 0032     Glucose 107 mg/dL     Phosphorus [[id number]]  (Abnormal) Collected: 06/01/24 2324    Specimen: Blood Updated: 06/02/24 0003     Phosphorus 1.7 mg/dL     Basic Metabolic Panel [[id number]]  (Abnormal) Collected: 06/01/24 2324    Specimen: Blood Updated: 06/01/24 2352     Glucose 132 mg/dL      BUN 18 mg/dL      Creatinine 0.78 mg/dL      Sodium 138 mmol/L      Potassium 3.3 mmol/L      Chloride 109 mmol/L      CO2 19.6 mmol/L      Calcium 7.8 mg/dL      BUN/Creatinine Ratio 23.1     Anion Gap 9.4 mmol/L      eGFR 88.2 mL/min/1.73     Narrative:      GFR Normal >60  Chronic Kidney Disease <60  Kidney Failure <15      Magnesium [[id number]]  (Normal) Collected: 06/01/24 2324    Specimen: Blood Updated: 06/01/24 2352     Magnesium 1.6 mg/dL     POC Glucose Once [[id number]]  (Abnormal) Collected: 06/01/24 2324    Specimen: Blood Updated: 06/01/24 2330     Glucose 151 mg/dL     POC Glucose Once [[id number]]  (Abnormal) Collected: 06/01/24 2222    Specimen: Blood Updated: 06/01/24 2228     Glucose 162 mg/dL     POC Glucose Once [[id number]]  (Abnormal) Collected: 06/01/24 2123    Specimen: Blood Updated: 06/01/24 2129     Glucose 194 mg/dL     Phosphorus [[id number]]  (Abnormal) Collected: 06/01/24 2022    Specimen: Blood Updated: 06/01/24 2056     Phosphorus 1.9 mg/dL     Basic Metabolic Panel [[id number]]  (Abnormal) Collected: 06/01/24 2022    Specimen: Blood  Updated: 06/01/24 2051     Glucose 188 mg/dL      BUN 20 mg/dL      Creatinine 0.95 mg/dL      Sodium 139 mmol/L      Potassium 3.8 mmol/L      Chloride 109 mmol/L      CO2 16.4 mmol/L      Calcium 8.0 mg/dL      BUN/Creatinine Ratio 21.1     Anion Gap 13.6 mmol/L      eGFR 69.6 mL/min/1.73     Narrative:      GFR Normal >60  Chronic Kidney Disease <60  Kidney Failure <15      Magnesium [[id number]]  (Normal) Collected: 06/01/24 2022    Specimen: Blood Updated: 06/01/24 2051     Magnesium 1.7 mg/dL     POC Glucose Once [[id number]]  (Abnormal) Collected: 06/01/24 2013    Specimen: Blood Updated: 06/01/24 2019     Glucose 180 mg/dL     POC Glucose Once [[id number]]  (Abnormal) Collected: 06/01/24 1912    Specimen: Blood Updated: 06/01/24 1918     Glucose 198 mg/dL     POC Glucose Once [[id number]]  (Abnormal) Collected: 06/01/24 1809    Specimen: Blood Updated: 06/01/24 1818     Glucose 202 mg/dL     Blood Gas, Venous - [[id number]]  (Abnormal) Collected: 06/01/24 1040    Specimen: Venous Blood Updated: 06/01/24 1806     Site Nurse/Dr Draw     pH, Venous 6.834 pH Units      Comment: 85 Value below critical limit        pCO2, Venous <15.4 mm Hg      Comment: 94 Value below reportable range < 15.4        pO2, Venous 87.9 mm Hg      Comment: 83 Value above reference range        HCO3, Venous 0.0 mmol/L      Comment: Values not reported        Base Excess, Venous 0.0 mmol/L      Comment: Values not reported        O2 Saturation, Venous 91.1 %      Comment: 83 Value above reference range        Hemoglobin, Blood Gas 8.3 g/dL      Comment: 84 Value below reference range        Temperature 37.0     Barometric Pressure for Blood Gas 744 mmHg      Modality RA     Notified Who RB AND V DR GEE     Notified By [id number]     Notified Time 06/01/2024 10:57     Collected by [id number]     Comment: Meter: [id number]     :  [id number]       POC Glucose Once [[id number]]  (Abnormal) Collected: 06/01/24 1706    Specimen: Blood Updated:  06/01/24 1719     Glucose 229 mg/dL     POC Glucose Once [[id number]]  (Abnormal) Collected: 06/01/24 1602    Specimen: Blood Updated: 06/01/24 1610     Glucose 263 mg/dL     Basic Metabolic Panel [[id number]]  (Abnormal) Collected: 06/01/24 1546    Specimen: Blood Updated: 06/01/24 1609     Glucose 275 mg/dL      BUN 25 mg/dL      Creatinine 1.25 mg/dL      Sodium 139 mmol/L      Potassium 4.2 mmol/L      Chloride 107 mmol/L      CO2 9.3 mmol/L      Calcium 8.9 mg/dL      BUN/Creatinine Ratio 20.0     Anion Gap 22.7 mmol/L      eGFR 50.1 mL/min/1.73     Narrative:      GFR Normal >60  Chronic Kidney Disease <60  Kidney Failure <15      Magnesium [[id number]]  (Normal) Collected: 06/01/24 1546    Specimen: Blood Updated: 06/01/24 1609     Magnesium 2.1 mg/dL     Phosphorus [[id number]]  (Abnormal) Collected: 06/01/24 1546    Specimen: Blood Updated: 06/01/24 1609     Phosphorus 1.4 mg/dL     POC Glucose Once [[id number]]  (Abnormal) Collected: 06/01/24 1529    Specimen: Blood Updated: 06/01/24 1537     Glucose 275 mg/dL     POC Glucose Once [[id number]]  (Abnormal) Collected: 06/01/24 1458    Specimen: Blood Updated: 06/01/24 1506     Glucose 358 mg/dL     POC Glucose Once [[id number]]  (Abnormal) Collected: 06/01/24 1357    Specimen: Blood Updated: 06/01/24 1406     Glucose 486 mg/dL     POC Glucose Once [[id number]]  (Abnormal) Collected: 06/01/24 1252    Specimen: Blood Updated: 06/01/24 1258     Glucose 522 mg/dL     High Sensitivity Troponin T 2Hr [[id number]]  (Abnormal) Collected: 06/01/24 1210    Specimen: Blood Updated: 06/01/24 1241     HS Troponin T 27 ng/L      Troponin T Delta 4 ng/L     Narrative:      High Sensitive Troponin T Reference Range:  <14.0 ng/L- Negative Female for AMI  <22.0 ng/L- Negative Male for AMI  >=14 - Abnormal Female indicating possible myocardial injury.  >=22 - Abnormal Male indicating possible myocardial injury.   Clinicians would have to utilize clinical acumen, EKG, Troponin, and  serial changes to determine if it is an Acute Myocardial Infarction or myocardial injury due to an underlying chronic condition.         Basic Metabolic Panel [[id number]]  (Abnormal) Collected: 06/01/24 1210    Specimen: Blood Updated: 06/01/24 1241     Glucose 668 mg/dL      BUN 28 mg/dL      Creatinine 1.41 mg/dL      Sodium 136 mmol/L      Potassium 4.8 mmol/L      Chloride 99 mmol/L      CO2 3.8 mmol/L      Calcium 8.8 mg/dL      BUN/Creatinine Ratio 19.9     Anion Gap 33.2 mmol/L      eGFR 43.3 mL/min/1.73     Narrative:      GFR Normal >60  Chronic Kidney Disease <60  Kidney Failure <15      Magnesium [[id number]]  (Normal) Collected: 06/01/24 1210    Specimen: Blood Updated: 06/01/24 1241     Magnesium 2.6 mg/dL     Phosphorus [[id number]]  (Abnormal) Collected: 06/01/24 1210    Specimen: Blood Updated: 06/01/24 1237     Phosphorus 6.2 mg/dL     Osmolality, Serum [[id number]] Collected: 06/01/24 1211    Specimen: Blood Updated: 06/01/24 1215    Phosphorus [[id number]]  (Abnormal) Collected: 06/01/24 0952    Specimen: Blood Updated: 06/01/24 1205     Phosphorus 7.5 mg/dL     Magnesium [[id number]]  (Normal) Collected: 06/01/24 0952    Specimen: Blood Updated: 06/01/24 1205     Magnesium 2.6 mg/dL     High Sensitivity Troponin T [[id number]]  (Abnormal) Collected: 06/01/24 0952    Specimen: Blood Updated: 06/01/24 1205     HS Troponin T 23 ng/L     Narrative:      High Sensitive Troponin T Reference Range:  <14.0 ng/L- Negative Female for AMI  <22.0 ng/L- Negative Male for AMI  >=14 - Abnormal Female indicating possible myocardial injury.  >=22 - Abnormal Male indicating possible myocardial injury.   Clinicians would have to utilize clinical acumen, EKG, Troponin, and serial changes to determine if it is an Acute Myocardial Infarction or myocardial injury due to an underlying chronic condition.         Hemoglobin A1c [[id number]]  (Abnormal) Collected: 06/01/24 0952    Specimen: Blood Updated: 06/01/24 1205      Hemoglobin A1C 8.16 %     Narrative:      Hemoglobin A1C Ranges:    Increased Risk for Diabetes  5.7% to 6.4%  Diabetes                     >= 6.5%  Diabetic Goal                < 7.0%    CBC & Differential [[id number]]  (Abnormal) Collected: 06/01/24 0952    Specimen: Blood Updated: 06/01/24 1034    Narrative:      The following orders were created for panel order CBC & Differential.  Procedure                               Abnormality         Status                     ---------                               -----------         ------                     CBC Auto Differential[[id number]]        Abnormal            Final result               Scan Slide[[id number]]                                                                    Please view results for these tests on the individual orders.    Manual Differential [[id number]]  (Abnormal) Collected: 06/01/24 0952    Specimen: Blood Updated: 06/01/24 1034     Neutrophil % 91.0 %      Lymphocyte % 8.0 %      Monocyte % 1.0 %      Neutrophils Absolute 20.94 10*3/mm3      Lymphocytes Absolute 1.84 10*3/mm3      Monocytes Absolute 0.23 10*3/mm3      RBC Morphology Normal     WBC Morphology Normal     Platelet Estimate Increased    COVID-19 and FLU A/B PCR, 1 HR TAT - Swab, Nasopharynx [[id number]]  (Normal) Collected: 06/01/24 0952    Specimen: Swab from Nasopharynx Updated: 06/01/24 1025     COVID19 Not Detected     Influenza A PCR Not Detected     Influenza B PCR Not Detected    Narrative:      Fact sheet for providers: https://www.fda.gov/media/830271/download    Fact sheet for patients: https://www.fda.gov/media/702061/download    Test performed by PCR.    Comprehensive Metabolic Panel [[id number]]  (Abnormal) Collected: 06/01/24 0952    Specimen: Blood Updated: 06/01/24 1021     Glucose 735 mg/dL      BUN 27 mg/dL      Creatinine 1.54 mg/dL      Sodium 131 mmol/L      Potassium 5.9 mmol/L      Chloride 93 mmol/L      CO2 2.9 mmol/L      Calcium 9.8 mg/dL      Total  Protein 8.2 g/dL      Albumin 4.6 g/dL      ALT (SGPT) 17 U/L      AST (SGOT) 20 U/L      Alkaline Phosphatase 125 U/L      Total Bilirubin 0.2 mg/dL      Globulin 3.6 gm/dL      A/G Ratio 1.3 g/dL      BUN/Creatinine Ratio 17.5     Anion Gap 35.1 mmol/L      eGFR 39.0 mL/min/1.73     Narrative:      GFR Normal >60  Chronic Kidney Disease <60  Kidney Failure <15      Lipase [[id number]]  (Normal) Collected: 06/01/24 0952    Specimen: Blood Updated: 06/01/24 1012     Lipase 16 U/L     Acetone [[id number]]  (Abnormal) Collected: 06/01/24 0952    Specimen: Blood Updated: 06/01/24 1008     Acetone Small    Urinalysis, Microscopic Only - Urine, Clean Catch [[id number]]  (Abnormal) Collected: 06/01/24 0934    Specimen: Urine, Clean Catch Updated: 06/01/24 1008     RBC, UA None Seen /HPF      WBC, UA None Seen /HPF      Bacteria, UA Trace /HPF      Squamous Epithelial Cells, UA 0-2 /HPF      Hyaline Casts, UA None Seen /LPF      Methodology Manual Light Microscopy    CBC Auto Differential [[id number]]  (Abnormal) Collected: 06/01/24 0952    Specimen: Blood Updated: 06/01/24 1007     WBC 23.01 10*3/mm3      RBC 3.95 10*6/mm3      Hemoglobin 10.6 g/dL      Hematocrit 36.2 %      MCV 91.6 fL      MCH 26.8 pg      MCHC 29.3 g/dL      RDW 17.2 %      RDW-SD 57.8 fl      MPV 10.9 fL      Platelets 376 10*3/mm3      Neutrophil % 51.7 %      Lymphocyte % 3.9 %      Monocyte % 6.8 %      Eosinophil % 36.4 %      Basophil % 0.2 %      Immature Grans % 1.0 %      Neutrophils, Absolute 11.89 10*3/mm3      Lymphocytes, Absolute 0.90 10*3/mm3      Monocytes, Absolute 1.56 10*3/mm3      Eosinophils, Absolute 8.38 10*3/mm3      Basophils, Absolute 0.05 10*3/mm3      Immature Grans, Absolute 0.23 10*3/mm3      nRBC 0.0 /100 WBC     Urine Drug Screen - Urine, Clean Catch [[id number]]  (Abnormal) Collected: 06/01/24 0934    Specimen: Urine, Clean Catch Updated: 06/01/24 0952     THC, Screen, Urine Positive     Phencyclidine (PCP), Urine Negative      Cocaine Screen, Urine Negative     Methamphetamine, Ur Negative     Opiate Screen Negative     Amphetamine Screen, Urine Negative     Benzodiazepine Screen, Urine Negative     Tricyclic Antidepressants Screen Negative     Methadone Screen, Urine Negative     Barbiturates Screen, Urine Negative     Oxycodone Screen, Urine Negative     Buprenorphine, Screen, Urine Negative    Narrative:      Cutoff For Drugs Screened:    Amphetamines               500 ng/ml  Barbiturates               200 ng/ml  Benzodiazepines            150 ng/ml  Cocaine                    150 ng/ml  Methadone                  200 ng/ml  Opiates                    100 ng/ml  Phencyclidine               25 ng/ml  THC                         50 ng/ml  Methamphetamine            500 ng/ml  Tricyclic Antidepressants  300 ng/ml  Oxycodone                  100 ng/ml  Buprenorphine               10 ng/ml    The normal value for all drugs tested is negative. This report includes unconfirmed screening results, with the cutoff values listed, to be used for medical treatment purposes only.  Unconfirmed results must not be used for non-medical purposes such as employment or legal testing.  Clinical consideration should be applied to any drug of abuse test, particularly when unconfirmed results are used.      Urinalysis With Microscopic If Indicated (No Culture) - Urine, Clean Catch [[id number]]  (Abnormal) Collected: 06/01/24 0934    Specimen: Urine, Clean Catch Updated: 06/01/24 0946     Color, UA Yellow     Appearance, UA Clear     pH, UA <=5.0     Specific Gravity, UA 1.025     Glucose,  mg/dL (2+)     Ketones, UA >=160 mg/dL (4+)     Bilirubin, UA Small (1+)     Blood, UA Small (1+)     Protein,  mg/dL (2+)     Leuk Esterase, UA Negative     Nitrite, UA Negative     Urobilinogen, UA 0.2 E.U./dL    [place] Draw [[id number]] Collected: 06/01/24 0930    Specimen: Blood Updated: 06/01/24 0945    Narrative:      The following orders were created  for panel order [place] Draw.  Procedure                               Abnormality         Status                     ---------                               -----------         ------                     Green Top (Gel)[[id number]]                                  Final result               Lavender Top[[id number]]                                     Final result               Gold Top - SST[[id number]]                                   Final result               Light Blue Top[[id number]]                                   Final result                 Please view results for these tests on the individual orders.    Green Top (Gel) [[id number]] Collected: 06/01/24 0930    Specimen: Blood from Arm, Right Updated: 06/01/24 0945     Extra Tube Hold for add-ons.     Comment: Auto resulted.       Lavender Top [[id number]] Collected: 06/01/24 0930    Specimen: Blood from Arm, Right Updated: 06/01/24 0945     Extra Tube hold for add-on     Comment: Auto resulted       Gold Top - SST [[id number]] Collected: 06/01/24 0930    Specimen: Blood Updated: 06/01/24 0945     Extra Tube Hold for add-ons.     Comment: Auto resulted.       Light Blue Top [[id number]] Collected: 06/01/24 0930    Specimen: Blood Updated: 06/01/24 0945     Extra Tube Hold for add-ons.     Comment: Auto resulted             ECG 12 Lead Electrolyte Imbalance   Final Result   HEART RATE= 118  bpm   RR Interval= 508  ms   RI Interval= 86  ms   P Horizontal Axis= 200  deg   P Front Axis= 245  deg   QRSD Interval= 156  ms   QT Interval= 467  ms   QTcB= 655  ms   QRS Axis= -51  deg   T Wave Axis= 86  deg   - ABNORMAL ECG -   Sinus or ectopic atrial tachycardia   Left bundle branch block - new   ST elevation secondary to IVCD   Electronically Signed By: Femi Rosario ([facility]) 01-Jun-2024 19:01:24   Date and Time of Study: 2024-06-01 12:03:42          No radiology results for the last day      ASSESSMENT/PLAN:  Please note portions of this assessment/plan may have been copied  "and pasted, but I have personally seen this patient and reviewed each line of this assessment and plan for accuracy and made updates to reflect my necessary changes on 6/2/2024    Diabetic ketoacidosis and type II diabetic, poorly controlled  -Anion gap is closed x 2, patient appears much improved this morning, therefore will transition to subcutaneous insulin dosing, continue insulin drip for 1 hour then discontinue and allow patient to eat  -Monitor need of insulin closely, plan to adjust insulin regimen for home accordingly  -Patient has previously been referred to an endocrinologist but was unable to afford, discussed with patient likely need of additional referral to get her blood glucose under control, she is agreeable.   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 if blood sugars well-controlled    Scott Sharma, DO  06/02/24  06:59 EDT    At [facility], we believe that sharing information builds trust and better relationships. You are receiving this note because you recently visited [facility]. It is possible you will see health information before a provider has talked with you about it. This kind of information can be easy to misunderstand. To help you fully understand what it means for your health, we urge you to discuss this note with your provider.    \"Dictated utilizing Dragon dictation\"    "

## 2024-06-02 NOTE — PLAN OF CARE
Goal Outcome Evaluation:  Plan of Care Reviewed With: patient, daughter        Progress: improving  Outcome Evaluation: Patient had a fair day insulin gtt was discontinued. Her bloodsugar today 140-180. transitioned to subq Glucommander. Lantus 10units given/Humalog 3, and 7 units given with her meals. She is awake, talkative, family has visited. Non-Prod cough noted, Bicarb gtt discontinued. NS and 20KCL cont at 150. CXR obtained, with multifocal infiltrates noted. Tessalon po given for her cough with some relief. Zofran given for intermittent nausea. Appetite decreased, sipping sprite, ate jello, couple bites sandwich. K+ 3.5, K+ replaced 40MEQ x2, Mag 1.5, 3 gm replaced, and NA phos 20mmol infusing now. Na 134, phos 2.1.Has been up to [facility] with x1 assist and BM noted.

## 2024-06-02 NOTE — PLAN OF CARE
Goal Outcome Evaluation:   Pt uses call light for needs, remains on Glucomander and Q 1 hour accuchecks, replacing Potassium and Phosphorus, sleeping thru shift, arouses easily. VSS

## 2024-06-03 VITALS
DIASTOLIC BLOOD PRESSURE: 74 MMHG | TEMPERATURE: 97.8 F | OXYGEN SATURATION: 94 % | WEIGHT: 124.56 LBS | BODY MASS INDEX: 22.07 KG/M2 | RESPIRATION RATE: 18 BRPM | SYSTOLIC BLOOD PRESSURE: 116 MMHG | HEIGHT: 63 IN | HEART RATE: 87 BPM

## 2024-06-03 LAB
ANION GAP SERPL CALCULATED.3IONS-SCNC: 10.6 MMOL/L (ref 5–15)
BUN SERPL-MCNC: 7 MG/DL (ref 6–20)
BUN/CREAT SERPL: 10.8 (ref 7–25)
CALCIUM SPEC-SCNC: 8.6 MG/DL (ref 8.6–10.5)
CHLORIDE SERPL-SCNC: 107 MMOL/L (ref 98–107)
CO2 SERPL-SCNC: 21.4 MMOL/L (ref 22–29)
CREAT SERPL-MCNC: 0.65 MG/DL (ref 0.57–1)
EGFRCR SERPLBLD CKD-EPI 2021: 102.2 ML/MIN/1.73
GLUCOSE BLDC GLUCOMTR-MCNC: 223 MG/DL (ref 70–130)
GLUCOSE BLDC GLUCOMTR-MCNC: 272 MG/DL (ref 70–130)
GLUCOSE SERPL-MCNC: 172 MG/DL (ref 65–99)
MAGNESIUM SERPL-MCNC: 2 MG/DL (ref 1.6–2.6)
PHOSPHATE SERPL-MCNC: 2.7 MG/DL (ref 2.5–4.5)
POTASSIUM SERPL-SCNC: 4.4 MMOL/L (ref 3.5–5.2)
SODIUM SERPL-SCNC: 139 MMOL/L (ref 136–145)

## 2024-06-03 PROCEDURE — 83735 ASSAY OF MAGNESIUM: CPT | Performed by: INTERNAL MEDICINE

## 2024-06-03 PROCEDURE — 80048 BASIC METABOLIC PNL TOTAL CA: CPT | Performed by: HOSPITALIST

## 2024-06-03 PROCEDURE — 82948 REAGENT STRIP/BLOOD GLUCOSE: CPT

## 2024-06-03 PROCEDURE — 99238 HOSP IP/OBS DSCHRG MGMT 30/<: CPT | Performed by: HOSPITALIST

## 2024-06-03 PROCEDURE — 63710000001 INSULIN LISPRO (HUMAN) PER 5 UNITS: Performed by: INTERNAL MEDICINE

## 2024-06-03 PROCEDURE — 84100 ASSAY OF PHOSPHORUS: CPT | Performed by: INTERNAL MEDICINE

## 2024-06-03 RX ORDER — LANCETS 30 GAUGE
EACH MISCELLANEOUS 4 TIMES DAILY PRN
Qty: 100 EACH | Refills: 0 | Status: SHIPPED | OUTPATIENT
Start: 2024-06-03

## 2024-06-03 RX ORDER — BLOOD-GLUCOSE METER
EACH MISCELLANEOUS
Qty: 1 KIT | Refills: 0 | Status: SHIPPED | OUTPATIENT
Start: 2024-06-03

## 2024-06-03 RX ORDER — ONDANSETRON 4 MG/1
4 TABLET, ORALLY DISINTEGRATING ORAL EVERY 8 HOURS PRN
Qty: 10 TABLET | Refills: 0 | Status: SHIPPED | OUTPATIENT
Start: 2024-06-03

## 2024-06-03 RX ORDER — INSULIN GLARGINE 100 [IU]/ML
28 INJECTION, SOLUTION SUBCUTANEOUS
Qty: 15 ML | Refills: 0 | Status: SHIPPED | OUTPATIENT
Start: 2024-06-03

## 2024-06-03 RX ADMIN — FLUOXETINE 40 MG: 10 CAPSULE ORAL at 08:16

## 2024-06-03 RX ADMIN — INSULIN LISPRO 2 UNITS: 100 INJECTION, SOLUTION INTRAVENOUS; SUBCUTANEOUS at 09:27

## 2024-06-03 RX ADMIN — INSULIN LISPRO 4 UNITS: 100 INJECTION, SOLUTION INTRAVENOUS; SUBCUTANEOUS at 13:28

## 2024-06-03 RX ADMIN — SUCRALFATE 1 G: 1 TABLET ORAL at 11:57

## 2024-06-03 RX ADMIN — BENZONATATE 100 MG: 100 CAPSULE ORAL at 11:57

## 2024-06-03 RX ADMIN — SUCRALFATE 1 G: 1 TABLET ORAL at 08:16

## 2024-06-03 RX ADMIN — PANTOPRAZOLE SODIUM 40 MG: 40 TABLET, DELAYED RELEASE ORAL at 08:16

## 2024-06-03 RX ADMIN — Medication 10 ML: at 08:18

## 2024-06-03 RX ADMIN — FAMOTIDINE 40 MG: 20 TABLET, FILM COATED ORAL at 08:16

## 2024-06-03 NOTE — CONSULTS
"Diabetes Education  Assessment/Teaching    Patient Name:  Lona Dupree  YOB: 1965  MRN: [id number]  Admit Date:  6/1/2024      Assessment Date:  6/3/2024  Flowsheet Row Most Recent Value   General Information     Referral From: MD order   Height 160 cm (63\")   Height Method Stated   Weight 56.5 kg (124 lb 9 oz)   Weight Method Bed scale   Diabetes History    What type of diabetes do you have? Type 2   Length of Diabetes Diagnosis 6 - 10 years  [Pt states since 2017]   Current DM knowledge fair   Do you test your blood sugar at home? yes   Frequency of checks Daily aftter work between 4-5 PM and before eating dinner   Meter type [employer]   Who performs the test? self   Typical readings 150-200s   Education Preferences    Barriers to Learning other (comment)  [None noted.  Consultation via phone.]   Assessment Topics    Problem Solving - Assessment Needs education   Reducing Risk - Assessment Needs education   Monitoring - Assessment Needs education   DM Goals    Problem Solving - Goal 0-30 days from discharge   Reducing Risk - Goal 30-90 days from discharge   Monitoring - Goal 0-7 days from discharge   Contact Plan Follow-up medical care       Flowsheet Row Most Recent Value   DM Education Needs    Meter Has own   Meter Type Other (comment)  [[employer] brand, couple of years old]   Frequency of Testing AC  [Discussed changing monitoring times to fasting and other times of the day for better pattern assessment]   Blood Glucose Target Range 90-150s   Problem Solving Sick days, Treatment, Symptoms   Reducing Risks A1C testing  [A1c 8.1%  Note improvement in A1c.]   Healthy Coping Appropriate   Discharge Plan Home, Follow-up with PCP, Follow-up with endocrinolgoist  [Pt states she was previously being followed by [facility]]   Motivation Engaged   Teaching Method Discussion, Explanation   Patient Response Verbalized understanding     Other Comments:  Pt states she had been ill, coughing for the " past week.  Pt was monitoring up until the last day prior to admission.  Pt did not monitor the day before admission.  Pt also states she noticed glass cracking on her Lantus pen and leakage from the pen device.     Pt educated illness affecting glucose and sick day rules.      Pt ordered a new glucometer with no refills.  Pt advised to get future supplies from PCP.  Per pt's insurance, pt needs a PA for CGMs.  Encouraged to reach out to PCP for PA.    Pt advised not to use cracked Lantus pen due to possible integrity issues as well as dialing count being off.      Pt verbalized understanding of above.      Electronically signed by:  Nadiya Diaz RN, [facility]– [place][note 2]  06/03/24 09:22 EDT

## 2024-06-03 NOTE — DISCHARGE INSTRUCTIONS
Please check blood glucose  and keep a log to take to  's Appt   [Chaperone Present] : A chaperone was present in the examining room during all aspects of the physical examination [TextEntry] : ***General*** General Appearance: normal; Judgment and insight: normal; the patient is oriented to time, place and person; Recent and remote memory: normal; Mood and affect: normal; Respiratory effort: normal.  ***Pelvic Examination*** External genitalia: the appearance and hair distribution are normal; no lesions are appreciated. Urethra/urethral meatus: no masses or tenderness are appreciated; there is no scarring noted. Bladder: nontender; there is no fullness appreciated; no masses were palpated. Vagina: the vagina is [normally rugated] ; a white discharge is seen; no lesions are seen; pelvic support is adequate without any evidence of cystocele or rectocele. Cervix: normal in appearance; no overt lesions are seen. Uterus: Retroverted; normal in size, mobile, nontender, and well supported. Adnexa/parametria: nontender and not enlarged; no masses are palpated. A stool specimen was not indicated at this time.  A GC/chlamydia culture was done.  ***Vaginal Discharge Evaluation*** A saline wet mount and KOH slide evaluation of the vaginal discharge were done. The discharge was [white] ; the pH was [normal] ; the whiff test was [negative] ; clue cells were [not] seen; hyphae were not seen; Lactobacilli were seen; [a few] white blood cells were seen; [superficial] cells were seen; a candida culture was sent.  ***colposcopy of the vulva*** Colposcopy of the external genitalia showed that the labia majora and labia minora were normal. She identified the introitus and labia minora as the location of her pain/itching/irritation. There was 9/10 vestibular tenderness of the anterior minor vestibular glands, and [no] vestibular tenderness of the posterior minor vestibular glands. There was [no] evidence of other dermatopathology. There was [mild] erythema but no edema of the introitus.

## 2024-06-03 NOTE — DISCHARGE SUMMARY
Lona Dupree  1965  [id number]    Hospitalists Discharge Summary    Date of Admission: 6/1/2024  Date of Discharge:  6/3/2024    Primary Discharge Diagnoses:  Diabetic Ketoacidosis  Acute Human Rhinovirus/Enterovirus Infection  Reactive Leukocytosis  Hyperkalemia  THC use    Secondary Discharge Diagnoses:  Diabetes Mellitus, Type 2 A1c of 8.16%  Depression  GERD  ASCVD    History of Present Illness (taken from H&P):  Patient is a 59-year-old female with past medical history significant for poorly controlled diabetes mellitus type 2 for which she has been admitted to this facility previously, Howell's esophagus, GERD, HSV-2 history, hyperlipidemia.  She presents to [facility] complaining of nausea and vomiting.  She has had nasal congestion and cough for the last 4 to 5 days however last night she developed nausea and has vomited numerous times, this caused her to seek evaluation in the ER today.  She is unable to keep anything down by mouth.  Her abdomen hurts due to amount of vomiting she has injured.      Workup in the ER revealed patient to be in DKA with anion gap of 35, ketones present in urine, small acetone present.  Therefore admission was sought.     Hospital Course:  Ms. Dupree was admitted to the ICU under the DKA protocol.  She responded appropriately to therapeutic and intervention and was resumed on insulin.  Viral panel was positive for human rhinovirus.  Treat supportively.  Given her body habitus, she may not receive maximum benefit from Metformin so I have stopped this at discharge.  Defer further consideration to outpatient provider.    PCP  Patient Care Team:  Jaiden Villagomez MD as PCP - General  Jaiden Villagomez MD as PCP - Family Medicine  Amaya Tran MD as Consulting Physician (Obstetrics and Gynecology)    Consults:   Consults       No orders found from 5/3/2024 to 6/2/2024.            Operations and Procedures Performed:    06/01 1520 Note By: Prashanth  Radha Jose, ISELA  XR Chest 1 View    Result Date: 6/2/2024  Narrative: XR CHEST 1 VW Date of Exam: 6/2/2024 10:22 AM EDT Indication: dyspnea Comparison: 4/21/2021 FINDINGS: There are ill-defined multifocal airspace infiltrates throughout the lungs bilaterally with associated diffuse interstitial changes. More pronounced infiltrate is seen in the right lower lobe. No pleural effusions are observed. The cardiac silhouette and  mediastinum are stable. No acute osseous abnormalities are identified.     Impression: 1.Ill-defined multifocal airspace infiltrates bilaterally with diffuse interstitial changes. More pronounced infiltrate is seen in the right lower lobe. The findings suggest developing atypical/viral infection or multifocal pneumonia with more pronounced  consolidation in the right lower lobe. Recommend correlation for signs or symptoms of acute infection and follow-up to ensure improvement/resolution. Electronically Signed: Zach Hayes MD  6/2/2024 10:34 AM EDT  Workstation ID: [id number]     Allergies:  is allergic to mobic [meloxicam].      Discharge Medications:     Discharge Medications        New Medications        Instructions Start Date   glucose blood test strip   Use to test blood glucose up to four times daily as needed. Formulary Compliance Approval. Diagnosis: Type 2 Diabetes - Insulin Dependent      glucose monitor monitoring kit   Use to test blood glucose up to four times daily as needed. Formulary Compliance Approval. Diagnosis: Type 2 Diabetes - Insulin Dependent      Lancets misc   Use to test blood glucose up to four times daily as needed. Formulary Compliance Approval. Diagnosis: Type 2 Diabetes - Insulin Dependent             Continue These Medications        Instructions Start Date   acetaminophen 325 MG tablet  Commonly known as: TYLENOL   650 mg, Oral, Every 4 Hours PRN, Over the counter      aspirin 81 MG chewable tablet   81 mg, Oral, Daily      atorvastatin 10 MG tablet  Commonly  known as: LIPITOR   atorvastatin 10 mg tablet      cetirizine 10 MG tablet  Commonly known as: zyrTEC   10 mg, Oral, Daily, PRN      famotidine 40 MG tablet  Commonly known as: PEPCID   TAKE ONE TABLET BY MOUTH TWICE A DAY      FLUoxetine 40 MG capsule  Commonly known as: PROzac   fluoxetine 40 mg capsule   Take 1 capsule every day by oral route for 90 days.      Lantus SoloStar 100 UNIT/ML injection pen  Generic drug: Insulin Glargine   28 Units, Subcutaneous, Every Morning Before Breakfast      multivitamin with minerals tablet tablet   2 capsules, Oral, Daily      ondansetron ODT 4 MG disintegrating tablet  Commonly known as: ZOFRAN-ODT   4 mg, Translingual, Every 8 Hours PRN      pantoprazole 40 MG EC tablet  Commonly known as: PROTONIX   40 mg, Oral, 2 Times Daily      promethazine 25 MG tablet  Commonly known as: PHENERGAN   25 mg, Oral, Every 6 Hours PRN      SALINE NASAL MIST NA   Saline Nasal Mist      sucralfate 1 g tablet  Commonly known as: CARAFATE   Take 1 tab by mouth 4 times a day      SudoGest 12 Hour 120 MG 12 hr tablet  Generic drug: pseudoephedrine   No dose, route, or frequency recorded.      SUMAtriptan 100 MG tablet  Commonly known as: IMITREX   sumatriptan 100 mg tablet   take 1 po at onset of symptoms. can repeat x 1 in 2 hours if headache persists      traZODone 150 MG tablet  Commonly known as: DESYREL   150 mg, Oral, Nightly      valACYclovir 500 MG tablet  Commonly known as: VALTREX   TAKE ONE TABLET BY MOUTH DAILY             Stop These Medications      metFORMIN 500 MG tablet  Commonly known as: GLUCOPHAGE              Last Lab Results:   Lab Results (most recent)       Procedure Component Value Units Date/Time    POC Glucose Once [[id number]]  (Abnormal) Collected: 06/03/24 1237    Specimen: Blood Updated: 06/03/24 1242     Glucose 272 mg/dL     Basic Metabolic Panel [[id number]]  (Abnormal) Collected: 06/03/24 0450    Specimen: Blood from Arm, Left Updated: 06/03/24 0837     Glucose  172 mg/dL      BUN 7 mg/dL      Creatinine 0.65 mg/dL      Sodium 139 mmol/L      Potassium 4.4 mmol/L      Comment: Specimen hemolyzed.  Result may be falsely elevated.        Chloride 107 mmol/L      CO2 21.4 mmol/L      Calcium 8.6 mg/dL      BUN/Creatinine Ratio 10.8     Anion Gap 10.6 mmol/L      eGFR 102.2 mL/min/1.73     Narrative:      GFR Normal >60  Chronic Kidney Disease <60  Kidney Failure <15      POC Glucose Once [[id number]]  (Abnormal) Collected: 06/03/24 0814    Specimen: Blood Updated: 06/03/24 0820     Glucose 223 mg/dL     Magnesium [[id number]]  (Normal) Collected: 06/03/24 0450    Specimen: Blood from Arm, Left Updated: 06/03/24 0520     Magnesium 2.0 mg/dL     Phosphorus [[id number]]  (Normal) Collected: 06/03/24 0450    Specimen: Blood from Arm, Left Updated: 06/03/24 0520     Phosphorus 2.7 mg/dL     Potassium [[id number]]  (Normal) Collected: 06/02/24 1849    Specimen: Blood from Arm, Left Updated: 06/02/24 1914     Potassium 4.3 mmol/L     Respiratory Panel PCR w/COVID-19(SARS-CoV-2) [facility] In-House, NP Swab in UTM/VTM, 2 HR TAT - Swab, Nasopharynx [[id number]]  (Abnormal) Collected: 06/02/24 1305    Specimen: Swab from Nasopharynx Updated: 06/02/24 1807     ADENOVIRUS, PCR Not Detected     Coronavirus 229E Not Detected     Coronavirus HKU1 Not Detected     Coronavirus NL63 Not Detected     Coronavirus OC43 Not Detected     COVID19 Not Detected     Human Metapneumovirus Not Detected     Human Rhinovirus/Enterovirus Detected     Influenza A PCR Not Detected     Influenza B PCR Not Detected     Parainfluenza Virus 1 Not Detected     Parainfluenza Virus 2 Not Detected     Parainfluenza Virus 3 Not Detected     Parainfluenza Virus 4 Not Detected     RSV, PCR Not Detected     Bordetella pertussis pcr Not Detected     Bordetella parapertussis PCR Not Detected     Chlamydophila pneumoniae PCR Not Detected     Mycoplasma pneumo by PCR Not Detected    Narrative:      In the setting of a  positive respiratory panel with a viral infection PLUS a negative procalcitonin without other underlying concern for bacterial infection, consider observing off antibiotics or discontinuation of antibiotics and continue supportive care. If the respiratory panel is positive for atypical bacterial infection (Bordetella pertussis, Chlamydophila pneumoniae, or Mycoplasma pneumoniae), consider antibiotic de-escalation to target atypical bacterial infection.    Basic Metabolic Panel [[id number]]  (Abnormal) Collected: 06/02/24 1429    Specimen: Blood from Arm, Left Updated: 06/02/24 1453     Glucose 312 mg/dL      BUN 12 mg/dL      Creatinine 0.75 mg/dL      Sodium 134 mmol/L      Potassium 4.4 mmol/L      Comment: Specimen hemolyzed.  Result may be falsely elevated.        Chloride 104 mmol/L      CO2 19.5 mmol/L      Calcium 8.0 mg/dL      BUN/Creatinine Ratio 16.0     Anion Gap 10.5 mmol/L      eGFR 92.4 mL/min/1.73     Narrative:      GFR Normal >60  Chronic Kidney Disease <60  Kidney Failure <15      Osmolality, Serum [[id number]]  (Abnormal) Collected: 06/01/24 1211    Specimen: Blood Updated: 06/02/24 1311     Osmolality 344 mOsm/kg     Magnesium [[id number]]  (Abnormal) Collected: 06/02/24 0759    Specimen: Blood from Arm, Left Updated: 06/02/24 0838     Magnesium 1.5 mg/dL     Phosphorus [[id number]]  (Abnormal) Collected: 06/02/24 0759    Specimen: Blood from Arm, Left Updated: 06/02/24 0838     Phosphorus 2.1 mg/dL     Potassium [[id number]]  (Normal) Collected: 06/02/24 0600    Specimen: Blood Updated: 06/02/24 0622     Potassium 4.0 mmol/L     Blood Gas, Venous - [[id number]]  (Abnormal) Collected: 06/01/24 1040    Specimen: Venous Blood Updated: 06/01/24 1806     Site Nurse/Dr Draw     pH, Venous 6.834 pH Units      Comment: 85 Value below critical limit        pCO2, Venous <15.4 mm Hg      Comment: 94 Value below reportable range < 15.4        pO2, Venous 87.9 mm Hg      Comment: 83 Value above reference range         HCO3, Venous 0.0 mmol/L      Comment: Values not reported        Base Excess, Venous 0.0 mmol/L      Comment: Values not reported        O2 Saturation, Venous 91.1 %      Comment: 83 Value above reference range        Hemoglobin, Blood Gas 8.3 g/dL      Comment: 84 Value below reference range        Temperature 37.0     Barometric Pressure for Blood Gas 744 mmHg      Modality RA     Notified Who RB AND V DR GEE     Notified By [id number]     Notified Time 06/01/2024 10:57     Collected by [id number]     Comment: Meter: [id number]     :  [id number]       High Sensitivity Troponin T 2Hr [[id number]]  (Abnormal) Collected: 06/01/24 1210    Specimen: Blood Updated: 06/01/24 1241     HS Troponin T 27 ng/L      Troponin T Delta 4 ng/L     Narrative:      High Sensitive Troponin T Reference Range:  <14.0 ng/L- Negative Female for AMI  <22.0 ng/L- Negative Male for AMI  >=14 - Abnormal Female indicating possible myocardial injury.  >=22 - Abnormal Male indicating possible myocardial injury.   Clinicians would have to utilize clinical acumen, EKG, Troponin, and serial changes to determine if it is an Acute Myocardial Infarction or myocardial injury due to an underlying chronic condition.         High Sensitivity Troponin T [[id number]]  (Abnormal) Collected: 06/01/24 0952    Specimen: Blood Updated: 06/01/24 1205     HS Troponin T 23 ng/L     Narrative:      High Sensitive Troponin T Reference Range:  <14.0 ng/L- Negative Female for AMI  <22.0 ng/L- Negative Male for AMI  >=14 - Abnormal Female indicating possible myocardial injury.  >=22 - Abnormal Male indicating possible myocardial injury.   Clinicians would have to utilize clinical acumen, EKG, Troponin, and serial changes to determine if it is an Acute Myocardial Infarction or myocardial injury due to an underlying chronic condition.         Hemoglobin A1c [[id number]]  (Abnormal) Collected: 06/01/24 0952    Specimen: Blood Updated: 06/01/24 1205     Hemoglobin  A1C 8.16 %     Narrative:      Hemoglobin A1C Ranges:    Increased Risk for Diabetes  5.7% to 6.4%  Diabetes                     >= 6.5%  Diabetic Goal                < 7.0%    CBC & Differential [[id number]]  (Abnormal) Collected: 06/01/24 0952    Specimen: Blood Updated: 06/01/24 1034    Narrative:      The following orders were created for panel order CBC & Differential.  Procedure                               Abnormality         Status                     ---------                               -----------         ------                     CBC Auto Differential[[id number]]        Abnormal            Final result               Scan Slide[[id number]]                                                                    Please view results for these tests on the individual orders.    Manual Differential [[id number]]  (Abnormal) Collected: 06/01/24 0952    Specimen: Blood Updated: 06/01/24 1034     Neutrophil % 91.0 %      Lymphocyte % 8.0 %      Monocyte % 1.0 %      Neutrophils Absolute 20.94 10*3/mm3      Lymphocytes Absolute 1.84 10*3/mm3      Monocytes Absolute 0.23 10*3/mm3      RBC Morphology Normal     WBC Morphology Normal     Platelet Estimate Increased    COVID-19 and FLU A/B PCR, 1 HR TAT - Swab, Nasopharynx [[id number]]  (Normal) Collected: 06/01/24 0952    Specimen: Swab from Nasopharynx Updated: 06/01/24 1025     COVID19 Not Detected     Influenza A PCR Not Detected     Influenza B PCR Not Detected    Narrative:      Fact sheet for providers: https://www.fda.gov/media/442611/download    Fact sheet for patients: https://www.fda.gov/media/500025/download    Test performed by PCR.    Comprehensive Metabolic Panel [[id number]]  (Abnormal) Collected: 06/01/24 0952    Specimen: Blood Updated: 06/01/24 1021     Glucose 735 mg/dL      BUN 27 mg/dL      Creatinine 1.54 mg/dL      Sodium 131 mmol/L      Potassium 5.9 mmol/L      Chloride 93 mmol/L      CO2 2.9 mmol/L      Calcium 9.8 mg/dL      Total Protein 8.2 g/dL       Albumin 4.6 g/dL      ALT (SGPT) 17 U/L      AST (SGOT) 20 U/L      Alkaline Phosphatase 125 U/L      Total Bilirubin 0.2 mg/dL      Globulin 3.6 gm/dL      A/G Ratio 1.3 g/dL      BUN/Creatinine Ratio 17.5     Anion Gap 35.1 mmol/L      eGFR 39.0 mL/min/1.73     Narrative:      GFR Normal >60  Chronic Kidney Disease <60  Kidney Failure <15      Lipase [[id number]]  (Normal) Collected: 06/01/24 0952    Specimen: Blood Updated: 06/01/24 1012     Lipase 16 U/L     Acetone [[id number]]  (Abnormal) Collected: 06/01/24 0952    Specimen: Blood Updated: 06/01/24 1008     Acetone Small    Urinalysis, Microscopic Only - Urine, Clean Catch [[id number]]  (Abnormal) Collected: 06/01/24 0934    Specimen: Urine, Clean Catch Updated: 06/01/24 1008     RBC, UA None Seen /HPF      WBC, UA None Seen /HPF      Bacteria, UA Trace /HPF      Squamous Epithelial Cells, UA 0-2 /HPF      Hyaline Casts, UA None Seen /LPF      Methodology Manual Light Microscopy    CBC Auto Differential [[id number]]  (Abnormal) Collected: 06/01/24 0952    Specimen: Blood Updated: 06/01/24 1007     WBC 23.01 10*3/mm3      RBC 3.95 10*6/mm3      Hemoglobin 10.6 g/dL      Hematocrit 36.2 %      MCV 91.6 fL      MCH 26.8 pg      MCHC 29.3 g/dL      RDW 17.2 %      RDW-SD 57.8 fl      MPV 10.9 fL      Platelets 376 10*3/mm3      Neutrophil % 51.7 %      Lymphocyte % 3.9 %      Monocyte % 6.8 %      Eosinophil % 36.4 %      Basophil % 0.2 %      Immature Grans % 1.0 %      Neutrophils, Absolute 11.89 10*3/mm3      Lymphocytes, Absolute 0.90 10*3/mm3      Monocytes, Absolute 1.56 10*3/mm3      Eosinophils, Absolute 8.38 10*3/mm3      Basophils, Absolute 0.05 10*3/mm3      Immature Grans, Absolute 0.23 10*3/mm3      nRBC 0.0 /100 WBC     Urine Drug Screen - Urine, Clean Catch [[id number]]  (Abnormal) Collected: 06/01/24 0934    Specimen: Urine, Clean Catch Updated: 06/01/24 0952     THC, Screen, Urine Positive     Phencyclidine (PCP), Urine Negative     Cocaine  Screen, Urine Negative     Methamphetamine, Ur Negative     Opiate Screen Negative     Amphetamine Screen, Urine Negative     Benzodiazepine Screen, Urine Negative     Tricyclic Antidepressants Screen Negative     Methadone Screen, Urine Negative     Barbiturates Screen, Urine Negative     Oxycodone Screen, Urine Negative     Buprenorphine, Screen, Urine Negative    Narrative:      Cutoff For Drugs Screened:    Amphetamines               500 ng/ml  Barbiturates               200 ng/ml  Benzodiazepines            150 ng/ml  Cocaine                    150 ng/ml  Methadone                  200 ng/ml  Opiates                    100 ng/ml  Phencyclidine               25 ng/ml  THC                         50 ng/ml  Methamphetamine            500 ng/ml  Tricyclic Antidepressants  300 ng/ml  Oxycodone                  100 ng/ml  Buprenorphine               10 ng/ml    The normal value for all drugs tested is negative. This report includes unconfirmed screening results, with the cutoff values listed, to be used for medical treatment purposes only.  Unconfirmed results must not be used for non-medical purposes such as employment or legal testing.  Clinical consideration should be applied to any drug of abuse test, particularly when unconfirmed results are used.      Urinalysis With Microscopic If Indicated (No Culture) - Urine, Clean Catch [[id number]]  (Abnormal) Collected: 06/01/24 0934    Specimen: Urine, Clean Catch Updated: 06/01/24 0946     Color, UA Yellow     Appearance, UA Clear     pH, UA <=5.0     Specific Gravity, UA 1.025     Glucose,  mg/dL (2+)     Ketones, UA >=160 mg/dL (4+)     Bilirubin, UA Small (1+)     Blood, UA Small (1+)     Protein,  mg/dL (2+)     Leuk Esterase, UA Negative     Nitrite, UA Negative     Urobilinogen, UA 0.2 E.U./dL    [place] Draw [[id number]] Collected: 06/01/24 0930    Specimen: Blood Updated: 06/01/24 0945    Narrative:      The following orders were created for panel  order [place] Draw.  Procedure                               Abnormality         Status                     ---------                               -----------         ------                     Green Top (Gel)[[id number]]                                  Final result               Lavender Top[[id number]]                                     Final result               Gold Top - SST[[id number]]                                   Final result               Light Blue Top[[id number]]                                   Final result                 Please view results for these tests on the individual orders.    Green Top (Gel) [[id number]] Collected: 06/01/24 0930    Specimen: Blood from Arm, Right Updated: 06/01/24 0945     Extra Tube Hold for add-ons.     Comment: Auto resulted.       Lavender Top [[id number]] Collected: 06/01/24 0930    Specimen: Blood from Arm, Right Updated: 06/01/24 0945     Extra Tube hold for add-on     Comment: Auto resulted       Gold Top - SST [[id number]] Collected: 06/01/24 0930    Specimen: Blood Updated: 06/01/24 0945     Extra Tube Hold for add-ons.     Comment: Auto resulted.       Light Blue Top [[id number]] Collected: 06/01/24 0930    Specimen: Blood Updated: 06/01/24 0945     Extra Tube Hold for add-ons.     Comment: Auto resulted             Imaging Results (Most Recent)       Procedure Component Value Units Date/Time    XR Chest 1 View [[id number]] Collected: 06/02/24 1031     Updated: 06/02/24 1037    Narrative:      XR CHEST 1 VW    Date of Exam: 6/2/2024 10:22 AM EDT    Indication: dyspnea    Comparison: 4/21/2021    FINDINGS:  There are ill-defined multifocal airspace infiltrates throughout the lungs bilaterally with associated diffuse interstitial changes. More pronounced infiltrate is seen in the right lower lobe. No pleural effusions are observed. The cardiac silhouette and   mediastinum are stable. No acute osseous abnormalities are identified.      Impression:      1.Ill-defined  multifocal airspace infiltrates bilaterally with diffuse interstitial changes. More pronounced infiltrate is seen in the right lower lobe. The findings suggest developing atypical/viral infection or multifocal pneumonia with more pronounced   consolidation in the right lower lobe. Recommend correlation for signs or symptoms of acute infection and follow-up to ensure improvement/resolution.        Electronically Signed: Zach Hayes MD    6/2/2024 10:34 AM EDT    Workstation ID: [id number]            PROCEDURES      Condition on Discharge:  Stable    Physical Exam at Discharge  Vital Signs  Temp:  [97.6 °F (36.4 °C)-98.8 °F (37.1 °C)] 97.8 °F (36.6 °C)  Heart Rate:  [67-97] 90  Resp:  [16-20] 18  BP: (114-144)/(69-96) 133/76    Physical Exam:  Physical Exam   Constitutional: Patient appears well-developed and well-nourished and in no acute distress   Cardiovascular: Regular rate, regular rhythm, S1 normal and S2 normal.  Exam reveals no gallop and no friction rub.  No murmur heard.  Pulmonary/Chest: Lungs are clear to auscultation bilaterally. No respiratory distress. No wheezes. No rhonchi. No rales.   Abdominal: Soft. Bowel sounds are normal.  There is no tenderness.   Musculoskeletal: Normal Muscle tone  Extremities: No edema.   Neurological: Patient is alert and oriented to person, place, and time. Cranial nerves II-XII are grossly intact with no focal deficits.    Discharge Disposition  Home    Visiting Nurse:    No     Home PT/OT:  No     Home Safety Evaluation:  No     DME  None    Discharge Diet:      Dietary Orders (From admission, onward)       Start     Ordered    06/02/24 0659  Diet: Diabetic; Consistent Carbohydrate; Fluid Consistency: Thin (IDDSI 0)  Diet Effective Now        References:    Diet Order [place]   Question Answer Comment   Diets: Diabetic    Diabetic Diet: Consistent Carbohydrate    Fluid Consistency: Thin (IDDSI 0)        06/02/24 0658    06/02/24 0658  Patient is on Glucommander   (Glucommander™ SQ Insulin - Select Dosing Option)  Continuous        Question Answer Comment   Tray Note: Glucommander    Patient is on Glucommander: Yes        06/02/24 0658                    Activity at Discharge:  As tolerated      Follow-up Appointments  Future Appointments   Date Time Provider Department Center   6/10/2024  9:15 AM Amaya Tran MD [facility]   8/20/2024  8:00 AM Hortencia Moura APRN [facility]     Additional Instructions for the Follow-ups that You Need to Schedule       Discharge Follow-up with PCP   As directed       Currently Documented PCP:    Jaiden Villagomez MD    PCP Phone Number:    [phone]     Follow Up Details: 1-2 weeks                Test Results Pending at Discharge       Aditya Carlos MD  06/03/24  13:38 EDT    Time: <30 minutes

## 2024-06-03 NOTE — CASE MANAGEMENT/SOCIAL WORK
Case Management Discharge Note      Final Note: dc home    Provided Post Acute Provider List?: Refused  Refused Provider List Comment: pt declined    Selected Continued Care - Discharged on 6/3/2024 Admission date: 6/1/2024 - Discharge disposition: Home or Self Care      Destination    No services have been selected for the patient.                Durable Medical Equipment    No services have been selected for the patient.                Dialysis/Infusion    No services have been selected for the patient.                Home Medical Care    No services have been selected for the patient.                Therapy    No services have been selected for the patient.                Community Resources    No services have been selected for the patient.                Community & DME    No services have been selected for the patient.                         Final Discharge Disposition Code: 01 - home or self-care

## 2024-06-03 NOTE — PLAN OF CARE
Goal Outcome Evaluation:  Plan of Care Reviewed With: patient        Progress: improving  Outcome Evaluation: pt rested well overnight. VSS. Remains off insulin gtt; BG maintained on SQ insulin per Glucommander protocol. IVF discontinued overnight. Compazine added PRN for c/o nausea; no new complainnts noted after compazine administered. AM labs pending.

## 2024-06-03 NOTE — PROGRESS NOTES
Adult Nutrition  Assessment/PES    Patient Name:  Lona Dupree  YOB: 1965  MRN: [id number]  Admit Date:  6/1/2024    Assessment Date:  6/3/2024    Comments:  Pt seen for DKA.    Edu as below.     Noted plan for possible d/c if tolerates po intake.  Will cont to follow and monitor.      Reason for Assessment       Row Name 06/03/24 1147          Reason for Assessment    Reason For Assessment diagnosis/disease state  DKA     Diagnosis diabetes diagnosis/complications  Hyperkalemia, DKA, DM                    Nutrition/Diet History       Row Name 06/03/24 1148          Nutrition/Diet History    Typical Intake (Food/Fluid/EN/PN) Spoke w pt & daughter is in chair. NKFA. Reports swallowing issue due to Barretts. Denies wt loss. Reports eats small 3 meals per day. Income does limit her choices. Reports had been taking pro shake from [employer] but price has increased signficantly     Supplemental Drinks/Foods/Additives Equate Protein Shake at times                    Labs/Tests/Procedures/Meds       Row Name 06/03/24 1149          Labs/Procedures/Meds    Lab Results Reviewed reviewed     Lab Results Comments HgA1c 8.1 elevated, phos/mg wnl, glu 223, 262        Diagnostic Tests/Procedures    Diagnostic Test/Procedure Reviewed reviewed        Medications    Pertinent Medications Reviewed reviewed     Pertinent Medications Comments lantus, humalog                    Physical Findings       Row Name 06/03/24 1149          Physical Findings    Overall Physical Appearance nutrition exam complete                    Estimated/Assessed Needs - Anthropometrics       Row Name 06/03/24 1150          Anthropometrics    Weight for Calculation 56.5 kg (124 lb 9 oz)        Estimated/Assessed Needs    Additional Documentation Estimated Calorie Needs (Group);Fluid Requirements (Group);Protein Requirements (Group)        Estimated Calorie Needs    Estimated Calorie Requirement (kcal/day) 1695 kcal ( 30 kcal/kg )      Estimated Calorie Need Method kcal/kg        Protein Requirements    Est Protein Requirement Amount (gms/kg) 1.0 gm protein  57 gm pro        Fluid Requirements    Estimated Fluid Requirement Method RDA Method  1695 ml                    Nutrition Prescription Ordered       Row Name 06/03/24 1150          Nutrition Prescription PO    Common Modifiers Consistent Carbohydrate                    Evaluation of Received Nutrient/Fluid Intake       Row Name 06/03/24 1150          Fluid Intake Evaluation    Oral Fluid (mL) 600  insufficient data     Other Fluid (mL) 1757  >I00%  of IVF        PO Evaluation    % PO Intake 50, 75%                       Problem/Interventions:   Problem 1       Row Name 06/03/24 1151          Nutrition Diagnoses Problem 1    Problem 1 Other (comment)  Altered nutrition related lab values related to DM as evidenced by HgA1c and DKA                          Intervention Goal       Row Name 06/03/24 1151          Intervention Goal    General Meet nutritional needs for age/condition;Improved nutrition related lab(s)     PO Tolerate PO;PO intake (%)     PO Intake % 50 %  or greater                    Nutrition Intervention       Row Name 06/03/24 1151          Nutrition Intervention    RD/Tech Action Follow Tx progress                      Education/Evaluation       Row Name 06/03/24 1152          Education    Education Education topics  Edu on nutrition exam. Edu on eating reg intervals with some pro for BG control. Edu on improving HGA1c. Edu making half plate veggies non-starchy     Education Topics Diabetes        Monitor/Evaluation    Monitor Per protocol;I&O;PO intake;Pertinent labs;Weight;Symptoms     Education Follow-up Other (comment)  pt verbalized understanding                     Electronically signed by:  Annie Aly RD  06/03/24 11:53 EDT

## 2024-06-04 ENCOUNTER — READMISSION MANAGEMENT (OUTPATIENT)
Dept: CALL CENTER | Facility: HOSPITAL | Age: 59
End: 2024-06-04
Payer: COMMERCIAL

## 2024-06-04 NOTE — OUTREACH NOTE
Prep Survey      Flowsheet Row Responses   [facility] facility patient discharged from? [place]   Is LACE score < 7 ? Yes   Eligibility Readm Mgmt   Discharge diagnosis Diabetic ketoacidosis   Does the patient have one of the following disease processes/diagnoses(primary or secondary)? Other   Does the patient have Home health ordered? No   Is there a DME ordered? No   Medication alerts for this patient see AVS   Prep survey completed? Yes            Yolanda THOMPSON - Registered Nurse

## 2024-06-10 ENCOUNTER — READMISSION MANAGEMENT (OUTPATIENT)
Dept: CALL CENTER | Facility: HOSPITAL | Age: 59
End: 2024-06-10
Payer: COMMERCIAL

## 2024-06-10 NOTE — OUTREACH NOTE
LAG < 7 Survey      Flowsheet Row Responses   [facility] patient discharged from? [place]   Does the patient have one of the following disease processes/diagnoses(primary or secondary)? Other   BHLAG <7 Attempt successful? Yes   Call start time 1711   Call end time 1714   Discharge diagnosis Diabetic ketoacidosis   Medication alerts for this patient Pt will be getting the Annalee 3 to monitor her glucose and now using Delsym for dry cough.   Meds reviewed with patient/caregiver? Yes   Is the patient having any side effects they believe may be caused by any medication additions or changes? No   Does the patient have all medications ordered at discharge? Yes   Is the patient taking all medications as directed (includes completed medication regime)? Yes   Does the patient have a primary care provider?  Yes   Does the patient have an appointment with their PCP within 7 days of discharge? Yes   Has the patient kept scheduled appointments due by today? Yes   Comments Pt is monitoring her glucose at home. Pt reports no further N/V and reports tolerating po intake.   What is the patient's perception of their health status since discharge? Improving   Is the patient/caregiver able to teach back signs and symptoms related to disease process for when to call PCP? Yes   Graduated Yes   Is the patient interested in additional calls from an ambulatory ? No   Wrap up additional comments Call was suddenly ended, nurse attempted call back but got JUAN LUIS NEVES - Registered Nurse

## 2024-06-13 LAB — GLUCOSE BLDC GLUCOMTR-MCNC: >599 MG/DL (ref 70–130)

## 2024-07-30 ENCOUNTER — OFFICE VISIT (OUTPATIENT)
Dept: OBSTETRICS AND GYNECOLOGY | Facility: CLINIC | Age: 59
End: 2024-07-30
Payer: COMMERCIAL

## 2024-07-30 VITALS
SYSTOLIC BLOOD PRESSURE: 112 MMHG | WEIGHT: 130 LBS | BODY MASS INDEX: 23.04 KG/M2 | HEIGHT: 63 IN | DIASTOLIC BLOOD PRESSURE: 64 MMHG

## 2024-07-30 DIAGNOSIS — Z01.419 ROUTINE GYNECOLOGICAL EXAMINATION: Primary | ICD-10-CM

## 2024-07-30 DIAGNOSIS — Z12.31 ENCOUNTER FOR SCREENING MAMMOGRAM FOR MALIGNANT NEOPLASM OF BREAST: ICD-10-CM

## 2024-07-30 DIAGNOSIS — Z11.51 SCREENING FOR HUMAN PAPILLOMAVIRUS (HPV): ICD-10-CM

## 2024-07-30 LAB
BILIRUB BLD-MCNC: NEGATIVE MG/DL
CLARITY, POC: CLEAR
COLOR UR: YELLOW
GLUCOSE UR STRIP-MCNC: NEGATIVE MG/DL
KETONES UR QL: ABNORMAL
LEUKOCYTE EST, POC: NEGATIVE
NITRITE UR-MCNC: NEGATIVE MG/ML
PH UR: 5 [PH] (ref 5–8)
PROT UR STRIP-MCNC: ABNORMAL MG/DL
RBC # UR STRIP: NEGATIVE /UL
SP GR UR: 1 (ref 1–1.03)
UROBILINOGEN UR QL: NORMAL

## 2024-07-30 PROCEDURE — 99396 PREV VISIT EST AGE 40-64: CPT | Performed by: NURSE PRACTITIONER

## 2024-07-30 PROCEDURE — 81002 URINALYSIS NONAUTO W/O SCOPE: CPT | Performed by: NURSE PRACTITIONER

## 2024-07-30 RX ORDER — ALBUTEROL SULFATE 90 UG/1
1 AEROSOL, METERED RESPIRATORY (INHALATION)
COMMUNITY
Start: 2024-07-29

## 2024-07-30 RX ORDER — ATORVASTATIN CALCIUM 10 MG/1
1 TABLET, FILM COATED ORAL DAILY
COMMUNITY

## 2024-07-30 NOTE — PROGRESS NOTES
GYN Annual Exam     CC- Here for annual exam.     Lona Dupree is a 59 y.o. female who presents for annual well woman exam. She is an established patient but new to me. Menopause at age 52. No  VB . No HRT.  No bladder issues.     She is not sexually active.     She was recently in the ICU for DKA and pneumonia.     OB History          2    Para   2    Term   2            AB        Living   2         SAB        IAB        Ectopic        Molar        Multiple        Live Births              Obstetric Comments   2 C/S               Menarche:14  Menopause: 52  HRT:none  Current contraception: tubal ligation,   History of abnormal Pap smear: yes - 1 abnormal with normal f/u  History of abnormal mammogram: no  Family history of uterine, colon or ovarian cancer: yes - uncle had colon or pancreatic , brother with esphopahgeal cancer  Family history of breast cancer: yes, p aunt > 50  STD: HSV 2+  Last pap: 2022-  normal pap/HPV  MM/23  Colonoscopy: 3/20  HOMERO: none    Health Maintenance   Topic Date Due    Pneumococcal Vaccine 0-64 (1 of 2 - PCV) Never done    DIABETIC EYE EXAM  Never done    Hepatitis B (1 of 3 - 19+ 3-dose series) Never done    TDAP/TD VACCINES (1 - Tdap) Never done    ZOSTER VACCINE (1 of 2) Never done    ANNUAL PHYSICAL  Never done    DIABETIC FOOT EXAM  Never done    URINE MICROALBUMIN  2022    GASTROSCOPY (EGD)  2023    COVID-19 Vaccine ( - - season) 2023    Annual Gynecologic Pelvic and Breast Exam  2024    INFLUENZA VACCINE  2024    HEMOGLOBIN A1C  2024    PAP SMEAR  2025    MAMMOGRAM  2025    COLORECTAL CANCER SCREENING  2025    HEPATITIS C SCREENING  Completed       Past Medical History:   Diagnosis Date    Abnormal Pap smear of cervix     1 abnormal pap with normal f/u    [place] esophagus     Colon polyp     Diabetes mellitus     Difficulty swallowing     at times    Fibroid     GERD (gastroesophageal  reflux disease)     HSV-2 (herpes simplex virus 2) infection 2017    Menstrual disorder     Migraines     Rectal bleeding     bleeds with bowel movement    Seasonal allergies        Past Surgical History:   Procedure Laterality Date    CARPAL TUNNEL RELEASE WITH CUBITAL TUNNEL RELEASE Right      SECTION      X2    COLONOSCOPY N/A 2016    Procedure: COLONOSCOPY with polypectomy;  Surgeon: Maryan Kent MD;  Location: [facility] OR;  Service:     COLONOSCOPY N/A 6/3/2020    Procedure: COLONOSCOPY;  Surgeon: Dmitri Fung MD;  Location: [facility] OR;  Service: Gastroenterology;  Laterality: N/A;  Poor prep, polyp    ENDOMETRIAL ABLATION      thermachoice    ENDOSCOPY N/A 6/3/2020    Procedure: ESOPHAGOGASTRODUODENOSCOPY;  Surgeon: Dmitri Fung MD;  Location: [facility] OR;  Service: Gastroenterology;  Laterality: N/A;  Reflux esophagitis, erosive gastritis, duodenitis    HYSTEROSCOPY      AND ENDOMETRIAL  ABLATION    NASAL SEPTUM SURGERY          TUBAL ABDOMINAL LIGATION           Current Outpatient Medications:     albuterol sulfate  (90 Base) MCG/ACT inhaler, 1 puff., Disp: , Rfl:     acetaminophen (TYLENOL) 325 MG tablet, Take 2 tablets by mouth Every 4 (Four) Hours As Needed for Mild Pain . Over the counter, Disp: , Rfl:     aspirin 81 MG chewable tablet, Chew 1 tablet Daily., Disp: , Rfl:     atorvastatin (LIPITOR) 10 MG tablet, atorvastatin 10 mg tablet, Disp: , Rfl:     atorvastatin (LIPITOR) 10 MG tablet, Take 1 tablet by mouth Daily., Disp: , Rfl:     cetirizine (zyrTEC) 10 MG tablet, Take 1 tablet by mouth Daily. PRN, Disp: , Rfl:     Continuous Glucose  (FreeStyle Annalee 3 [place]) device, Use to check blood sugar 6-8 times a day as needed, Disp: 1 each, Rfl: 11    Continuous Glucose Sensor (FreeStyle Annalee 14 Day Sensor) misc, Use to check glucose 6-8 times a day as needed. Change sensor every 14 days as directed by prescriber., Disp: 1 each, Rfl:  11    Continuous Glucose Sensor (FreeStyle Annalee 3 Sensor) misc, Check blood glucose levels 6 to 8 times daily. Change sensory every 14 days., Disp: 1 each, Rfl: 11    famotidine (PEPCID) 40 MG tablet, TAKE ONE TABLET BY MOUTH TWICE A DAY, Disp: 180 tablet, Rfl: 3    FLUoxetine (PROzac) 40 MG capsule, fluoxetine 40 mg capsule  Take 1 capsule every day by oral route for 90 days., Disp: , Rfl:     glucose blood test strip, Use to test blood glucose up to four times daily as needed., Disp: 100 each, Rfl: 0    Blood Glucose Monitoring Suppl (Contour Next EZ) w/Device kit, Use to test blood glucose up to four times daily as needed., Disp: 1 kit, Rfl: 0    Lancets misc, Use to test blood glucose up to four times daily as needed., Disp: 100 each, Rfl: 0    Lantus SoloStar 100 UNIT/ML injection pen, Inject 28 Units under the skin into the appropriate area as directed Every Morning Before Breakfast., Disp: 15 mL, Rfl: 0    Multiple Vitamins-Minerals (MULTI-BETIC PO), Take 2 tablets by mouth Daily., Disp: , Rfl:     ondansetron ODT (ZOFRAN-ODT) 4 MG disintegrating tablet, Place 1 tablet on the tongue Every 8 (Eight) Hours As Needed for Nausea or Vomiting., Disp: 10 tablet, Rfl: 0    pantoprazole (PROTONIX) 40 MG EC tablet, Take 1 tablet by mouth 2 (Two) Times a Day., Disp: 180 tablet, Rfl: 3    promethazine (PHENERGAN) 25 MG tablet, Take 1 tablet by mouth Every 6 (Six) Hours As Needed for Nausea or Vomiting., Disp: , Rfl:     SALINE NASAL MIST NA, Saline Nasal Mist, Disp: , Rfl:     sucralfate (CARAFATE) 1 g tablet, Take 1 tab by mouth 4 times a day, Disp: 120 tablet, Rfl: 3    SudoGest 12 Hour 120 MG 12 hr tablet, , Disp: , Rfl:     SUMAtriptan (IMITREX) 100 MG tablet, sumatriptan 100 mg tablet  take 1 po at onset of symptoms. can repeat x 1 in 2 hours if headache persists, Disp: , Rfl:     traZODone (DESYREL) 150 MG tablet, Take 1 tablet by mouth Every Night., Disp: , Rfl:     valACYclovir (VALTREX) 500 MG tablet, TAKE ONE  "TABLET BY MOUTH DAILY, Disp: 90 tablet, Rfl: 3    Allergies   Allergen Reactions    Mobic [Meloxicam]      Blurred vision       Social History     Tobacco Use    Smoking status: Former     Current packs/day: 0.00     Types: Cigarettes     Quit date: 1995     Years since quittin.1    Smokeless tobacco: Never    Tobacco comments:     Quit \" a long time ago\"    Vaping Use    Vaping status: Never Used   Substance Use Topics    Alcohol use: Yes     Comment: OCCASIONALLY    Drug use: Yes     Types: Marijuana       Family History   Problem Relation Age of Onset    Heart attack Father     Hypertension Father     Diabetes Father     Alcohol abuse Father     Dementia Father     Hypertension Mother     Hyperlipidemia Mother     Atrial fibrillation Mother     Stroke Mother     Heart attack Brother     Hypertension Brother     Stroke Brother     Esophageal cancer Brother     Stroke Sister     Crohn's disease Sister     Alzheimer's disease Paternal Grandmother     Colon cancer Maternal Uncle     Diabetes Paternal Aunt     Breast cancer Paternal Aunt     Rheum arthritis Other     Ovarian cancer Neg Hx     Deep vein thrombosis Neg Hx     Pulmonary embolism Neg Hx     Uterine cancer Neg Hx        Review of Systems   Constitutional: Negative.    Respiratory: Negative.     Cardiovascular: Negative.    Gastrointestinal: Negative.    Genitourinary: Negative.    Musculoskeletal: Negative.    Skin: Negative.    Psychiatric/Behavioral: Negative.         /64   Ht 160 cm (62.99\")   Wt 59 kg (130 lb)   LMP  (LMP Unknown)   BMI 23.03 kg/m²     Physical Exam   Constitutional: She is oriented to person, place, and time. She appears well-developed.   HENT:   Head: Normocephalic and atraumatic.   Eyes: Conjunctivae are normal. No scleral icterus.   Neck: No thyromegaly present.   Cardiovascular: Normal rate and regular rhythm.   Pulmonary/Chest: Effort normal and breath sounds normal. Right breast exhibits no inverted nipple, no " mass, no nipple discharge, no skin change and no tenderness. Left breast exhibits no inverted nipple, no mass, no nipple discharge, no skin change and no tenderness.   Abdominal: Soft. Normal appearance and bowel sounds are normal. She exhibits no distension and no mass. There is no abdominal tenderness. There is no rebound and no guarding. No hernia.   Genitourinary:    Pelvic exam was performed with patient supine.   There is no rash, tenderness or lesion on the right labia. There is no rash, tenderness or lesion on the left labia. Uterus is enlarged. Uterus is not deviated, not fixed and not tender. Cervix exhibits no motion tenderness, no lesion, no discharge and no friability. Right adnexum displays no mass, no tenderness and no fullness. Left adnexum displays no mass, no tenderness and no fullness.    No vaginal discharge, erythema, tenderness or bleeding.   No erythema, tenderness or bleeding in the vagina.    No foreign body in the vagina.      No signs of injury in the vagina.      Genitourinary Comments: 12 cm uterus, irregular contour     Neurological: She is alert and oriented to person, place, and time.   Skin: Skin is warm and dry.   Psychiatric: Her behavior is normal. Mood, judgment and thought content normal.   Nursing note and vitals reviewed.         Assessment/Plan    1) GYN HM: normal pap/HPV 5/2022  SBE demonstrated and encouraged.  2) STD screening: declines.Condoms encouraged.  3) Bone health - Weight bearing exercise, dietary calcium recommendations and vitamin D reviewed.   4) Body mass index is 23.03 kg/m². Diet and Exercise discussed  5) Smoking Status:  Still quit! Congratulations !  6) MMG:  UTD 5/2023, B2. Repeat MMG now  7) DEXA- UTD 4/2021- normal ,repeat in 3-5 years  8) C scope-UTD 6/2020- repeat 5 years.  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 Refills sent in.    Parts of this document have been copied or forwarded from her previous visits and have been reviewed, updated and edited as  indicated.     Follow up prn and 1 year annual        Diagnoses and all orders for this visit:    1. Routine gynecological examination (Primary)  -     POC Urinalysis Dipstick  -     IGP, Apt HPV,rfx 16 / 18,45    2. Screening for human papillomavirus (HPV)  -     IGP, Apt HPV,rfx 16 / 18,45        Umu Colvin, ROSA  010:28 EDT   5/25/2023

## 2024-08-05 LAB
CYTOLOGIST CVX/VAG CYTO: NORMAL
CYTOLOGY CVX/VAG DOC CYTO: NORMAL
CYTOLOGY CVX/VAG DOC THIN PREP: NORMAL
DX ICD CODE: NORMAL
HPV I/H RISK 4 DNA CVX QL PROBE+SIG AMP: NEGATIVE
Lab: NORMAL
Lab: NORMAL
OTHER STN SPEC: NORMAL
STAT OF ADQ CVX/VAG CYTO-IMP: NORMAL

## 2024-08-12 ENCOUNTER — HOSPITAL ENCOUNTER (EMERGENCY)
Facility: HOSPITAL | Age: 59
Discharge: HOME | End: 2024-08-12
Attending: STUDENT IN AN ORGANIZED HEALTH CARE EDUCATION/TRAINING PROGRAM
Payer: COMMERCIAL

## 2024-08-12 VITALS
DIASTOLIC BLOOD PRESSURE: 71 MMHG | SYSTOLIC BLOOD PRESSURE: 118 MMHG | WEIGHT: 134.3 LBS | OXYGEN SATURATION: 97 % | HEIGHT: 62 IN | RESPIRATION RATE: 18 BRPM | TEMPERATURE: 97.6 F | BODY MASS INDEX: 24.71 KG/M2 | HEART RATE: 89 BPM

## 2024-08-12 DIAGNOSIS — T38.3X5A HYPOGLYCEMIA DUE TO INSULIN: Primary | ICD-10-CM

## 2024-08-12 DIAGNOSIS — E16.0 HYPOGLYCEMIA DUE TO INSULIN: Primary | ICD-10-CM

## 2024-08-12 LAB
ALBUMIN SERPL-MCNC: 3.9 G/DL (ref 3.5–5.2)
ALBUMIN/GLOB SERPL: 1.6 G/DL
ALP SERPL-CCNC: 60 U/L (ref 39–117)
ALT SERPL W P-5'-P-CCNC: 13 U/L (ref 1–33)
ANION GAP SERPL CALCULATED.3IONS-SCNC: 11.5 MMOL/L (ref 5–15)
AST SERPL-CCNC: 26 U/L (ref 1–32)
BASOPHILS # BLD AUTO: 0.04 10*3/MM3 (ref 0–0.2)
BASOPHILS NFR BLD AUTO: 0.5 % (ref 0–1.5)
BILIRUB SERPL-MCNC: <0.2 MG/DL (ref 0–1.2)
BUN SERPL-MCNC: 15 MG/DL (ref 6–20)
BUN/CREAT SERPL: 22.4 (ref 7–25)
CALCIUM SPEC-SCNC: 8.3 MG/DL (ref 8.6–10.5)
CHLORIDE SERPL-SCNC: 104 MMOL/L (ref 98–107)
CO2 SERPL-SCNC: 20.5 MMOL/L (ref 22–29)
CREAT SERPL-MCNC: 0.67 MG/DL (ref 0.57–1)
DEPRECATED RDW RBC AUTO: 50.9 FL (ref 37–54)
EGFRCR SERPLBLD CKD-EPI 2021: 101.5 ML/MIN/1.73
EOSINOPHIL # BLD AUTO: 0.1 10*3/MM3 (ref 0–0.4)
EOSINOPHIL NFR BLD AUTO: 1.2 % (ref 0.3–6.2)
ERYTHROCYTE [DISTWIDTH] IN BLOOD BY AUTOMATED COUNT: 16.9 % (ref 12.3–15.4)
GLOBULIN UR ELPH-MCNC: 2.4 GM/DL
GLUCOSE BLDC GLUCOMTR-MCNC: 195 MG/DL (ref 70–130)
GLUCOSE BLDC GLUCOMTR-MCNC: 44 MG/DL (ref 70–130)
GLUCOSE SERPL-MCNC: 241 MG/DL (ref 65–99)
HCT VFR BLD AUTO: 29.9 % (ref 34–46.6)
HGB BLD-MCNC: 9.5 G/DL (ref 12–15.9)
IMM GRANULOCYTES # BLD AUTO: 0.02 10*3/MM3 (ref 0–0.05)
IMM GRANULOCYTES NFR BLD AUTO: 0.2 % (ref 0–0.5)
LYMPHOCYTES # BLD AUTO: 1.87 10*3/MM3 (ref 0.7–3.1)
LYMPHOCYTES NFR BLD AUTO: 23.1 % (ref 19.6–45.3)
MCH RBC QN AUTO: 26.4 PG (ref 26.6–33)
MCHC RBC AUTO-ENTMCNC: 31.8 G/DL (ref 31.5–35.7)
MCV RBC AUTO: 83.1 FL (ref 79–97)
MONOCYTES # BLD AUTO: 0.77 10*3/MM3 (ref 0.1–0.9)
MONOCYTES NFR BLD AUTO: 9.5 % (ref 5–12)
NEUTROPHILS NFR BLD AUTO: 5.31 10*3/MM3 (ref 1.7–7)
NEUTROPHILS NFR BLD AUTO: 65.5 % (ref 42.7–76)
NRBC BLD AUTO-RTO: 0 /100 WBC (ref 0–0.2)
PLATELET # BLD AUTO: 250 10*3/MM3 (ref 140–450)
PMV BLD AUTO: 10.2 FL (ref 6–12)
POTASSIUM SERPL-SCNC: 3.5 MMOL/L (ref 3.5–5.2)
PROT SERPL-MCNC: 6.3 G/DL (ref 6–8.5)
RBC # BLD AUTO: 3.6 10*6/MM3 (ref 3.77–5.28)
SODIUM SERPL-SCNC: 136 MMOL/L (ref 136–145)
WBC NRBC COR # BLD AUTO: 8.11 10*3/MM3 (ref 3.4–10.8)

## 2024-08-12 PROCEDURE — 99283 EMERGENCY DEPT VISIT LOW MDM: CPT

## 2024-08-12 PROCEDURE — 85025 COMPLETE CBC W/AUTO DIFF WBC: CPT | Performed by: STUDENT IN AN ORGANIZED HEALTH CARE EDUCATION/TRAINING PROGRAM

## 2024-08-12 PROCEDURE — 82948 REAGENT STRIP/BLOOD GLUCOSE: CPT

## 2024-08-12 PROCEDURE — 80053 COMPREHEN METABOLIC PANEL: CPT | Performed by: STUDENT IN AN ORGANIZED HEALTH CARE EDUCATION/TRAINING PROGRAM

## 2024-08-12 RX ORDER — DEXTROSE MONOHYDRATE 25 G/50ML
INJECTION, SOLUTION INTRAVENOUS
Status: COMPLETED
Start: 2024-08-12 | End: 2024-08-12

## 2024-08-12 RX ADMIN — DEXTROSE MONOHYDRATE 50 ML: 25 INJECTION, SOLUTION INTRAVENOUS at 05:36

## 2024-08-12 NOTE — DISCHARGE INSTRUCTIONS

## 2024-08-12 NOTE — ED PROVIDER NOTES
"Subjective   History of Present Illness  This is a 59-year-old female with a history of diabetes on metformin, 500 mg twice a day as well as 28 units of Lantus at nighttime.  The patient, over the last several months, states that she has been having episodes of low glucose.  Today she states that she feels like she has low blood sugar and is \"off.\"  The patient presents for further evaluation.  She states that she has been taking her normal dose.  Denies recent illness, chest pressure, chest pain, cough, dysuria, hematuria, fever.  She has no pain or radiating pain      Review of Systems   Constitutional:  Positive for diaphoresis. Negative for activity change, appetite change and fatigue.   All other systems reviewed and are negative.      Past Medical History:   Diagnosis Date    Abnormal Pap smear of cervix     1 abnormal pap with normal f/u    Howell esophagus     Colon polyp     Diabetes mellitus     Difficulty swallowing     at times    DKA (diabetic ketoacidosis)     Fibroid     GERD (gastroesophageal reflux disease)     HSV-2 (herpes simplex virus 2) infection 2017    Menstrual disorder     Migraines     Rectal bleeding     bleeds with bowel movement    Seasonal allergies        Allergies   Allergen Reactions    Mobic [Meloxicam]      Blurred vision       Past Surgical History:   Procedure Laterality Date    CARPAL TUNNEL RELEASE WITH CUBITAL TUNNEL RELEASE Right      SECTION      X2    COLONOSCOPY N/A 2016    Procedure: COLONOSCOPY with polypectomy;  Surgeon: Maryan Kent MD;  Location: [facility] OR;  Service:     COLONOSCOPY N/A 6/3/2020    Procedure: COLONOSCOPY;  Surgeon: Dmitri Fung MD;  Location: [facility] OR;  Service: Gastroenterology;  Laterality: N/A;  Poor prep, polyp    ENDOMETRIAL ABLATION      thermachoice    ENDOSCOPY N/A 6/3/2020    Procedure: ESOPHAGOGASTRODUODENOSCOPY;  Surgeon: Dmitri Fung MD;  Location: [facility] OR;  Service: " "Gastroenterology;  Laterality: N/A;  Reflux esophagitis, erosive gastritis, duodenitis    HYSTEROSCOPY      AND ENDOMETRIAL  ABLATION    NASAL SEPTUM SURGERY      2004    TUBAL ABDOMINAL LIGATION         Family History   Problem Relation Age of Onset    Heart attack Father     Hypertension Father     Diabetes Father     Alcohol abuse Father     Dementia Father     Hypertension Mother     Hyperlipidemia Mother     Atrial fibrillation Mother     Stroke Mother     Heart attack Brother     Hypertension Brother     Stroke Brother     Esophageal cancer Brother     Stroke Sister     Crohn's disease Sister     Alzheimer's disease Paternal Grandmother     Colon cancer Maternal Uncle     Diabetes Paternal Aunt     Breast cancer Paternal Aunt     Rheum arthritis Other     Ovarian cancer Neg Hx     Deep vein thrombosis Neg Hx     Pulmonary embolism Neg Hx     Uterine cancer Neg Hx        Social History     Socioeconomic History    Marital status:    Tobacco Use    Smoking status: Former     Current packs/day: 0.00     Types: Cigarettes     Quit date: 1995     Years since quittin.2    Smokeless tobacco: Never    Tobacco comments:     Quit \" a long time ago\"    Vaping Use    Vaping status: Never Used   Substance and Sexual Activity    Alcohol use: Yes     Comment: OCCASIONALLY    Drug use: Yes     Types: Marijuana    Sexual activity: Yes     Partners: Male     Birth control/protection: Post-menopausal, Tubal ligation     Comment: BTL           Objective   Physical Exam  Vitals and nursing note reviewed.   Constitutional:       General: She is in acute distress.      Appearance: Normal appearance. She is not ill-appearing, toxic-appearing or diaphoretic.      Comments: Uncomfortable, shivering   HENT:      Head: Normocephalic and atraumatic.      Right Ear: Tympanic membrane and external ear normal.      Left Ear: Tympanic membrane and external ear normal.      Nose: Nose normal. No congestion or rhinorrhea.      " Mouth/Throat:      Mouth: Mucous membranes are moist.      Pharynx: No oropharyngeal exudate or posterior oropharyngeal erythema.   Eyes:      Conjunctiva/sclera: Conjunctivae normal.      Pupils: Pupils are equal, round, and reactive to light.   Cardiovascular:      Rate and Rhythm: Normal rate and regular rhythm.      Pulses: Normal pulses.   Pulmonary:      Effort: Pulmonary effort is normal. No respiratory distress.      Breath sounds: Normal breath sounds. No stridor. No wheezing, rhonchi or rales.   Chest:      Chest wall: No tenderness.   Abdominal:      General: There is no distension.      Palpations: Abdomen is soft. There is no mass.      Tenderness: There is no guarding or rebound.   Musculoskeletal:         General: No swelling or deformity. Normal range of motion.      Cervical back: Normal range of motion and neck supple. No rigidity or tenderness.   Skin:     General: Skin is warm.      Capillary Refill: Capillary refill takes less than 2 seconds.      Coloration: Skin is not pale.   Neurological:      General: No focal deficit present.      Mental Status: She is alert and oriented to person, place, and time. Mental status is at baseline.      Comments: Slurred speech, no focal deficit   Psychiatric:         Mood and Affect: Mood normal.         Thought Content: Thought content normal.         Procedures           ED Course  ED Course as of 08/12/24 0654   Mon Aug 12, 2024   0550 On reevaluation the patient's speech is now improved significantly, she states that she feels much improved after the D50 dose. [JN]   0652 Patient's blood glucose is 195.  She has eaten a full breakfast.  Maintains stable glucose and is alert and oriented. [JN]      ED Course User Index  [JN] David Colby, DO                                             Medical Decision Making    MDM:    Escalation of care including admission/observation considered    - Discussions of management with other providers:  None    -  Discussed/reviewed with Radiology regarding test interpretation    - Independent interpretation: Labs    - Additional patient history obtained from: Lindsey    - Review of external non-ED record (if available):  Prior Inpt record, Office record, Outpt record, Prior Outpt labs, PCP record, Outside ED record, Other    - Chronic conditions affecting care: See HPI and medical Hx.    - Social Determinants of health significantly affecting care:  None        Medical Decision Making Discussion:    This is a 59-year-old female who presents with hypoglycemia and feelings that she is cold.  The patient and I discussed her insulin dosing after she was initially seen and evaluated and placed in hospital bed.  IV access was established immediately and the patient was found to have a blood glucose in the low 40s.  As result an amp of D50 was given.  She states that she was taking Lantus and has been taking her normal dose of 28 units.  Based off of the patient's weight I feel that her Lantus dose may be slightly high as she is also concomitantly taking metformin.  Basic labs obtained and we will continue to monitor.    Hemoglobin is mildly low but the patient denies any active bleeding.  No black or tarry stools or coffee-ground emesis.  No vaginal bleeding    Patient's family is at bedside, we had a long discussion about the use of insulin, the new dose that I would like her to have which is about 18 units.  I would like the patient's insulin dosing to go down and she is also taking metformin I feel that she has been overdoing the insulin which is causing her intermittent episodes of hypoglycemia.  The patient has been reevaluated multiple times, is progressing very well and states that she feels fine and would like to go home.  I feel that this is very reasonable.  The patient has eaten a full breakfast, her blood glucose remains over 100 and on reevaluation the patient states that she feels much improved.  She will be discharged  with her family and will have family with her throughout the morning.  The patient is otherwise well-appearing, she will follow-up here if there is any acute change or worsening of her condition and is stable for discharge.    The patient has been given very strict return precautions to return to the emergency department should there be any acute change or worsening of their condition.  I have explained my findings and the patient has expressed understanding to me.  I explained that the work-up performed in the ED has been based on the specific complaint and concern, as the nature of emergency medicine is complaint driven and they understand that new symptoms may arise.  I have told them that, should there be any new symptoms, worsening or changing symptoms, a new work-up may be indicated that they are encouraged to return to the emergency department or promptly contact their primary care physician. We have employed a shared decision-making process as the discussion of their disposition.  The patient has been educated as to the nature of the visit, the tests and work-up performed and the findings from today's visit. At this time, there does not appear to be any acute emergent process that necessitates admission to the hospital, however, the patient understands that this can change unexpectedly. At this time, the patient is stable for discharge home and agrees to follow-up with her primary care physician in the next 24 to 48 hours or earlier should they be able to obtain an appointment.    The patient was counseled regarding diagnostic results and treatment plan and patient has indicated understanding of these instructions.      Amount and/or Complexity of Data Reviewed  Labs: ordered.        Final diagnoses:   Hypoglycemia due to insulin       ED Disposition  ED Disposition       ED Disposition   Discharge    Condition   Good    Comment   --               Jaiden Villagomez MD  [address] [facility] [place]  [id number]  [phone]               Medication List      No changes were made to your prescriptions during this visit.            David Colby DO  08/12/24 0655

## 2024-08-20 ENCOUNTER — OFFICE VISIT (OUTPATIENT)
Dept: GASTROENTEROLOGY | Facility: CLINIC | Age: 59
End: 2024-08-20
Payer: COMMERCIAL

## 2024-08-20 VITALS
HEIGHT: 62 IN | WEIGHT: 127.6 LBS | DIASTOLIC BLOOD PRESSURE: 72 MMHG | SYSTOLIC BLOOD PRESSURE: 118 MMHG | BODY MASS INDEX: 23.48 KG/M2

## 2024-08-20 DIAGNOSIS — K22.719 BARRETT'S ESOPHAGUS WITH DYSPLASIA: Primary | ICD-10-CM

## 2024-08-20 DIAGNOSIS — K22.89 PRESBYESOPHAGUS: ICD-10-CM

## 2024-08-20 RX ORDER — GLIPIZIDE 5 MG/1
5 TABLET, FILM COATED, EXTENDED RELEASE ORAL DAILY
COMMUNITY
Start: 2024-08-12

## 2024-08-20 NOTE — PROGRESS NOTES
PATIENT INFORMATION  Lona Dupree       - 1965    CHIEF COMPLAINT  Chief Complaint   Patient presents with   • Heartburn   • Howell's Esophagus        HISTORY OF PRESENT ILLNESS    Here today for GERD follow-up     GERD: Per LOV: Alternating PPI (AM and dinner) and H2RA (lunch and bedtime), taking sucralfate TID. Sucralfate is the only one that gives relief, when was out was having nausea and vomiting. With current regimen flares go with trigger foods, but difficult to avoid. Tums PRN. Dysphagia with foods, feels clogged at times if tries to drink, other times can feel sensation in GE. Generally does not have to cough anything up. Plan to wean pepcid and add sucralfate at night. TODAY: Feels like stomach is doing well. Nausea was was better until started taking glipizide. Sucralfate TID and BID PPI, not on pepcid. Occasional dysphagia, but less often and no meet impactions, usually breaded foods and not usually coughing back up.     Diabetes dx in 2017, insulin and metformin. Sugar bottomed out recently and went to ER and admitted once for DKA with insulin issues.      Bowels are very regular, easy and well controlled after coffee in morning, no bleeding or straining.     Sister with crohns and brother passed of esophageal cancer.     6/3/2020 last EGD and Colon for dysphagia, reflux with barretts, negative for celiac, dysplasia. Colon with 0/1 adenomas, previous colon normal. Personal hx polyps.        REVIEWED PERTINENT RESULTS/ LABS  Lab Results   Component Value Date    CASEREPORT  2020     Surgical Pathology Report                         Case: [id number]                                  Authorizing Provider:  Dmitri Fung        Collected:           2020 01:56 PM                                 MD Goran                                                                   Ordering Location:     [facility]   Received:            2020 05:23 PM                                  OR                                                                           Pathologist:           Norberto Mcgregor MD                                                         Specimens:   1) - Small Intestine, Duodenum                                                                      2) - Gastric, Body                                                                                  3) - Esophagus, Distal                                                                              4) - Large Intestine, Rectum                                                               FINALDX  06/03/2020     1.  Duodenum Biopsy: Benign duodenal and small intestinal mucosa with   A. Normal villous architecture.   B. No active cryptitis, crypt abscess formation, granulomatous inflammation nor         intestinal parasites identified.    2.  Gastric Biopsy:  Benign gastric mucosa with     A.  Mild chronic inflammation.    B.  No H. pylori-like organisms identified by routine staining.      3.  Distal Esophagus Biopsy:  Benign squamous and glandular mucosa with   A.  Focally active mild chronic inflammation with rare eosinophil noted suggesting chronic reflux.    B.  Focal, rare intestinal metaplasia by special staining consistent with developing Howell's esophagus                      (see comment).   C.  No dysplasia nor malignancy identified.      4.  Rectum Biopsy:    A.  Hyperplastic polyp.    B.  No glandular dyplasia nor malignancy identified.     jat/brb        Lab Results   Component Value Date    HGB 9.5 (L) 08/12/2024    MCV 83.1 08/12/2024     08/12/2024    ALT 13 08/12/2024    AST 26 08/12/2024    HGBA1C 8.16 (H) 06/01/2024    INR 0.95 04/21/2021    TRIG 114 04/21/2021      No results found.    REVIEW OF SYSTEMS  Review of Systems   Constitutional: Negative.    HENT: Negative.     Eyes: Negative.    Respiratory: Negative.     Cardiovascular: Negative.    Gastrointestinal:  Positive for diarrhea  (Due to medication) and nausea (Due to medication). Negative for abdominal pain.        Howell's, GERD   Endocrine: Negative.    Genitourinary: Negative.    Musculoskeletal: Negative.    Skin: Negative.    Allergic/Immunologic: Negative.    Neurological: Negative.    Hematological: Negative.    Psychiatric/Behavioral: Negative.           ACTIVE PROBLEMS  Patient Active Problem List    Diagnosis    • Diabetic ketoacidosis [E11.10]    • DKA (diabetic ketoacidosis) [E11.10]    • Hypoglycemia [E16.2]    • Diabetic acidosis without coma [E11.10]    • Gastroesophageal reflux disease with esophagitis without hemorrhage [K21.00]    • Howell's esophagus with dysplasia [K22.719]    • Presbyesophagus [K22.89]    • Esophageal dysphagia [R13.19]    • Personal history of colonic polyps [Z86.010]    • High anion gap metabolic acidosis [E87.29]    • Methamphetamine use [F15.10]    • Cocaine use [F14.90]    • Nausea & vomiting [R11.2]    • Diabetes mellitus with ketosis [E13.10]    • HSV-2 (herpes simplex virus 2) infection [B00.9]    • Fibroids [D21.9]    • Smoker [F17.200]    • Headache [R51.9]    • Diabetes mellitus [E11.9]    • History of bloody stools [Z87.19]          PAST MEDICAL HISTORY  Past Medical History:   Diagnosis Date   • Abnormal Pap smear of cervix     1 abnormal pap with normal f/u   • Howell esophagus    • Colon polyp    • Diabetes mellitus    • Difficulty swallowing     at times   • DKA (diabetic ketoacidosis)    • Fibroid    • GERD (gastroesophageal reflux disease)    • HSV-2 (herpes simplex virus 2) infection 2017   • Menstrual disorder    • Migraines    • Rectal bleeding     bleeds with bowel movement   • Seasonal allergies          SURGICAL HISTORY  Past Surgical History:   Procedure Laterality Date   • CARPAL TUNNEL RELEASE WITH CUBITAL TUNNEL RELEASE Right    •  SECTION      X2   • COLONOSCOPY N/A 2016    Procedure: COLONOSCOPY with polypectomy;  Surgeon: Maryan Kent MD;   "Location:  [facility] OR;  Service:    • COLONOSCOPY N/A 6/3/2020    Procedure: COLONOSCOPY;  Surgeon: Dmitri Fung MD;  Location:  [facility] OR;  Service: Gastroenterology;  Laterality: N/A;  Poor prep, polyp   • ENDOMETRIAL ABLATION      thermachoice   • ENDOSCOPY N/A 6/3/2020    Procedure: ESOPHAGOGASTRODUODENOSCOPY;  Surgeon: Dmitri Fung MD;  Location:  [facility] OR;  Service: Gastroenterology;  Laterality: N/A;  Reflux esophagitis, erosive gastritis, duodenitis   • HYSTEROSCOPY      AND ENDOMETRIAL  ABLATION   • NASAL SEPTUM SURGERY         • TUBAL ABDOMINAL LIGATION           FAMILY HISTORY  Family History   Problem Relation Age of Onset   • Heart attack Father    • Hypertension Father    • Diabetes Father    • Alcohol abuse Father    • Dementia Father    • Hypertension Mother    • Hyperlipidemia Mother    • Atrial fibrillation Mother    • Stroke Mother    • Heart attack Brother    • Hypertension Brother    • Stroke Brother    • Esophageal cancer Brother    • Stroke Sister    • Crohn's disease Sister    • Alzheimer's disease Paternal Grandmother    • Colon cancer Maternal Uncle    • Diabetes Paternal Aunt    • Breast cancer Paternal Aunt    • Rheum arthritis Other    • Ovarian cancer Neg Hx    • Deep vein thrombosis Neg Hx    • Pulmonary embolism Neg Hx    • Uterine cancer Neg Hx          SOCIAL HISTORY  Social History     Occupational History   • Not on file   Tobacco Use   • Smoking status: Former     Current packs/day: 0.00     Types: Cigarettes     Quit date: 1995     Years since quittin.2   • Smokeless tobacco: Never   • Tobacco comments:     Quit \" a long time ago\"    Vaping Use   • Vaping status: Never Used   Substance and Sexual Activity   • Alcohol use: Yes     Comment: OCCASIONALLY   • Drug use: Yes     Types: Marijuana   • Sexual activity: Yes     Partners: Male     Birth control/protection: Post-menopausal, Tubal ligation     Comment: BTL         CURRENT " MEDICATIONS    Current Outpatient Medications:   •  acetaminophen (TYLENOL) 325 MG tablet, Take 2 tablets by mouth Every 4 (Four) Hours As Needed for Mild Pain . Over the counter, Disp: , Rfl:   •  albuterol sulfate  (90 Base) MCG/ACT inhaler, 1 puff., Disp: , Rfl:   •  atorvastatin (LIPITOR) 10 MG tablet, atorvastatin 10 mg tablet, Disp: , Rfl:   •  Blood Glucose Monitoring Suppl (Contour Next EZ) w/Device kit, Use to test blood glucose up to four times daily as needed., Disp: 1 kit, Rfl: 0  •  cetirizine (zyrTEC) 10 MG tablet, Take 1 tablet by mouth Daily. PRN, Disp: , Rfl:   •  Continuous Glucose  (FreeStyle Annalee 3 [place]) device, Use to check blood sugar 6-8 times a day as needed, Disp: 1 each, Rfl: 11  •  Continuous Glucose Sensor (FreeStyle Annalee 14 Day Sensor) misc, Use to check glucose 6-8 times a day as needed. Change sensor every 14 days as directed by prescriber., Disp: 1 each, Rfl: 11  •  Continuous Glucose Sensor (FreeStyle Annalee 3 Sensor) misc, Check blood glucose levels 6 to 8 times daily. Change sensory every 14 days., Disp: 1 each, Rfl: 11  •  FLUoxetine (PROzac) 40 MG capsule, fluoxetine 40 mg capsule  Take 1 capsule every day by oral route for 90 days., Disp: , Rfl:   •  glipizide (GLUCOTROL XL) 5 MG ER tablet, Take 1 tablet by mouth Daily., Disp: , Rfl:   •  glucose blood test strip, Use to test blood glucose up to four times daily as needed., Disp: 100 each, Rfl: 0  •  Lancets misc, Use to test blood glucose up to four times daily as needed., Disp: 100 each, Rfl: 0  •  Lantus SoloStar 100 UNIT/ML injection pen, Inject 28 Units under the skin into the appropriate area as directed Every Morning Before Breakfast., Disp: 15 mL, Rfl: 0  •  Multiple Vitamins-Minerals (MULTI-BETIC PO), Take 2 tablets by mouth Daily., Disp: , Rfl:   •  pantoprazole (PROTONIX) 40 MG EC tablet, Take 1 tablet by mouth 2 (Two) Times a Day., Disp: 180 tablet, Rfl: 3  •  promethazine (PHENERGAN) 25 MG tablet,  "Take 1 tablet by mouth Every 6 (Six) Hours As Needed for Nausea or Vomiting., Disp: , Rfl:   •  SALINE NASAL MIST NA, Saline Nasal Mist, Disp: , Rfl:   •  sucralfate (CARAFATE) 1 g tablet, Take 1 tab by mouth 4 times a day, Disp: 120 tablet, Rfl: 3  •  SudoGest 12 Hour 120 MG 12 hr tablet, , Disp: , Rfl:   •  SUMAtriptan (IMITREX) 100 MG tablet, sumatriptan 100 mg tablet  take 1 po at onset of symptoms. can repeat x 1 in 2 hours if headache persists, Disp: , Rfl:   •  traZODone (DESYREL) 150 MG tablet, Take 1 tablet by mouth Every Night., Disp: , Rfl:   •  valACYclovir (VALTREX) 500 MG tablet, TAKE ONE TABLET BY MOUTH DAILY, Disp: 90 tablet, Rfl: 3  •  aspirin 81 MG chewable tablet, Chew 1 tablet Daily. (Patient not taking: Reported on 8/20/2024), Disp: , Rfl:   •  ondansetron ODT (ZOFRAN-ODT) 4 MG disintegrating tablet, Place 1 tablet on the tongue Every 8 (Eight) Hours As Needed for Nausea or Vomiting. (Patient not taking: Reported on 8/20/2024), Disp: 10 tablet, Rfl: 0    ALLERGIES  Mobic [meloxicam]    VITALS  Vitals:    08/20/24 0809   BP: 118/72   BP Location: Left arm   Patient Position: Sitting   Cuff Size: Large Adult   Weight: 57.9 kg (127 lb 9.6 oz)   Height: 157.5 cm (62.01\")       PHYSICAL EXAM  Debilities/Disabilities Identified: None  Emotional Behavior: Appropriate  Wt Readings from Last 3 Encounters:   08/20/24 57.9 kg (127 lb 9.6 oz)   08/12/24 60.9 kg (134 lb 4.8 oz)   07/30/24 59 kg (130 lb)     Ht Readings from Last 1 Encounters:   08/20/24 157.5 cm (62.01\")     Body mass index is 23.33 kg/m².  Physical Exam  Constitutional:       General: She is not in acute distress.     Appearance: Normal appearance. She is not ill-appearing.   HENT:      Head: Normocephalic and atraumatic.      Mouth/Throat:      Mouth: Mucous membranes are moist.      Pharynx: No posterior oropharyngeal erythema.   Eyes:      General: No scleral icterus.  Cardiovascular:      Rate and Rhythm: Normal rate and regular rhythm. "      Heart sounds: Normal heart sounds.   Pulmonary:      Effort: Pulmonary effort is normal.      Breath sounds: Normal breath sounds.   Abdominal:      General: Abdomen is flat. Bowel sounds are normal. There is no distension.      Palpations: Abdomen is soft. There is no mass.      Tenderness: There is abdominal tenderness in the epigastric area. There is no guarding or rebound. Negative signs include Gonzalez's sign.      Hernia: No hernia is present.   Musculoskeletal:      Cervical back: Neck supple.   Skin:     General: Skin is warm.      Capillary Refill: Capillary refill takes less than 2 seconds.   Neurological:      General: No focal deficit present.      Mental Status: She is alert and oriented to person, place, and time.   Psychiatric:         Mood and Affect: Mood normal.         Behavior: Behavior normal.         Thought Content: Thought content normal.         Judgment: Judgment normal.       CLINICAL DATA REVIEWED   reviewed previous lab results and integrated with today's visit, reviewed notes from other physicians and/or last GI encounter, reviewed previous endoscopy results and available photos, reviewed surgical pathology results from previous biopsies    ASSESSMENT  Diagnoses and all orders for this visit:    Howell's esophagus with dysplasia  -     Case Request; Standing  -     Case Request    Presbyesophagus    Other orders  -     glipizide (GLUCOTROL XL) 5 MG ER tablet; Take 1 tablet by mouth Daily.  -     Follow Anesthesia Guidelines / Protocol; Future  -     Obtain Informed Consent; Standing          PLAN    EGD, suspect not as well controlled  Continue current meds, would consider Voquezna if inadequate control on EGD    Return in about 6 months (around 2/20/2025).    I have discussed the above plan with the patient.  They verbalize understanding and are in agreement with the plan.  They have been advised to contact the office for any questions, concerns, or changes related to their  health.

## 2024-08-20 NOTE — H&P (VIEW-ONLY)
PATIENT INFORMATION  Lona Dupree       - 1965    CHIEF COMPLAINT  Chief Complaint   Patient presents with   • Heartburn   • Howell's Esophagus        HISTORY OF PRESENT ILLNESS    Here today for GERD follow-up     GERD: Per LOV: Alternating PPI (AM and dinner) and H2RA (lunch and bedtime), taking sucralfate TID. Sucralfate is the only one that gives relief, when was out was having nausea and vomiting. With current regimen flares go with trigger foods, but difficult to avoid. Tums PRN. Dysphagia with foods, feels clogged at times if tries to drink, other times can feel sensation in GE. Generally does not have to cough anything up. Plan to wean pepcid and add sucralfate at night. TODAY: Feels like stomach is doing well. Nausea was was better until started taking glipizide. Sucralfate TID and BID PPI, not on pepcid. Occasional dysphagia, but less often and no meet impactions, usually breaded foods and not usually coughing back up.     Diabetes dx in 2017, insulin and metformin. Sugar bottomed out recently and went to ER and admitted once for DKA with insulin issues.      Bowels are very regular, easy and well controlled after coffee in morning, no bleeding or straining.     Sister with crohns and brother passed of esophageal cancer.     6/3/2020 last EGD and Colon for dysphagia, reflux with barretts, negative for celiac, dysplasia. Colon with 0/1 adenomas, previous colon normal. Personal hx polyps.        REVIEWED PERTINENT RESULTS/ LABS  Lab Results   Component Value Date    CASEREPORT  2020     Surgical Pathology Report                         Case: [id number]                                  Authorizing Provider:  Dmitri Fung        Collected:           2020 01:56 PM                                 MD Goran                                                                   Ordering Location:     [facility]   Received:            2020 05:23 PM                                  OR                                                                           Pathologist:           Norberto Mcgregor MD                                                         Specimens:   1) - Small Intestine, Duodenum                                                                      2) - Gastric, Body                                                                                  3) - Esophagus, Distal                                                                              4) - Large Intestine, Rectum                                                               FINALDX  06/03/2020     1.  Duodenum Biopsy: Benign duodenal and small intestinal mucosa with   A. Normal villous architecture.   B. No active cryptitis, crypt abscess formation, granulomatous inflammation nor         intestinal parasites identified.    2.  Gastric Biopsy:  Benign gastric mucosa with     A.  Mild chronic inflammation.    B.  No H. pylori-like organisms identified by routine staining.      3.  Distal Esophagus Biopsy:  Benign squamous and glandular mucosa with   A.  Focally active mild chronic inflammation with rare eosinophil noted suggesting chronic reflux.    B.  Focal, rare intestinal metaplasia by special staining consistent with developing Howell's esophagus                      (see comment).   C.  No dysplasia nor malignancy identified.      4.  Rectum Biopsy:    A.  Hyperplastic polyp.    B.  No glandular dyplasia nor malignancy identified.     jat/brb        Lab Results   Component Value Date    HGB 9.5 (L) 08/12/2024    MCV 83.1 08/12/2024     08/12/2024    ALT 13 08/12/2024    AST 26 08/12/2024    HGBA1C 8.16 (H) 06/01/2024    INR 0.95 04/21/2021    TRIG 114 04/21/2021      No results found.    REVIEW OF SYSTEMS  Review of Systems   Constitutional: Negative.    HENT: Negative.     Eyes: Negative.    Respiratory: Negative.     Cardiovascular: Negative.    Gastrointestinal:  Positive for diarrhea  (Due to medication) and nausea (Due to medication). Negative for abdominal pain.        Howell's, GERD   Endocrine: Negative.    Genitourinary: Negative.    Musculoskeletal: Negative.    Skin: Negative.    Allergic/Immunologic: Negative.    Neurological: Negative.    Hematological: Negative.    Psychiatric/Behavioral: Negative.           ACTIVE PROBLEMS  Patient Active Problem List    Diagnosis    • Diabetic ketoacidosis [E11.10]    • DKA (diabetic ketoacidosis) [E11.10]    • Hypoglycemia [E16.2]    • Diabetic acidosis without coma [E11.10]    • Gastroesophageal reflux disease with esophagitis without hemorrhage [K21.00]    • Howell's esophagus with dysplasia [K22.719]    • Presbyesophagus [K22.89]    • Esophageal dysphagia [R13.19]    • Personal history of colonic polyps [Z86.010]    • High anion gap metabolic acidosis [E87.29]    • Methamphetamine use [F15.10]    • Cocaine use [F14.90]    • Nausea & vomiting [R11.2]    • Diabetes mellitus with ketosis [E13.10]    • HSV-2 (herpes simplex virus 2) infection [B00.9]    • Fibroids [D21.9]    • Smoker [F17.200]    • Headache [R51.9]    • Diabetes mellitus [E11.9]    • History of bloody stools [Z87.19]          PAST MEDICAL HISTORY  Past Medical History:   Diagnosis Date   • Abnormal Pap smear of cervix     1 abnormal pap with normal f/u   • Howell esophagus    • Colon polyp    • Diabetes mellitus    • Difficulty swallowing     at times   • DKA (diabetic ketoacidosis)    • Fibroid    • GERD (gastroesophageal reflux disease)    • HSV-2 (herpes simplex virus 2) infection 2017   • Menstrual disorder    • Migraines    • Rectal bleeding     bleeds with bowel movement   • Seasonal allergies          SURGICAL HISTORY  Past Surgical History:   Procedure Laterality Date   • CARPAL TUNNEL RELEASE WITH CUBITAL TUNNEL RELEASE Right    •  SECTION      X2   • COLONOSCOPY N/A 2016    Procedure: COLONOSCOPY with polypectomy;  Surgeon: Maryan Kent MD;   "Location:  [facility];  Service:    • COLONOSCOPY N/A 6/3/2020    Procedure: COLONOSCOPY;  Surgeon: Dmitri Fung MD;  Location:  [facility] OR;  Service: Gastroenterology;  Laterality: N/A;  Poor prep, polyp   • ENDOMETRIAL ABLATION      thermachoice   • ENDOSCOPY N/A 6/3/2020    Procedure: ESOPHAGOGASTRODUODENOSCOPY;  Surgeon: Dmitri Fung MD;  Location:  [facility] OR;  Service: Gastroenterology;  Laterality: N/A;  Reflux esophagitis, erosive gastritis, duodenitis   • HYSTEROSCOPY      AND ENDOMETRIAL  ABLATION   • NASAL SEPTUM SURGERY         • TUBAL ABDOMINAL LIGATION           FAMILY HISTORY  Family History   Problem Relation Age of Onset   • Heart attack Father    • Hypertension Father    • Diabetes Father    • Alcohol abuse Father    • Dementia Father    • Hypertension Mother    • Hyperlipidemia Mother    • Atrial fibrillation Mother    • Stroke Mother    • Heart attack Brother    • Hypertension Brother    • Stroke Brother    • Esophageal cancer Brother    • Stroke Sister    • Crohn's disease Sister    • Alzheimer's disease Paternal Grandmother    • Colon cancer Maternal Uncle    • Diabetes Paternal Aunt    • Breast cancer Paternal Aunt    • Rheum arthritis Other    • Ovarian cancer Neg Hx    • Deep vein thrombosis Neg Hx    • Pulmonary embolism Neg Hx    • Uterine cancer Neg Hx          SOCIAL HISTORY  Social History     Occupational History   • Not on file   Tobacco Use   • Smoking status: Former     Current packs/day: 0.00     Types: Cigarettes     Quit date: 1995     Years since quittin.2   • Smokeless tobacco: Never   • Tobacco comments:     Quit \" a long time ago\"    Vaping Use   • Vaping status: Never Used   Substance and Sexual Activity   • Alcohol use: Yes     Comment: OCCASIONALLY   • Drug use: Yes     Types: Marijuana   • Sexual activity: Yes     Partners: Male     Birth control/protection: Post-menopausal, Tubal ligation     Comment: BTL         CURRENT " MEDICATIONS    Current Outpatient Medications:   •  acetaminophen (TYLENOL) 325 MG tablet, Take 2 tablets by mouth Every 4 (Four) Hours As Needed for Mild Pain . Over the counter, Disp: , Rfl:   •  albuterol sulfate  (90 Base) MCG/ACT inhaler, 1 puff., Disp: , Rfl:   •  atorvastatin (LIPITOR) 10 MG tablet, atorvastatin 10 mg tablet, Disp: , Rfl:   •  Blood Glucose Monitoring Suppl (Contour Next EZ) w/Device kit, Use to test blood glucose up to four times daily as needed., Disp: 1 kit, Rfl: 0  •  cetirizine (zyrTEC) 10 MG tablet, Take 1 tablet by mouth Daily. PRN, Disp: , Rfl:   •  Continuous Glucose  (FreeStyle Ann[place]e 3 [place]) device, Use to check blood sugar 6-8 times a day as needed, Disp: 1 each, Rfl: 11  •  Continuous Glucose Sensor (FreeStyle Ann[place]e 14 Day Sensor) misc, Use to check glucose 6-8 times a day as needed. Change sensor every 14 days as directed by prescriber., Disp: 1 each, Rfl: 11  •  Continuous Glucose Sensor (FreeStyle Annalee 3 Sensor) misc, Check blood glucose levels 6 to 8 times daily. Change sensory every 14 days., Disp: 1 each, Rfl: 11  •  FLUoxetine (PROzac) 40 MG capsule, fluoxetine 40 mg capsule  Take 1 capsule every day by oral route for 90 days., Disp: , Rfl:   •  glipizide (GLUCOTROL XL) 5 MG ER tablet, Take 1 tablet by mouth Daily., Disp: , Rfl:   •  glucose blood test strip, Use to test blood glucose up to four times daily as needed., Disp: 100 each, Rfl: 0  •  Lancets misc, Use to test blood glucose up to four times daily as needed., Disp: 100 each, Rfl: 0  •  Lantus SoloStar 100 UNIT/ML injection pen, Inject 28 Units under the skin into the appropriate area as directed Every Morning Before Breakfast., Disp: 15 mL, Rfl: 0  •  Multiple Vitamins-Minerals (MULTI-BETIC PO), Take 2 tablets by mouth Daily., Disp: , Rfl:   •  pantoprazole (PROTONIX) 40 MG EC tablet, Take 1 tablet by mouth 2 (Two) Times a Day., Disp: 180 tablet, Rfl: 3  •  promethazine (PHENERGAN) 25 MG tablet,  "Take 1 tablet by mouth Every 6 (Six) Hours As Needed for Nausea or Vomiting., Disp: , Rfl:   •  SALINE NASAL MIST NA, Saline Nasal Mist, Disp: , Rfl:   •  sucralfate (CARAFATE) 1 g tablet, Take 1 tab by mouth 4 times a day, Disp: 120 tablet, Rfl: 3  •  SudoGest 12 Hour 120 MG 12 hr tablet, , Disp: , Rfl:   •  SUMAtriptan (IMITREX) 100 MG tablet, sumatriptan 100 mg tablet  take 1 po at onset of symptoms. can repeat x 1 in 2 hours if headache persists, Disp: , Rfl:   •  traZODone (DESYREL) 150 MG tablet, Take 1 tablet by mouth Every Night., Disp: , Rfl:   •  valACYclovir (VALTREX) 500 MG tablet, TAKE ONE TABLET BY MOUTH DAILY, Disp: 90 tablet, Rfl: 3  •  aspirin 81 MG chewable tablet, Chew 1 tablet Daily. (Patient not taking: Reported on 8/20/2024), Disp: , Rfl:   •  ondansetron ODT (ZOFRAN-ODT) 4 MG disintegrating tablet, Place 1 tablet on the tongue Every 8 (Eight) Hours As Needed for Nausea or Vomiting. (Patient not taking: Reported on 8/20/2024), Disp: 10 tablet, Rfl: 0    ALLERGIES  Mobic [meloxicam]    VITALS  Vitals:    08/20/24 0809   BP: 118/72   BP Location: Left arm   Patient Position: Sitting   Cuff Size: Large Adult   Weight: 57.9 kg (127 lb 9.6 oz)   Height: 157.5 cm (62.01\")       PHYSICAL EXAM  Debilities/Disabilities Identified: None  Emotional Behavior: Appropriate  Wt Readings from Last 3 Encounters:   08/20/24 57.9 kg (127 lb 9.6 oz)   08/12/24 60.9 kg (134 lb 4.8 oz)   07/30/24 59 kg (130 lb)     Ht Readings from Last 1 Encounters:   08/20/24 157.5 cm (62.01\")     Body mass index is 23.33 kg/m².  Physical Exam  Constitutional:       General: She is not in acute distress.     Appearance: Normal appearance. She is not ill-appearing.   HENT:      Head: Normocephalic and atraumatic.      Mouth/Throat:      Mouth: Mucous membranes are moist.      Pharynx: No posterior oropharyngeal erythema.   Eyes:      General: No scleral icterus.  Cardiovascular:      Rate and Rhythm: Normal rate and regular rhythm. "      Heart sounds: Normal heart sounds.   Pulmonary:      Effort: Pulmonary effort is normal.      Breath sounds: Normal breath sounds.   Abdominal:      General: Abdomen is flat. Bowel sounds are normal. There is no distension.      Palpations: Abdomen is soft. There is no mass.      Tenderness: There is abdominal tenderness in the epigastric area. There is no guarding or rebound. Negative signs include Gonzalez's sign.      Hernia: No hernia is present.   Musculoskeletal:      Cervical back: Neck supple.   Skin:     General: Skin is warm.      Capillary Refill: Capillary refill takes less than 2 seconds.   Neurological:      General: No focal deficit present.      Mental Status: She is alert and oriented to person, place, and time.   Psychiatric:         Mood and Affect: Mood normal.         Behavior: Behavior normal.         Thought Content: Thought content normal.         Judgment: Judgment normal.       CLINICAL DATA REVIEWED   reviewed previous lab results and integrated with today's visit, reviewed notes from other physicians and/or last GI encounter, reviewed previous endoscopy results and available photos, reviewed surgical pathology results from previous biopsies    ASSESSMENT  Diagnoses and all orders for this visit:    Howell's esophagus with dysplasia  -     Case Request; Standing  -     Case Request    Presbyesophagus    Other orders  -     glipizide (GLUCOTROL XL) 5 MG ER tablet; Take 1 tablet by mouth Daily.  -     Follow Anesthesia Guidelines / Protocol; Future  -     Obtain Informed Consent; Standing          PLAN    EGD, suspect not as well controlled  Continue current meds, would consider Voquezna if inadequate control on EGD    Return in about 6 months (around 2/20/2025).    I have discussed the above plan with the patient.  They verbalize understanding and are in agreement with the plan.  They have been advised to contact the office for any questions, concerns, or changes related to their  health.

## 2024-08-22 ENCOUNTER — ANESTHESIA EVENT (OUTPATIENT)
Dept: PERIOP | Facility: HOSPITAL | Age: 59
End: 2024-08-22
Payer: COMMERCIAL

## 2024-08-23 ENCOUNTER — HOSPITAL ENCOUNTER (OUTPATIENT)
Facility: HOSPITAL | Age: 59
Setting detail: HOSPITAL OUTPATIENT SURGERY
Discharge: HOME | End: 2024-08-23
Attending: INTERNAL MEDICINE | Admitting: INTERNAL MEDICINE
Payer: COMMERCIAL

## 2024-08-23 ENCOUNTER — ANESTHESIA (OUTPATIENT)
Dept: PERIOP | Facility: HOSPITAL | Age: 59
End: 2024-08-23
Payer: COMMERCIAL

## 2024-08-23 VITALS
RESPIRATION RATE: 15 BRPM | BODY MASS INDEX: 23.3 KG/M2 | HEART RATE: 84 BPM | WEIGHT: 127.4 LBS | DIASTOLIC BLOOD PRESSURE: 55 MMHG | TEMPERATURE: 97.5 F | SYSTOLIC BLOOD PRESSURE: 110 MMHG | OXYGEN SATURATION: 96 %

## 2024-08-23 DIAGNOSIS — K22.719 BARRETT'S ESOPHAGUS WITH DYSPLASIA: ICD-10-CM

## 2024-08-23 LAB
GLUCOSE BLDC GLUCOMTR-MCNC: 157 MG/DL (ref 70–130)
GLUCOSE BLDC GLUCOMTR-MCNC: 312 MG/DL (ref 70–130)
GLUCOSE BLDC GLUCOMTR-MCNC: 346 MG/DL (ref 70–130)

## 2024-08-23 PROCEDURE — 25010000002 PHENYLEPHRINE 10 MG/ML SOLUTION

## 2024-08-23 PROCEDURE — 25810000003 LACTATED RINGERS PER 1000 ML: Performed by: NURSE ANESTHETIST, CERTIFIED REGISTERED

## 2024-08-23 PROCEDURE — 88305 TISSUE EXAM BY PATHOLOGIST: CPT | Performed by: INTERNAL MEDICINE

## 2024-08-23 PROCEDURE — 82948 REAGENT STRIP/BLOOD GLUCOSE: CPT

## 2024-08-23 PROCEDURE — 25010000002 PROPOFOL 200 MG/20ML EMULSION

## 2024-08-23 PROCEDURE — 43239 EGD BIOPSY SINGLE/MULTIPLE: CPT | Performed by: INTERNAL MEDICINE

## 2024-08-23 PROCEDURE — 63710000001 INSULIN REGULAR HUMAN PER 5 UNITS

## 2024-08-23 RX ORDER — SODIUM CHLORIDE 0.9 % (FLUSH) 0.9 %
10 SYRINGE (ML) INJECTION EVERY 12 HOURS SCHEDULED
Status: DISCONTINUED | OUTPATIENT
Start: 2024-08-23 | End: 2024-08-23 | Stop reason: HOSPADM

## 2024-08-23 RX ORDER — SODIUM CHLORIDE 0.9 % (FLUSH) 0.9 %
10 SYRINGE (ML) INJECTION AS NEEDED
Status: DISCONTINUED | OUTPATIENT
Start: 2024-08-23 | End: 2024-08-23 | Stop reason: HOSPADM

## 2024-08-23 RX ORDER — SODIUM CHLORIDE, SODIUM LACTATE, POTASSIUM CHLORIDE, CALCIUM CHLORIDE 600; 310; 30; 20 MG/100ML; MG/100ML; MG/100ML; MG/100ML
9 INJECTION, SOLUTION INTRAVENOUS CONTINUOUS PRN
Status: DISCONTINUED | OUTPATIENT
Start: 2024-08-23 | End: 2024-08-23 | Stop reason: HOSPADM

## 2024-08-23 RX ORDER — ONDANSETRON 2 MG/ML
4 INJECTION INTRAMUSCULAR; INTRAVENOUS ONCE AS NEEDED
Status: DISCONTINUED | OUTPATIENT
Start: 2024-08-23 | End: 2024-08-23 | Stop reason: HOSPADM

## 2024-08-23 RX ORDER — DEXMEDETOMIDINE HYDROCHLORIDE 100 UG/ML
INJECTION, SOLUTION INTRAVENOUS AS NEEDED
Status: DISCONTINUED | OUTPATIENT
Start: 2024-08-23 | End: 2024-08-23 | Stop reason: SURG

## 2024-08-23 RX ORDER — LIDOCAINE HYDROCHLORIDE 20 MG/ML
INJECTION, SOLUTION INFILTRATION; PERINEURAL AS NEEDED
Status: DISCONTINUED | OUTPATIENT
Start: 2024-08-23 | End: 2024-08-23 | Stop reason: SURG

## 2024-08-23 RX ORDER — SODIUM CHLORIDE, SODIUM LACTATE, POTASSIUM CHLORIDE, CALCIUM CHLORIDE 600; 310; 30; 20 MG/100ML; MG/100ML; MG/100ML; MG/100ML
100 INJECTION, SOLUTION INTRAVENOUS ONCE
Status: DISCONTINUED | OUTPATIENT
Start: 2024-08-23 | End: 2024-08-23 | Stop reason: HOSPADM

## 2024-08-23 RX ORDER — PROPOFOL 10 MG/ML
INJECTION, EMULSION INTRAVENOUS AS NEEDED
Status: DISCONTINUED | OUTPATIENT
Start: 2024-08-23 | End: 2024-08-23 | Stop reason: SURG

## 2024-08-23 RX ORDER — PHENYLEPHRINE HYDROCHLORIDE 10 MG/ML
INJECTION INTRAVENOUS AS NEEDED
Status: DISCONTINUED | OUTPATIENT
Start: 2024-08-23 | End: 2024-08-23 | Stop reason: SURG

## 2024-08-23 RX ORDER — SODIUM CHLORIDE 9 MG/ML
40 INJECTION, SOLUTION INTRAVENOUS AS NEEDED
Status: DISCONTINUED | OUTPATIENT
Start: 2024-08-23 | End: 2024-08-23 | Stop reason: HOSPADM

## 2024-08-23 RX ADMIN — INSULIN HUMAN 10 UNITS: 100 INJECTION, SOLUTION PARENTERAL at 09:25

## 2024-08-23 RX ADMIN — PROPOFOL 50 MG: 10 INJECTION, EMULSION INTRAVENOUS at 10:12

## 2024-08-23 RX ADMIN — DEXMEDETOMIDINE HYDROCHLORIDE 8 MCG: 100 INJECTION, SOLUTION, CONCENTRATE INTRAVENOUS at 10:10

## 2024-08-23 RX ADMIN — SODIUM CHLORIDE, POTASSIUM CHLORIDE, SODIUM LACTATE AND CALCIUM CHLORIDE 9 ML/HR: 600; 310; 30; 20 INJECTION, SOLUTION INTRAVENOUS at 09:19

## 2024-08-23 RX ADMIN — LIDOCAINE HYDROCHLORIDE 40 MG: 20 INJECTION, SOLUTION INFILTRATION; PERINEURAL at 10:12

## 2024-08-23 RX ADMIN — PROPOFOL 130 MG: 10 INJECTION, EMULSION INTRAVENOUS at 10:14

## 2024-08-23 RX ADMIN — PHENYLEPHRINE HYDROCHLORIDE 100 MCG: 10 INJECTION INTRAVENOUS at 10:17

## 2024-08-23 NOTE — BRIEF OP NOTE
ESOPHAGOGASTRODUODENOSCOPY WITH BIOPSY  Progress Note    Lona Dupree  8/23/2024    Pre-op Diagnosis:   Howell's esophagus with dysplasia [K22.719]       Post-Op Diagnosis Codes:     * Howell's esophagus with dysplasia [K22.719]     * Gastritis [K29.70]    Procedure/CPT® Codes:        Procedure(s):  ESOPHAGOGASTRODUODENOSCOPY WITH BIOPSY              Surgeon(s):  Dmitri Fung MD    Anesthesia: Monitored Anesthesia Care    Staff:   Circulator: Lizandro George RN  Scrub Person: Jennifer Moody         Estimated Blood Loss: none    Urine Voided: * No values recorded between 8/23/2024 10:08 AM and 8/23/2024 10:26 AM *    Specimens:                Specimens       ID Source Type Tests Collected By Collected At Frozen?    A Gastric, Body Tissue TISSUE PATHOLOGY EXAM   Dmitri Fung MD 8/23/24 1020 No    Description: Gastric Biopsy    B Esophagus, Distal Tissue TISSUE PATHOLOGY EXAM   Dmitri Fung MD 8/23/24 1020 No    Description: Distal Esophageal Biopsy                  Drains: * No LDAs found *    Findings: Normal Duodenum  Moderate gastritis-biopsy  Reflux Esophagitis/SSBE-Biopsy        Complications: none          Dmitri Fung MD     Date: 8/23/2024  Time: 10:27 EDT

## 2024-08-23 NOTE — ANESTHESIA PREPROCEDURE EVALUATION
Anesthesia Evaluation     Patient summary reviewed and Nursing notes reviewed   no history of anesthetic complications:   NPO Solid Status: > 8 hours  NPO Liquid Status: > 8 hours           Airway   Mallampati: II  TM distance: >3 FB  Neck ROM: full  No difficulty expected  Dental - normal exam     Pulmonary - normal exam   (+) pneumonia (june 2024) resolved , a smoker (quit 30 years ago) Former, cigarettes,    ROS comment: Inhaler on med list for former respiratory infection  Cardiovascular - normal exam  Exercise tolerance: good (4-7 METS)    Rhythm: regular  Rate: normal    (-) hyperlipidemia (denies)      Neuro/Psych  (+) headaches (migraines)  GI/Hepatic/Renal/Endo    (+) GERD poorly controlled, diabetes mellitus (a1c 8.16 in june 2024) type 2 poorly controlled using insulin    ROS Comment: Last DKA episode 6/1/24 (hospitalized in ICU x 3 days)    Musculoskeletal     Abdominal    Substance History   (+) alcohol use (occasionally), drug use (smokes marijuana (last week); denies methamphetamine in chart)     OB/GYN          Other   arthritis (reports joint pain and believes it to be arthritis, though no diagnosis),                   Anesthesia Plan    ASA 3     MAC       Anesthetic plan, risks, benefits, and alternatives have been provided, discussed and informed consent has been obtained with: patient.  Pre-procedure education provided  Use of blood products discussed with patient  Consented to blood products.    Plan discussed with CRNA.    CODE STATUS:

## 2024-08-23 NOTE — ANESTHESIA POSTPROCEDURE EVALUATION
Patient: Lona Dupree    Procedure Summary       Date: 08/23/24 Room / Location: formerly [facility] ENDOSCOPY 1 /  [facility]    Anesthesia Start: 1007 Anesthesia Stop: 1028    Procedure: ESOPHAGOGASTRODUODENOSCOPY WITH BIOPSY (Esophagus) Diagnosis:       Howell's esophagus with dysplasia      Gastritis      (Howell's esophagus with dysplasia [K22.719])    Surgeons: Dmitri Fung MD Provider: Quentin Hernandez CRNA    Anesthesia Type: MAC ASA Status: 3            Anesthesia Type: MAC    Vitals  Vitals Value Taken Time   /55 08/23/24 1112   Temp     Pulse 87 08/23/24 1111   Resp 15 08/23/24 1105   SpO2 98 % 08/23/24 1111   Vitals shown include unfiled device data.        Post Anesthesia Care and Evaluation    Patient location during evaluation: PHASE II  Patient participation: complete - patient participated  Level of consciousness: awake and alert  Pain score: 0  Pain management: adequate    Airway patency: patent  Anesthetic complications: No anesthetic complications  PONV Status: none  Cardiovascular status: acceptable  Respiratory status: acceptable  Hydration status: acceptable

## 2024-08-23 NOTE — INTERVAL H&P NOTE
Vital Signs  /54 (BP Location: Left arm, Patient Position: Lying)   Pulse 96   Temp 97.5 °F (36.4 °C) (Oral)   Resp 12   Wt 57.8 kg (127 lb 6.4 oz)   LMP  (LMP Unknown)   SpO2 95%   BMI 23.30 kg/m²     H&P reviewed. The patient was examined and there are no changes to the H&P.

## 2024-08-26 LAB
CYTO UR: NORMAL
LAB AP CASE REPORT: NORMAL
PATH REPORT.FINAL DX SPEC: NORMAL
PATH REPORT.GROSS SPEC: NORMAL

## 2024-09-04 LAB
CYTO UR: NORMAL
LAB AP CASE REPORT: NORMAL
PATH REPORT.ADDENDUM SPEC: NORMAL
PATH REPORT.FINAL DX SPEC: NORMAL
PATH REPORT.GROSS SPEC: NORMAL

## 2024-09-13 ENCOUNTER — HOSPITAL ENCOUNTER (OUTPATIENT)
Dept: MAMMOGRAPHY | Facility: HOSPITAL | Age: 59
Discharge: HOME | End: 2024-09-13
Admitting: NURSE PRACTITIONER
Payer: COMMERCIAL

## 2024-09-13 DIAGNOSIS — Z12.31 ENCOUNTER FOR SCREENING MAMMOGRAM FOR MALIGNANT NEOPLASM OF BREAST: ICD-10-CM

## 2024-09-13 PROCEDURE — 77063 BREAST TOMOSYNTHESIS BI: CPT

## 2024-09-13 PROCEDURE — 77067 SCR MAMMO BI INCL CAD: CPT

## 2024-10-01 RX ORDER — VALACYCLOVIR HYDROCHLORIDE 500 MG/1
500 TABLET, FILM COATED ORAL DAILY
Qty: 90 TABLET | Refills: 3 | Status: SHIPPED | OUTPATIENT
Start: 2024-10-01

## 2024-10-22 ENCOUNTER — OFFICE VISIT (OUTPATIENT)
Dept: GASTROENTEROLOGY | Facility: CLINIC | Age: 59
End: 2024-10-22
Payer: COMMERCIAL

## 2024-10-22 VITALS
HEIGHT: 62 IN | WEIGHT: 128 LBS | SYSTOLIC BLOOD PRESSURE: 98 MMHG | DIASTOLIC BLOOD PRESSURE: 52 MMHG | BODY MASS INDEX: 23.55 KG/M2

## 2024-10-22 DIAGNOSIS — R11.0 NAUSEA: ICD-10-CM

## 2024-10-22 DIAGNOSIS — K21.00 GASTROESOPHAGEAL REFLUX DISEASE WITH ESOPHAGITIS WITHOUT HEMORRHAGE: ICD-10-CM

## 2024-10-22 DIAGNOSIS — Z86.0100 HISTORY OF COLONIC POLYPS: Primary | ICD-10-CM

## 2024-10-22 DIAGNOSIS — E08.22 DIABETES MELLITUS DUE TO UNDERLYING CONDITION WITH STAGE 1 CHRONIC KIDNEY DISEASE, UNSPECIFIED WHETHER LONG TERM INSULIN USE: ICD-10-CM

## 2024-10-22 DIAGNOSIS — N18.1 DIABETES MELLITUS DUE TO UNDERLYING CONDITION WITH STAGE 1 CHRONIC KIDNEY DISEASE, UNSPECIFIED WHETHER LONG TERM INSULIN USE: ICD-10-CM

## 2024-10-22 DIAGNOSIS — E16.2 HYPOGLYCEMIA: ICD-10-CM

## 2024-10-22 PROBLEM — K22.719 BARRETT'S ESOPHAGUS WITH DYSPLASIA: Status: RESOLVED | Noted: 2021-02-22 | Resolved: 2024-10-22

## 2024-10-22 RX ORDER — VONOPRAZAN FUMARATE 26.72 MG/1
20 TABLET ORAL DAILY
Qty: 30 TABLET | Refills: 1 | Status: SHIPPED | OUTPATIENT
Start: 2024-10-22

## 2024-10-22 RX ORDER — PROMETHAZINE HYDROCHLORIDE 25 MG/1
25 TABLET ORAL EVERY 6 HOURS PRN
Qty: 30 TABLET | Refills: 2 | Status: SHIPPED | OUTPATIENT
Start: 2024-10-22

## 2024-10-22 RX ORDER — VONOPRAZAN FUMARATE 26.72 MG/1
20 TABLET ORAL DAILY
Qty: 30 TABLET | Refills: 1 | COMMUNITY
Start: 2024-10-22 | End: 2024-10-22 | Stop reason: SDUPTHER

## 2024-10-22 NOTE — PROGRESS NOTES
PATIENT INFORMATION  Lona Dupree       - 1965    CHIEF COMPLAINT  Chief Complaint   Patient presents with    Heartburn    Nausea    Gastritis       HISTORY OF PRESENT ILLNESS    Here today for EGD follow-up    EGD with chronic gastritis, reflux esophagitis, no HP or barretts. Reviewed path and images with patient.    GERD: Managed with BID PPI, TID sucralfate. Nausea at LOV since starting Glipizide. Still having a lot of nausea, attributing to sinus drainage from allergies. Dysphagia is less often, but avoiding carbonation and breads, cutting fried foods in small bites. Nausea several times a week, phenergan helped.    Diabetes dx in 2017, insulin and metformin. Sugar bottomed out recently and went to ER and admitted once for DKA with insulin issues. BG dropping into 60s every night.     Bowels are very regular, easy and well controlled after coffee in morning, no bleeding or straining.     Sister with crohns and brother passed of esophageal cancer.     6/3/2020 EGD and Colon for dysphagia, reflux with barretts, negative for celiac, dysplasia. Colon with 0/1 adenomas, previous colon normal. Personal hx polyps.             REVIEWED PERTINENT RESULTS/ LABS  Lab Results   Component Value Date    CASEREPORT  2024     Surgical Pathology Report                         Case: [id number]                                  Authorizing Provider:  Dmitri Fung        Collected:           2024 10:20 AM                                 MD Goran                                                                   Ordering Location:     [facility]   Received:            2024 10:30 AM                                 OR                                                                           Pathologist:           Ivy Albert MD                                                        Specimens:   1) - Gastric, Body, Gastric Biopsy                                                                   2) - Esophagus, Distal, Distal Esophageal Biopsy                                           FINALDX  08/23/2024     1.   Stomach, biopsy:          A.  Gastric antral and oxyntic mucosa with mild chronic inactive gastritis.         B.  Negative for intestinal metaplasia.         C.  Negative for H. pylori-type organisms (H&E stain).    2.  Distal esophagus, biopsy:          A.  Squamocolumnar mucosa with mild reactive changes and chronic inflammation.         B.  Negative for intestinal metaplasia or dysplasia.       Lab Results   Component Value Date    HGB 9.5 (L) 08/12/2024    MCV 83.1 08/12/2024     08/12/2024    ALT 13 08/12/2024    AST 26 08/12/2024    HGBA1C 8.16 (H) 06/01/2024    INR 0.95 04/21/2021    TRIG 114 04/21/2021      No results found.    REVIEW OF SYSTEMS  Review of Systems      ACTIVE PROBLEMS  Patient Active Problem List    Diagnosis     Diabetic ketoacidosis [E11.10]     DKA (diabetic ketoacidosis) [E11.10]     Hypoglycemia [E16.2]     Diabetic acidosis without coma [E11.10]     Gastroesophageal reflux disease with esophagitis without hemorrhage [K21.00]     Presbyesophagus [K22.89]     Esophageal dysphagia [R13.19]     Personal history of colonic polyps [Z86.0100]     High anion gap metabolic acidosis [E87.29]     Methamphetamine use [F15.10]     Cocaine use [F14.90]     Nausea & vomiting [R11.2]     Diabetes mellitus with ketosis [E11.10]     HSV-2 (herpes simplex virus 2) infection [B00.9]     Fibroids [D21.9]     Smoker [F17.200]     Headache [R51.9]     Diabetes mellitus [E11.9]     History of bloody stools [Z87.19]          PAST MEDICAL HISTORY  Past Medical History:   Diagnosis Date    Abnormal Pap smear of cervix     1 abnormal pap with normal f/u    Howell esophagus     Colon polyp     Diabetes mellitus     Difficulty swallowing     at times    DKA (diabetic ketoacidosis)     Fibroid     GERD (gastroesophageal reflux disease)     HSV-2 (herpes simplex virus 2)  infection 2017    Menstrual disorder     Migraines     Rectal bleeding     bleeds with bowel movement    Seasonal allergies          SURGICAL HISTORY  Past Surgical History:   Procedure Laterality Date    CARPAL TUNNEL RELEASE WITH CUBITAL TUNNEL RELEASE Right      SECTION      X2    COLONOSCOPY N/A 2016    Procedure: COLONOSCOPY with polypectomy;  Surgeon: Maryan Kent MD;  Location: [facility] OR;  Service:     COLONOSCOPY N/A 6/3/2020    Procedure: COLONOSCOPY;  Surgeon: Dmitri Fung MD;  Location:  [facility] OR;  Service: Gastroenterology;  Laterality: N/A;  Poor prep, polyp    ENDOMETRIAL ABLATION      thermachoice    ENDOSCOPY N/A 6/3/2020    Procedure: ESOPHAGOGASTRODUODENOSCOPY;  Surgeon: Dmitri Fung MD;  Location: [facility] OR;  Service: Gastroenterology;  Laterality: N/A;  Reflux esophagitis, erosive gastritis, duodenitis    ENDOSCOPY N/A 2024    Procedure: ESOPHAGOGASTRODUODENOSCOPY WITH BIOPSY;  Surgeon: Dmitri Fung MD;  Location: [facility] OR;  Service: Gastroenterology;  Laterality: N/A;  Gastritis; Barretts Esophagus    HYSTEROSCOPY      AND ENDOMETRIAL  ABLATION    NASAL SEPTUM SURGERY          TUBAL ABDOMINAL LIGATION           FAMILY HISTORY  Family History   Problem Relation Age of Onset    Heart attack Father     Hypertension Father     Diabetes Father     Alcohol abuse Father     Dementia Father     Hypertension Mother     Hyperlipidemia Mother     Atrial fibrillation Mother     Stroke Mother     Heart attack Brother     Hypertension Brother     Stroke Brother     Esophageal cancer Brother     Stroke Sister     Crohn's disease Sister     Alzheimer's disease Paternal Grandmother     Colon cancer Maternal Uncle     Diabetes Paternal Aunt     Breast cancer Paternal Aunt     Rheum arthritis Other     Ovarian cancer Neg Hx     Deep vein thrombosis Neg Hx     Pulmonary embolism Neg Hx     Uterine cancer Neg Hx          SOCIAL  "HISTORY  Social History     Occupational History    Not on file   Tobacco Use    Smoking status: Former     Current packs/day: 0.00     Types: Cigarettes     Quit date: 1995     Years since quittin.4    Smokeless tobacco: Never    Tobacco comments:     Quit \" a long time ago\"    Vaping Use    Vaping status: Never Used   Substance and Sexual Activity    Alcohol use: Yes     Comment: OCCASIONALLY    Drug use: Yes     Types: Marijuana    Sexual activity: Yes     Partners: Male     Birth control/protection: Post-menopausal, Tubal ligation     Comment: BTL         CURRENT MEDICATIONS    Current Outpatient Medications:     acetaminophen (TYLENOL) 325 MG tablet, Take 2 tablets by mouth Every 4 (Four) Hours As Needed for Mild Pain . Over the counter, Disp: , Rfl:     albuterol sulfate  (90 Base) MCG/ACT inhaler, 1 puff., Disp: , Rfl:     aspirin-acetaminophen-caffeine (EXCEDRIN MIGRAINE) 250-250-65 MG per tablet, Take 1 tablet by mouth Every 6 (Six) Hours As Needed for Headache., Disp: , Rfl:     atorvastatin (LIPITOR) 10 MG tablet, atorvastatin 10 mg tablet, Disp: , Rfl:     Blood Glucose Monitoring Suppl (Contour Next EZ) w/Device kit, Use to test blood glucose up to four times daily as needed., Disp: 1 kit, Rfl: 0    cetirizine (zyrTEC) 10 MG tablet, Take 1 tablet by mouth Daily. PRN, Disp: , Rfl:     Continuous Glucose  (FreeStyle Annalee 3 [place]) device, Use to check blood sugar 6-8 times a day as needed, Disp: 1 each, Rfl: 11    Continuous Glucose Sensor (FreeStyle Annalee 14 Day Sensor) misc, Use to check glucose 6-8 times a day as needed. Change sensor every 14 days as directed by prescriber., Disp: 1 each, Rfl: 11    Continuous Glucose Sensor (FreeStyle Annalee 3 Sensor) misc, Check blood glucose levels 6 to 8 times daily. Change sensory every 14 days., Disp: 1 each, Rfl: 11    FLUoxetine (PROzac) 40 MG capsule, fluoxetine 40 mg capsule  Take 1 capsule every day by oral route for 90 days., Disp: " ", Rfl:     glipizide (GLUCOTROL XL) 5 MG ER tablet, Take 1 tablet by mouth Daily., Disp: , Rfl:     glucose blood test strip, Use to test blood glucose up to four times daily as needed., Disp: 100 each, Rfl: 0    Lancets misc, Use to test blood glucose up to four times daily as needed., Disp: 100 each, Rfl: 0    Lantus SoloStar 100 UNIT/ML injection pen, Inject 28 Units under the skin into the appropriate area as directed Every Morning Before Breakfast. (Patient taking differently: Inject 20 Units under the skin into the appropriate area as directed Every Morning Before Breakfast.), Disp: 15 mL, Rfl: 0    metFORMIN (GLUCOPHAGE) 500 MG tablet, Take 2 tablets by mouth 2 (Two) Times a Day With Meals., Disp: , Rfl:     Multiple Vitamins-Minerals (MULTI-BETIC PO), Take 2 tablets by mouth Daily., Disp: , Rfl:     promethazine (PHENERGAN) 25 MG tablet, Take 1 tablet by mouth Every 6 (Six) Hours As Needed for Nausea or Vomiting., Disp: 30 tablet, Rfl: 2    SALINE NASAL MIST NA, Saline Nasal Mist, Disp: , Rfl:     sucralfate (CARAFATE) 1 g tablet, Take 1 tab by mouth 4 times a day, Disp: 120 tablet, Rfl: 3    SudoGest 12 Hour 120 MG 12 hr tablet, , Disp: , Rfl:     SUMAtriptan (IMITREX) 100 MG tablet, sumatriptan 100 mg tablet  take 1 po at onset of symptoms. can repeat x 1 in 2 hours if headache persists, Disp: , Rfl:     traZODone (DESYREL) 150 MG tablet, Take 1 tablet by mouth Every Night., Disp: , Rfl:     valACYclovir (VALTREX) 500 MG tablet, TAKE 1 TABLET BY MOUTH DAILY, Disp: 90 tablet, Rfl: 3    Vonoprazan Fumarate (Voquezna) 20 MG tablet, Take 1 tablet by mouth Daily., Disp: 30 tablet, Rfl: 1    ALLERGIES  Mobic [meloxicam]    VITALS  Vitals:    10/22/24 0808   BP: 98/52   BP Location: Left arm   Patient Position: Sitting   Cuff Size: Large Adult   Weight: 58.1 kg (128 lb)   Height: 157.5 cm (62.01\")       PHYSICAL EXAM  Debilities/Disabilities Identified: None  Emotional Behavior: Appropriate  Wt Readings from Last " "3 Encounters:   10/22/24 58.1 kg (128 lb)   08/23/24 57.8 kg (127 lb 6.4 oz)   08/20/24 57.9 kg (127 lb 9.6 oz)     Ht Readings from Last 1 Encounters:   10/22/24 157.5 cm (62.01\")     Body mass index is 23.41 kg/m².  Physical Exam  Constitutional:       General: She is not in acute distress.     Appearance: Normal appearance. She is not ill-appearing.   HENT:      Head: Normocephalic and atraumatic.      Mouth/Throat:      Mouth: Mucous membranes are moist.      Pharynx: No posterior oropharyngeal erythema.   Eyes:      General: No scleral icterus.  Cardiovascular:      Rate and Rhythm: Normal rate and regular rhythm.      Heart sounds: Normal heart sounds.   Pulmonary:      Effort: Pulmonary effort is normal.      Breath sounds: Normal breath sounds.   Abdominal:      General: Abdomen is flat. Bowel sounds are normal. There is no distension.      Palpations: Abdomen is soft. There is no mass.      Tenderness: There is no abdominal tenderness in the right upper quadrant, right lower quadrant and epigastric area. There is no guarding or rebound. Negative signs include Gonzalez's sign.      Hernia: No hernia is present.   Musculoskeletal:      Cervical back: Neck supple.   Skin:     General: Skin is warm.      Capillary Refill: Capillary refill takes less than 2 seconds.   Neurological:      General: No focal deficit present.      Mental Status: She is alert and oriented to person, place, and time.   Psychiatric:         Mood and Affect: Mood normal.         Behavior: Behavior normal.         Thought Content: Thought content normal.         Judgment: Judgment normal.       CLINICAL DATA REVIEWED   reviewed previous lab results and integrated with today's visit, reviewed notes from other physicians and/or last GI encounter, reviewed previous endoscopy results and available photos, reviewed surgical pathology results from previous biopsies    ASSESSMENT  Diagnoses and all orders for this visit:    Personal history of " colonic polyps    Gastroesophageal reflux disease with esophagitis without hemorrhage  -     Discontinue: Vonoprazan Fumarate (Voquezna) 20 MG tablet; Take 1 tablet by mouth Daily.  -     Vonoprazan Fumarate (Voquezna) 20 MG tablet; Take 1 tablet by mouth Daily.    Diabetes mellitus due to underlying condition with stage 1 chronic kidney disease, unspecified whether long term insulin use  -     Ambulatory Referral to Endocrinology    Nausea  -     promethazine (PHENERGAN) 25 MG tablet; Take 1 tablet by mouth Every 6 (Six) Hours As Needed for Nausea or Vomiting.    Hypoglycemia    Other orders  -     metFORMIN (GLUCOPHAGE) 500 MG tablet; Take 2 tablets by mouth 2 (Two) Times a Day With Meals.          PLAN    GERD: Will try Voquezna and stop PPI  Nausea: PRN phenergan  Pt requesting endocrinology referral    Return in about 4 months (around 2/22/2025).    I have discussed the above plan with the patient.  They verbalize understanding and are in agreement with the plan.  They have been advised to contact the office for any questions, concerns, or changes related to their health.

## 2024-10-23 ENCOUNTER — TELEPHONE (OUTPATIENT)
Dept: GASTROENTEROLOGY | Facility: CLINIC | Age: 59
End: 2024-10-23
Payer: COMMERCIAL

## 2024-11-12 ENCOUNTER — TELEPHONE (OUTPATIENT)
Dept: ENDOCRINOLOGY | Age: 59
End: 2024-11-12

## 2024-11-12 ENCOUNTER — OFFICE VISIT (OUTPATIENT)
Dept: ENDOCRINOLOGY | Age: 59
End: 2024-11-12
Payer: COMMERCIAL

## 2024-11-12 VITALS
DIASTOLIC BLOOD PRESSURE: 64 MMHG | SYSTOLIC BLOOD PRESSURE: 102 MMHG | WEIGHT: 126 LBS | HEART RATE: 105 BPM | OXYGEN SATURATION: 97 % | BODY MASS INDEX: 23.19 KG/M2 | HEIGHT: 62 IN

## 2024-11-12 DIAGNOSIS — Z79.4 TYPE 2 DIABETES MELLITUS WITH HYPERGLYCEMIA, WITH LONG-TERM CURRENT USE OF INSULIN: Primary | ICD-10-CM

## 2024-11-12 DIAGNOSIS — E11.65 TYPE 2 DIABETES MELLITUS WITH HYPERGLYCEMIA, WITH LONG-TERM CURRENT USE OF INSULIN: Primary | ICD-10-CM

## 2024-11-12 PROCEDURE — 95251 CONT GLUC MNTR ANALYSIS I&R: CPT | Performed by: INTERNAL MEDICINE

## 2024-11-12 PROCEDURE — 99204 OFFICE O/P NEW MOD 45 MIN: CPT | Performed by: INTERNAL MEDICINE

## 2024-11-12 RX ORDER — INSULIN ASPART INJECTION 100 [IU]/ML
4 INJECTION, SOLUTION SUBCUTANEOUS 3 TIMES DAILY
Qty: 15 ML | Refills: 1 | Status: SHIPPED | OUTPATIENT
Start: 2024-11-12

## 2024-11-12 RX ORDER — INSULIN LISPRO 100 [IU]/ML
4 INJECTION, SOLUTION INTRAVENOUS; SUBCUTANEOUS 3 TIMES DAILY
Qty: 15 ML | Refills: 1 | Status: SHIPPED | OUTPATIENT
Start: 2024-11-12

## 2024-11-12 RX ORDER — PEN NEEDLE, DIABETIC 32GX 5/32"
1 NEEDLE, DISPOSABLE MISCELLANEOUS 4 TIMES DAILY
Qty: 100 EACH | Refills: 1 | Status: SHIPPED | OUTPATIENT
Start: 2024-11-12

## 2024-11-12 NOTE — TELEPHONE ENCOUNTER
Caller: Lona Dupree    Relationship: Self    Best call back number: [phone]    What is the best time to reach you: ANYTIME , LEAVE VM     Who are you requesting to speak with (clinical staff, provider,  specific staff member): CLINICAL STAFF / PROVIDER     What was the call regarding: PATIENT CALLED STATING SHE HAD AND APPOINTMENT WITH DR. TELLEZ ON 11/12/2025 AND WAS PRESCRIBED A NEW INSULIN MEDICATION. PATIENT STATED PHARMACY TEXTED HER AND STATED PROVIDER NEEDS TO FILL OUT PRIOR AUTHORIZATION FOR MEDICATION. PATIENT WONDERING IF PROVIDER RECEIVED PRIOR AUTHORIZATION AS SHE STATED SHE IS SUPPOSED TO START MEDICATION TOMORROW. PLEASE ADVISE.

## 2024-11-12 NOTE — PROGRESS NOTES
Chief complaint   Chief Complaint   Patient presents with    Diabetes     T2DM. Annalee is attached.           Subjective     History of Present Illness:          This is a 59 year old female I am seeing for type 2 DM   referred by PCP     other medical history is   1- Migraines   2- HLD   3- Other   Pt had T2DM for many years   Current home medication for diabetes     Glipizide 5 mg daily   Lantus 24 units once a day   Metformin 1000 mg twice a day     the A1c was 8.1 in 6-2024  Checks blood sugar at home using Annalee 3   Checks blood sugar 4 times per day   SBMG: ave is 244 , 27 % in range and the rest is high, with PP up to 350   pt states that she is having high BG now , trying his best with dietary Compliance, in regards to Exercise, not on a daily basis and his Weight has been the same and there is No Hypoglycemic episodes, there is no AM Headaches /Nightmares, no Polyuria / Polydipsia, and there os no Blurring of Vision  Last Dilated Pupil Exam, in 2024 , no DM in the eye   NoTingling / numbness in feet, No Dizziness / lightheadedness, No Falls  No Nausea, vomiting / Diarrhea   at DMV    Family History   Problem Relation Age of Onset    Heart attack Father     Hypertension Father     Diabetes Father     Alcohol abuse Father     Dementia Father     Hypertension Mother     Hyperlipidemia Mother     Atrial fibrillation Mother     Stroke Mother     Heart attack Brother     Hypertension Brother     Stroke Brother     Esophageal cancer Brother     Stroke Sister     Crohn's disease Sister     Alzheimer's disease Paternal Grandmother     Colon cancer Maternal Uncle     Diabetes Paternal Aunt     Breast cancer Paternal Aunt     Rheum arthritis Other     Ovarian cancer Neg Hx     Deep vein thrombosis Neg Hx     Pulmonary embolism Neg Hx     Uterine cancer Neg Hx      Social History     Socioeconomic History    Marital status:    Tobacco Use    Smoking status: Former     Current packs/day: 0.00      "Types: Cigarettes     Quit date: 1995     Years since quittin.4    Smokeless tobacco: Never    Tobacco comments:     Quit \" a long time ago\"    Vaping Use    Vaping status: Never Used   Substance and Sexual Activity    Alcohol use: Yes     Comment: OCCASIONALLY    Drug use: Yes     Types: Marijuana    Sexual activity: Yes     Partners: Male     Birth control/protection: Post-menopausal, Tubal ligation     Comment: BTL     Past Medical History:   Diagnosis Date    Abnormal Pap smear of cervix     1 abnormal pap with normal f/u    Howell esophagus     Colon polyp     Diabetes mellitus     Difficulty swallowing     at times    DKA (diabetic ketoacidosis)     Fibroid     GERD (gastroesophageal reflux disease)     HSV-2 (herpes simplex virus 2) infection 2017    Menstrual disorder     Migraines     Rectal bleeding     bleeds with bowel movement    Seasonal allergies      Past Surgical History:   Procedure Laterality Date    CARPAL TUNNEL RELEASE WITH CUBITAL TUNNEL RELEASE Right      SECTION      X2    COLONOSCOPY N/A 2016    Procedure: COLONOSCOPY with polypectomy;  Surgeon: Maryan Kent MD;  Location: [facility] OR;  Service:     COLONOSCOPY N/A 6/3/2020    Procedure: COLONOSCOPY;  Surgeon: Dmitri Fung MD;  Location: [facility] OR;  Service: Gastroenterology;  Laterality: N/A;  Poor prep, polyp    ENDOMETRIAL ABLATION      thermachoice    ENDOSCOPY N/A 6/3/2020    Procedure: ESOPHAGOGASTRODUODENOSCOPY;  Surgeon: Dmitri Fung MD;  Location: [facility] OR;  Service: Gastroenterology;  Laterality: N/A;  Reflux esophagitis, erosive gastritis, duodenitis    ENDOSCOPY N/A 2024    Procedure: ESOPHAGOGASTRODUODENOSCOPY WITH BIOPSY;  Surgeon: Dmitri Fung MD;  Location: [facility] OR;  Service: Gastroenterology;  Laterality: N/A;  Gastritis; Barretts Esophagus    HYSTEROSCOPY      AND ENDOMETRIAL  ABLATION    NASAL SEPTUM SURGERY      2004    TUBAL " ABDOMINAL LIGATION         Current Outpatient Medications:     aspirin-acetaminophen-caffeine (EXCEDRIN MIGRAINE) 250-250-65 MG per tablet, Take 1 tablet by mouth Every 6 (Six) Hours As Needed for Headache., Disp: , Rfl:     atorvastatin (LIPITOR) 10 MG tablet, atorvastatin 10 mg tablet, Disp: , Rfl:     Blood Glucose Monitoring Suppl (Contour Next EZ) w/Device kit, Use to test blood glucose up to four times daily as needed., Disp: 1 kit, Rfl: 0    cetirizine (zyrTEC) 10 MG tablet, Take 1 tablet by mouth Daily. PRN, Disp: , Rfl:     Continuous Glucose  (FreeStyle Annalee 3 [place]) device, Use to check blood sugar 6-8 times a day as needed, Disp: 1 each, Rfl: 11    Continuous Glucose Sensor (FreeStyle Annalee 14 Day Sensor) misc, Use to check glucose 6-8 times a day as needed. Change sensor every 14 days as directed by prescriber., Disp: 1 each, Rfl: 11    Continuous Glucose Sensor (FreeStyle Annalee 3 Sensor) misc, Check blood glucose levels 6 to 8 times daily. Change sensory every 14 days., Disp: 1 each, Rfl: 11    FLUoxetine (PROzac) 40 MG capsule, fluoxetine 40 mg capsule  Take 1 capsule every day by oral route for 90 days., Disp: , Rfl:     glipizide (GLUCOTROL XL) 5 MG ER tablet, Take 1 tablet by mouth Daily., Disp: , Rfl:     glucose blood test strip, Use to test blood glucose up to four times daily as needed., Disp: 100 each, Rfl: 0    Lancets misc, Use to test blood glucose up to four times daily as needed., Disp: 100 each, Rfl: 0    Lantus SoloStar 100 UNIT/ML injection pen, Inject 28 Units under the skin into the appropriate area as directed Every Morning Before Breakfast. (Patient taking differently: Inject 24 Units under the skin into the appropriate area as directed Every Morning Before Breakfast.), Disp: 15 mL, Rfl: 0    metFORMIN (GLUCOPHAGE) 500 MG tablet, Take 2 tablets by mouth 2 (Two) Times a Day With Meals., Disp: , Rfl:     Multiple Vitamins-Minerals (MULTI-BETIC PO), Take 2 tablets by mouth  "Daily., Disp: , Rfl:     promethazine (PHENERGAN) 25 MG tablet, Take 1 tablet by mouth Every 6 (Six) Hours As Needed for Nausea or Vomiting., Disp: 30 tablet, Rfl: 2    SALINE NASAL MIST NA, Saline Nasal Mist, Disp: , Rfl:     sucralfate (CARAFATE) 1 g tablet, Take 1 tab by mouth 4 times a day, Disp: 120 tablet, Rfl: 3    SudoGest 12 Hour 120 MG 12 hr tablet, , Disp: , Rfl:     SUMAtriptan (IMITREX) 100 MG tablet, sumatriptan 100 mg tablet  take 1 po at onset of symptoms. can repeat x 1 in 2 hours if headache persists, Disp: , Rfl:     traZODone (DESYREL) 150 MG tablet, Take 1 tablet by mouth Every Night., Disp: , Rfl:     valACYclovir (VALTREX) 500 MG tablet, TAKE 1 TABLET BY MOUTH DAILY, Disp: 90 tablet, Rfl: 3    Vonoprazan Fumarate (Voquezna) 20 MG tablet, Take 1 tablet by mouth Daily., Disp: 30 tablet, Rfl: 1    acetaminophen (TYLENOL) 325 MG tablet, Take 2 tablets by mouth Every 4 (Four) Hours As Needed for Mild Pain . Over the counter (Patient not taking: Reported on 11/12/2024), Disp: , Rfl:     albuterol sulfate  (90 Base) MCG/ACT inhaler, 1 puff. (Patient not taking: Reported on 11/12/2024), Disp: , Rfl:     Insulin Aspart, w/Niacinamide, (Fiasp FlexTouch) 100 UNIT/ML solution pen-injector, Inject 4 Units under the skin into the appropriate area as directed 3 (Three) Times a Day., Disp: 15 mL, Rfl: 1    Insulin Pen Needle (BD Pen Needle Yari U/F) 32G X 4 MM misc, Use 1 Units 4 (Four) Times a Day., Disp: 100 each, Rfl: 1  Mobic [meloxicam]    Objective   Vitals:    11/12/24 0835   BP: 102/64   Pulse: 105   SpO2: 97%      /64 (BP Location: Left arm, Patient Position: Sitting)   Pulse 105   Ht 157.5 cm (62.01\")   Wt 57.2 kg (126 lb)   LMP  (LMP Unknown)   SpO2 97%   BMI 23.04 kg/m²       Physical Exam  Neurological:      General: No focal deficit present.      Mental Status: She is alert and oriented to person, place, and time.               Diagnoses and all orders for this visit:    1. " Type 2 diabetes mellitus with hyperglycemia, with long-term current use of insulin (Primary)  -     Insulin Aspart, w/Niacinamide, (Fiasp FlexTouch) 100 UNIT/ML solution pen-injector; Inject 4 Units under the skin into the appropriate area as directed 3 (Three) Times a Day.  Dispense: 15 mL; Refill: 1  -     Insulin Pen Needle (BD Pen Needle Yari U/F) 32G X 4 MM misc; Use 1 Units 4 (Four) Times a Day.  Dispense: 100 each; Refill: 1  -     Ambulatory Referral to Diabetic Education         Assessment:       1- DM, Type 2  Uncontrolled with High risk and hyperglycemia   CGM on file , reviewed with pt    ave is 244 , 27 % in range and the rest is high, with PP up to 350   We discussed the medications  Hypoglycemia and its importance was reviewed   Labs where shown to the patient   2. BP is in good range   3. Hyperlipidemia, on a statin   4. Long term insulin use         Plan:      1. Diet, exercise and weight management  2. Consistent food intake to avoid low blood sugars  3. Home medication includes for diabetes     Stop Glipizide, in need of prandial coverage   Change to Lantus 20 units once a day   Start Meal time insulin 4 units with meals , fiasp , will add SSI soon   Metformin 1000 mg twice a day     4. Consistent checking of blood sugars using Annalee 3   5. I reviewed the last progress note  6. Annual eye exam  7. Return in 2 months   8. Send in readings in 4-6 weeks if running high , she will send a [employer] message   9. Rx sent to the pharmacy  10. Labs : A1c today       I discussed with the patient/legal representative the risks and the benefits associated with the medications.  The patient/legal representative has been given the opportunity to ask questions.  Alternatives to the proposed treatment (s) were discussed, including the likely results of no treatment.  The patient/legal representative wishes to proceed.       Refugio Vyas MD   11/12/24  09:02 EST

## 2024-11-12 NOTE — TELEPHONE ENCOUNTER
Left pt a detailed message regarding medication change. Advised to return call with any issues picking up rx or any additional questions.

## 2024-11-14 ENCOUNTER — PRIOR AUTHORIZATION (OUTPATIENT)
Dept: ENDOCRINOLOGY | Age: 59
End: 2024-11-14
Payer: COMMERCIAL

## 2024-11-14 NOTE — TELEPHONE ENCOUNTER
Prior Authorization submitted for    Fiasp FlexTouch 100UNIT/ML pen-injectors    (Key: [id number])  Rx #: [id number]

## 2025-01-11 DIAGNOSIS — K22.719 BARRETT'S ESOPHAGUS WITH DYSPLASIA: ICD-10-CM

## 2025-01-11 DIAGNOSIS — R13.19 ESOPHAGEAL DYSPHAGIA: ICD-10-CM

## 2025-01-11 DIAGNOSIS — K21.00 GASTROESOPHAGEAL REFLUX DISEASE WITH ESOPHAGITIS WITHOUT HEMORRHAGE: ICD-10-CM

## 2025-01-13 RX ORDER — SUCRALFATE 1 G/1
TABLET ORAL
Qty: 120 TABLET | Refills: 3 | Status: SHIPPED | OUTPATIENT
Start: 2025-01-13

## 2025-01-25 DIAGNOSIS — K21.00 GASTROESOPHAGEAL REFLUX DISEASE WITH ESOPHAGITIS WITHOUT HEMORRHAGE: ICD-10-CM

## 2025-01-27 RX ORDER — VONOPRAZAN FUMARATE 26.72 MG/1
20 TABLET ORAL DAILY
Qty: 30 TABLET | Refills: 1 | OUTPATIENT
Start: 2025-01-27

## 2025-01-27 RX ORDER — VONOPRAZAN FUMARATE 13.36 MG/1
10 TABLET ORAL DAILY
Qty: 30 TABLET | Refills: 3 | Status: SHIPPED | OUTPATIENT
Start: 2025-01-27 | End: 2025-01-31 | Stop reason: SDUPTHER

## 2025-01-30 NOTE — TELEPHONE ENCOUNTER
Spoke with patient.  She is agreeable to try [facility] for a better price.  Also, may we give her 10 mg samples to allow for time to receive RX from [facility]?

## 2025-01-30 NOTE — TELEPHONE ENCOUNTER
PT NEEDS SOMEONE TO CALL HER  HER SCRIPT WAS CALLED IN FOR A LOWER DOSE AND SHE DOESN'T KNOW WHY  THE COST IS OVER 300$  SHE WOULD LIKE A CALL [phone]

## 2025-01-31 RX ORDER — VONOPRAZAN FUMARATE 13.36 MG/1
10 TABLET ORAL DAILY
Qty: 30 TABLET | Refills: 3 | Status: SHIPPED | OUTPATIENT
Start: 2025-01-31

## 2025-02-04 DIAGNOSIS — E11.65 TYPE 2 DIABETES MELLITUS WITH HYPERGLYCEMIA, WITH LONG-TERM CURRENT USE OF INSULIN: ICD-10-CM

## 2025-02-04 DIAGNOSIS — Z79.4 TYPE 2 DIABETES MELLITUS WITH HYPERGLYCEMIA, WITH LONG-TERM CURRENT USE OF INSULIN: ICD-10-CM

## 2025-02-04 RX ORDER — PEN NEEDLE, DIABETIC 32GX 5/32"
NEEDLE, DISPOSABLE MISCELLANEOUS
Qty: 100 EACH | Refills: 0 | Status: SHIPPED | OUTPATIENT
Start: 2025-02-04

## 2025-02-11 NOTE — PROGRESS NOTES
Chief complaint   Chief Complaint   Patient presents with    Type 2 diabetes mellitus with hyperglycemia, with long-term          Subjective     History of Present Illness:          This is a 59 year old female I am seeing for type 2 DM   referred by PCP   Last OV was 3 months ago   She is here for a follow up   other medical history is   1- Migraines   2- HLD   3- Other   Pt had T2DM for many years   Current home medication for diabetes     Glipizide 5 mg daily   Lantus 20 units once a day AM   Metformin 1000 mg 1 a day   Humalog with meals 4 units per meals   the A1c was 8.1 in 6-2024  Checks blood sugar at home using Annalee 3   Checks blood sugar 4 times per day   SBMG: ave is 200 ,45 % in range and 4 % low , with PP up to 300   pt states that she is having better BG now , trying his best with dietary Compliance, in regards to Exercise, not on a daily basis and his Weight has been the same and there is few Hypoglycemic episodes, there is no AM Headaches /Nightmares, no Polyuria / Polydipsia, and there os no Blurring of Vision  Last Dilated Pupil Exam, in 2024 , no DM in the eye   NoTingling / numbness in feet, No Dizziness / lightheadedness, No Falls  No Nausea, vomiting / Diarrhea   at DMV        Family History   Problem Relation Age of Onset    Heart attack Father     Hypertension Father     Diabetes Father     Alcohol abuse Father     Dementia Father     Hypertension Mother     Hyperlipidemia Mother     Atrial fibrillation Mother     Stroke Mother     Heart attack Brother     Hypertension Brother     Stroke Brother     Esophageal cancer Brother     Stroke Sister     Crohn's disease Sister     Alzheimer's disease Paternal Grandmother     Colon cancer Maternal Uncle     Diabetes Paternal Aunt     Breast cancer Paternal Aunt     Rheum arthritis Other     Ovarian cancer Neg Hx     Deep vein thrombosis Neg Hx     Pulmonary embolism Neg Hx     Uterine cancer Neg Hx      Social History     Socioeconomic  "History    Marital status:    Tobacco Use    Smoking status: Former     Current packs/day: 0.00     Types: Cigarettes     Quit date: 1995     Years since quittin.7    Smokeless tobacco: Never    Tobacco comments:     Quit \" a long time ago\"    Vaping Use    Vaping status: Never Used   Substance and Sexual Activity    Alcohol use: Yes     Comment: OCCASIONALLY    Drug use: Yes     Types: Marijuana    Sexual activity: Yes     Partners: Male     Birth control/protection: Post-menopausal, Tubal ligation     Comment: BTL     Past Medical History:   Diagnosis Date    Abnormal Pap smear of cervix     1 abnormal pap with normal f/u    Howell esophagus     Colon polyp     Diabetes mellitus     Difficulty swallowing     at times    DKA (diabetic ketoacidosis)     Fibroid     GERD (gastroesophageal reflux disease)     HSV-2 (herpes simplex virus 2) infection 2017    Menstrual disorder     Migraines     Rectal bleeding     bleeds with bowel movement    Seasonal allergies      Past Surgical History:   Procedure Laterality Date    CARPAL TUNNEL RELEASE WITH CUBITAL TUNNEL RELEASE Right      SECTION      X2    COLONOSCOPY N/A 2016    Procedure: COLONOSCOPY with polypectomy;  Surgeon: Maryan Kent MD;  Location: [facility] OR;  Service:     COLONOSCOPY N/A 6/3/2020    Procedure: COLONOSCOPY;  Surgeon: Dmitri Fung MD;  Location: [facility] OR;  Service: Gastroenterology;  Laterality: N/A;  Poor prep, polyp    ENDOMETRIAL ABLATION      thermachoice    ENDOSCOPY N/A 6/3/2020    Procedure: ESOPHAGOGASTRODUODENOSCOPY;  Surgeon: Dmitri Fung MD;  Location: [facility] OR;  Service: Gastroenterology;  Laterality: N/A;  Reflux esophagitis, erosive gastritis, duodenitis    ENDOSCOPY N/A 2024    Procedure: ESOPHAGOGASTRODUODENOSCOPY WITH BIOPSY;  Surgeon: Dmitri Fung MD;  Location: [facility] OR;  Service: Gastroenterology;  Laterality: N/A;  Gastritis; Barretts " Esophagus    HYSTEROSCOPY      AND ENDOMETRIAL  ABLATION    NASAL SEPTUM SURGERY      2004    TUBAL ABDOMINAL LIGATION         Current Outpatient Medications:     atorvastatin (LIPITOR) 10 MG tablet, atorvastatin 10 mg tablet, Disp: , Rfl:     Blood Glucose Monitoring Suppl (Contour Next EZ) w/Device kit, Use to test blood glucose up to four times daily as needed., Disp: 1 kit, Rfl: 0    cetirizine (zyrTEC) 10 MG tablet, Take 1 tablet by mouth Daily. PRN, Disp: , Rfl:     Continuous Glucose  (FreeStyle Annalee 3 Chinle) device, Use to check blood sugar 6-8 times a day as needed, Disp: 1 each, Rfl: 11    Continuous Glucose Sensor (FreeStyle Annalee 14 Day Sensor) misc, Use to check glucose 6-8 times a day as needed. Change sensor every 14 days as directed by prescriber., Disp: 1 each, Rfl: 11    Continuous Glucose Sensor (FreeStyle Annalee 3 Sensor) misc, Check blood glucose levels 6 to 8 times daily. Change sensory every 14 days., Disp: 1 each, Rfl: 11    FLUoxetine (PROzac) 40 MG capsule, fluoxetine 40 mg capsule  Take 1 capsule every day by oral route for 90 days., Disp: , Rfl:     glucose blood test strip, Use to test blood glucose up to four times daily as needed., Disp: 100 each, Rfl: 0    Insulin Lispro, 1 Unit Dial, (HumaLOG KwikPen) 100 UNIT/ML solution pen-injector, Inject 4 Units under the skin into the appropriate area as directed 3 (Three) Times a Day., Disp: 15 mL, Rfl: 1    Insulin Pen Needle (Droplet Pen Needles) 32G X 4 MM misc, Inject 1 Units under the skin into the appropriate area as directed 4 (Four) Times a Day., Disp: 100 each, Rfl: 1    Lancets misc, Use to test blood glucose up to four times daily as needed., Disp: 100 each, Rfl: 0    Lantus SoloStar 100 UNIT/ML injection pen, Inject 18 Units under the skin into the appropriate area as directed Daily., Disp: 15 mL, Rfl: 2    metFORMIN (GLUCOPHAGE) 500 MG tablet, Take 2 tablets by mouth Daily With Breakfast., Disp: 30 tablet, Rfl: 2     Multiple Vitamins-Minerals (MULTI-BETIC PO), Take 2 tablets by mouth Daily., Disp: , Rfl:     promethazine (PHENERGAN) 25 MG tablet, Take 1 tablet by mouth Every 6 (Six) Hours As Needed for Nausea or Vomiting., Disp: 30 tablet, Rfl: 2    SALINE NASAL MIST NA, Saline Nasal Mist, Disp: , Rfl:     sucralfate (CARAFATE) 1 g tablet, TAKE 1 TABLET BY MOUTH 4 TIMES A DAY, Disp: 120 tablet, Rfl: 3    SudoGest 12 Hour 120 MG 12 hr tablet, , Disp: , Rfl:     SUMAtriptan (IMITREX) 100 MG tablet, sumatriptan 100 mg tablet  take 1 po at onset of symptoms. can repeat x 1 in 2 hours if headache persists, Disp: , Rfl:     traZODone (DESYREL) 150 MG tablet, Take 1 tablet by mouth Every Night., Disp: , Rfl:     valACYclovir (VALTREX) 500 MG tablet, TAKE 1 TABLET BY MOUTH DAILY, Disp: 90 tablet, Rfl: 3    Vonoprazan Fumarate (Voquezna) 10 MG tablet, Take 1 tablet by mouth Daily., Disp: 30 tablet, Rfl: 3    acetaminophen (TYLENOL) 325 MG tablet, Take 2 tablets by mouth Every 4 (Four) Hours As Needed for Mild Pain . Over the counter (Patient not taking: Reported on 2/12/2025), Disp: , Rfl:     albuterol sulfate  (90 Base) MCG/ACT inhaler, 1 puff. (Patient not taking: Reported on 2/12/2025), Disp: , Rfl:     aspirin-acetaminophen-caffeine (EXCEDRIN MIGRAINE) 250-250-65 MG per tablet, Take 1 tablet by mouth Every 6 (Six) Hours As Needed for Headache. (Patient not taking: Reported on 2/12/2025), Disp: , Rfl:     Continuous Glucose Sensor (FreeStyle Annalee 3 Plus Sensor), Use every 15 days, Disp: 2 each, Rfl: 2    glipizide (GLUCOTROL XL) 5 MG ER tablet, Take 1 tablet by mouth Daily. (Patient not taking: Reported on 2/12/2025), Disp: , Rfl:     Insulin Aspart, w/Niacinamide, (Fiasp FlexTouch) 100 UNIT/ML solution pen-injector, Inject 4 Units under the skin into the appropriate area as directed 3 (Three) Times a Day. (Patient not taking: Reported on 2/12/2025), Disp: 15 mL, Rfl: 1  Mobic [meloxicam]    Objective   Vitals:    02/12/25  "0751   BP: 100/68   Pulse: 55   SpO2: 96%        /68 (BP Location: Left arm)   Pulse 55   Ht 157.5 cm (62.01\")   Wt 58.6 kg (129 lb 3.2 oz)   LMP  (LMP Unknown)   SpO2 96%   BMI 23.62 kg/m²       Physical Exam  Neurological:      General: No focal deficit present.      Mental Status: She is alert and oriented to person, place, and time.               Diagnoses and all orders for this visit:    1. Type 2 diabetes mellitus with hyperglycemia, with long-term current use of insulin  -     Continuous Glucose Sensor (FreeStyle Annalee 3 Plus Sensor); Use every 15 days  Dispense: 2 each; Refill: 2  -     metFORMIN (GLUCOPHAGE) 500 MG tablet; Take 2 tablets by mouth Daily With Breakfast.  Dispense: 30 tablet; Refill: 2  -     Insulin Pen Needle (Droplet Pen Needles) 32G X 4 MM misc; Inject 1 Units under the skin into the appropriate area as directed 4 (Four) Times a Day.  Dispense: 100 each; Refill: 1  -     Lantus SoloStar 100 UNIT/ML injection pen; Inject 18 Units under the skin into the appropriate area as directed Daily.  Dispense: 15 mL; Refill: 2  -     Hemoglobin A1c           Assessment:       1- DM, Type 2  Uncontrolled with High risk and hyperglycemia   CGM on file , reviewed with pt   Getting better   We discussed the medications  Hypoglycemia and its importance was reviewed   Labs where shown to the patient   2. BP is in good range   3. Hyperlipidemia, on a statin   4. Long term insulin use         Plan:      1. Diet, exercise and weight management  2. Consistent food intake to avoid low blood sugars  3. Home medication includes for diabetes     No Glipizide, in need of prandial coverage   Change to Lantus 18 units once a day   Change to  Meal time insulin 5 units with meals ,   add SSI 1/50 above 150   Metformin 1000 mg 1 a day     4. Consistent checking of blood sugars using Annalee 3   5. I reviewed the last progress note  6. Annual eye exam  7. Return in 3 months   8. Send in readings in 4-6 weeks if " running high , she will send a [facility] message   9. Rx sent to the pharmacy  10. Labs : A1c today       I discussed with the patient/legal representative the risks and the benefits associated with the medications.  The patient/legal representative has been given the opportunity to ask questions.  Alternatives to the proposed treatment (s) were discussed, including the likely results of no treatment.  The patient/legal representative wishes to proceed.       Refugio Vyas MD   02/12/25  08:25 EST

## 2025-02-12 ENCOUNTER — OFFICE VISIT (OUTPATIENT)
Dept: ENDOCRINOLOGY | Age: 60
End: 2025-02-12
Payer: COMMERCIAL

## 2025-02-12 VITALS
SYSTOLIC BLOOD PRESSURE: 100 MMHG | DIASTOLIC BLOOD PRESSURE: 68 MMHG | HEIGHT: 62 IN | WEIGHT: 129.2 LBS | BODY MASS INDEX: 23.77 KG/M2 | OXYGEN SATURATION: 96 % | HEART RATE: 55 BPM

## 2025-02-12 DIAGNOSIS — E11.65 TYPE 2 DIABETES MELLITUS WITH HYPERGLYCEMIA, WITH LONG-TERM CURRENT USE OF INSULIN: ICD-10-CM

## 2025-02-12 DIAGNOSIS — Z79.4 TYPE 2 DIABETES MELLITUS WITH HYPERGLYCEMIA, WITH LONG-TERM CURRENT USE OF INSULIN: ICD-10-CM

## 2025-02-12 LAB — HBA1C MFR BLD: 7.9 % (ref 4.8–5.6)

## 2025-02-12 RX ORDER — PEN NEEDLE, DIABETIC 32GX 5/32"
1 NEEDLE, DISPOSABLE MISCELLANEOUS 4 TIMES DAILY
Qty: 100 EACH | Refills: 1 | Status: SHIPPED | OUTPATIENT
Start: 2025-02-12

## 2025-02-12 RX ORDER — HYDROCHLOROTHIAZIDE 12.5 MG/1
CAPSULE ORAL
Qty: 2 EACH | Refills: 2 | Status: SHIPPED | OUTPATIENT
Start: 2025-02-12

## 2025-02-12 RX ORDER — INSULIN GLARGINE 100 [IU]/ML
18 INJECTION, SOLUTION SUBCUTANEOUS DAILY
Qty: 15 ML | Refills: 2 | Status: SHIPPED | OUTPATIENT
Start: 2025-02-12

## 2025-02-13 RX ORDER — INSULIN LISPRO 100 [IU]/ML
4 INJECTION, SOLUTION INTRAVENOUS; SUBCUTANEOUS 3 TIMES DAILY
Qty: 15 ML | Refills: 1 | Status: SHIPPED | OUTPATIENT
Start: 2025-02-13

## 2025-02-13 NOTE — TELEPHONE ENCOUNTER
Rx Refill Note  Requested Prescriptions     Pending Prescriptions Disp Refills    Insulin Lispro, 1 Unit Dial, (HumaLOG KwikPen) 100 UNIT/ML solution pen-injector 15 mL 1     Sig: Inject 4 Units under the skin into the appropriate area as directed 3 (Three) Times a Day.      Last office visit with prescribing clinician: 2/12/2025   Last telemedicine visit with prescribing clinician: Visit date not found   Next office visit with prescribing clinician: 5/13/2025                         Would you like a call back once the refill request has been completed: [] Yes [] No    If the office needs to give you a call back, can they leave a voicemail: [] Yes [] No    April Nunez  02/13/25, 08:14 EST

## 2025-02-24 ENCOUNTER — OFFICE VISIT (OUTPATIENT)
Dept: GASTROENTEROLOGY | Facility: CLINIC | Age: 60
End: 2025-02-24
Payer: COMMERCIAL

## 2025-02-24 VITALS
HEIGHT: 62 IN | WEIGHT: 131.6 LBS | BODY MASS INDEX: 24.22 KG/M2 | SYSTOLIC BLOOD PRESSURE: 110 MMHG | DIASTOLIC BLOOD PRESSURE: 68 MMHG

## 2025-02-24 DIAGNOSIS — K21.00 GASTROESOPHAGEAL REFLUX DISEASE WITH ESOPHAGITIS WITHOUT HEMORRHAGE: ICD-10-CM

## 2025-02-24 DIAGNOSIS — K22.70 BARRETT'S ESOPHAGUS WITHOUT DYSPLASIA: Primary | ICD-10-CM

## 2025-02-24 DIAGNOSIS — Z86.0100 HISTORY OF COLONIC POLYPS: ICD-10-CM

## 2025-02-24 PROCEDURE — 99214 OFFICE O/P EST MOD 30 MIN: CPT

## 2025-02-24 NOTE — PROGRESS NOTES
PATIENT INFORMATION  Lona Dupree       - 1965    CHIEF COMPLAINT  Chief Complaint   Patient presents with    Heartburn    Nausea    Diabetes Mellitus    Hypoglycemia         HISTORY OF PRESENT ILLNESS  Here today for Barretts follow-up      GERD: Per LOV: Managed with BID PPI, TID sucralfate. Nausea at LOV since starting Glipizide. Still having a lot of nausea, attributing to sinus drainage from allergies. Dysphagia is less often, but avoiding carbonation and breads, cutting fried foods in small bites. Nausea several times a week, phenergan helped. Switched from PPI to PCAB. TODAY: Voquezna is working well and nausea has improved and less than weekly. Happy where she is. Voquezna is a little on the expensive side now. More affordable now, offered blinkrx for bigger cost savings, but patient deferring for now.      Per LOV: Diabetes dx in 2017, insulin and metformin. Sugar bottomed out recently and went to ER and admitted once for DKA with insulin issues. BG dropping into 60s every night. TODAY: Now happy with new endocrinologist, still having some low blood sugars, but is working through it. No more hospitalizations.      Bowels are very regular, easy and well controlled after coffee in morning, no bleeding or straining.     Sister with crohns and brother passed of esophageal cancer.     6/3/2020 EGD and Colon for dysphagia, reflux with barretts, negative for celiac, dysplasia. Colon with 0/1 adenomas, previous colon normal. Personal hx polyps.  2024 EGD with chronic gastritis, reflux esophagitis, no HP or barretts.           REVIEWED PERTINENT RESULTS/ LABS  Lab Results   Component Value Date    CASEREPORT  2024     Surgical Pathology Report                         Case: [id number]                                  Authorizing Provider:  Dmitri Fung        Collected:           2024 10:20 AM                                 MD Goran                                                                    Ordering Location:     [facility]   Received:            08/23/2024 10:30 AM                                 OR                                                                           Pathologist:           Ivy Albert MD                                                        Specimens:   1) - Gastric, Body, Gastric Biopsy                                                                  2) - Esophagus, Distal, Distal Esophageal Biopsy                                           FINALDX  08/23/2024     1.   Stomach, biopsy:          A.  Gastric antral and oxyntic mucosa with mild chronic inactive gastritis.         B.  Negative for intestinal metaplasia.         C.  Negative for H. pylori-type organisms (H&E stain).    2.  Distal esophagus, biopsy:          A.  Squamocolumnar mucosa with mild reactive changes and chronic inflammation.         B.  Negative for intestinal metaplasia or dysplasia.       Lab Results   Component Value Date    HGB 9.5 (L) 08/12/2024    MCV 83.1 08/12/2024     08/12/2024    ALT 13 08/12/2024    AST 26 08/12/2024    HGBA1C 7.90 (H) 02/12/2025    INR 0.95 04/21/2021    TRIG 114 04/21/2021      No results found.    REVIEW OF SYSTEMS  Review of Systems      ACTIVE PROBLEMS  Patient Active Problem List    Diagnosis     Diabetic ketoacidosis [E11.10]     DKA (diabetic ketoacidosis) [E11.10]     Hypoglycemia [E16.2]     Diabetic acidosis without coma [E11.10]     Gastroesophageal reflux disease with esophagitis without hemorrhage [K21.00]     Presbyesophagus [K22.89]     Esophageal dysphagia [R13.19]     Personal history of colonic polyps [Z86.0100]     High anion gap metabolic acidosis [E87.29]     Methamphetamine use [F15.10]     Cocaine use [F14.90]     Nausea & vomiting [R11.2]     Diabetes mellitus with ketosis [E11.10]     HSV-2 (herpes simplex virus 2) infection [B00.9]     Fibroids [D21.9]     Smoker [F17.200]     Headache [R51.9]      Diabetes mellitus [E11.9]     History of bloody stools [Z87.19]          PAST MEDICAL HISTORY  Past Medical History:   Diagnosis Date    Abnormal Pap smear of cervix     1 abnormal pap with normal f/u    Howell esophagus     Colon polyp     Diabetes mellitus     Difficulty swallowing     at times    DKA (diabetic ketoacidosis)     Fibroid     GERD (gastroesophageal reflux disease)     HSV-2 (herpes simplex virus 2) infection 2017    Menstrual disorder     Migraines     Rectal bleeding     bleeds with bowel movement    Seasonal allergies          SURGICAL HISTORY  Past Surgical History:   Procedure Laterality Date    CARPAL TUNNEL RELEASE WITH CUBITAL TUNNEL RELEASE Right      SECTION      X2    COLONOSCOPY N/A 2016    Procedure: COLONOSCOPY with polypectomy;  Surgeon: Maryan Kent MD;  Location: [facility] OR;  Service:     COLONOSCOPY N/A 6/3/2020    Procedure: COLONOSCOPY;  Surgeon: Dmitri Fung MD;  Location: [facility] OR;  Service: Gastroenterology;  Laterality: N/A;  Poor prep, polyp    ENDOMETRIAL ABLATION      thermachoice    ENDOSCOPY N/A 6/3/2020    Procedure: ESOPHAGOGASTRODUODENOSCOPY;  Surgeon: Dmitri Fung MD;  Location: [facility] OR;  Service: Gastroenterology;  Laterality: N/A;  Reflux esophagitis, erosive gastritis, duodenitis    ENDOSCOPY N/A 2024    Procedure: ESOPHAGOGASTRODUODENOSCOPY WITH BIOPSY;  Surgeon: Dmitri Fung MD;  Location: [facility];  Service: Gastroenterology;  Laterality: N/A;  Gastritis; Barretts Esophagus    HYSTEROSCOPY      AND ENDOMETRIAL  ABLATION    NASAL SEPTUM SURGERY          TUBAL ABDOMINAL LIGATION           FAMILY HISTORY  Family History   Problem Relation Age of Onset    Heart attack Father     Hypertension Father     Diabetes Father     Alcohol abuse Father     Dementia Father     Hypertension Mother     Hyperlipidemia Mother     Atrial fibrillation Mother     Stroke Mother     Heart attack  "Brother     Hypertension Brother     Stroke Brother     Esophageal cancer Brother     Stroke Sister     Crohn's disease Sister     Alzheimer's disease Paternal Grandmother     Colon cancer Maternal Uncle     Diabetes Paternal Aunt     Breast cancer Paternal Aunt     Rheum arthritis Other     Ovarian cancer Neg Hx     Deep vein thrombosis Neg Hx     Pulmonary embolism Neg Hx     Uterine cancer Neg Hx          SOCIAL HISTORY  Social History     Occupational History    Not on file   Tobacco Use    Smoking status: Former     Current packs/day: 0.00     Types: Cigarettes     Quit date: 1995     Years since quittin.7    Smokeless tobacco: Never    Tobacco comments:     Quit \" a long time ago\"    Vaping Use    Vaping status: Never Used   Substance and Sexual Activity    Alcohol use: Yes     Comment: OCCASIONALLY    Drug use: Yes     Types: Marijuana    Sexual activity: Yes     Partners: Male     Birth control/protection: Post-menopausal, Tubal ligation     Comment: BTL         CURRENT MEDICATIONS    Current Outpatient Medications:     aspirin-acetaminophen-caffeine (EXCEDRIN MIGRAINE) 250-250-65 MG per tablet, Take 1 tablet by mouth Every 6 (Six) Hours As Needed for Headache., Disp: , Rfl:     atorvastatin (LIPITOR) 10 MG tablet, atorvastatin 10 mg tablet, Disp: , Rfl:     Blood Glucose Monitoring Suppl (Contour Next EZ) w/Device kit, Use to test blood glucose up to four times daily as needed., Disp: 1 kit, Rfl: 0    cetirizine (zyrTEC) 10 MG tablet, Take 1 tablet by mouth Daily. PRN, Disp: , Rfl:     Continuous Glucose  (FreeStyle Annalee 3 [place]) device, Use to check blood sugar 6-8 times a day as needed, Disp: 1 each, Rfl: 11    Continuous Glucose Sensor (FreeStyle Annalee 14 Day Sensor) misc, Use to check glucose 6-8 times a day as needed. Change sensor every 14 days as directed by prescriber., Disp: 1 each, Rfl: 11    Continuous Glucose Sensor (FreeStyle Annalee 3 Plus Sensor), Use every 15 days, Disp: 2 " each, Rfl: 2    Continuous Glucose Sensor (FreeStyle Annalee 3 Sensor) misc, Check blood glucose levels 6 to 8 times daily. Change sensory every 14 days., Disp: 1 each, Rfl: 11    FLUoxetine (PROzac) 40 MG capsule, fluoxetine 40 mg capsule  Take 1 capsule every day by oral route for 90 days., Disp: , Rfl:     glucose blood test strip, Use to test blood glucose up to four times daily as needed., Disp: 100 each, Rfl: 0    Insulin Lispro, 1 Unit Dial, (HumaLOG KwikPen) 100 UNIT/ML solution pen-injector, Inject 4 Units under the skin into the appropriate area as directed 3 (Three) Times a Day., Disp: 15 mL, Rfl: 1    Insulin Pen Needle (Droplet Pen Needles) 32G X 4 MM misc, Inject 1 Units under the skin into the appropriate area as directed 4 (Four) Times a Day., Disp: 100 each, Rfl: 1    Lancets misc, Use to test blood glucose up to four times daily as needed., Disp: 100 each, Rfl: 0    Lantus SoloStar 100 UNIT/ML injection pen, Inject 18 Units under the skin into the appropriate area as directed Daily., Disp: 15 mL, Rfl: 2    metFORMIN (GLUCOPHAGE) 500 MG tablet, Take 2 tablets by mouth Daily With Breakfast. (Patient taking differently: Take  by mouth.), Disp: 30 tablet, Rfl: 2    Multiple Vitamins-Minerals (MULTI-BETIC PO), Take 2 tablets by mouth Daily., Disp: , Rfl:     promethazine (PHENERGAN) 25 MG tablet, Take 1 tablet by mouth Every 6 (Six) Hours As Needed for Nausea or Vomiting., Disp: 30 tablet, Rfl: 2    SALINE NASAL MIST NA, Saline Nasal Mist, Disp: , Rfl:     sucralfate (CARAFATE) 1 g tablet, TAKE 1 TABLET BY MOUTH 4 TIMES A DAY, Disp: 120 tablet, Rfl: 3    SudoGest 12 Hour 120 MG 12 hr tablet, , Disp: , Rfl:     SUMAtriptan (IMITREX) 100 MG tablet, sumatriptan 100 mg tablet  take 1 po at onset of symptoms. can repeat x 1 in 2 hours if headache persists, Disp: , Rfl:     traZODone (DESYREL) 150 MG tablet, Take 1 tablet by mouth Every Night., Disp: , Rfl:     valACYclovir (VALTREX) 500 MG tablet, TAKE 1  "TABLET BY MOUTH DAILY, Disp: 90 tablet, Rfl: 3    Vonoprazan Fumarate (Voquezna) 10 MG tablet, Take 1 tablet by mouth Daily., Disp: 30 tablet, Rfl: 3    ALLERGIES  Mobic [meloxicam]    VITALS  Vitals:    02/24/25 0808   BP: 110/68   BP Location: Left arm   Patient Position: Sitting   Cuff Size: Adult   Weight: 59.7 kg (131 lb 9.6 oz)   Height: 157.5 cm (62\")       PHYSICAL EXAM  Debilities/Disabilities Identified: None  Emotional Behavior: Appropriate  Wt Readings from Last 3 Encounters:   02/24/25 59.7 kg (131 lb 9.6 oz)   02/12/25 58.6 kg (129 lb 3.2 oz)   11/12/24 57.2 kg (126 lb)     Ht Readings from Last 1 Encounters:   02/24/25 157.5 cm (62\")     Body mass index is 24.07 kg/m².  Physical Exam  Constitutional:       General: She is not in acute distress.     Appearance: Normal appearance. She is not ill-appearing.   HENT:      Head: Normocephalic and atraumatic.      Mouth/Throat:      Mouth: Mucous membranes are moist.      Pharynx: No posterior oropharyngeal erythema.   Eyes:      General: No scleral icterus.  Cardiovascular:      Rate and Rhythm: Normal rate and regular rhythm.      Heart sounds: Normal heart sounds.   Pulmonary:      Effort: Pulmonary effort is normal.      Breath sounds: Normal breath sounds.   Abdominal:      General: Abdomen is flat. Bowel sounds are normal. There is no distension.      Palpations: Abdomen is soft. There is no mass.      Tenderness: There is no abdominal tenderness. There is no guarding or rebound. Negative signs include Gonzalez's sign.      Hernia: No hernia is present.   Musculoskeletal:      Cervical back: Neck supple.   Skin:     General: Skin is warm.      Capillary Refill: Capillary refill takes less than 2 seconds.   Neurological:      General: No focal deficit present.      Mental Status: She is alert and oriented to person, place, and time.   Psychiatric:         Mood and Affect: Mood normal.         Behavior: Behavior normal.         Thought Content: Thought " content normal.         Judgment: Judgment normal.         CLINICAL DATA REVIEWED   reviewed previous lab results and integrated with today's visit, reviewed notes from other physicians and/or last GI encounter, reviewed previous endoscopy results and available photos, reviewed surgical pathology results from previous biopsies    ASSESSMENT  Diagnoses and all orders for this visit:    Howell's esophagus without dysplasia    Gastroesophageal reflux disease with esophagitis without hemorrhage    Personal history of colonic polyps          PLAN    GERD/Barretts: Continue voquezna, will plan for next EGD in 2029  Colon 2027    Return in about 6 months (around 8/24/2025).    I have discussed the above plan with the patient.  They verbalize understanding and are in agreement with the plan.  They have been advised to contact the office for any questions, concerns, or changes related to their health.

## 2025-05-02 DIAGNOSIS — Z79.4 TYPE 2 DIABETES MELLITUS WITH HYPERGLYCEMIA, WITH LONG-TERM CURRENT USE OF INSULIN: ICD-10-CM

## 2025-05-02 DIAGNOSIS — E11.65 TYPE 2 DIABETES MELLITUS WITH HYPERGLYCEMIA, WITH LONG-TERM CURRENT USE OF INSULIN: ICD-10-CM

## 2025-05-05 RX ORDER — HYDROCHLOROTHIAZIDE 12.5 MG/1
CAPSULE ORAL
Qty: 2 EACH | Refills: 0 | Status: SHIPPED | OUTPATIENT
Start: 2025-05-05

## 2025-05-05 NOTE — TELEPHONE ENCOUNTER
Rx Refill Note  Requested Prescriptions     Pending Prescriptions Disp Refills    Continuous Glucose Sensor (FreeStyle Annalee 3 Plus Sensor) [Pharmacy Med Name: FREESTYLE ANNALEE 3 PLUS SENSOR] 2 each 2     Sig: APPLY SENSOR TO SKIN FOR CONTINUOUS BLOOD SUGAR MONITORING, REPLACE EVERY 15 DAYS      Last office visit with prescribing clinician: 2/12/2025   Last telemedicine visit with prescribing clinician: Visit date not found   Next office visit with prescribing clinician: 5/13/2025                         Would you like a call back once the refill request has been completed: [] Yes [] No    If the office needs to give you a call back, can they leave a voicemail: [] Yes [] No    Park Green MA  05/05/25, 07:54 EDT

## 2025-05-12 NOTE — PROGRESS NOTES
Chief complaint   Chief Complaint   Patient presents with    Type 2 diabetes mellitus with hyperglycemia, with long-term          Subjective     History of Present Illness:          This is a 60 year old female I am seeing for type 2 DM   referred by PCP   Last OV was 3 months ago   She is here for a follow up   other medical history is   1- Migraines   2- HLD   3- Other   Pt had T2DM for many years   Current home medication for diabetes     Glipizide 5 mg daily , off   Lantus 18 units once a day AM   Metformin 1000 mg 1 a day   Humalog with meals 5 units per meals   the A1c was 8.1 in 6-2024  the A1c was 7.9 in 2-2025  Checks blood sugar at home using Annalee 3 +  Checks blood sugar 4 times per day   SBMG: ave is 195 ,43  % in range and 9 % low , with PP up to 300   pt states that she is having high BG after meals as she sometimes does not take her insulin and then spikes and takes it letter , trying his best with dietary Compliance, in regards to Exercise, not on a daily basis and his Weight has been the same and there is few Hypoglycemic episodes, there is no AM Headaches /Nightmares, no Polyuria / Polydipsia, and there os no Blurring of Vision  Last Dilated Pupil Exam, in 2024 , no DM in the eye   NoTingling / numbness in feet, No Dizziness / lightheadedness, No Falls  No Nausea, vomiting / Diarrhea   at [facility]          Family History   Problem Relation Age of Onset    Heart attack Father     Hypertension Father     Diabetes Father     Alcohol abuse Father     Dementia Father     Hypertension Mother     Hyperlipidemia Mother     Atrial fibrillation Mother     Stroke Mother     Heart attack Brother     Hypertension Brother     Stroke Brother     Esophageal cancer Brother     Stroke Sister     Crohn's disease Sister     Alzheimer's disease Paternal Grandmother     Colon cancer Maternal Uncle     Diabetes Paternal Aunt     Breast cancer Paternal Aunt     Rheum arthritis Other     Ovarian cancer Neg Hx      "Deep vein thrombosis Neg Hx     Pulmonary embolism Neg Hx     Uterine cancer Neg Hx      Social History     Socioeconomic History    Marital status:    Tobacco Use    Smoking status: Former     Current packs/day: 0.00     Types: Cigarettes     Quit date: 1995     Years since quittin.9    Smokeless tobacco: Never    Tobacco comments:     Quit \" a long time ago\"    Vaping Use    Vaping status: Never Used   Substance and Sexual Activity    Alcohol use: Yes     Comment: OCCASIONALLY    Drug use: Yes     Types: Marijuana    Sexual activity: Yes     Partners: Male     Birth control/protection: Post-menopausal, Tubal ligation     Comment: BTL     Past Medical History:   Diagnosis Date    Abnormal Pap smear of cervix     1 abnormal pap with normal f/u    [place] esophagus     Colon polyp     Diabetes mellitus     Difficulty swallowing     at times    DKA (diabetic ketoacidosis)     Fibroid     GERD (gastroesophageal reflux disease)     HSV-2 (herpes simplex virus 2) infection 2017    Menstrual disorder     Migraines     Rectal bleeding     bleeds with bowel movement    Seasonal allergies      Past Surgical History:   Procedure Laterality Date    CARPAL TUNNEL RELEASE WITH CUBITAL TUNNEL RELEASE Right      SECTION      X2    COLONOSCOPY N/A 2016    Procedure: COLONOSCOPY with polypectomy;  Surgeon: Maryan Kent MD;  Location: [facility] OR;  Service:     COLONOSCOPY N/A 6/3/2020    Procedure: COLONOSCOPY;  Surgeon: Dmitri Fung MD;  Location: [facility] OR;  Service: Gastroenterology;  Laterality: N/A;  Poor prep, polyp    ENDOMETRIAL ABLATION      thermachoice    ENDOSCOPY N/A 6/3/2020    Procedure: ESOPHAGOGASTRODUODENOSCOPY;  Surgeon: Dmitri Fung MD;  Location: [facility] OR;  Service: Gastroenterology;  Laterality: N/A;  Reflux esophagitis, erosive gastritis, duodenitis    ENDOSCOPY N/A 2024    Procedure: ESOPHAGOGASTRODUODENOSCOPY WITH BIOPSY;  " Surgeon: Dmitri Fung MD;  Location: [facility];  Service: Gastroenterology;  Laterality: N/A;  Gastritis; Barretts Esophagus    HYSTEROSCOPY      AND ENDOMETRIAL  ABLATION    NASAL SEPTUM SURGERY      2004    TUBAL ABDOMINAL LIGATION         Current Outpatient Medications:     aspirin-acetaminophen-caffeine (EXCEDRIN MIGRAINE) 250-250-65 MG per tablet, Take 1 tablet by mouth Every 6 (Six) Hours As Needed for Headache., Disp: , Rfl:     atorvastatin (LIPITOR) 10 MG tablet, atorvastatin 10 mg tablet, Disp: , Rfl:     Blood Glucose Monitoring Suppl (Contour Next EZ) w/Device kit, Use to test blood glucose up to four times daily as needed., Disp: 1 kit, Rfl: 0    cetirizine (zyrTEC) 10 MG tablet, Take 1 tablet by mouth Daily. PRN, Disp: , Rfl:     Continuous Glucose Sensor (FreeStyle Annalee 3 Plus Sensor), APPLY SENSOR TO SKIN FOR CONTINUOUS BLOOD SUGAR MONITORING, REPLACE EVERY 15 DAYS, Disp: 2 each, Rfl: 0    FLUoxetine (PROzac) 40 MG capsule, fluoxetine 40 mg capsule  Take 1 capsule every day by oral route for 90 days., Disp: , Rfl:     glucose blood test strip, Use to test blood glucose up to four times daily as needed., Disp: 100 each, Rfl: 0    Insulin Lispro, 1 Unit Dial, (HumaLOG KwikPen) 100 UNIT/ML solution pen-injector, Inject 4 Units under the skin into the appropriate area as directed 3 (Three) Times a Day., Disp: 15 mL, Rfl: 1    Insulin Pen Needle (Droplet Pen Needles) 32G X 4 MM misc, Inject 1 Units under the skin into the appropriate area as directed 4 (Four) Times a Day., Disp: 100 each, Rfl: 1    Lancets misc, Use to test blood glucose up to four times daily as needed., Disp: 100 each, Rfl: 0    Lantus SoloStar 100 UNIT/ML injection pen, Inject 18 Units under the skin into the appropriate area as directed Daily., Disp: 15 mL, Rfl: 2    metFORMIN (GLUCOPHAGE) 500 MG tablet, Take 2 tablets by mouth Daily With Breakfast. (Patient taking differently: Take  by mouth.), Disp: 30 tablet, Rfl:  2    Multiple Vitamins-Minerals (MULTI-BETIC PO), Take 2 tablets by mouth Daily., Disp: , Rfl:     promethazine (PHENERGAN) 25 MG tablet, Take 1 tablet by mouth Every 6 (Six) Hours As Needed for Nausea or Vomiting., Disp: 30 tablet, Rfl: 2    SALINE NASAL MIST NA, Saline Nasal Mist, Disp: , Rfl:     sucralfate (CARAFATE) 1 g tablet, TAKE 1 TABLET BY MOUTH 4 TIMES A DAY, Disp: 120 tablet, Rfl: 3    SudoGest 12 Hour 120 MG 12 hr tablet, , Disp: , Rfl:     SUMAtriptan (IMITREX) 100 MG tablet, sumatriptan 100 mg tablet  take 1 po at onset of symptoms. can repeat x 1 in 2 hours if headache persists, Disp: , Rfl:     traZODone (DESYREL) 150 MG tablet, Take 1 tablet by mouth Every Night., Disp: , Rfl:     valACYclovir (VALTREX) 500 MG tablet, TAKE 1 TABLET BY MOUTH DAILY, Disp: 90 tablet, Rfl: 3    Vonoprazan Fumarate (Voquezna) 10 MG tablet, Take 1 tablet by mouth Daily., Disp: 30 tablet, Rfl: 3    benzonatate (TESSALON) 200 MG capsule, Take 1 capsule by mouth 3 (Three) Times a Day As Needed for Cough. (Patient not taking: Reported on 5/13/2025), Disp: 30 capsule, Rfl: 0    Continuous Glucose  (FreeStyle Annalee 3 [place]) device, Use to check blood sugar 6-8 times a day as needed (Patient not taking: Reported on 5/13/2025), Disp: 1 each, Rfl: 11    Continuous Glucose Sensor (FreeStyle Annalee 14 Day Sensor) misc, Use to check glucose 6-8 times a day as needed. Change sensor every 14 days as directed by prescriber. (Patient not taking: Reported on 5/13/2025), Disp: 1 each, Rfl: 11    Continuous Glucose Sensor (FreeStyle Annalee 3 Sensor) misc, Check blood glucose levels 6 to 8 times daily. Change sensory every 14 days. (Patient not taking: Reported on 5/13/2025), Disp: 1 each, Rfl: 11    oseltamivir (Tamiflu) 75 MG capsule, Take 1 capsule by mouth 2 (Two) Times a Day. (Patient not taking: Reported on 5/13/2025), Disp: 10 capsule, Rfl: 0    promethazine-dextromethorphan (PROMETHAZINE-DM) 6.25-15 MG/5ML syrup, Take 5 mL by  mouth 4 (Four) Times a Day As Needed for Cough. (Patient not taking: Reported on 5/13/2025), Disp: 118 mL, Rfl: 0  Mobic [meloxicam]    Objective   Vitals:    05/13/25 0804   BP: 122/70   Pulse: 87   Temp: 98.2 °F (36.8 °C)   SpO2: 98%          /70 (BP Location: Left arm)   Pulse 87   Temp 98.2 °F (36.8 °C)   Wt 60 kg (132 lb 3.2 oz)   LMP  (LMP Unknown)   SpO2 98%   BMI 24.18 kg/m²       Physical Exam  Neurological:      General: No focal deficit present.      Mental Status: She is alert and oriented to person, place, and time.               Diagnoses and all orders for this visit:    1. Type 2 diabetes mellitus with hyperglycemia, with long-term current use of insulin (Primary)  -     Hemoglobin A1c             Assessment:       1- DM, Type 2  Uncontrolled with High risk and hyperglycemia   CGM on file , reviewed with pt   Getting better   We discussed the medications  Hypoglycemia and its importance was reviewed   Labs where shown to the patient   2. BP is in good range   3. Hyperlipidemia, on a statin   4. Long term insulin use         Plan:      1. Diet, exercise and weight management  2. Consistent food intake to avoid low blood sugars  3. Home medication includes for diabetes     No Glipizide, in need of prandial coverage   Lantus 18 units once a day   Change to  Meal time insulin 5 units with meals ,   add SSI 1/50 above 150   Needs to take it with all meals even if low   Metformin 1000 mg 1 a day     4. Consistent checking of blood sugars using Annalee 3   5. I reviewed the last progress note  6. Annual eye exam  7. Return in 3 months   8. Send in readings in 4-6 weeks if running high , she will send a [employer] message   9. Rx sent to the pharmacy  10. Labs : A1c today       I discussed with the patient/legal representative the risks and the benefits associated with the medications.  The patient/legal representative has been given the opportunity to ask questions.  Alternatives to the proposed  treatment (s) were discussed, including the likely results of no treatment.  The patient/legal representative wishes to proceed.       Refugio Vyas MD   05/13/25  08:29 EDT

## 2025-05-13 ENCOUNTER — OFFICE VISIT (OUTPATIENT)
Dept: ENDOCRINOLOGY | Age: 60
End: 2025-05-13
Payer: COMMERCIAL

## 2025-05-13 VITALS
HEART RATE: 87 BPM | WEIGHT: 132.2 LBS | DIASTOLIC BLOOD PRESSURE: 70 MMHG | SYSTOLIC BLOOD PRESSURE: 122 MMHG | OXYGEN SATURATION: 98 % | BODY MASS INDEX: 24.18 KG/M2 | TEMPERATURE: 98.2 F

## 2025-05-13 DIAGNOSIS — Z79.4 TYPE 2 DIABETES MELLITUS WITH HYPERGLYCEMIA, WITH LONG-TERM CURRENT USE OF INSULIN: Primary | ICD-10-CM

## 2025-05-13 DIAGNOSIS — E11.65 TYPE 2 DIABETES MELLITUS WITH HYPERGLYCEMIA, WITH LONG-TERM CURRENT USE OF INSULIN: Primary | ICD-10-CM

## 2025-05-13 LAB — HBA1C MFR BLD: 8.8 % (ref 4.8–5.6)

## 2025-05-13 RX ORDER — INSULIN LISPRO 100 [IU]/ML
5 INJECTION, SOLUTION INTRAVENOUS; SUBCUTANEOUS 3 TIMES DAILY
Qty: 15 ML | Refills: 1 | Status: SHIPPED | OUTPATIENT
Start: 2025-05-13

## 2025-05-13 RX ORDER — INSULIN GLARGINE 100 [IU]/ML
18 INJECTION, SOLUTION SUBCUTANEOUS DAILY
Qty: 15 ML | Refills: 2 | Status: SHIPPED | OUTPATIENT
Start: 2025-05-13

## 2025-05-31 DIAGNOSIS — E11.65 TYPE 2 DIABETES MELLITUS WITH HYPERGLYCEMIA, WITH LONG-TERM CURRENT USE OF INSULIN: ICD-10-CM

## 2025-05-31 DIAGNOSIS — K22.719 BARRETT'S ESOPHAGUS WITH DYSPLASIA: ICD-10-CM

## 2025-05-31 DIAGNOSIS — R13.19 ESOPHAGEAL DYSPHAGIA: ICD-10-CM

## 2025-05-31 DIAGNOSIS — Z79.4 TYPE 2 DIABETES MELLITUS WITH HYPERGLYCEMIA, WITH LONG-TERM CURRENT USE OF INSULIN: ICD-10-CM

## 2025-05-31 DIAGNOSIS — K21.00 GASTROESOPHAGEAL REFLUX DISEASE WITH ESOPHAGITIS WITHOUT HEMORRHAGE: ICD-10-CM

## 2025-06-01 DIAGNOSIS — E11.65 TYPE 2 DIABETES MELLITUS WITH HYPERGLYCEMIA, WITH LONG-TERM CURRENT USE OF INSULIN: ICD-10-CM

## 2025-06-01 DIAGNOSIS — Z79.4 TYPE 2 DIABETES MELLITUS WITH HYPERGLYCEMIA, WITH LONG-TERM CURRENT USE OF INSULIN: ICD-10-CM

## 2025-06-02 RX ORDER — PEN NEEDLE, DIABETIC 32GX 5/32"
1 NEEDLE, DISPOSABLE MISCELLANEOUS 4 TIMES DAILY
Qty: 100 EACH | Refills: 1 | Status: SHIPPED | OUTPATIENT
Start: 2025-06-02

## 2025-06-02 RX ORDER — SUCRALFATE 1 G/1
1 TABLET ORAL EVERY 6 HOURS
Qty: 120 TABLET | Refills: 3 | Status: SHIPPED | OUTPATIENT
Start: 2025-06-02

## 2025-06-02 RX ORDER — HYDROCHLOROTHIAZIDE 12.5 MG/1
CAPSULE ORAL
Qty: 2 EACH | Refills: 1 | Status: SHIPPED | OUTPATIENT
Start: 2025-06-02

## 2025-06-02 NOTE — TELEPHONE ENCOUNTER
Rx Refill Note  Requested Prescriptions     Pending Prescriptions Disp Refills    Continuous Glucose Sensor (FreeStyle Annalee 3 Plus Sensor) [Pharmacy Med Name: FREESTYLE ANNALEE 3 PLUS SENSOR] 2 each 0     Sig: APPLY SENSOR TO SKIN FOR CONTINUOUS BLOOD SUGAR MONITORING, REPLACE EVERY 15 DAYS      Last office visit with prescribing clinician: 5/13/2025   Last telemedicine visit with prescribing clinician: Visit date not found   Next office visit with prescribing clinician: 6/1/2025                         Would you like a call back once the refill request has been completed: [] Yes [] No    If the office needs to give you a call back, can they leave a voicemail: [] Yes [] No    Park Green MA  06/02/25, 08:43 EDT

## 2025-06-02 NOTE — TELEPHONE ENCOUNTER
Rx Refill Note  Requested Prescriptions     Pending Prescriptions Disp Refills    Droplet Pen [place] 32G X 4 MM misc [Pharmacy Med Name: DROPLET PEN NEEDLE 32G 4MM] 100 each 1     Sig: INJECT 1 UNITS UNDER THE SKIN INTO THE APPROPRIATE AREA AS DIRECTED 4 (FOUR) TIMES A DAY.      Last office visit with prescribing clinician: 5/13/2025   Last telemedicine visit with prescribing clinician: Visit date not found   Next office visit with prescribing clinician: 8/13/2025                         Would you like a call back once the refill request has been completed: [] Yes [] No    If the office needs to give you a call back, can they leave a voicemail: [] Yes [] No    Park Green MA  06/02/25, 08:51 EDT

## 2025-06-30 RX ORDER — VONOPRAZAN FUMARATE 13.36 MG/1
10 TABLET ORAL DAILY
Qty: 30 TABLET | Refills: 3 | Status: SHIPPED | OUTPATIENT
Start: 2025-06-30

## 2025-07-28 DIAGNOSIS — E11.65 TYPE 2 DIABETES MELLITUS WITH HYPERGLYCEMIA, WITH LONG-TERM CURRENT USE OF INSULIN: ICD-10-CM

## 2025-07-28 DIAGNOSIS — Z79.4 TYPE 2 DIABETES MELLITUS WITH HYPERGLYCEMIA, WITH LONG-TERM CURRENT USE OF INSULIN: ICD-10-CM

## 2025-07-28 RX ORDER — HYDROCHLOROTHIAZIDE 12.5 MG/1
CAPSULE ORAL
Qty: 2 EACH | Refills: 1 | OUTPATIENT
Start: 2025-07-28

## 2025-07-29 DIAGNOSIS — Z79.4 TYPE 2 DIABETES MELLITUS WITH HYPERGLYCEMIA, WITH LONG-TERM CURRENT USE OF INSULIN: ICD-10-CM

## 2025-07-29 DIAGNOSIS — E11.65 TYPE 2 DIABETES MELLITUS WITH HYPERGLYCEMIA, WITH LONG-TERM CURRENT USE OF INSULIN: ICD-10-CM

## 2025-07-29 RX ORDER — HYDROCHLOROTHIAZIDE 12.5 MG/1
CAPSULE ORAL
Qty: 2 EACH | Refills: 0 | Status: SHIPPED | OUTPATIENT
Start: 2025-07-29

## 2025-07-29 NOTE — TELEPHONE ENCOUNTER
Rx Refill Note  Requested Prescriptions     Pending Prescriptions Disp Refills    Continuous Glucose Sensor (FreeStyle Annalee 3 Plus Sensor) 2 each 1     Sig: APPLY SENSOR TO SKIN FOR CONTINUOUS BLOOD SUGAR MONITORING, REPLACE EVERY 15 DAYS      Last office visit with prescribing clinician: 5/13/2025   Last telemedicine visit with prescribing clinician: Visit date not found   Next office visit with prescribing clinician: 8/13/2025                         Would you like a call back once the refill request has been completed: [] Yes [] No    If the office needs to give you a call back, can they leave a voicemail: [] Yes [] No    Park Green MA  07/29/25, 11:06 EDT

## 2025-08-11 ENCOUNTER — OFFICE VISIT (OUTPATIENT)
Dept: OBSTETRICS AND GYNECOLOGY | Facility: CLINIC | Age: 60
End: 2025-08-11
Payer: COMMERCIAL

## 2025-08-11 ENCOUNTER — HOSPITAL ENCOUNTER (EMERGENCY)
Facility: HOSPITAL | Age: 60
Discharge: HOME | End: 2025-08-12
Payer: COMMERCIAL

## 2025-08-11 ENCOUNTER — TELEPHONE (OUTPATIENT)
Dept: GASTROENTEROLOGY | Facility: CLINIC | Age: 60
End: 2025-08-11
Payer: COMMERCIAL

## 2025-08-11 VITALS
SYSTOLIC BLOOD PRESSURE: 122 MMHG | HEIGHT: 62 IN | WEIGHT: 138 LBS | DIASTOLIC BLOOD PRESSURE: 70 MMHG | BODY MASS INDEX: 25.4 KG/M2

## 2025-08-11 DIAGNOSIS — E16.2 HYPOGLYCEMIA: Primary | ICD-10-CM

## 2025-08-11 DIAGNOSIS — Z01.419 ROUTINE GYNECOLOGICAL EXAMINATION: Primary | ICD-10-CM

## 2025-08-11 DIAGNOSIS — Z12.31 ENCOUNTER FOR SCREENING MAMMOGRAM FOR MALIGNANT NEOPLASM OF BREAST: ICD-10-CM

## 2025-08-11 DIAGNOSIS — N95.2 VAGINAL ATROPHY: ICD-10-CM

## 2025-08-11 DIAGNOSIS — Z13.820 SCREENING FOR OSTEOPOROSIS: ICD-10-CM

## 2025-08-11 DIAGNOSIS — B00.9 HSV-2 (HERPES SIMPLEX VIRUS 2) INFECTION: ICD-10-CM

## 2025-08-11 LAB
ALBUMIN SERPL-MCNC: 4.7 G/DL (ref 3.5–5.2)
ALBUMIN/GLOB SERPL: 1.5 G/DL
ALP SERPL-CCNC: 85 U/L (ref 39–117)
ALT SERPL W P-5'-P-CCNC: 17 U/L (ref 1–33)
AMORPH URATE CRY URNS QL MICRO: ABNORMAL /HPF
ANION GAP SERPL CALCULATED.3IONS-SCNC: 13.3 MMOL/L (ref 5–15)
AST SERPL-CCNC: 19 U/L (ref 1–32)
BACTERIA UR QL AUTO: ABNORMAL /HPF
BASOPHILS # BLD AUTO: 0.05 10*3/MM3 (ref 0–0.2)
BASOPHILS NFR BLD AUTO: 0.4 % (ref 0–1.5)
BILIRUB BLD-MCNC: NEGATIVE MG/DL
BILIRUB SERPL-MCNC: <0.2 MG/DL (ref 0–1.2)
BILIRUB UR QL STRIP: NEGATIVE
BUN SERPL-MCNC: 17.4 MG/DL (ref 6–20)
BUN/CREAT SERPL: 20.2 (ref 7–25)
CALCIUM SPEC-SCNC: 9.6 MG/DL (ref 8.6–10.5)
CHLORIDE SERPL-SCNC: 101 MMOL/L (ref 98–107)
CLARITY UR: CLEAR
CLARITY, POC: CLEAR
CO2 SERPL-SCNC: 24.7 MMOL/L (ref 22–29)
COARSE GRAN CASTS URNS QL MICRO: ABNORMAL /LPF
COLOR UR: ABNORMAL
COLOR UR: YELLOW
CREAT SERPL-MCNC: 0.86 MG/DL (ref 0.57–1)
DEPRECATED RDW RBC AUTO: 50.3 FL (ref 37–54)
EGFRCR SERPLBLD CKD-EPI 2021: 77.9 ML/MIN/1.73
EOSINOPHIL # BLD AUTO: 0.23 10*3/MM3 (ref 0–0.4)
EOSINOPHIL NFR BLD AUTO: 1.9 % (ref 0.3–6.2)
ERYTHROCYTE [DISTWIDTH] IN BLOOD BY AUTOMATED COUNT: 18.7 % (ref 12.3–15.4)
GLOBULIN UR ELPH-MCNC: 3.1 GM/DL
GLUCOSE BLDC GLUCOMTR-MCNC: 269 MG/DL (ref 70–130)
GLUCOSE BLDC GLUCOMTR-MCNC: 60 MG/DL (ref 70–130)
GLUCOSE BLDC GLUCOMTR-MCNC: 91 MG/DL (ref 70–130)
GLUCOSE SERPL-MCNC: 86 MG/DL (ref 65–99)
GLUCOSE UR STRIP-MCNC: ABNORMAL MG/DL
GLUCOSE UR STRIP-MCNC: NEGATIVE MG/DL
HCT VFR BLD AUTO: 34 % (ref 34–46.6)
HGB BLD-MCNC: 10.4 G/DL (ref 12–15.9)
HGB UR QL STRIP.AUTO: NEGATIVE
HYALINE CASTS UR QL AUTO: ABNORMAL /LPF
IMM GRANULOCYTES # BLD AUTO: 0.08 10*3/MM3 (ref 0–0.05)
IMM GRANULOCYTES NFR BLD AUTO: 0.7 % (ref 0–0.5)
KETONES UR QL STRIP: ABNORMAL
KETONES UR QL: NEGATIVE
LEUKOCYTE EST, POC: NEGATIVE
LEUKOCYTE ESTERASE UR QL STRIP.AUTO: NEGATIVE
LYMPHOCYTES # BLD AUTO: 2.01 10*3/MM3 (ref 0.7–3.1)
LYMPHOCYTES NFR BLD AUTO: 16.9 % (ref 19.6–45.3)
MCH RBC QN AUTO: 23.1 PG (ref 26.6–33)
MCHC RBC AUTO-ENTMCNC: 30.6 G/DL (ref 31.5–35.7)
MCV RBC AUTO: 75.6 FL (ref 79–97)
MONOCYTES # BLD AUTO: 1.05 10*3/MM3 (ref 0.1–0.9)
MONOCYTES NFR BLD AUTO: 8.9 % (ref 5–12)
NEUTROPHILS NFR BLD AUTO: 71.2 % (ref 42.7–76)
NEUTROPHILS NFR BLD AUTO: 8.44 10*3/MM3 (ref 1.7–7)
NITRITE UR QL STRIP: NEGATIVE
NITRITE UR-MCNC: NEGATIVE MG/ML
NRBC BLD AUTO-RTO: 0 /100 WBC (ref 0–0.2)
PH UR STRIP.AUTO: 6 [PH] (ref 4.5–8)
PH UR: 5 [PH] (ref 5–8)
PLATELET # BLD AUTO: 252 10*3/MM3 (ref 140–450)
PMV BLD AUTO: 9.4 FL (ref 6–12)
POTASSIUM SERPL-SCNC: 3.3 MMOL/L (ref 3.5–5.2)
PROT SERPL-MCNC: 7.8 G/DL (ref 6–8.5)
PROT UR QL STRIP: ABNORMAL
PROT UR STRIP-MCNC: NEGATIVE MG/DL
RBC # BLD AUTO: 4.5 10*6/MM3 (ref 3.77–5.28)
RBC # UR STRIP: ABNORMAL /HPF
RBC # UR STRIP: NEGATIVE /UL
REF LAB TEST METHOD: ABNORMAL
SODIUM SERPL-SCNC: 139 MMOL/L (ref 136–145)
SP GR UR STRIP: 1.02 (ref 1–1.03)
SP GR UR: 1 (ref 1–1.03)
SQUAMOUS #/AREA URNS HPF: ABNORMAL /HPF
T4 FREE SERPL-MCNC: 1.27 NG/DL (ref 0.92–1.68)
TSH SERPL DL<=0.05 MIU/L-ACNC: 5.9 UIU/ML (ref 0.27–4.2)
UROBILINOGEN UR QL STRIP: ABNORMAL
UROBILINOGEN UR QL: NORMAL
WBC # UR STRIP: ABNORMAL /HPF
WBC NRBC COR # BLD AUTO: 11.86 10*3/MM3 (ref 3.4–10.8)

## 2025-08-11 PROCEDURE — 85025 COMPLETE CBC W/AUTO DIFF WBC: CPT

## 2025-08-11 PROCEDURE — 99396 PREV VISIT EST AGE 40-64: CPT | Performed by: OBSTETRICS & GYNECOLOGY

## 2025-08-11 PROCEDURE — 84439 ASSAY OF FREE THYROXINE: CPT

## 2025-08-11 PROCEDURE — 80053 COMPREHEN METABOLIC PANEL: CPT

## 2025-08-11 PROCEDURE — 36415 COLL VENOUS BLD VENIPUNCTURE: CPT

## 2025-08-11 PROCEDURE — 81001 URINALYSIS AUTO W/SCOPE: CPT

## 2025-08-11 PROCEDURE — 99284 EMERGENCY DEPT VISIT MOD MDM: CPT

## 2025-08-11 PROCEDURE — 82948 REAGENT STRIP/BLOOD GLUCOSE: CPT

## 2025-08-11 PROCEDURE — 84443 ASSAY THYROID STIM HORMONE: CPT

## 2025-08-11 PROCEDURE — 81002 URINALYSIS NONAUTO W/O SCOPE: CPT | Performed by: OBSTETRICS & GYNECOLOGY

## 2025-08-11 RX ORDER — VALACYCLOVIR HYDROCHLORIDE 500 MG/1
500 TABLET, FILM COATED ORAL DAILY
Qty: 90 TABLET | Refills: 3 | Status: SHIPPED | OUTPATIENT
Start: 2025-08-11

## 2025-08-11 RX ORDER — ESTRADIOL 0.1 MG/G
1 CREAM VAGINAL 2 TIMES WEEKLY
Qty: 45 G | Refills: 6 | Status: SHIPPED | OUTPATIENT
Start: 2025-08-11

## 2025-08-12 VITALS
BODY MASS INDEX: 25.4 KG/M2 | DIASTOLIC BLOOD PRESSURE: 60 MMHG | WEIGHT: 138 LBS | HEART RATE: 84 BPM | HEIGHT: 62 IN | TEMPERATURE: 97.7 F | RESPIRATION RATE: 18 BRPM | OXYGEN SATURATION: 100 % | SYSTOLIC BLOOD PRESSURE: 118 MMHG

## 2025-08-13 ENCOUNTER — OFFICE VISIT (OUTPATIENT)
Dept: ENDOCRINOLOGY | Age: 60
End: 2025-08-13
Payer: COMMERCIAL

## 2025-08-13 VITALS
BODY MASS INDEX: 25.06 KG/M2 | OXYGEN SATURATION: 82 % | HEART RATE: 92 BPM | SYSTOLIC BLOOD PRESSURE: 122 MMHG | DIASTOLIC BLOOD PRESSURE: 70 MMHG | HEIGHT: 62 IN | WEIGHT: 136.2 LBS

## 2025-08-13 DIAGNOSIS — Z79.4 TYPE 2 DIABETES MELLITUS WITH HYPERGLYCEMIA, WITH LONG-TERM CURRENT USE OF INSULIN: ICD-10-CM

## 2025-08-13 DIAGNOSIS — E11.65 TYPE 2 DIABETES MELLITUS WITH HYPERGLYCEMIA, WITH LONG-TERM CURRENT USE OF INSULIN: Primary | ICD-10-CM

## 2025-08-13 DIAGNOSIS — E11.65 TYPE 2 DIABETES MELLITUS WITH HYPERGLYCEMIA, WITH LONG-TERM CURRENT USE OF INSULIN: ICD-10-CM

## 2025-08-13 DIAGNOSIS — Z79.4 TYPE 2 DIABETES MELLITUS WITH HYPERGLYCEMIA, WITH LONG-TERM CURRENT USE OF INSULIN: Primary | ICD-10-CM

## 2025-08-13 RX ORDER — GLUCAGON INJECTION, SOLUTION 1 MG/.2ML
1 INJECTION, SOLUTION SUBCUTANEOUS AS NEEDED
Qty: 0.2 ML | Refills: 0 | Status: SHIPPED | OUTPATIENT
Start: 2025-08-13

## 2025-08-13 RX ORDER — PEN NEEDLE, DIABETIC 32GX 5/32"
NEEDLE, DISPOSABLE MISCELLANEOUS
Qty: 100 EACH | Refills: 1 | Status: SHIPPED | OUTPATIENT
Start: 2025-08-13

## 2025-08-14 LAB
ALBUMIN/CREAT UR: 4 MG/G CREAT (ref 0–29)
CREAT UR-MCNC: 164.1 MG/DL
HBA1C MFR BLD: NORMAL %
MICROALBUMIN UR-MCNC: 7.3 UG/ML
SPECIMEN STATUS: NORMAL

## 2025-08-15 DIAGNOSIS — Z79.4 TYPE 2 DIABETES MELLITUS WITH HYPERGLYCEMIA, WITH LONG-TERM CURRENT USE OF INSULIN: Primary | ICD-10-CM

## 2025-08-15 DIAGNOSIS — E11.65 TYPE 2 DIABETES MELLITUS WITH HYPERGLYCEMIA, WITH LONG-TERM CURRENT USE OF INSULIN: Primary | ICD-10-CM

## 2025-08-17 DIAGNOSIS — E11.65 TYPE 2 DIABETES MELLITUS WITH HYPERGLYCEMIA, WITH LONG-TERM CURRENT USE OF INSULIN: ICD-10-CM

## 2025-08-17 DIAGNOSIS — Z79.4 TYPE 2 DIABETES MELLITUS WITH HYPERGLYCEMIA, WITH LONG-TERM CURRENT USE OF INSULIN: ICD-10-CM

## 2025-08-18 RX ORDER — HYDROCHLOROTHIAZIDE 12.5 MG/1
CAPSULE ORAL
Qty: 2 EACH | Refills: 1 | Status: SHIPPED | OUTPATIENT
Start: 2025-08-18

## (undated) DEVICE — LINER SURG CANSTR SXN S/RIGD 1500CC

## (undated) DEVICE — BW-412T DISP COMBO CLEANING BRUSH: Brand: SINGLE USE COMBINATION CLEANING BRUSH

## (undated) DEVICE — FRCP BX RADJAW4 NDL 2.8 240CM LG OG BX40

## (undated) DEVICE — KT ORCA ORCAPOD DISP STRL

## (undated) DEVICE — SPNG GZ WOVN 4X4IN 12PLY 10/BX STRL

## (undated) DEVICE — THE BITE BLOCK MAXI, LATEX FREE STRAP IS USED TO PROTECT THE ENDOSCOPE INSERTION TUBE FROM BEING BITTEN BY THE PATIENT.

## (undated) DEVICE — SOL IRR H2O BTL 1000ML STRL

## (undated) DEVICE — GLV SURG SENSICARE PI MIC PF SZ7.5 LF STRL

## (undated) DEVICE — JACKT LAB F/R KNIT CUFF/COLR XLG BLU

## (undated) DEVICE — SYR LL W/SCALE/MARK 3ML STRL

## (undated) DEVICE — VIAL FORMALIN CAP 10P 40ML

## (undated) DEVICE — SYR LL 3CC

## (undated) DEVICE — ADAPT CLN BIOGUARD AIR/H2O DISP

## (undated) DEVICE — GLV SURG SENSICARE PI MIC PF SZ8 LF STRL

## (undated) DEVICE — Device

## (undated) DEVICE — SUCTION CANISTER, 3000CC,SAFELINER: Brand: DEROYAL